# Patient Record
Sex: FEMALE | Race: WHITE | Employment: OTHER | URBAN - METROPOLITAN AREA
[De-identification: names, ages, dates, MRNs, and addresses within clinical notes are randomized per-mention and may not be internally consistent; named-entity substitution may affect disease eponyms.]

---

## 2020-09-05 ENCOUNTER — APPOINTMENT (OUTPATIENT)
Dept: GENERAL RADIOLOGY | Facility: CLINIC | Age: 75
DRG: 003 | End: 2020-09-05
Attending: EMERGENCY MEDICINE
Payer: MEDICARE

## 2020-09-05 ENCOUNTER — APPOINTMENT (OUTPATIENT)
Dept: MRI IMAGING | Facility: CLINIC | Age: 75
DRG: 003 | End: 2020-09-05
Attending: EMERGENCY MEDICINE
Payer: MEDICARE

## 2020-09-05 ENCOUNTER — APPOINTMENT (OUTPATIENT)
Dept: CT IMAGING | Facility: CLINIC | Age: 75
DRG: 003 | End: 2020-09-05
Attending: EMERGENCY MEDICINE
Payer: MEDICARE

## 2020-09-05 ENCOUNTER — HOSPITAL ENCOUNTER (INPATIENT)
Facility: CLINIC | Age: 75
LOS: 30 days | Discharge: NURSING, REHAB OR LONG-TERM CARE | DRG: 003 | End: 2020-10-05
Attending: EMERGENCY MEDICINE | Admitting: HOSPITALIST
Payer: MEDICARE

## 2020-09-05 DIAGNOSIS — R79.89 TROPONIN LEVEL ELEVATED: ICD-10-CM

## 2020-09-05 DIAGNOSIS — G93.40 ACUTE ENCEPHALOPATHY: ICD-10-CM

## 2020-09-05 DIAGNOSIS — N39.0 URINARY TRACT INFECTION WITHOUT HEMATURIA, SITE UNSPECIFIED: ICD-10-CM

## 2020-09-05 DIAGNOSIS — R41.82 ALTERED MENTAL STATUS, UNSPECIFIED ALTERED MENTAL STATUS TYPE: ICD-10-CM

## 2020-09-05 DIAGNOSIS — I63.522: Primary | ICD-10-CM

## 2020-09-05 DIAGNOSIS — I16.0 HYPERTENSIVE URGENCY: ICD-10-CM

## 2020-09-05 LAB
ALBUMIN SERPL-MCNC: 4.1 G/DL (ref 3.4–5)
ALBUMIN UR-MCNC: 10 MG/DL
ALP SERPL-CCNC: 99 U/L (ref 40–150)
ALT SERPL W P-5'-P-CCNC: 23 U/L (ref 0–50)
ANION GAP SERPL CALCULATED.3IONS-SCNC: 7 MMOL/L (ref 3–14)
APPEARANCE UR: ABNORMAL
AST SERPL W P-5'-P-CCNC: 20 U/L (ref 0–45)
BACTERIA #/AREA URNS HPF: ABNORMAL /HPF
BASOPHILS # BLD AUTO: 0 10E9/L (ref 0–0.2)
BASOPHILS NFR BLD AUTO: 0.3 %
BILIRUB DIRECT SERPL-MCNC: 0.2 MG/DL (ref 0–0.2)
BILIRUB SERPL-MCNC: 0.6 MG/DL (ref 0.2–1.3)
BILIRUB UR QL STRIP: NEGATIVE
BUN SERPL-MCNC: 20 MG/DL (ref 7–30)
CALCIUM SERPL-MCNC: 9.7 MG/DL (ref 8.5–10.1)
CHLORIDE SERPL-SCNC: 109 MMOL/L (ref 94–109)
CHOLEST SERPL-MCNC: 186 MG/DL
CO2 SERPL-SCNC: 28 MMOL/L (ref 20–32)
COLOR UR AUTO: YELLOW
CREAT BLD-MCNC: 0.8 MG/DL (ref 0.52–1.04)
CREAT SERPL-MCNC: 0.82 MG/DL (ref 0.52–1.04)
DIFFERENTIAL METHOD BLD: ABNORMAL
EOSINOPHIL # BLD AUTO: 0 10E9/L (ref 0–0.7)
EOSINOPHIL NFR BLD AUTO: 0.2 %
ERYTHROCYTE [DISTWIDTH] IN BLOOD BY AUTOMATED COUNT: 13.3 % (ref 10–15)
GFR SERPL CREATININE-BSD FRML MDRD: 69 ML/MIN/{1.73_M2}
GFR SERPL CREATININE-BSD FRML MDRD: 70 ML/MIN/{1.73_M2}
GLUCOSE BLDC GLUCOMTR-MCNC: 101 MG/DL (ref 70–99)
GLUCOSE SERPL-MCNC: 111 MG/DL (ref 70–99)
GLUCOSE UR STRIP-MCNC: NEGATIVE MG/DL
HBA1C MFR BLD: 5.5 % (ref 0–5.6)
HCT VFR BLD AUTO: 47.8 % (ref 35–47)
HDLC SERPL-MCNC: 87 MG/DL
HGB BLD-MCNC: 15.8 G/DL (ref 11.7–15.7)
HGB UR QL STRIP: NEGATIVE
IMM GRANULOCYTES # BLD: 0 10E9/L (ref 0–0.4)
IMM GRANULOCYTES NFR BLD: 0.3 %
INTERPRETATION ECG - MUSE: NORMAL
KETONES UR STRIP-MCNC: 40 MG/DL
LDLC SERPL CALC-MCNC: 81 MG/DL
LEUKOCYTE ESTERASE UR QL STRIP: ABNORMAL
LYMPHOCYTES # BLD AUTO: 1.7 10E9/L (ref 0.8–5.3)
LYMPHOCYTES NFR BLD AUTO: 17.9 %
MCH RBC QN AUTO: 32.1 PG (ref 26.5–33)
MCHC RBC AUTO-ENTMCNC: 33.1 G/DL (ref 31.5–36.5)
MCV RBC AUTO: 97 FL (ref 78–100)
MONOCYTES # BLD AUTO: 0.4 10E9/L (ref 0–1.3)
MONOCYTES NFR BLD AUTO: 4.6 %
NEUTROPHILS # BLD AUTO: 7.3 10E9/L (ref 1.6–8.3)
NEUTROPHILS NFR BLD AUTO: 76.7 %
NITRATE UR QL: POSITIVE
NONHDLC SERPL-MCNC: 99 MG/DL
NRBC # BLD AUTO: 0 10*3/UL
NRBC BLD AUTO-RTO: 0 /100
PH UR STRIP: 6 PH (ref 5–7)
PLATELET # BLD AUTO: 239 10E9/L (ref 150–450)
POTASSIUM SERPL-SCNC: 3.3 MMOL/L (ref 3.4–5.3)
PROT SERPL-MCNC: 7.3 G/DL (ref 6.8–8.8)
RBC # BLD AUTO: 4.92 10E12/L (ref 3.8–5.2)
RBC #/AREA URNS AUTO: 2 /HPF (ref 0–2)
SODIUM SERPL-SCNC: 144 MMOL/L (ref 133–144)
SOURCE: ABNORMAL
SP GR UR STRIP: >1.035 (ref 1–1.03)
SQUAMOUS #/AREA URNS AUTO: 2 /HPF (ref 0–1)
TRIGL SERPL-MCNC: 89 MG/DL
TROPONIN I SERPL-MCNC: 0.05 UG/L (ref 0–0.04)
TROPONIN I SERPL-MCNC: 0.07 UG/L (ref 0–0.04)
TSH SERPL DL<=0.005 MIU/L-ACNC: 2.04 MU/L (ref 0.4–4)
UROBILINOGEN UR STRIP-MCNC: NORMAL MG/DL (ref 0–2)
WBC # BLD AUTO: 9.5 10E9/L (ref 4–11)
WBC #/AREA URNS AUTO: 93 /HPF (ref 0–5)

## 2020-09-05 PROCEDURE — 25000128 H RX IP 250 OP 636: Performed by: HOSPITALIST

## 2020-09-05 PROCEDURE — 25000125 ZZHC RX 250: Performed by: EMERGENCY MEDICINE

## 2020-09-05 PROCEDURE — 84443 ASSAY THYROID STIM HORMONE: CPT | Performed by: HOSPITALIST

## 2020-09-05 PROCEDURE — 25800030 ZZH RX IP 258 OP 636: Performed by: HOSPITALIST

## 2020-09-05 PROCEDURE — 96374 THER/PROPH/DIAG INJ IV PUSH: CPT | Mod: 59

## 2020-09-05 PROCEDURE — 85025 COMPLETE CBC W/AUTO DIFF WBC: CPT | Performed by: EMERGENCY MEDICINE

## 2020-09-05 PROCEDURE — 93005 ELECTROCARDIOGRAM TRACING: CPT

## 2020-09-05 PROCEDURE — 87086 URINE CULTURE/COLONY COUNT: CPT | Performed by: EMERGENCY MEDICINE

## 2020-09-05 PROCEDURE — 36415 COLL VENOUS BLD VENIPUNCTURE: CPT | Performed by: HOSPITALIST

## 2020-09-05 PROCEDURE — 70498 CT ANGIOGRAPHY NECK: CPT

## 2020-09-05 PROCEDURE — 25000128 H RX IP 250 OP 636: Performed by: EMERGENCY MEDICINE

## 2020-09-05 PROCEDURE — 84484 ASSAY OF TROPONIN QUANT: CPT | Performed by: EMERGENCY MEDICINE

## 2020-09-05 PROCEDURE — 84484 ASSAY OF TROPONIN QUANT: CPT | Performed by: HOSPITALIST

## 2020-09-05 PROCEDURE — U0003 INFECTIOUS AGENT DETECTION BY NUCLEIC ACID (DNA OR RNA); SEVERE ACUTE RESPIRATORY SYNDROME CORONAVIRUS 2 (SARS-COV-2) (CORONAVIRUS DISEASE [COVID-19]), AMPLIFIED PROBE TECHNIQUE, MAKING USE OF HIGH THROUGHPUT TECHNOLOGIES AS DESCRIBED BY CMS-2020-01-R: HCPCS | Performed by: EMERGENCY MEDICINE

## 2020-09-05 PROCEDURE — 80061 LIPID PANEL: CPT | Performed by: EMERGENCY MEDICINE

## 2020-09-05 PROCEDURE — 87088 URINE BACTERIA CULTURE: CPT | Performed by: EMERGENCY MEDICINE

## 2020-09-05 PROCEDURE — 84443 ASSAY THYROID STIM HORMONE: CPT | Performed by: EMERGENCY MEDICINE

## 2020-09-05 PROCEDURE — 99285 EMERGENCY DEPT VISIT HI MDM: CPT | Mod: 25

## 2020-09-05 PROCEDURE — 87186 SC STD MICRODIL/AGAR DIL: CPT | Performed by: EMERGENCY MEDICINE

## 2020-09-05 PROCEDURE — 00000146 ZZHCL STATISTIC GLUCOSE BY METER IP

## 2020-09-05 PROCEDURE — 82565 ASSAY OF CREATININE: CPT

## 2020-09-05 PROCEDURE — 70450 CT HEAD/BRAIN W/O DYE: CPT

## 2020-09-05 PROCEDURE — 99223 1ST HOSP IP/OBS HIGH 75: CPT | Mod: AI | Performed by: HOSPITALIST

## 2020-09-05 PROCEDURE — 96376 TX/PRO/DX INJ SAME DRUG ADON: CPT

## 2020-09-05 PROCEDURE — 83036 HEMOGLOBIN GLYCOSYLATED A1C: CPT | Performed by: EMERGENCY MEDICINE

## 2020-09-05 PROCEDURE — 71046 X-RAY EXAM CHEST 2 VIEWS: CPT

## 2020-09-05 PROCEDURE — 81001 URINALYSIS AUTO W/SCOPE: CPT | Performed by: EMERGENCY MEDICINE

## 2020-09-05 PROCEDURE — 25000132 ZZH RX MED GY IP 250 OP 250 PS 637: Performed by: EMERGENCY MEDICINE

## 2020-09-05 PROCEDURE — 70551 MRI BRAIN STEM W/O DYE: CPT

## 2020-09-05 PROCEDURE — 12000000 ZZH R&B MED SURG/OB

## 2020-09-05 PROCEDURE — 80076 HEPATIC FUNCTION PANEL: CPT | Performed by: EMERGENCY MEDICINE

## 2020-09-05 PROCEDURE — C9803 HOPD COVID-19 SPEC COLLECT: HCPCS

## 2020-09-05 PROCEDURE — 80048 BASIC METABOLIC PNL TOTAL CA: CPT | Performed by: EMERGENCY MEDICINE

## 2020-09-05 RX ORDER — PROCHLORPERAZINE MALEATE 5 MG
5 TABLET ORAL EVERY 6 HOURS PRN
Status: DISCONTINUED | OUTPATIENT
Start: 2020-09-05 | End: 2020-10-05 | Stop reason: HOSPADM

## 2020-09-05 RX ORDER — POTASSIUM CHLORIDE 29.8 MG/ML
20 INJECTION INTRAVENOUS
Status: DISCONTINUED | OUTPATIENT
Start: 2020-09-05 | End: 2020-09-06 | Stop reason: CLARIF

## 2020-09-05 RX ORDER — ASPIRIN 325 MG
325 TABLET ORAL ONCE
Status: COMPLETED | OUTPATIENT
Start: 2020-09-05 | End: 2020-09-05

## 2020-09-05 RX ORDER — POTASSIUM CL/LIDO/0.9 % NACL 10MEQ/0.1L
10 INTRAVENOUS SOLUTION, PIGGYBACK (ML) INTRAVENOUS
Status: DISCONTINUED | OUTPATIENT
Start: 2020-09-05 | End: 2020-10-05 | Stop reason: HOSPADM

## 2020-09-05 RX ORDER — PROCHLORPERAZINE 25 MG
12.5 SUPPOSITORY, RECTAL RECTAL EVERY 12 HOURS PRN
Status: DISCONTINUED | OUTPATIENT
Start: 2020-09-05 | End: 2020-10-05 | Stop reason: HOSPADM

## 2020-09-05 RX ORDER — ONDANSETRON 2 MG/ML
4 INJECTION INTRAMUSCULAR; INTRAVENOUS EVERY 6 HOURS PRN
Status: DISCONTINUED | OUTPATIENT
Start: 2020-09-05 | End: 2020-10-05 | Stop reason: HOSPADM

## 2020-09-05 RX ORDER — LABETALOL HYDROCHLORIDE 5 MG/ML
20 INJECTION, SOLUTION INTRAVENOUS ONCE
Status: COMPLETED | OUTPATIENT
Start: 2020-09-05 | End: 2020-09-05

## 2020-09-05 RX ORDER — IOPAMIDOL 755 MG/ML
90 INJECTION, SOLUTION INTRAVASCULAR ONCE
Status: COMPLETED | OUTPATIENT
Start: 2020-09-05 | End: 2020-09-05

## 2020-09-05 RX ORDER — ASPIRIN 300 MG/1
300 SUPPOSITORY RECTAL DAILY
Status: DISCONTINUED | OUTPATIENT
Start: 2020-09-06 | End: 2020-09-11 | Stop reason: ALTCHOICE

## 2020-09-05 RX ORDER — POLYETHYLENE GLYCOL 3350 17 G/17G
17 POWDER, FOR SOLUTION ORAL DAILY PRN
Status: DISCONTINUED | OUTPATIENT
Start: 2020-09-05 | End: 2020-10-05 | Stop reason: HOSPADM

## 2020-09-05 RX ORDER — AMOXICILLIN 250 MG
1 CAPSULE ORAL 2 TIMES DAILY PRN
Status: DISCONTINUED | OUTPATIENT
Start: 2020-09-05 | End: 2020-10-05 | Stop reason: HOSPADM

## 2020-09-05 RX ORDER — SODIUM CHLORIDE 9 MG/ML
INJECTION, SOLUTION INTRAVENOUS CONTINUOUS
Status: DISCONTINUED | OUTPATIENT
Start: 2020-09-05 | End: 2020-09-07

## 2020-09-05 RX ORDER — MULTIVITAMIN,THERAPEUTIC
1 TABLET ORAL DAILY
Status: ON HOLD | COMMUNITY
End: 2020-10-05

## 2020-09-05 RX ORDER — HYDRALAZINE HYDROCHLORIDE 20 MG/ML
10-20 INJECTION INTRAMUSCULAR; INTRAVENOUS
Status: DISCONTINUED | OUTPATIENT
Start: 2020-09-05 | End: 2020-09-09

## 2020-09-05 RX ORDER — POTASSIUM CHLORIDE 1.5 G/1.58G
20-40 POWDER, FOR SOLUTION ORAL
Status: DISCONTINUED | OUTPATIENT
Start: 2020-09-05 | End: 2020-10-05 | Stop reason: HOSPADM

## 2020-09-05 RX ORDER — LIDOCAINE 40 MG/G
CREAM TOPICAL
Status: DISCONTINUED | OUTPATIENT
Start: 2020-09-05 | End: 2020-10-05 | Stop reason: HOSPADM

## 2020-09-05 RX ORDER — ACETAMINOPHEN 325 MG/1
650 TABLET ORAL EVERY 4 HOURS PRN
Status: DISCONTINUED | OUTPATIENT
Start: 2020-09-05 | End: 2020-09-18

## 2020-09-05 RX ORDER — ATORVASTATIN CALCIUM 20 MG/1
40 TABLET, FILM COATED ORAL
Status: DISCONTINUED | OUTPATIENT
Start: 2020-09-05 | End: 2020-10-05 | Stop reason: HOSPADM

## 2020-09-05 RX ORDER — NALOXONE HYDROCHLORIDE 0.4 MG/ML
.1-.4 INJECTION, SOLUTION INTRAMUSCULAR; INTRAVENOUS; SUBCUTANEOUS
Status: DISCONTINUED | OUTPATIENT
Start: 2020-09-05 | End: 2020-10-05 | Stop reason: HOSPADM

## 2020-09-05 RX ORDER — POTASSIUM CHLORIDE 7.45 MG/ML
10 INJECTION INTRAVENOUS
Status: DISCONTINUED | OUTPATIENT
Start: 2020-09-05 | End: 2020-10-05 | Stop reason: HOSPADM

## 2020-09-05 RX ORDER — VITC/E/ZINC/COPPER/LUTEIN/ZEAX 250 MG-200
1 TABLET,CHEWABLE ORAL DAILY
Status: ON HOLD | COMMUNITY
End: 2020-10-05

## 2020-09-05 RX ORDER — ONDANSETRON 4 MG/1
4 TABLET, ORALLY DISINTEGRATING ORAL EVERY 6 HOURS PRN
Status: DISCONTINUED | OUTPATIENT
Start: 2020-09-05 | End: 2020-10-05 | Stop reason: HOSPADM

## 2020-09-05 RX ORDER — AMOXICILLIN 250 MG
2 CAPSULE ORAL 2 TIMES DAILY PRN
Status: DISCONTINUED | OUTPATIENT
Start: 2020-09-05 | End: 2020-10-05 | Stop reason: HOSPADM

## 2020-09-05 RX ORDER — LABETALOL HYDROCHLORIDE 5 MG/ML
10-40 INJECTION, SOLUTION INTRAVENOUS EVERY 10 MIN PRN
Status: DISCONTINUED | OUTPATIENT
Start: 2020-09-05 | End: 2020-09-09

## 2020-09-05 RX ORDER — CEFTRIAXONE 1 G/1
1 INJECTION, POWDER, FOR SOLUTION INTRAMUSCULAR; INTRAVENOUS EVERY 24 HOURS
Status: DISCONTINUED | OUTPATIENT
Start: 2020-09-05 | End: 2020-09-11

## 2020-09-05 RX ORDER — BISACODYL 10 MG
10 SUPPOSITORY, RECTAL RECTAL DAILY PRN
Status: DISCONTINUED | OUTPATIENT
Start: 2020-09-05 | End: 2020-10-05 | Stop reason: HOSPADM

## 2020-09-05 RX ORDER — POTASSIUM CHLORIDE 1500 MG/1
20-40 TABLET, EXTENDED RELEASE ORAL
Status: DISCONTINUED | OUTPATIENT
Start: 2020-09-05 | End: 2020-10-05 | Stop reason: HOSPADM

## 2020-09-05 RX ORDER — ACETAMINOPHEN 650 MG/1
650 SUPPOSITORY RECTAL EVERY 4 HOURS PRN
Status: DISCONTINUED | OUTPATIENT
Start: 2020-09-05 | End: 2020-09-18

## 2020-09-05 RX ADMIN — CEFTRIAXONE SODIUM 1 G: 1 INJECTION, POWDER, FOR SOLUTION INTRAMUSCULAR; INTRAVENOUS at 20:59

## 2020-09-05 RX ADMIN — LABETALOL HYDROCHLORIDE 20 MG: 5 INJECTION, SOLUTION INTRAVENOUS at 13:33

## 2020-09-05 RX ADMIN — IOPAMIDOL 90 ML: 755 INJECTION, SOLUTION INTRAVENOUS at 14:31

## 2020-09-05 RX ADMIN — SODIUM CHLORIDE 70 ML: 9 INJECTION, SOLUTION INTRAVENOUS at 14:31

## 2020-09-05 RX ADMIN — SODIUM CHLORIDE: 9 INJECTION, SOLUTION INTRAVENOUS at 21:00

## 2020-09-05 RX ADMIN — Medication 10 MEQ: at 23:01

## 2020-09-05 RX ADMIN — ASPIRIN 325 MG ORAL TABLET 325 MG: 325 PILL ORAL at 15:11

## 2020-09-05 RX ADMIN — LABETALOL HYDROCHLORIDE 20 MG: 5 INJECTION, SOLUTION INTRAVENOUS at 15:32

## 2020-09-05 ASSESSMENT — ENCOUNTER SYMPTOMS
ABDOMINAL PAIN: 0
DIARRHEA: 0
COUGH: 0
FEVER: 0
HEADACHES: 0
CONFUSION: 1

## 2020-09-05 ASSESSMENT — ACTIVITIES OF DAILY LIVING (ADL): ADLS_ACUITY_SCORE: 14

## 2020-09-05 NOTE — PROGRESS NOTES
RECEIVING UNIT ED HANDOFF REVIEW    ED Nurse Handoff Report was reviewed by: Kate Gorman, RN on September 5, 2020 at 5:30 PM

## 2020-09-05 NOTE — ED PROVIDER NOTES
History     Chief Complaint:  Altered Mental Status    The history is provided by the patient and the spouse.      Sherrell Leonard is a 74 year old female, accompanied by her , who presents with mild confusion and unsteady gait which has been unchanged the past few days (3 days per ). She denies any headache, chest pain, abdominal pain, fever, cough, diarrhea, or cold like symptoms.  No vision changes or numbness or weakness in the extremities.  She is not anticoagulated. She does not have a history of stroke.    Allergies:  NKDA     Medications:    The patient is not currently taking any prescribed medications.    Past Medical History:    History reviewed.  No pertinent past medical history.    Past Surgical History:    The patient does not have any pertinent past surgical history.     Family History:    History reviewed. No pertinent family history.     Social History:  Patient was accompanied by her .      Review of Systems   Constitutional: Negative for fever.   Respiratory: Negative for cough.    Cardiovascular: Negative for chest pain.   Gastrointestinal: Negative for abdominal pain and diarrhea.   Neurological: Negative for headaches.   Psychiatric/Behavioral: Positive for confusion.   All other systems reviewed and are negative.    Physical Exam     Patient Vitals for the past 24 hrs:   BP Temp Temp src Pulse Resp SpO2 Weight   09/05/20 1500 (!) 212/108 -- -- 92 17 95 % --   09/05/20 1400 (!) 183/89 -- -- 83 15 92 % --   09/05/20 1345 (!) 185/96 -- -- 84 24 92 % --   09/05/20 1330 (!) 221/119 -- -- 99 -- -- --   09/05/20 1315 (!) 203/112 -- -- 78 9 94 % --   09/05/20 1300 (!) 207/100 -- -- 81 20 95 % --   09/05/20 1245 (!) 226/109 -- -- 96 12 94 % --   09/05/20 1240 (!) 213/94 -- -- -- -- -- --   09/05/20 1239 (!) 200/92 98  F (36.7  C) Oral -- 16 95 % 63.5 kg (140 lb)       Physical Exam  VS: Reviewed per above  HENT: Mucous membranes moist, no nuchal rigidity  EYES: sclera anicteric  CV:  Rate as noted, regular rhythm.   RESP: Effort normal. Breath sounds are normal bilaterally.  GI: no tenderness/rebound/guarding, not distended.  NEURO: GCS 15, cranial nerves II through XII are intact, 5 out of 5 strength in all 4 extremities, sensation is intact light touch in all 4 extremities  MSK: No deformity of the extremities  SKIN: Warm and dry    Emergency Department Course   ECG (13:42:17):  Rate 85 bpm. MI interval 212. QRS duration 138. QT/QTc 432/514. P-R-T axes 59 -60 102. Sinus rhythm with 1st degree AV block. Biatrial enlargement. Left axis deviation. Left bundle branch block. Abnormal ECG. Interpreted at 1346 by Martin Bell MD.    Imaging:  Radiology findings were communicated with the patient and  who voiced understanding of the findings.    Head CT w/o Contrast  Patchy and confluent white matter hypoattenuation likely   representing advanced chronic small vessel ischemic change. This is   most focal involving the left basal ganglia/centrum semiovale.   Ischemia cannot be completely excluded. MRI could be performed if   indicated.  As read by Radiology.    CTA Head Neck w Contrast  CTA Head: Multiple intracranial stenoses including high-grade stenosis   of the left middle cerebral artery M1 segment.   CTA Neck: No evidence of flow limiting stenosis at the carotid  As read by Radiology.    Chest XR 2 Views  Two views of the chest. Mild interstitial lung changes are   noted bilaterally. No focal infiltrate or lung consolidation is   appreciated. Lungs are hypoaerated. Heart size appears within normal   limits. No pneumothorax or pleural effusions  As read by Radiology.    Laboratory:  Laboratory findings were communicated with the patient and  who voiced understanding of the findings.    Troponin I 1256: 0.051(H)    BMP: Potassium 3.3(L), Glucose 111(H), o/w WNL (Creatinine 0.82)    CBC: HGB 15.9(H), o/w WNL (WBC 9.5, )    UA with micro: Urineketon: 40, Gravity: >1.035 (H),  Protein albumin: 10, Nitrite: Positive, WBC: 93 (H), bacteria: few, o/w negative     Creatinine POCT: Creatinine 0.8, GFR estimate 70    Asymptomatic Covid PCR: Pending    Interventions:  Medications   labetalol (NORMODYNE/TRANDATE) injection 20 mg (20 mg Intravenous Given 9/5/20 1333)   Saline (70 mLs As instructed Given 9/5/20 1431)   iopamidol (ISOVUE-370) solution 90 mL (90 mLs Intravenous Given 9/5/20 1431)   aspirin (ASA) tablet 325 mg (325 mg Oral Given 9/5/20 1511)   labetalol (NORMODYNE/TRANDATE) injection 20 mg (20 mg Intravenous Given 9/5/20 1532)       Emergency Department Course:  Past medical records, nursing notes, and vitals reviewed.  1244: I performed an exam of the patient and obtained history, as documented above.     EKG was obtained and reviewed in the emergency department.    IV inserted and blood drawn.    Urinalysis and culture obtained and sent for evaluation.    The patient was sent for a head CT and CTA and chest XR while in the emergency department, results above.     1336: I rechecked the patient.    I rechecked the patient. I reviewed the results with the Patient and  and answered all related questions prior to admission.     (1530)  I consulted with Dr. Noriega of the hospitalist services. They are in agreement to accept the patient for admission.    (1538) I consulted with Dr. Mancia, Stroke Neurology Fellow, regarding the patient's history and presentation here in the emergency department.    Findings and plan explained to the Patient and  who consents to admission. Discussed the patient with Dr. Noriega, who will [place] the patient [place] [facility] [place] further monitoring, evaluation, and treatment.    Impression & Plan     Medical Decision Making:  Patient presents to the ER for evaluation of confusion, unsteady gait over the past few days.  On arrival vital signs are normal for blood pressure of 200s over 100s.  She is afebrile.  On exam she has no appreciable  neurological deficits on the gurney but is apparently unsteady while ambulating in the ER without falling to one side of the other.  She is confused about the date small details but otherwise seems relatively coherent.  She denies any other symptoms.  CT of the head without intracranial hemorrhage but there is nonspecific white matter hypoattenuation specifically in the basal ganglia and there are scattered intracranial stenoses worse in the left MCA.  Discussed with stroke neurology with plans for MRI to evaluate for processes such as pres or occult CVA.  EKG is rhythm with left bundle branch block that prior for comparison but no high risk features by Sgarbossa criteria.  Troponin level is slightly elevated at 0.05 but she has no chest pain or shortness of breath. She was given aspirin.  Chest x-ray without widened mediastinum suggest aortic dissection.  Suspect troponin elevation is due to demand ischemia in the setting of elevated blood pressure.  Patient was given serial boluses of labetalol 20 mg for blood pressure control.  At signout to my colleague, patient was awaiting inpatient bed with plans for MRI follow-up as inpatient.      Covid-19  Sherrell Leonard was evaluated during a global COVID-19 pandemic, which necessitated consideration that the patient might be at risk for infection with the SARS-CoV-2 virus that causes COVID-19.   Applicable protocols for evaluation were followed during the patient's care.   COVID-19 was considered as part of the patient's evaluation. The plan for testing is:  a test was obtained during this visit.    Diagnosis:    ICD-10-CM    1. Altered mental status, unspecified altered mental status type  R41.82 UA with Microscopic   2. Urinary tract infection without hematuria, site unspecified  N39.0    3. Hypertensive urgency  I16.0    4. Troponin level elevated  R79.89        Disposition:  Admitted to Dr. Noriega.    Roxy Schneider  9/5/2020    EMERGENCY DEPARTMENT    Scribe  Disclosure:  I, Roxy Schneider, am serving as a scribe at 12:42 PM on 9/5/2020 to document services personally performed by Martin Bell MD based on my observations and the provider's statements to me.        Martin Bell MD  09/05/20 1551       Martin Bell MD  09/05/20 1947

## 2020-09-05 NOTE — ED NOTES
[facility]  ED Nurse Handoff Report    ED Chief complaint: Altered Mental Status      ED Diagnosis:   Final diagnoses:   Altered mental status, unspecified altered mental status type   Urinary tract infection without hematuria, site unspecified   Hypertensive urgency   Troponin level elevated       Code Status: See prior    Allergies: No Known Allergies    Patient Story: Pt here for confusion and dizziness for past three days.   Focused Assessment:  Pt Alert, follows commands. Hypertensive 212 systolic. Given IV meds now 169/105. Needs MRI. UA appears dirty. Ct neg for bleed. Labs WNL.     Treatments and/or interventions provided: See mar for hypertensive medications given.  Patient's response to treatments and/or interventions:blood pressure now 169 systolic.    To be done/followed up on inpatient unit:  na    Does this patient have any cognitive concerns?: Disoriented to time    Activity level - Baseline/Home:  Independent  Activity Level - Current:   Unknown    Patient's Preferred language: English   Needed?: No    Isolation: None  Infection: Not Applicable  Bariatric?: No    Vital Signs:   Vitals:    09/05/20 1345 09/05/20 1400 09/05/20 1500 09/05/20 1600   BP: (!) 185/96 (!) 183/89 (!) 212/108 (!) 169/105   Pulse: 84 83 92 82   Resp: 24 15 17    Temp:       TempSrc:       SpO2: 92% 92% 95% 93%   Weight:           Cardiac Rhythm:     Was the PSS-3 completed:   Yes  What interventions are required if any?               Family Comments: na  OBS brochure/video discussed/provided to patient/family: N/A              Name of person given brochure if not patient: na              Relationship to patient: a    For the majority of the shift this patient's behavior was Green.   Behavioral interventions performed were na.    ED NURSE PHONE NUMBER:[id number]

## 2020-09-05 NOTE — PHARMACY-ADMISSION MEDICATION HISTORY
Pharmacy Medication History  Admission medication history interview status for the 9/5/2020  admission is complete. See EPIC admission navigator for prior to admission medications     Medication history sources: Patient's family/friend ()  Medication history source reliability: Good  Adherence assessment: Good    Medication reconciliation completed by provider prior to medication history? No    Time spent in this activity: 5 min      Prior to Admission medications    Medication Sig Last Dose Taking? Auth Provider   Multiple Vitamins-Minerals (SYSTANE ICAPS AREDS2) CAPS Take 1 capsule by mouth daily 9/5/2020 at Unknown time Yes Unknown, Entered By History   multivitamin, therapeutic (THERA-VIT) TABS tablet Take 1 tablet by mouth daily 9/5/2020 at Unknown time Yes Unknown, Entered By History

## 2020-09-06 ENCOUNTER — APPOINTMENT (OUTPATIENT)
Dept: CARDIOLOGY | Facility: CLINIC | Age: 75
DRG: 003 | End: 2020-09-06
Attending: HOSPITALIST
Payer: MEDICARE

## 2020-09-06 ENCOUNTER — APPOINTMENT (OUTPATIENT)
Dept: MRI IMAGING | Facility: CLINIC | Age: 75
DRG: 003 | End: 2020-09-06
Attending: PSYCHIATRY & NEUROLOGY
Payer: MEDICARE

## 2020-09-06 ENCOUNTER — APPOINTMENT (OUTPATIENT)
Dept: PHYSICAL THERAPY | Facility: CLINIC | Age: 75
DRG: 003 | End: 2020-09-06
Attending: HOSPITALIST
Payer: MEDICARE

## 2020-09-06 ENCOUNTER — APPOINTMENT (OUTPATIENT)
Dept: OCCUPATIONAL THERAPY | Facility: CLINIC | Age: 75
DRG: 003 | End: 2020-09-06
Attending: HOSPITALIST
Payer: MEDICARE

## 2020-09-06 ENCOUNTER — APPOINTMENT (OUTPATIENT)
Dept: SPEECH THERAPY | Facility: CLINIC | Age: 75
DRG: 003 | End: 2020-09-06
Attending: HOSPITALIST
Payer: MEDICARE

## 2020-09-06 LAB
ANION GAP SERPL CALCULATED.3IONS-SCNC: 6 MMOL/L (ref 3–14)
BUN SERPL-MCNC: 13 MG/DL (ref 7–30)
CALCIUM SERPL-MCNC: 8.7 MG/DL (ref 8.5–10.1)
CHLORIDE SERPL-SCNC: 112 MMOL/L (ref 94–109)
CO2 SERPL-SCNC: 26 MMOL/L (ref 20–32)
CREAT SERPL-MCNC: 0.66 MG/DL (ref 0.52–1.04)
ERYTHROCYTE [DISTWIDTH] IN BLOOD BY AUTOMATED COUNT: 13.5 % (ref 10–15)
GFR SERPL CREATININE-BSD FRML MDRD: 86 ML/MIN/{1.73_M2}
GLUCOSE BLDC GLUCOMTR-MCNC: 102 MG/DL (ref 70–99)
GLUCOSE SERPL-MCNC: 89 MG/DL (ref 70–99)
HCT VFR BLD AUTO: 44.8 % (ref 35–47)
HGB BLD-MCNC: 14.4 G/DL (ref 11.7–15.7)
MCH RBC QN AUTO: 31.3 PG (ref 26.5–33)
MCHC RBC AUTO-ENTMCNC: 32.1 G/DL (ref 31.5–36.5)
MCV RBC AUTO: 97 FL (ref 78–100)
PLATELET # BLD AUTO: 215 10E9/L (ref 150–450)
POTASSIUM SERPL-SCNC: 3.4 MMOL/L (ref 3.4–5.3)
RBC # BLD AUTO: 4.6 10E12/L (ref 3.8–5.2)
SARS-COV-2 RNA SPEC QL NAA+PROBE: NOT DETECTED
SODIUM SERPL-SCNC: 144 MMOL/L (ref 133–144)
SPECIMEN SOURCE: NORMAL
TROPONIN I SERPL-MCNC: 0.07 UG/L (ref 0–0.04)
TROPONIN I SERPL-MCNC: 0.08 UG/L (ref 0–0.04)
WBC # BLD AUTO: 7.8 10E9/L (ref 4–11)

## 2020-09-06 PROCEDURE — 92526 ORAL FUNCTION THERAPY: CPT | Mod: GN | Performed by: SPEECH-LANGUAGE PATHOLOGIST

## 2020-09-06 PROCEDURE — 99223 1ST HOSP IP/OBS HIGH 75: CPT | Performed by: PSYCHIATRY & NEUROLOGY

## 2020-09-06 PROCEDURE — 25500064 ZZH RX 255 OP 636: Performed by: HOSPITALIST

## 2020-09-06 PROCEDURE — 97161 PT EVAL LOW COMPLEX 20 MIN: CPT | Mod: GP | Performed by: PHYSICAL THERAPIST

## 2020-09-06 PROCEDURE — 00000146 ZZHCL STATISTIC GLUCOSE BY METER IP

## 2020-09-06 PROCEDURE — 12000000 ZZH R&B MED SURG/OB

## 2020-09-06 PROCEDURE — 85027 COMPLETE CBC AUTOMATED: CPT | Performed by: HOSPITALIST

## 2020-09-06 PROCEDURE — 97116 GAIT TRAINING THERAPY: CPT | Mod: GP | Performed by: PHYSICAL THERAPIST

## 2020-09-06 PROCEDURE — 93306 TTE W/DOPPLER COMPLETE: CPT | Mod: 26 | Performed by: INTERNAL MEDICINE

## 2020-09-06 PROCEDURE — 99233 SBSQ HOSP IP/OBS HIGH 50: CPT | Performed by: INTERNAL MEDICINE

## 2020-09-06 PROCEDURE — 36415 COLL VENOUS BLD VENIPUNCTURE: CPT | Performed by: HOSPITALIST

## 2020-09-06 PROCEDURE — 80048 BASIC METABOLIC PNL TOTAL CA: CPT | Performed by: HOSPITALIST

## 2020-09-06 PROCEDURE — 70553 MRI BRAIN STEM W/O & W/DYE: CPT

## 2020-09-06 PROCEDURE — 25800030 ZZH RX IP 258 OP 636: Performed by: HOSPITALIST

## 2020-09-06 PROCEDURE — 93306 TTE W/DOPPLER COMPLETE: CPT

## 2020-09-06 PROCEDURE — 97166 OT EVAL MOD COMPLEX 45 MIN: CPT | Mod: GO | Performed by: OCCUPATIONAL THERAPIST

## 2020-09-06 PROCEDURE — 99207 ZZC CONSULT E&M CHANGED TO INITIAL LEVEL: CPT | Performed by: PSYCHIATRY & NEUROLOGY

## 2020-09-06 PROCEDURE — 92610 EVALUATE SWALLOWING FUNCTION: CPT | Mod: GN | Performed by: SPEECH-LANGUAGE PATHOLOGIST

## 2020-09-06 PROCEDURE — 97530 THERAPEUTIC ACTIVITIES: CPT | Mod: GP | Performed by: PHYSICAL THERAPIST

## 2020-09-06 PROCEDURE — A9585 GADOBUTROL INJECTION: HCPCS | Performed by: HOSPITALIST

## 2020-09-06 PROCEDURE — 25000132 ZZH RX MED GY IP 250 OP 250 PS 637: Performed by: HOSPITALIST

## 2020-09-06 PROCEDURE — 25000128 H RX IP 250 OP 636: Performed by: HOSPITALIST

## 2020-09-06 PROCEDURE — 97535 SELF CARE MNGMENT TRAINING: CPT | Mod: GO | Performed by: OCCUPATIONAL THERAPIST

## 2020-09-06 PROCEDURE — 84484 ASSAY OF TROPONIN QUANT: CPT | Performed by: HOSPITALIST

## 2020-09-06 RX ORDER — GADOBUTROL 604.72 MG/ML
10 INJECTION INTRAVENOUS ONCE
Status: COMPLETED | OUTPATIENT
Start: 2020-09-06 | End: 2020-09-06

## 2020-09-06 RX ORDER — GADOBUTROL 604.72 MG/ML
7 INJECTION INTRAVENOUS ONCE
Status: DISCONTINUED | OUTPATIENT
Start: 2020-09-06 | End: 2020-09-06

## 2020-09-06 RX ADMIN — GADOBUTROL 10 ML: 604.72 INJECTION INTRAVENOUS at 19:32

## 2020-09-06 RX ADMIN — SODIUM CHLORIDE: 9 INJECTION, SOLUTION INTRAVENOUS at 20:03

## 2020-09-06 RX ADMIN — Medication 10 MEQ: at 01:47

## 2020-09-06 RX ADMIN — ATORVASTATIN CALCIUM 40 MG: 40 TABLET, FILM COATED ORAL at 20:08

## 2020-09-06 RX ADMIN — ASPIRIN 300 MG: 300 SUPPOSITORY RECTAL at 09:20

## 2020-09-06 RX ADMIN — HUMAN ALBUMIN MICROSPHERES AND PERFLUTREN 9 ML: 10; .22 INJECTION, SOLUTION INTRAVENOUS at 12:00

## 2020-09-06 RX ADMIN — Medication 10 MEQ: at 02:58

## 2020-09-06 RX ADMIN — Medication 10 MEQ: at 00:33

## 2020-09-06 ASSESSMENT — ACTIVITIES OF DAILY LIVING (ADL)
ADLS_ACUITY_SCORE: 16
ADLS_ACUITY_SCORE: 16
ADLS_ACUITY_SCORE: 14

## 2020-09-06 NOTE — PROGRESS NOTES
09/06/20 1523   Quick Adds   Type of Visit Initial Occupational Therapy Evaluation   Living Environment   Lives With spouse   Living Arrangements house  (2 story)   Home Accessibility stairs to enter home;stairs within home   Number of Stairs, Main Entrance 2   Stair Railings, Main Entrance railings on both sides of stairs   Number of Stairs, Within Home, Primary   (13)   Stair Railings, Within Home, Primary railings on both sides of stairs   Transportation Anticipated family or friend will provide   Self-Care   Usual Activity Tolerance good   Current Activity Tolerance moderate   Regular Exercise No   Equipment Currently Used at Home none   Activity/Exercise/Self-Care Comment pt is active socially   Functional Level   Ambulation 0-->independent   Transferring 0-->independent   Toileting 0-->independent   Bathing 1-->assistive equipment  (pt takes baths)   Dressing 1-->assistive equipment   Eating 0-->independent   Communication 0-->understands/communicates without difficulty   Cognition 1 - attention or memory deficits   Fall history within last six months no   Which of the above functional risks had a recent onset or change? ambulation;transferring;toileting;bathing;dressing;swallowing   General Information   Onset of Illness/Injury or Date of Surgery - Date 09/05/20   Referring Physician Braden Noriega   Patient/Family Goals Statement pt not speaking,  spouse wants her to recover as much as possible   Additional Occupational Profile Info/Pertinent History of Current Problem Pt is a 74 year olad female admitted for evaluation of aphasia and confusion. Found to have acute infarcts involving the left basal ganglia, centrum semiovale, corona radiata/frontal lobe, and left occiptial lobe.  Pt also has a UTI.  PMH includes macular degneration    Precautions/Limitations fall precautions   General Observations Spouse present during evaluation providing background   Cognitive Status Examination   Level of Consciousness  alert   Follows Commands (Cognition) follows one step commands;25-49% accuracy   Attention Sustained attention impaired   Visual Perception   Visual Perception Wears glasses   Visual Perception Comments pt also uses maginfier with lights for macular degeneration   Range of Motion (ROM)   ROM Quick Adds No deficits were identified   ROM Comment B UEs   Strength   Manual Muscle Testing Quick Adds Other   Strength Comments Pt has fair UE strength, some difficulty following commands for MMT   Hand Strength   Hand Strength Comments gross grasp fair, right slightly stronger   Coordination   Upper Extremity Coordination No deficits were identified   Coordination Comments pt having trouble with commands at times   Mobility   Bed Mobility Bed mobility skill: Sit to supine;Bed mobility skill: Supine to sit   Bed Mobility Skill: Sit to Supine   Level of Bacon: Sit/Supine stand-by assist   Physical Assist/Nonphysical Assist: Sit/Supine verbal cues;1 person assist   Assistive Device: Sit/Supine bedrail   Bed Mobility Skill: Supine to Sit   Level of [place]: Supine/Sit stand-by assist   Physical Assist/Nonphysical Assist: Supine/Sit verbal cues;1 person assist   Assistive Device: Supine/Sit bedrail   Transfer Skill: Sit to Stand   Level of [place]: Sit/Stand stand-by assist   Physical Assist/Nonphysical Assist: Sit/Stand verbal cues;1 person assist   Lower Body Dressing   Level of [place]: Dress Lower Body moderate assist (50% patients effort)   Physical Assist/Nonphysical Assist: Dress Lower Body verbal cues;1 person assist   Activities of Daily Living Analysis   Impairments Contributing to Impaired Activities of Daily Living balance impaired;cognition impaired;coordination impaired;motor control impaired;strength decreased   General Therapy Interventions   Planned Therapy Interventions ADL retraining;cognition;strengthening;transfer training;progressive activity/exercise   Clinical Impression   Criteria  "for Skilled Therapeutic Interventions Met yes, treatment indicated   OT Diagnosis decreased independence and safety for ADLS   Influenced by the following impairments weakness with some confusion and aphasia.  Macular degneration makes movement and following commands more difficult   Assessment of Occupational Performance 5 or more Performance Deficits   Identified Performance Deficits decresed safety and independence with dressing, grooming, bathing, functional mobility, household chores   Clinical Decision Making (Complexity) High complexity   Therapy Frequency Daily   Predicted Duration of Therapy Intervention (days/wks) 5 days   Anticipated Discharge Disposition Acute Rehabilitation Facility   Risks and Benefits of Treatment have been explained. Yes   Patient, Family & other staff in agreement with plan of care Yes   [facility] TM \"6 Clicks\"   2016, Trustees of [facility], under license to [employer].  All rights reserved.   6 Clicks Short Forms Daily Activity Inpatient Short Form   [facility]  \"6 Clicks\" Daily Activity Inpatient Short Form   1. Putting on and taking off regular lower body clothing? 2 - A Lot   2. Bathing (including washing, rinsing, drying)? 2 - A Lot   3. Toileting, which includes using toilet, bedpan or urinal? 2 - A Lot   4. Putting on and taking off regular upper body clothing? 3 - A Little   5. Taking care of personal grooming such as brushing teeth? 3 - A Little   6. Eating meals? 3 - A Little   Daily Activity Raw Score (Score out of 24.Lower scores equate to lower levels of function) 15   Total Evaluation Time   Total Evaluation Time (Minutes) 15     "

## 2020-09-06 NOTE — PLAN OF CARE
Pt here with L occipital CVA w/ multiple infarcts and UTI. Alert to self, ROGER orientation d/t WFD. Right facial droop evident and 4/5 strength noted to R-side. Troponins slightly increased overnight, MD notified with trops to be continued to be monitored q4h. Elevated BP, within parameters. Other VSS. Tele SD w/ 1st degree AVB/BBB. Denies pain. Does not use call light; impulsive at times when needing bathroom. Up with assist x1 with safety cues provided with one step directions. Continues NPO diet with oral cares. Plan for speech eval this AM. Discharge plan pending.

## 2020-09-06 NOTE — PROVIDER NOTIFICATION
MD Notification    Notified Person: MD    Notified Person Name: Dr Noriega    Notification Date/Time: 9/5/2020 7:57 PM     Notification Interaction: paged amcom    Purpose of Notification: patient is NPO overnight, would you like IV hydration over night? Thanks    Orders Received: please see new order for NS infusion placed by Dr Noriega    Comments:

## 2020-09-06 NOTE — PLAN OF CARE
Pt here with possible L CVA. A&O to self. Neuros difficult to assess d/t unable to follow commands, Confusion,Aphasia, R facial droop,word finding, possible R leg strength 4/5 . VSS except hypertension . Tele SR w/1degree AVB/BBB. NPO diet. Continent B/B. On ABX for UTI. Up with A1 GB. Denies pain. Pt scoring green on the Aggression Stop Light Toll.      Pt's belongings:   (Belongings from admission day present in pt's room)      One ring and one pair of glasses    Home medications: No

## 2020-09-06 NOTE — PLAN OF CARE
Pt here with L occiptal stroke with multiple infarcts and UTI . Alert, only oriented to self. Neuros: R droop, R-sided weakness, unable to follow many commands. VSS ex elevated BP, parameters  SBP<220. Tele SR with BBB. Reg diet, thin liquids. Takes pills whole. Up with A1 GB and walker. Denies pain. Echo bubble today. IVF infusing. Pt scoring green on the Aggression Stop Light Tool. R-sided weakness worse than yesterday, more confused today, plan MRI this dudley, LP pending MRI results. Discharge pending.

## 2020-09-06 NOTE — PROGRESS NOTES
[facility]  Hospitalist Progress Note    Admit Date:  9/5/2020  Date of Service (when I saw the patient): 09/06/2020   Provider:  Lucy Julian, DO    Assessment & Plan   Sherrell Leonard is a 74 year old female who was admitted on 9/5/2020 secondary to confusion over the last 2 days.  She has no significant PMH.    Problem List:    1. Acute encephalopathy suspect secondary to mulitple CVA  Presents with confusion as above, seems to have dense expressive aphasia and unable to follow commands.  Slight right lower facial droop on exam but no appreciable focal weakness.    Admission head CT with possible acute ischemia involving the left basal ganglia/centrum semiovale.  Admission CTA shows multiple intracranial stenoses including high-grade stenosis of the left middle cerebral artery M1 segment.  Neurology was contacted by ED provider who recommended MRI to assess for stroke v    MRI this am with acute infarct involving left basal ganglia, centrum semiovale, corona radiata/frontal lobe, and left occipital lobe   Intracranial Vascular Imaging: Multiple high-grade intracranial stenoses as detailed including high-grade stenosis of the left middle cerebral artery M1 segment. Severe stenosis is present involving the right middle cerebral artery M2 branch. Multifocal segmental moderate to severe stenoses are present involving the anterior cerebral arteries, left worse than right. Fetal origin of the right posterior cerebral artery. Multiple moderate to severe stenoses are present involving the bilateral posterior cerebral arteries.   Cervical Carotid and Vertebral Artery Vascular Imaging: 3 vessel aortic arch is present with heavy atherosclerotic plaquing. Mild plaque is present at the left vertebral artery origin.     Awaiting neuro consult for further recs - MRI and LP ordered  MOODY ab ordered and pending  Echo with bubble ordered  Tele monitoring to continue  Has been given daily asa, statin    COVID  testing - still pending    Permissive BP for now, as per Neuro.  PRN labetolol and hydralazine ordered for SBP>220 and DBP>120  LDL - 81    PT/OT/SLP consults requested    2. EKG with LBBB and mild trop elevation  - echo without WMA  Continue tele  Troponin bump but trending down this am     3. E.coli UTI  - ceftriaxone pending urine culture       Diet: Regular Diet Adult  With thin liquids  DVT Prophylaxis: Anti-embolisim stockings (TEDs)  Sinclair Catheter: not present  Code Status: Full Code      Disposition Plan   Expected discharge: several days, recommended to acute rehab once CVA/encephalopathy work-up.  Entered: Lucy Julian,  09/06/2020, 12:12 PM       The patient's care was discussed with the Patient and Patient's Family and bedside RN    Interval History   Seen with  at the bedside.  Also d/w RN.  Pt is able to minimally verbalize.  Has not clearly c/o WEEMS, CP.  Working with PT and speech - walked in [place] some.  Had some lunch.  Neuro rounded earlier, per , awaiting recs.    -Data reviewed today: I reviewed all new labs and imaging results over the last 24 hours. I personally reviewed the brain MRI image(s) showing multiple CVAs, as above. Repeat MRI pending for today..    Physical Exam   Temp: 98.5  F (36.9  C) Temp src: Axillary BP: (!) 192/105 Pulse: 79   Resp: 18 SpO2: 90 % O2 Device: None (Room air)    Vitals:    09/05/20 1239 09/06/20 0623   Weight: 63.5 kg (140 lb) 68.6 kg (151 lb 4.8 oz)     Vital Signs with Ranges  Temp:  [98  F (36.7  C)-98.7  F (37.1  C)] 98.5  F (36.9  C)  Pulse:  [78-99] 79  Resp:  [9-24] 18  BP: (169-226)/() 192/105  SpO2:  [90 %-95 %] 90 %  I/O last 3 completed shifts:  In: 705 [I.V.:705]  Out: -     GEN:  Alert, awake, appears frustrated, min verbal but overall comfortable, no overt respiratory distress.  HEENT:  Normocephalic/atraumatic, no scleral icterus, no nasal discharge, mouth moist,no clear thrush when tongue out, PERRL  NECK:  No  clear thyromegaly of clear JVD  CV:  Somewhat distant but regular rate and rhythm, no loud murmur to ausc.  S1 + S2 noted, no S3 or S4.  LUNGS:  Clear to auscultation ant/lat bilaterally.  No clear rales/rhonchi/wheezing auscultated bilaterally.  No costal retractions bilaterally.  Symmetric chest rise on inhalation noted.  ABD:  Active bowel sounds, soft, no clear grimacing to palpation, min distended.  No rebound/guarding/rigidity.  No masses palpated.  No obvious HSM to exam.  EXT:  No pretibial edema or cyanosis bilaterally. No joint synovitis noted.  No calf-tenderness or asymmetry noted.  SKIN:  Dry to touch, no rashes or jaundice noted.  PSYCH:  Mood frustrated, Not tearful or depressed.  Maintains fairly direct eye contact.  NEURO:  No tremors at rest, min verbalizing, right sided facial droop noted; tongue protruded to the right when sticks out her tongue.  Left hand > right hand .  Strength in the Lt arm/Left leg > right arm/right leg on testing.      Data   Labs:  Recent Labs   Lab 09/06/20  0530 09/05/20  1304 09/05/20  1256     --  144   POTASSIUM 3.4  --  3.3*   CHLORIDE 112*  --  109   CO2 26  --  28   ANIONGAP 6  --  7   GLC 89  --  111*   BUN 13  --  20   CR 0.66  --  0.82   GFRESTIMATED 86 70 69   GFRESTBLACK >90 85 80   MARII 8.7  --  9.7     Recent Labs   Lab 09/06/20  0530 09/05/20  1256   WBC 7.8 9.5   HGB 14.4 15.8*   HCT 44.8 47.8*   MCV 97 97    239     No results for input(s): INR in the last 168 hours.  Recent Labs   Lab 09/06/20  0530 09/06/20  0212 09/05/20  2144   TROPI 0.074* 0.084* 0.069*     Recent Labs   Lab 09/05/20  1256   TSH 2.04     Recent Labs   Lab 09/05/20  1459   COLOR Yellow   APPEARANCE Slightly Cloudy   URINEGLC Negative   URINEBILI Negative   URINEKETONE 40*   SG >1.035*   UBLD Negative   URINEPH 6.0   PROTEIN 10*   NITRITE Positive*   LEUKEST Large*   RBCU 2   WBCU 93*      Recent Imaging:   Recent Results (from the past 24 hour(s))   Head CT w/o  contrast    Narrative    CT SCAN OF THE HEAD WITHOUT CONTRAST September 5, 2020 2:48 PM     HISTORY: Confusion, unsteady.    TECHNIQUE: Axial images of the head and coronal reformations without  IV contrast material. Radiation dose for this scan was reduced using  automated exposure control, adjustment of the mA and/or kV according  to patient size, or iterative reconstruction technique.    COMPARISON: None.    FINDINGS: Moderate volume loss is present. Patchy and confluent white  matter hypoattenuation likely represents advanced chronic small vessel  ischemic change. Hypoattenuation is most focal involving the left  basal ganglia/centrum semiovale. No evidence of hemorrhage, mass, mass  effect or hydrocephalus. The visualized calvarium, tympanic cavities,  mastoid cavities, and paranasal sinuses are unremarkable.      Impression    IMPRESSION: Patchy and confluent white matter hypoattenuation likely  representing advanced chronic small vessel ischemic change. This is  most focal involving the left basal ganglia/centrum semiovale. Acute  ischemia cannot be completely excluded. MRI could be performed if  indicated.    CHANELL HERNANDEZ MD   CTA Head Neck with Contrast    Narrative    CT ANGIOGRAM OF THE HEAD AND NECK WITH CONTRAST September 5, 2020 2:49  PM     HISTORY: Ataxia, stroke suspected.     TECHNIQUE: CT angiography with an injection of 90 mL [id number]-370 IV  with scans through the head and neck. Images were transferred to a  separate 3-D workstation where multiplanar reformations and 3-D images  were created. Estimates of carotid stenoses are made relative to the  distal internal carotid artery diameters except as noted. Radiation  dose for this scan was reduced using automated exposure control,  adjustment of the mA and/or kV according to patient size, or iterative  reconstruction technique.    COMPARISON: None.     CT ANGIOGRAM HEAD FINDINGS: The bilateral vertebral arteries, basilar  artery, and posterior  cerebral arteries are patent. Right vertebral  artery predominantly terminates in posterior inferior cerebellar  artery with diminutive V4 segment. The internal carotid arteries,  anterior cerebral arteries, and middle cerebral arteries are patent.    Moderate to severe atherosclerotic plaquing is present. Severe  stenosis is present involving the left middle cerebral artery, the  distal M1 segment (series 9 image 406). Severe stenosis is present  involving the right middle cerebral artery M2 branch. Multifocal  segmental moderate to severe stenoses are present involving the  anterior cerebral arteries, left worse than right. Fetal origin of the  right posterior cerebral artery. Multiple moderate to severe stenoses  are present involving the bilateral posterior cerebral arteries.     CT ANGIOGRAM NECK FINDINGS: A 3 vessel aortic arch is present with  heavy atherosclerotic plaquing. The bilateral common carotid, anterior  carotid, external carotid, and vertebral arteries are patent. No  evidence of flow limiting stenosis at the carotid bifurcations. Mild  plaque is present at the left vertebral artery origin.     Extensive pulmonary emphysema is present. Nodular thickening at the  lung apices presumably represents chronic scarring/atelectasis.  Moderate to severe cervical spine degenerative change is present.  Scattered dental disease is present with multiple erosions and  periapical abscess noted.       Impression    IMPRESSION:   CTA Head:   1. Multiple high-grade intracranial stenoses as detailed including  high-grade stenosis of the left middle cerebral artery M1 segment.   CTA Neck: No evidence of flow limiting stenosis at the carotid  bifurcations.     CHANELL HERNANDEZ MD   XR Chest 2 Views    Narrative    CHEST TWO VIEW   9/5/2020 3:23 PM     HISTORY: Hypertension, elevated troponin.    COMPARISON: None.      Impression    IMPRESSION: Two views of the chest. Mild interstitial lung changes are  noted  bilaterally. No focal infiltrate or lung consolidation is  appreciated. Lungs are hypoaerated. Heart size appears within normal  limits. No pneumothorax or pleural effusions.    NASIM GARNETT MD   MR Brain w/o Contrast    Narrative    MRI BRAIN WITHOUT CONTRAST  9/5/2020 5:57 PM    HISTORY: Confusion, elevated blood pressure, abnormal CT head.    TECHNIQUE:  Multiplanar, multisequence MRI of the brain without  gadolinium IV contrast material.      COMPARISON:  Head CT 9/5/2020    FINDINGS:   Mild motion artifact. Patchy area of diffusion restriction is present  corresponding to the left basal ganglia/centrum semiovale. A few  scattered areas of diffusion restriction are present involving the  left corona radiata/frontal lobe. Small area of diffusion restriction  is present involving the left occipital lobe. No evidence of  hemorrhage or significant mass effect. Major intracranial flow voids  are maintained.    Moderate parenchymal volume loss is present. Confluent white matter T2  hyperintensities likely represent advanced chronic small vessel  ischemic change. Old basal ganglia/thalamic lacunar infarcts are  present.    The visualized calvarium, tympanic cavities, mastoid cavities, and  paranasal sinuses are unremarkable.      Impression    IMPRESSION:  1. Acute infarcts involving the left basal ganglia, centrum semiovale,  and corona radiata/frontal lobe.  2. Acute infarcts involving the left occipital lobe.  3. No evidence of hemorrhage or significant mass effect.  4. Volume loss, chronic small vessel ischemic change, and multiple old  lacunar infarcts.    CHANELL HERNANDEZ MD       Medications     - MEDICATION INSTRUCTIONS -       sodium chloride 75 mL/hr at 09/05/20 2100       aspirin  325 mg Oral Daily    Or     aspirin  300 mg Rectal Daily     atorvastatin  40 mg Oral or NG Tube Daily at 8 pm     cefTRIAXone  1 g Intravenous Q24H     sodium chloride (PF)  3 mL Intracatheter Q8H

## 2020-09-06 NOTE — CONSULTS
"  [facility]    Stroke Consult Note    Reason for Consult: \"Stroke (potential or actual)\"    Chief Complaint: Altered Mental Status       HPI  Sherrell Leonard is a 74 year old female with no known past medical history on file, presents with mild confusion and unsteady gait which has been on going for the past few days. Admission UA returned mildly abnormal. Patient was sitting up in bed with  at the bedside today. Patient was having word finding difficulties,  was the primary historian.  states the over the past 4 days the patient has become what he described as confused and not herself. He states he noticed that she was having difficulties expressing her thoughts.   States friends commented on patients mental status. Today patients  states the language difficulty has worsened since he initially noticed it a few days ago. Patient was unable to appropriately articulate her thoughts or symptoms.      Stroke Evaluation summarized:  MRI/Head CT: MRI: acute infarct involving left basal ganglia, centrum semiovale, corona radiata/frontal lobe, and left occipital lobe   Intracranial Vascular Imaging: Multiple high-grade intracranial stenoses as detailed including high-grade stenosis of the left middle cerebral artery M1 segment. Severe stenosis is present involving the right middle cerebral artery M2 branch. Multifocal segmental moderate to severe stenoses are present involving the anterior cerebral arteries, left worse than right. Fetal origin of the right posterior cerebral artery. Multiple moderate to severe stenoses are present involving the bilateral posterior cerebral arteries.   Cervical Carotid and Vertebral Artery Vascular Imaging: 3 vessel aortic arch is present with heavy atherosclerotic plaquing. Mild plaque is present at the left vertebral artery origin.   Echocardiogram: EF 60-65%, normal left and right atrium, negative bubble    EKG/Telemetry: SR with 1st degree " "AV block   LDL: 81  A1c: 5.5  Troponin: 0.069  Other testing: Not Applicable      Impression  Ischemic Stroke due to undetermined etiology ; Vasculitis vs RCVS vs unlikely atherosclerotic disease      Recommendations  - Continue 325 ASA   - STAT MRI with perfusion study to evaluate at for at risk tissue   - Discussing potential vessel wall study with radiology   - Consider LP and angio for further evaluation of vasculitis   - Please allow patient to autoregulated blood pressure, do not treat BP under 220, further BP parameters pending MRI image findings     Patient Follow-up    - final recommendation pending work-up    Thank you for this consult. We will continue to follow.     Octavia Cowan PA-C   Neurology  To page me or covering stroke neurology team member, click here: [facility]   Choose \"On Call\" tab at top, then search dropdown box for \"Neurology Adult\", select location, press Enter, then look for stroke/neuro ICU/telestroke.  _____________________________________________________    Past Medical History   No past medical history on file.  Past Surgical History   No past surgical history on file.  Medications   Home Meds  Prior to Admission medications    Medication Sig Start Date End Date Taking? Authorizing Provider   Multiple Vitamins-Minerals (SYSTANE ICAPS AREDS2) CAPS Take 1 capsule by mouth daily   Yes Unknown, Entered By History   multivitamin, therapeutic (THERA-VIT) TABS tablet Take 1 tablet by mouth daily   Yes Unknown, Entered By History       Scheduled Meds    aspirin  325 mg Oral Daily    Or     aspirin  300 mg Rectal Daily     atorvastatin  40 mg Oral or NG Tube Daily at 8 pm     cefTRIAXone  1 g Intravenous Q24H     sodium chloride (PF)  3 mL Intracatheter Q8H       Infusion Meds    - MEDICATION INSTRUCTIONS -       sodium chloride 75 mL/hr at 09/05/20 2100       PRN Meds  acetaminophen, acetaminophen, bisacodyl, hydrALAZINE, labetalol, lidocaine 4%, lidocaine (buffered or not buffered), - MEDICATION " INSTRUCTIONS -, melatonin, naloxone, ondansetron **OR** ondansetron, polyethylene glycol, potassium chloride, potassium chloride with lidocaine, potassium chloride, potassium chloride, potassium chloride, prochlorperazine **OR** prochlorperazine **OR** prochlorperazine, senna-docusate **OR** senna-docusate, sodium chloride (PF)    Allergies   No Known Allergies  Family History   No family history on file.  Social History   Social History     Tobacco Use     Smoking status: Not on file   Substance Use Topics     Alcohol use: Not on file     Drug use: Not on file       Review of Systems   Review of systems not obtained due to patient factors - confusion and patient currently globally aphasic        PHYSICAL EXAMINATION   Temp:  [98  F (36.7  C)-98.7  F (37.1  C)] 98.3  F (36.8  C)  Pulse:  [78-99] 83  Resp:  [9-24] 18  BP: (169-226)/() 188/103  SpO2:  [92 %-95 %] 93 %    Neurologic  Mental Status:  alert, oriented x 3, able to follow some simple commands but could not follow complex commands, globally aphasic   Cranial Nerves:  visual fields intact, PERRL, EOMI with normal smooth pursuit, hearing not formally tested but intact to conversation, tongue protrusion midline, right facial droop   Motor:  normal muscle tone and bulk, no abnormal movements, able to move all limbs spontaneously, decreased strength in right upper and lower extremity, + drift of right upper extremity   Reflexes:  deferred   Sensory:  could not assess due to patients asphasic test   Coordination:  deferred  Station/Gait:  deferred    Dysphagia Screen  Per Nursing    Stroke Scales    NIHSS  Interval baseline (09/05/20 6446)   Interval Comments     1a. Level of Consciousness 0-->Alert, keenly responsive   1b. LOC Questions 2-->Answers neither question correctly   1c. LOC Commands 2-->Performs neither task correctly   2.   Best Gaze 0-->Normal   3.   Visual 0-->No visual loss   4.   Facial Palsy 1-->Minor paralysis (flattened nasolabial fold,  asymmetry on smiling)   5a. Motor Arm, Left 0-->No drift, limb holds 90 (or 45) degrees for full 10 secs   5b. Motor Arm, Right 1-->Drift, limb holds 90 (or 45) degrees, but drifts down before full 10 secs, does not hit bed or other support   6a. Motor Leg, Left 0-->No drift, leg holds 30 degree position for full 5 secs   6b. Motor Leg, right 1-->Drift, leg falls by the end of the 5-sec period but does not hit bed   7.   Limb Ataxia 0-->Absent   8.   Sensory 1-->Mild-to-moderate sensory loss, patient feels pinprick is less sharp or is dull on the affected side, or there is a loss of superficial pain with pinprick, but patient is aware of being touched   9.   Best Language 2-->Severe aphasia, all communication is through fragmentary expression, great need for inference, questioning, and guessing by the listener. Range of information that can be exchanged is limited, listener carries burden of. . . (see row details)   10. Dysarthria 0-->Normal   11. Extinction and Inattention  0-->No abnormality   Total 10 (09/06/20 1240)       Imaging  I personally reviewed all imaging; relevant findings per HPI.    Labs Data   CBC  Recent Labs   Lab 09/06/20  0530 09/05/20  1256   WBC 7.8 9.5   RBC 4.60 4.92   HGB 14.4 15.8*   HCT 44.8 47.8*    239     Basic Metabolic Panel   Recent Labs   Lab 09/06/20  0530 09/05/20  1256    144   POTASSIUM 3.4 3.3*   CHLORIDE 112* 109   CO2 26 28   BUN 13 20   CR 0.66 0.82   GLC 89 111*   MARII 8.7 9.7     Liver Panel  Recent Labs   Lab 09/05/20  1256   PROTTOTAL 7.3   ALBUMIN 4.1   BILITOTAL 0.6   ALKPHOS 99   AST 20   ALT 23     INR  No lab results found.   Lipid Profile    Recent Labs   Lab Test 09/05/20  1256   CHOL 186   HDL 87   LDL 81   TRIG 89     A1C    Recent Labs   Lab Test 09/05/20  1256   A1C 5.5     Troponin I    Recent Labs   Lab 09/06/20  0530 09/06/20  0212 09/05/20  2144   TROPI 0.074* 0.084* 0.069*          Stroke Code / Stroke Consult Data Data This was a  non-emergent, non-tele stroke consult.

## 2020-09-06 NOTE — PROGRESS NOTES
SPIRITUAL HEALTH SERVICES  SPIRITUAL ASSESSMENT Progress Note  [facility]     REFERRAL SOURCE:  Request at Admission    I attempted to see patient a number of times today but she was receiving cares/in appointments during all of my attempts.     PLAN: Spiritual health will attempt to visit patient again tomorrow.     Liz Beavers  Associate    Phone: [phone]  Pager: [phone]

## 2020-09-06 NOTE — PROGRESS NOTES
09/06/20 1230   Quick Adds   Type of Visit Initial PT Evaluation   Living Environment   Lives With spouse   Living Arrangements house   Home Accessibility stairs to enter home;stairs within home   Number of Stairs, Main Entrance 2   Stair Railings, Main Entrance railings on both sides of stairs   Number of Stairs, Within Home, Primary   (13)   Stair Railings, Within Home, Primary railings on both sides of stairs   Transportation Anticipated family or friend will provide  (spouse drives d/t pt's macular)   Self-Care   Usual Activity Tolerance good   Current Activity Tolerance moderate   Regular Exercise No   Equipment Currently Used at Home none   Activity/Exercise/Self-Care Comment Spouse describes pt has antalgic gait at baseline d/t chronic hip pain, IND without AD for mobility at baseline   Functional Level Prior   Ambulation 0-->independent   Transferring 0-->independent   Toileting 0-->independent   Bathing   (sits to bathe)   Communication 0-->understands/communicates without difficulty   Fall history within last six months no   Which of the above functional risks had a recent onset or change? ambulation;transferring   General Information   Onset of Illness/Injury or Date of Surgery - Date 09/05/20   Referring Physician Braden Noriega MD   Patient/Family Goals Statement pt did not state, spouse open to recommendations   Pertinent History of Current Problem (include personal factors and/or comorbidities that impact the POC) Pt is a 73yo female admitted for evaluation of aphasia and confusion, found to have acute infarcts involving the L basal ganglia, centrum semiovale, corona radiata/frontal lobe, and L occipital lobe. Also with UTI.   Precautions/Limitations fall precautions   General Observations Pt long sitting in bed, spouse at bedside; pt straightening sheets and blanket, not participating in subjective interview except occasional yes or no response which seemed accurate when stated   Cognitive Status  Examination   Orientation person   Level of Consciousness alert   Follows Commands and Answers Questions 75% of the time;able to follow single-step instructions   Personal Safety and Judgment at risk behaviors demonstrated;impulsive   Pain Assessment   Patient Currently in Pain No   Posture    Posture Forward head position   Range of Motion (ROM)   ROM Comment BLE WNL with AAROM   Strength   Strength Comments RLE functional weakness noted with buckling during gait, RLE: dorsiflexion 4/5, plantarflexion 3+/5, knee extension 3+/5, knee flexion 4-/5, hip flexion 4-/5; LLE 5/5   Bed Mobility   Bed Mobility Comments Boby supine>sit, pt impulsive, not using RUE to assist   Transfer Skills   Transfer Comments modA sit>stand no AD, R lean initial stance, RLE buckling mildly   Gait   Gait Comments 3' without AD, modA for balance, dcreased step length and clearance on R, pt reaching out for BUE support (refused walker initially)   Balance   Balance Comments needs BUE support for static and dynamic standing activiies   Sensory Examination   Sensory Perception Comments 5/5 accuracy light touch sensation screen sandra Petty's inattention to R with mobility   Coordination   Coordination Comments appears to have decreased coordination RLE and RUE during mbility/functional tasks; pt not following commands for heel to shin or rapid alternating movements sitting EOB   General Therapy Interventions   Planned Therapy Interventions balance training;bed mobility training;gait training;neuromuscular re-education;strengthening;transfer training;progressive activity/exercise   Clinical Impression   Criteria for Skilled Therapeutic Intervention yes, treatment indicated   PT Diagnosis Impaired gait   Influenced by the following impairments Weakness, impaired coordination, impaired balance, expressive aphasia, confusion/cognition, impaired safety   Functional limitations due to impairments decreased safety and IND with functional transfers,  "gait, stairs   Clinical Presentation Stable/Uncomplicated   Clinical Decision Making (Complexity) Low complexity   Therapy Frequency 2x/day   Predicted Duration of Therapy Intervention (days/wks) 4 days   Anticipated Discharge Disposition Acute Rehabilitation Facility   Risk & Benefits of therapy have been explained Yes   Patient, Family & other staff in agreement with plan of care Yes   [facility] TM \"6 Clicks\"   2016, Trustees of [facility], under license to [employer].  All rights reserved.   6 Clicks Short Forms Basic Mobility Inpatient Short Form   [facility]  \"6 Clicks\" V.2 Basic Mobility Inpatient Short Form   1. Turning from your back to your side while in a flat bed without using bedrails? 3 - A Little   2. Moving from lying on your back to sitting on the side of a flat bed without using bedrails? 3 - A Little   3. Moving to and from a bed to a chair (including a wheelchair)? 2 - A Lot   4. Standing up from a chair using your arms (e.g., wheelchair, or bedside chair)? 2 - A Lot   5. To walk in hospital room? 2 - A Lot   6. Climbing 3-5 steps with a railing? 2 - A Lot   Basic Mobility Raw Score (Score out of 24.Lower scores equate to lower levels of function) 14   Total Evaluation Time   Total Evaluation Time (Minutes) 12     "

## 2020-09-06 NOTE — PROGRESS NOTES
X-cover    Troponin up slightly to 0.084. asymptomatic. Likely 2/2 CVA. No heparin given CVA. Continue to monitor    Emil Martínez MD

## 2020-09-06 NOTE — PLAN OF CARE
Discharge Planner SLP   Patient plan for discharge: Pt/ did not state  Current status: SLP: Pt presents with mild dysphagia on clinical swallow evaluation. Significant receptive and expressive aphasia with pt unable to follow or imitate commands. Right droop noted. Thin liquids by cup and straw, puree solids and regular solids trialed. Mild oral residue after the cracker and one cough with thin liquids by straw after the cracker.     Recommended regular diet and thin liquids. Trained  in swallow strategies including: no straws, fully upright, small bites and sips, slow rate of intake, avoid talking/distractions while eating and choose softer/more moist foods.    Barriers to return to prior living situation: Dysphagia; aphasia  Recommendations for discharge: ARU  Rationale for recommendations: Continue ST daily for swallowing. Add speech/language evaluation and tx.        Entered by: Elaine Santos 09/06/2020 10:00 AM

## 2020-09-06 NOTE — PROVIDER NOTIFICATION
MD Notification    Notified Person: MD    Notified Person Name: Gavino Neuro fellow    Notification Date/Time: 9/5/20 1946    Notification Interaction: page    Purpose of Notification: FYI MRI has resulted thanks    Orders Received:    Comments:

## 2020-09-06 NOTE — PLAN OF CARE
Discharge Planner OT     Pt is a 74 year old female admitted for evaluation of aphasia and confusion. Found to have acute infarcts involving the left basal ganglia, centrum semiovale, corona radiata/frontal lobe, and left occiptial lobe.  Pt also has a UTI.  PMH includes macular degeneration.  Lives with spouse in a 2 story home, has been independent with ADLS at baseline.  Uses a lighted magnifier for reading at baseline.      Patient plan for discharge: Not stated, spouse supportive of rehab post hospitalization.  Current status: Initial evaluation complete.  Pt's spouse providing background.  Pt appearred to be following most of the time but only said on3 e 3 word phrase.  Was inconsistent in trying to mirror movements for UE ROM and LE dressing.  Not sure if it was related to aphasia or limited vision with macular degeneration.  Seemed to happen mostly on right side and with right arm.  Pt had some success finishing ADL tasks that I had physically started for her.  Spouse present and very supportive.    Barriers to return to prior living situation: current medical situation including aphasia, weakness, decreased vision with macular degeneration.  Recommendations for discharge: ARU  Rationale for recommendations: Pt is significantly below her baseline with a complicated picture of symptoms.inhibiting functional performance in ADLS.  Would benefit from intense OT here and at ARU to increase UE function, ability to follow commands and sequence ADL and IADL task, and increase UE strength.  Pt and spouse appear very motivated, would be able to tolerate 3 hours of therapy a day.  Pt is a great aRU candidate.       Entered by: Donell Perez 09/06/2020 3:39 PM

## 2020-09-06 NOTE — PLAN OF CARE
Discharge Planner PT   Patient plan for discharge: Pt did not state, spouse open to recommendations    Current status: PT orders received, eval completed, treatment initiated. Pt is a 73yo female admitted for evaluation of aphasia and confusion, found to have acute infarcts involving the L basal ganglia, centrum semiovale, corona radiata/frontal lobe, and L occipital lobe. Also with UTI.    Pt with significant expressive aphasia, able to answer  yes/no. Follows ~90% of cues for mobility, responds best to demo compared to verbal commands. Pt presents with RUE/RLE weakness, inattention to R, impulsive at times. Pt requires Boby for bed mobility, modA for sit<>stand transfers, modA to ambulate 25' without AD, R knee buckling with fatigue, pt reaching for BUE support. Pt requires min-modA to ambulate 25' with FWW, needs assist to manage walker. Unsteady on feet.    Barriers to return to prior living situation: Weakness, impaired balance, inattention to R, decreased safety awareness, fall risk, level of assist    Recommendations for discharge: ARC    Rationale for recommendations:  Pt is significantly below baseline limited by above listed barriers. Pt with good family support, highly motivated to participate in therapy. Pt will benefit from intense interdisciplinary therapies to address impairments and increase functional independence so pt can return home safely. Anticipate pt will be able to tolerate 3 hours of therapy per day at disch.       Entered by: Ava Davila 09/06/2020 1:09 PM

## 2020-09-06 NOTE — PROGRESS NOTES
"   09/06/20 1002   General Information   Onset Date 09/05/20   Start of Care Date 09/06/20   Referring Physician Dr. Noriega   Patient Profile Review/OT: Additional Occupational Profile Info See Profile for full history and prior level of function   Patient/Family Goals Statement eat   Swallowing Evaluation Bedside swallow evaluation   Behaviorial Observations Alert  (aphasia)   Mode of current nutrition NPO   Respiratory Status Room air   Comments \"Sherrell Leonard is a 74 year old female admitted on 9/5/2020.  No known past medical history secondary to no routine medical care.  Her  brings her in secondary to confusion over the past 2 days.\" MRI found Acute infarcts involving the left basal ganglia, centrum semiovale, and corona radiata/frontal lobe. Acute infarcts involving the left occipital lobe. Old basal ganglia/thalamic lacunar infarcts are present.\"   Clinical Swallow Evaluation   Oral Musculature unable to assess due to poor participation/comprehension;anomalies present   Oral Musculature Comments right droop at rest; unable to follow any commands or imitation   General Therapy Interventions   Planned Therapy Interventions Dysphagia Treatment   Dysphagia treatment Modified diet education;Instruction of safe swallow strategies   Swallow Eval: Clinical Impressions   Skilled Criteria for Therapy Intervention Skilled criteria met.  Treatment indicated.   Functional Assessment Scale (FAS) 5   Treatment Diagnosis dysphagia   Diet texture recommendations Regular diet;Thin liquids  (soft/moist foods)   Recommended Feeding/Eating Techniques alternate between small bites and sips of food/liquid;check mouth frequently for oral residue/pocketing;hard swallow w/ each bite or sip;no straws;small sips/bites   Therapy Frequency Daily   Predicted Duration of Therapy Intervention (days/wks) 1 week   Anticipated Discharge Disposition inpatient rehabilitation facility   Risks and Benefits of Treatment have been explained. " Yes   Patient, family and/or staff in agreement with Plan of Care Yes   Clinical Impression Comments SLP: Pt presents with mild dysphagia on clinical swallow evaluation. Significant receptive and expressive aphasia with pt unable to follow or imitate commands. Right droop noted. Thin liquids by cup and straw, puree solids and regular solids trialed. Mild oral residue after the cracker and one cough with thin liquids by straw after the cracker. Recommended regular diet and thin liquids. Trained  in swallow strategies including: no straws, fully upright, small bites and sips, slow rate of intake, avoid talking/distractions while eating and choose softer/more moist foods.   Total Evaluation Time   Total Evaluation Time (Minutes) 30

## 2020-09-06 NOTE — PROVIDER NOTIFICATION
"MD Notification    Notified Person: MD    Notified Person Name: Dr. Martínez    Notification Date/Time: 9/6/2020 at 0311    Notification Interaction: web-based page    Purpose of Notification: \"740-1 - SL - FYI - pt on q4h Troponins. Most recent trop 0.084; asymptomatic. Next check at 0600. Thanks, Leydi MONTES *[id number]\"    Orders Received:    Comments:    "

## 2020-09-07 ENCOUNTER — APPOINTMENT (OUTPATIENT)
Dept: OCCUPATIONAL THERAPY | Facility: CLINIC | Age: 75
DRG: 003 | End: 2020-09-07
Attending: PHYSICIAN ASSISTANT
Payer: MEDICARE

## 2020-09-07 LAB
ANION GAP SERPL CALCULATED.3IONS-SCNC: 6 MMOL/L (ref 3–14)
APTT PPP: 27 SEC (ref 22–37)
BACTERIA SPEC CULT: ABNORMAL
BUN SERPL-MCNC: 11 MG/DL (ref 7–30)
CALCIUM SERPL-MCNC: 8.5 MG/DL (ref 8.5–10.1)
CHLORIDE SERPL-SCNC: 110 MMOL/L (ref 94–109)
CO2 SERPL-SCNC: 26 MMOL/L (ref 20–32)
CREAT SERPL-MCNC: 0.7 MG/DL (ref 0.52–1.04)
CRP SERPL-MCNC: <2.9 MG/L (ref 0–8)
ERYTHROCYTE [SEDIMENTATION RATE] IN BLOOD BY WESTERGREN METHOD: 6 MM/H (ref 0–30)
GFR SERPL CREATININE-BSD FRML MDRD: 84 ML/MIN/{1.73_M2}
GLUCOSE SERPL-MCNC: 95 MG/DL (ref 70–99)
INR PPP: 1.05 (ref 0.86–1.14)
Lab: ABNORMAL
POTASSIUM SERPL-SCNC: 3.1 MMOL/L (ref 3.4–5.3)
POTASSIUM SERPL-SCNC: 3.5 MMOL/L (ref 3.4–5.3)
SODIUM SERPL-SCNC: 142 MMOL/L (ref 133–144)
SPECIMEN SOURCE: ABNORMAL

## 2020-09-07 PROCEDURE — 97112 NEUROMUSCULAR REEDUCATION: CPT | Mod: GO

## 2020-09-07 PROCEDURE — 12000000 ZZH R&B MED SURG/OB

## 2020-09-07 PROCEDURE — 80048 BASIC METABOLIC PNL TOTAL CA: CPT | Performed by: INTERNAL MEDICINE

## 2020-09-07 PROCEDURE — 25800030 ZZH RX IP 258 OP 636: Performed by: HOSPITALIST

## 2020-09-07 PROCEDURE — 36415 COLL VENOUS BLD VENIPUNCTURE: CPT | Performed by: INTERNAL MEDICINE

## 2020-09-07 PROCEDURE — 99233 SBSQ HOSP IP/OBS HIGH 50: CPT | Performed by: INTERNAL MEDICINE

## 2020-09-07 PROCEDURE — 86038 ANTINUCLEAR ANTIBODIES: CPT | Performed by: INTERNAL MEDICINE

## 2020-09-07 PROCEDURE — 85730 THROMBOPLASTIN TIME PARTIAL: CPT | Performed by: INTERNAL MEDICINE

## 2020-09-07 PROCEDURE — 36415 COLL VENOUS BLD VENIPUNCTURE: CPT | Performed by: HOSPITALIST

## 2020-09-07 PROCEDURE — 99207 ZZC APP CREDIT; MD BILLING SHARED VISIT: CPT | Performed by: PHYSICIAN ASSISTANT

## 2020-09-07 PROCEDURE — 97530 THERAPEUTIC ACTIVITIES: CPT | Mod: GO

## 2020-09-07 PROCEDURE — 99232 SBSQ HOSP IP/OBS MODERATE 35: CPT | Performed by: PSYCHIATRY & NEUROLOGY

## 2020-09-07 PROCEDURE — 86140 C-REACTIVE PROTEIN: CPT | Performed by: INTERNAL MEDICINE

## 2020-09-07 PROCEDURE — 25000128 H RX IP 250 OP 636: Performed by: HOSPITALIST

## 2020-09-07 PROCEDURE — 25000132 ZZH RX MED GY IP 250 OP 250 PS 637: Performed by: HOSPITALIST

## 2020-09-07 PROCEDURE — 85610 PROTHROMBIN TIME: CPT | Performed by: INTERNAL MEDICINE

## 2020-09-07 PROCEDURE — 85652 RBC SED RATE AUTOMATED: CPT | Performed by: INTERNAL MEDICINE

## 2020-09-07 PROCEDURE — 84132 ASSAY OF SERUM POTASSIUM: CPT | Performed by: HOSPITALIST

## 2020-09-07 RX ORDER — LORAZEPAM 0.5 MG/1
0.25 TABLET ORAL
Status: DISCONTINUED | OUTPATIENT
Start: 2020-09-07 | End: 2020-09-10

## 2020-09-07 RX ADMIN — ATORVASTATIN CALCIUM 40 MG: 40 TABLET, FILM COATED ORAL at 19:10

## 2020-09-07 RX ADMIN — ACETAMINOPHEN 650 MG: 325 TABLET, FILM COATED ORAL at 20:38

## 2020-09-07 RX ADMIN — POTASSIUM CHLORIDE 40 MEQ: 1500 TABLET, EXTENDED RELEASE ORAL at 15:56

## 2020-09-07 RX ADMIN — SODIUM CHLORIDE: 9 INJECTION, SOLUTION INTRAVENOUS at 08:29

## 2020-09-07 RX ADMIN — ASPIRIN 325 MG: 325 TABLET, COATED ORAL at 08:28

## 2020-09-07 RX ADMIN — CEFTRIAXONE SODIUM 1 G: 1 INJECTION, POWDER, FOR SOLUTION INTRAMUSCULAR; INTRAVENOUS at 01:24

## 2020-09-07 RX ADMIN — POTASSIUM CHLORIDE 20 MEQ: 1500 TABLET, EXTENDED RELEASE ORAL at 19:28

## 2020-09-07 ASSESSMENT — ACTIVITIES OF DAILY LIVING (ADL)
ADLS_ACUITY_SCORE: 20
ADLS_ACUITY_SCORE: 16
ADLS_ACUITY_SCORE: 20
ADLS_ACUITY_SCORE: 16

## 2020-09-07 NOTE — PROGRESS NOTES
"    [facility]    Medicine Progress Note - Hospitalist Service       Date of Admission:  9/5/2020  Assessment & Plan The email address for your [employer] account has been updated     Sherrell Leonard is a 74 year old female who was admitted on 9/5/2020 secondary to confusion over the last 2 days.  She has no significant PMH. She presented with aphasia, slight right lower facial droop, RUE weakness.     CTA of the head in ER revealed multiple intracranial vessel stenoses (L M1, L>R PCA, R M2) and left basal ganglia hypodensity suggestive of ischemia; MRI confirmed left MCA and PCA distribution strokes of small to moderate volume. ER eval also revealed a UTI.      Dense aphasia and R sided weakness  Mulitple CVAs of unclear etiology - inflammatory vs ASCVD  * Neurology consult appreciated.  \"Differential diagnoses include vasculitis vs RCVS, less likely multifocal atherosclerosis (hard to explain why multiple concurrent acute ischemic strokes and no athero risk factors), embolic (would not cause focal stenoses).\"  * CRP 2.9, ESR 6   * Echo EF 60-65% nl LV and RV size, function and structure. Bubble negative.    * LDL 81    - Permissive HTN, treated SBP > 220 with minimal doses of short-acting IV medications   - RNP ab, C4, C3, Ro and La ab, Scleroderma abs, RF, ANCA, DS DNA ordered on 09/07/20 differentiate bt vasculitis vs athero vs RCVS if etiology remains unclear after above studies.   - MOODY pending on 09/07/20   - MRA pending.   - Tele reviewed on 09/07/20, shows NSR, LBBB  - LP  On 09/07/20   - Considering DSA for further evaluation of vasculature    - Daily ASA, statin started.   - PT/OT/SLP consults - Recommending ARU, advanced to regular diet + thin liquids.    - Stop IVF on 09/07/20.      Asymptomatic COVID screen - negative on 9/5      EKG with LBBB and mild trop elevation secondary to type II NSTEMI related to stroke - Troponin flat. Echo nl.      Hypokalemia - 3.1 on 09/07/20  - Stop IVF  - " Regular diet.   - Replacement protocol ordered.    - Recheck in AM.      E.coli UTI - Sensitive to cephalosporins, bactrim   - continue IV ceftriaxone      Diet: Regular Diet Adult    DVT Prophylaxis: Pneumatic Compression Devices  Sinclair Catheter: not present  Code Status: Full Code           Disposition Plan   Expected discharge: 2 - 3 days, recommended to ARU once evaluation complete and treatment determined. .  Entered: Marlena Valdez MD 09/07/2020, 11:09 AM       The patient's care was discussed with the Bedside Nurse, Patient and Patient's Family, spoke with  on 09/07/20. .    Marlena Valdez MD  Hospitalist Service  [facility]    ______________________________________________________________________    Interval History   Events over last 24 hours as outlined above. Patient denies any SOB, CP, abdominal pain, N/V/D.     Continues with significant aphasia and R facial droop, RUE strong today.     Data reviewed today: I reviewed all medications, new labs and imaging results over the last 24 hours. I personally reviewed the EKG tracing showing as above.    Physical Exam   Vital Signs: Temp: 97.7  F (36.5  C) Temp src: Oral BP: (!) 202/110 Pulse: 71   Resp: 20 SpO2: 90 % O2 Device: None (Room air)    Weight: 151 lbs 4.8 oz  Constitutional:  NAD,   Neuropsyche:  alert and oriented, answers questions appropriately. Dense expressive aphasia but is following commands and say a few words. Can not name objects or tell me her name. Right lower facial droop. Good strength in all extremities.   Respiratory:  Breathing comfortably, good air exchange, no wheezes, no crackles.   Cardiovascular:  Regular rate and rhythm, no edema.  GI:  soft, NT/ND, BS normal  Skin/Integumen:  No acute rash, bruising or sign of bleeding.         Data   Recent Labs   Lab 09/07/20  0758 09/06/20  0530 09/06/20  0212 09/05/20  2144 09/05/20  1256   WBC  --  7.8  --   --  9.5   HGB  --  14.4  --   --  15.8*   MCV  --  97   --   --  97   PLT  --  215  --   --  239   INR 1.05  --   --   --   --     144  --   --  144   POTASSIUM 3.1* 3.4  --   --  3.3*   CHLORIDE 110* 112*  --   --  109   CO2 26 26  --   --  28   BUN 11 13  --   --  20   CR 0.70 0.66  --   --  0.82   ANIONGAP 6 6  --   --  7   MARII 8.5 8.7  --   --  9.7   GLC 95 89  --   --  111*   ALBUMIN  --   --   --   --  4.1   PROTTOTAL  --   --   --   --  7.3   BILITOTAL  --   --   --   --  0.6   ALKPHOS  --   --   --   --  99   ALT  --   --   --   --  23   AST  --   --   --   --  20   TROPI  --  0.074* 0.084* 0.069* 0.051*     Recent Results (from the past 24 hour(s))   MR Brain w/o & w Contrast    Narrative    MRI BRAIN WITHOUT AND WITH CONTRAST  9/6/2020 7:33 PM    HISTORY:  Focal neuro deficit, > 6 hrs, stroke suspected.     TECHNIQUE:  Multiplanar, multisequence MRI of the brain without and  with 10 mL Gadavist.    COMPARISON: Brain MRI 9/5/2020    FINDINGS:  Multiple areas of diffusion restriction are present throughout the  left basal ganglia and cerebral hemisphere, increased in size and  number compared to 9/5/2020. Infarcts involving the left basal  ganglia/centrum semiovale, corona radiata, frontal lobe, parietal  lobe, and occipital lobe. No right hemispheric, brainstem, or  cerebellum infarcts are identified. Multiple old basal  ganglia/thalamic lacunar infarcts are present.    The distal cervical left internal carotid artery demonstrates  hyperintensity on postcontrast imaging which is suggestive of slow  flow, however the flow void is maintained on T2-weighted imaging as  before. Flow voids are otherwise maintained.    Volume loss is present with confluent white matter T2 hyperdensities  likely representing advanced chronic small vessel ischemic change. No  evidence of hemorrhage or significant mass effect. No abnormal  parenchymal enhancement is identified.    MR perfusion imaging was performed and is limited by motion artifact.  MR perfusion images demonstrate  a defect corresponding to the left  basal ganglia/centrum semiovale. Areas of apparent transit time  prolongation are present corresponding to the left cerebral  hemisphere.    The visualized calvarium, tympanic cavities, mastoid cavities, and  paranasal sinuses are unremarkable.      Impression    IMPRESSION:  1. Increased size and number of infarcts involving the left basal  ganglia and cerebral hemisphere.  2. Volume loss, chronic small vessel ischemic change, and multiple old  lacunar infarcts.  3. The distal cervical left internal carotid artery demonstrates  hyperintensity on postcontrast imaging which is suggestive of slow  flow, however the flow void is maintained on T2-weighted imaging as  before. Repeat vascular imaging could be performed if indicated.    CHANELL HERNANDEZ MD     Medications     - MEDICATION INSTRUCTIONS -         aspirin  325 mg Oral Daily    Or     aspirin  300 mg Rectal Daily     atorvastatin  40 mg Oral or NG Tube Daily at 8 pm     cefTRIAXone  1 g Intravenous Q24H     sodium chloride (PF)  3 mL Intracatheter Q8H

## 2020-09-07 NOTE — PROGRESS NOTES
[facility]    Stroke Progress Note    Interval Events  Sherrell Leonard is a 74 year old female with no PMH on file, who presented to the ED on 09/05/2020 for evaluation of confusion that began a few days ago. During admission patient was found to have a UTI in which she is currently being treated for. No acute events overnight, Today patient continues to be globally aphasic.     Stroke Evaluation summarized:  MRI/Head CT: MRI: acute infarct involving left basal ganglia, centrum semiovale, corona radiata/frontal lobe, and left occipital lobe   Intracranial Vascular Imaging: Multiple high-grade intracranial stenoses as detailed including high-grade stenosis of the left middle cerebral artery M1 segment. Severe stenosis is present involving the right middle cerebral artery M2 branch. Multifocal segmental moderate to severe stenoses are present involving the anterior cerebral arteries, left worse than right. Fetal origin of the right posterior cerebral artery. Multiple moderate to severe stenoses are present involving the bilateral posterior cerebral arteries.   Cervical Carotid and Vertebral Artery Vascular Imaging: 3 vessel aortic arch is present with heavy atherosclerotic plaquing. Mild plaque is present at the left vertebral artery origin.   Echocardiogram: EF 60-65%, normal left and right atrium, negative bubble    EKG/Telemetry: SR with 1st degree AV block   LDL: 81  A1c: 5.5  Troponin: 0.069  Other testing: Not Applicable     Impression  Multifocal intracranial stenoses, most notably left M1 focal stenosis, causing multifocal ischemic strokes and a left MCA syndrome. Differential diagnoses include vasculitis vs RCVS, less likely multifocal atherosclerosis (hard to explain why multiple concurrent acute ischemic strokes and no athero risk factors), embolic (would not cause focal stenoses).    Plan  - Continue  mg   - Continue Atorvastatin 40 mg daily   - MRI w/wo with vessel wall imaging -  "ordered for tomorrow Ativan PRN available to be given prior to exam   - Allow patient to autoregulate blood pressure, do not treat BP under 220, further BP parameters pending MRI image findings   - Serum studies pending, will follow results   - Will hold on LP, pending other test results     Thank you for this consult, we will continue to follow.     Octavia Cowan PA-C   Neurology  To page me or covering stroke neurology team member, click here: AMCOM   Choose \"On Call\" tab at top, then search dropdown box for \"Neurology Adult\", select location, press Enter, then look for stroke/neuro ICU/telestroke.  ______________________________________________________    Medications   Home Meds  Prior to Admission medications    Medication Sig Start Date End Date Taking? Authorizing Provider   Multiple Vitamins-Minerals (SYSTANE ICAPS AREDS2) CAPS Take 1 capsule by mouth daily   Yes Unknown, Entered By History   multivitamin, therapeutic (THERA-VIT) TABS tablet Take 1 tablet by mouth daily   Yes Unknown, Entered By History       Scheduled Meds    aspirin  325 mg Oral Daily    Or     aspirin  300 mg Rectal Daily     atorvastatin  40 mg Oral or NG Tube Daily at 8 pm     cefTRIAXone  1 g Intravenous Q24H     sodium chloride (PF)  3 mL Intracatheter Q8H       Infusion Meds    - MEDICATION INSTRUCTIONS -         PRN Meds  acetaminophen, acetaminophen, bisacodyl, hydrALAZINE, labetalol, lidocaine 4%, lidocaine (buffered or not buffered), - MEDICATION INSTRUCTIONS -, melatonin, naloxone, ondansetron **OR** ondansetron, polyethylene glycol, potassium chloride, potassium chloride with lidocaine, potassium chloride, potassium chloride, prochlorperazine **OR** prochlorperazine **OR** prochlorperazine, senna-docusate **OR** senna-docusate, sodium chloride (PF)       PHYSICAL EXAMINATION  Temp:  [97.6  F (36.4  C)-98.5  F (36.9  C)] 98  F (36.7  C)  Pulse:  [71-86] 75  Resp:  [18-20] 20  BP: (200-213)/(105-116) 200/110  SpO2:  [90 %-95 %] 93 " %     Neurologic  Mental Status:  alert, oriented x 3, able to follow some simple commands but could not follow complex commands, globally aphasic   Cranial Nerves:  visual fields intact, PERRL, EOMI with normal smooth pursuit, hearing not formally tested but intact to conversation, tongue protrusion midline, right facial droop   Motor:  normal muscle tone and bulk, no abnormal movements, able to move all limbs spontaneously, decreased strength in right upper and lower extremity, + drift of right upper extremity   Reflexes:  deferred   Sensory:  could not assess due to patients asphasic test   Coordination:  deferred  Station/Gait:  deferred       Stroke Scales    NIHSS  Interval baseline (09/05/20 9166)   Interval Comments     1a. Level of Consciousness 0-->Alert, keenly responsive   1b. LOC Questions 2-->Answers neither question correctly   1c. LOC Commands 2-->Performs neither task correctly   2.   Best Gaze 0-->Normal   3.   Visual 0-->No visual loss   4.   Facial Palsy 1-->Minor paralysis (flattened nasolabial fold, asymmetry on smiling)   5a. Motor Arm, Left 0-->No drift, limb holds 90 (or 45) degrees for full 10 secs   5b. Motor Arm, Right 1-->Drift, limb holds 90 (or 45) degrees, but drifts down before full 10 secs, does not hit bed or other support   6a. Motor Leg, Left 0-->No drift, leg holds 30 degree position for full 5 secs   6b. Motor Leg, right 1-->Drift, leg falls by the end of the 5-sec period but does not hit bed   7.   Limb Ataxia 0-->Absent   8.   Sensory 0-->Normal, no sensory loss   9.   Best Language 2-->Severe aphasia, all communication is through fragmentary expression, great need for inference, questioning, and guessing by the listener. Range of information that can be exchanged is limited, listener carries burden of. . . (see row details)   10. Dysarthria 0-->Normal   11. Extinction and Inattention  0-->No abnormality   Total 9 (09/07/20 0830)       Imaging  I personally reviewed all  imaging; relevant findings per HPI.     Lab Results Data   CBC  Recent Labs   Lab 09/06/20  0530 09/05/20  1256   WBC 7.8 9.5   RBC 4.60 4.92   HGB 14.4 15.8*   HCT 44.8 47.8*    239     Basic Metabolic Panel    Recent Labs   Lab 09/07/20  0758 09/06/20  0530 09/05/20  1256    144 144   POTASSIUM 3.1* 3.4 3.3*   CHLORIDE 110* 112* 109   CO2 26 26 28   BUN 11 13 20   CR 0.70 0.66 0.82   GLC 95 89 111*   MARII 8.5 8.7 9.7     Liver Panel  Recent Labs   Lab 09/05/20  1256   PROTTOTAL 7.3   ALBUMIN 4.1   BILITOTAL 0.6   ALKPHOS 99   AST 20   ALT 23     INR    Recent Labs   Lab Test 09/07/20  0758   INR 1.05      Lipid Profile    Recent Labs   Lab Test 09/05/20  1256   CHOL 186   HDL 87   LDL 81   TRIG 89     A1C    Recent Labs   Lab Test 09/05/20  1256   A1C 5.5     Troponin I    Recent Labs   Lab 09/06/20  0530 09/06/20  0212 09/05/20  2144   TROPI 0.074* 0.084* 0.069*

## 2020-09-07 NOTE — PLAN OF CARE
Discharge Planner OT   Patient plan for discharge: none stated  Current status: CGA for sup>sit and sit>stand. CGA for standing balance w/ walker. Worked w/ pt following simple motor commands, after [place] and repetition w/ physical and verbal cueing, pt followed ~60%. Pt unable to follow command to scoot laterally at EOB, min A for sup>sit then A x 2 for scooting and boosting up in bed.  Barriers to return to prior living situation: level of A  Recommendations for discharge: ARU  Rationale for recommendations:  is significantly below her baseline with a complicated picture of symptoms.inhibiting functional performance in ADLS.  Would benefit from intense OT here and at ARU to increase UE function, ability to follow commands and sequence ADL and IADL task, and increase UE strength.  Pt and spouse appear very motivated, would be able to tolerate 3 hours of therapy a day.         Entered by: Monisha Singh 09/07/2020 3:38 PM

## 2020-09-07 NOTE — PLAN OF CARE
Pt A&O to self also difficulty finding words with communications. Right side facial droop w/mod strength BUE and BLE. VSS on RA ex elevated BP- MD aware. Tolerating oral intake and voiding adequately. IV infusing.  at bedside. Had CTA done. Will continue to monitor.

## 2020-09-07 NOTE — PROVIDER NOTIFICATION
MD Notification    Notified Person: MD    Notified Person Name: Dr Sierra    Notification Date/Time: 9/7/2020 4:43 AM     Notification Interaction: paged amcom    Purpose of Notification: FYI evening repeat MRI showing Increased size and number of infarcts involving the left basal ganglia and cerebral hemisphere. Planned MRI for 0800 to eval for vasculitis.    Orders Received:    Comments:

## 2020-09-07 NOTE — PROGRESS NOTES
Interventional Radiology    Patient brought to radiology and I had a lengthy discussion with patient and .  Patient had full dose aspirin this AM (325 mg).  Typically, ASA is a 5 day hold.  However, would be willing to proceed with urgent diagnostic/medical need.  I am not aware of that need at the moment.  Call out to Neurology and have not heard back.  Please plan to hold ASA and NeuroIR team will reevaluate tomorrow, perhaps after discussing with Neurology, will be able to develop definitive plan and timing.    Explained in detail to patient .    Tobias Blackman

## 2020-09-07 NOTE — PROGRESS NOTES
SPIRITUAL HEALTH SERVICES Progress Note  [facility]    Short visit w/ Ptnt &  of 57 yrs. He did all the talking, as Ptnt unable since stroke.     He said they were adjusting to the big shift in their life after Ptnt's stroke, awaiting doctor visit this afternoon to learn how long Ptnt is expected to remain in hospital.     said plans already in place to move Ptnt to Acute Care Facility. I offered emotional support.      brought up they are [employer] and wanted to see a ; he thought they'd already asked yesterday.  said they knew Fr Clark and looks forward to seeing him. I left a msj for Fr Clark to visit.      Kellie Hua  Chaplain Resident

## 2020-09-07 NOTE — PLAN OF CARE
Pt here with L occiptal stroke with multiple infarcts and UTI . Alert, oriented to self only. VSS on RA ex elevated BP, parameters  SBP <220. Tele SR with BBB. Regular diet, thin liquids. Takes pills whole. Up with A1 GB and walker. Needs cues in using walker.  Denies pain. Neuros intact ex R droop, R-sided weakness, unable to follow certain commands. Continent, voiding bathroom, Incontinent urine x1. IVF infusing @75mL/hr. Pt scoring green on the Aggression Stop Light Tool. Plan to repeat MRI in AM,  LP pending MRI results. Discharge pending. Continue to monitor.

## 2020-09-08 ENCOUNTER — APPOINTMENT (OUTPATIENT)
Dept: SPEECH THERAPY | Facility: CLINIC | Age: 75
DRG: 003 | End: 2020-09-08
Attending: PHYSICIAN ASSISTANT
Payer: MEDICARE

## 2020-09-08 ENCOUNTER — APPOINTMENT (OUTPATIENT)
Dept: PHYSICAL THERAPY | Facility: CLINIC | Age: 75
DRG: 003 | End: 2020-09-08
Attending: PHYSICIAN ASSISTANT
Payer: MEDICARE

## 2020-09-08 ENCOUNTER — APPOINTMENT (OUTPATIENT)
Dept: MRI IMAGING | Facility: CLINIC | Age: 75
DRG: 003 | End: 2020-09-08
Attending: PSYCHIATRY & NEUROLOGY
Payer: MEDICARE

## 2020-09-08 ENCOUNTER — APPOINTMENT (OUTPATIENT)
Dept: OCCUPATIONAL THERAPY | Facility: CLINIC | Age: 75
DRG: 003 | End: 2020-09-08
Attending: PHYSICIAN ASSISTANT
Payer: MEDICARE

## 2020-09-08 ENCOUNTER — APPOINTMENT (OUTPATIENT)
Dept: MRI IMAGING | Facility: CLINIC | Age: 75
DRG: 003 | End: 2020-09-08
Attending: RADIOLOGY
Payer: MEDICARE

## 2020-09-08 LAB
ANA SER QL IF: NEGATIVE
CREAT SERPL-MCNC: 0.69 MG/DL (ref 0.52–1.04)
GFR SERPL CREATININE-BSD FRML MDRD: 85 ML/MIN/{1.73_M2}
POTASSIUM SERPL-SCNC: 3.7 MMOL/L (ref 3.4–5.3)

## 2020-09-08 PROCEDURE — 92526 ORAL FUNCTION THERAPY: CPT | Mod: GN | Performed by: SPEECH-LANGUAGE PATHOLOGIST

## 2020-09-08 PROCEDURE — 70553 MRI BRAIN STEM W/O & W/DYE: CPT

## 2020-09-08 PROCEDURE — 86160 COMPLEMENT ANTIGEN: CPT | Performed by: PHYSICIAN ASSISTANT

## 2020-09-08 PROCEDURE — 97530 THERAPEUTIC ACTIVITIES: CPT | Mod: GO

## 2020-09-08 PROCEDURE — 99207 ZZC APP CREDIT; MD BILLING SHARED VISIT: CPT | Performed by: PHYSICIAN ASSISTANT

## 2020-09-08 PROCEDURE — 86235 NUCLEAR ANTIGEN ANTIBODY: CPT | Performed by: PHYSICIAN ASSISTANT

## 2020-09-08 PROCEDURE — 97110 THERAPEUTIC EXERCISES: CPT | Mod: GO

## 2020-09-08 PROCEDURE — 97530 THERAPEUTIC ACTIVITIES: CPT | Mod: GP

## 2020-09-08 PROCEDURE — 86255 FLUORESCENT ANTIBODY SCREEN: CPT | Performed by: PHYSICIAN ASSISTANT

## 2020-09-08 PROCEDURE — 82565 ASSAY OF CREATININE: CPT | Performed by: PHYSICIAN ASSISTANT

## 2020-09-08 PROCEDURE — 36415 COLL VENOUS BLD VENIPUNCTURE: CPT | Performed by: PHYSICIAN ASSISTANT

## 2020-09-08 PROCEDURE — A9585 GADOBUTROL INJECTION: HCPCS | Performed by: HOSPITALIST

## 2020-09-08 PROCEDURE — 25000128 H RX IP 250 OP 636: Performed by: HOSPITALIST

## 2020-09-08 PROCEDURE — 25000132 ZZH RX MED GY IP 250 OP 250 PS 637: Performed by: PHYSICIAN ASSISTANT

## 2020-09-08 PROCEDURE — 70544 MR ANGIOGRAPHY HEAD W/O DYE: CPT

## 2020-09-08 PROCEDURE — 25500064 ZZH RX 255 OP 636: Performed by: HOSPITALIST

## 2020-09-08 PROCEDURE — 86431 RHEUMATOID FACTOR QUANT: CPT | Performed by: PHYSICIAN ASSISTANT

## 2020-09-08 PROCEDURE — 99232 SBSQ HOSP IP/OBS MODERATE 35: CPT | Performed by: PSYCHIATRY & NEUROLOGY

## 2020-09-08 PROCEDURE — 97116 GAIT TRAINING THERAPY: CPT | Mod: GP

## 2020-09-08 PROCEDURE — 99232 SBSQ HOSP IP/OBS MODERATE 35: CPT | Performed by: INTERNAL MEDICINE

## 2020-09-08 PROCEDURE — 12000000 ZZH R&B MED SURG/OB

## 2020-09-08 PROCEDURE — 84132 ASSAY OF SERUM POTASSIUM: CPT | Performed by: PHYSICIAN ASSISTANT

## 2020-09-08 PROCEDURE — 97535 SELF CARE MNGMENT TRAINING: CPT | Mod: GO

## 2020-09-08 PROCEDURE — 25000132 ZZH RX MED GY IP 250 OP 250 PS 637: Performed by: HOSPITALIST

## 2020-09-08 PROCEDURE — 86225 DNA ANTIBODY NATIVE: CPT | Performed by: PHYSICIAN ASSISTANT

## 2020-09-08 PROCEDURE — 92523 SPEECH SOUND LANG COMPREHEN: CPT | Mod: GN | Performed by: SPEECH-LANGUAGE PATHOLOGIST

## 2020-09-08 RX ORDER — LORAZEPAM 2 MG/ML
0.5 INJECTION INTRAMUSCULAR
Status: DISCONTINUED | OUTPATIENT
Start: 2020-09-08 | End: 2020-09-10

## 2020-09-08 RX ORDER — GADOBUTROL 604.72 MG/ML
7 INJECTION INTRAVENOUS ONCE
Status: COMPLETED | OUTPATIENT
Start: 2020-09-08 | End: 2020-09-08

## 2020-09-08 RX ADMIN — CEFTRIAXONE SODIUM 1 G: 1 INJECTION, POWDER, FOR SOLUTION INTRAMUSCULAR; INTRAVENOUS at 00:17

## 2020-09-08 RX ADMIN — ASPIRIN 325 MG: 325 TABLET, COATED ORAL at 08:38

## 2020-09-08 RX ADMIN — GADOBUTROL 7 ML: 604.72 INJECTION INTRAVENOUS at 14:11

## 2020-09-08 RX ADMIN — LORAZEPAM 0.25 MG: 0.5 TABLET ORAL at 11:53

## 2020-09-08 RX ADMIN — ATORVASTATIN CALCIUM 40 MG: 40 TABLET, FILM COATED ORAL at 20:04

## 2020-09-08 ASSESSMENT — ACTIVITIES OF DAILY LIVING (ADL)
ADLS_ACUITY_SCORE: 20
SWALLOWING: 0-->SWALLOWS FOODS/LIQUIDS WITHOUT DIFFICULTY

## 2020-09-08 NOTE — PLAN OF CARE
Here with CVI.  Neuros stable.  Continues to have word-finding difficulty and mild aphasia.  Inconsistent with commands due to aphasia.  RUE slightly weaker, right neglect when up with walker noted.  Tele SR BBB.  Hot pack for right shoulder pain.  Up with assist 1, gait belt and walker.  Ativan given for repeat MRI.  Discharge to ARC when ready.

## 2020-09-08 NOTE — PROGRESS NOTES
CM    I: Consult for discharge planning remains pending; Per [facility] liaison, pt could be appropriate if pt has 24/7 assistance post ARU; Pt has no insurance listed in Epic as well. SW sent email to Financial counselor for assistance.    P: Continue to assist as needed.    TASH Guadarrama   [facility]  [phone]

## 2020-09-08 NOTE — PLAN OF CARE
Discharge Planner PT   Pt is a 73yo female admitted for evaluation of aphasia and confusion, found to have acute infarcts involving the L basal ganglia, centrum semiovale, corona radiata/frontal lobe, and L occipital lobe. Also with UTI.    Patient plan for discharge: Pt did not state, spouse open to recommendations    Current status:   AM: Supine-sit standby assist, sitting balance supervision, sit-stands Min assist with technique cues and right hand facilitation, pivots with min assist with walker management and heavy safety cues to square up and reach back to ensure pt reaches surface safely, min assist 8' + 40' + 40' rolling walker with cues for right foot stride length, foot inside walker, walker management, 3 stairs with moderate assist with pt powering from affected leg with facilitation and right hand movement assist given inattention. Not impulsive during visit but impaired safety awareness.     PM: Supine-sit standby assist, sitting balance supervision, sit-stands Min assist with right hand facilitation, pivots with min assist with walker management and safety cues to square up and reach back to ensure pt reaches surface safely, min assist 100' + 100' walk with rolling walker with cues for right foot stride length, foot inside walker, walker management facilitation, right hand movement assist given inattention. Not impulsive during visit but impaired safety awareness with left gaze preference. Sit-Supine min assist right leg, squaring up, maximum assist scoot, bed alarm on.   Barriers to return to prior living situation: Weakness, impaired balance, inattention to R, decreased safety awareness, fall risk, level of assist    Recommendations for discharge: Acute rehab    Rationale for recommendations:  Great rehab candidate. Pt is significantly below baseline but making rapid progress, works hard. Pt with good family support, highly motivated to participate in therapy. Pt will benefit from intense  interdisciplinary therapies to address impairments and increase functional independence so pt can return home safely. 3hrs rehab daily is reasonable for this pt.        Entered by: Brittany Dressler 09/08/2020 10:50 AM

## 2020-09-08 NOTE — PLAN OF CARE
Discharge Planner SLP   Patient plan for discharge: Not addressed.   Current status: Speech and language evaluation completed. Patient scoring a 10 on the WAB-R indicating very severe recepetive and expressive aphasia. Patient was unable to follow 1-step directions. Yes/no question were unreliable as she answers no to all questions. She was unable to imititate vowels, or automatic utterances. Unable to name common objects. She did have a few spontaneous words such as bye, thank you. Patient will benefit from daily speech therapy to improve her functional communication skills.     Barriers to return to prior living situation: Aphasia  Recommendations for discharge: ARC  Rationale for recommendations: Patient will need intense ST needs for aphasia to return of her functional communication skills needed for daily living. Her  is supportive and will be able to tolerate 3 hours of therapy daily. She has good participation and motivation. She is well below her baseline.        Entered by: Kady Zelaya 09/08/2020 12:03 PM

## 2020-09-08 NOTE — PROGRESS NOTES
[facility]    Medicine Progress Note - Hospitalist Service       Date of Admission:  9/5/2020  Assessment & Plan   Sherrell Leonard is a 74 year-old female with no known history who presents with aphasia, slight right lower facial droop, RUE weakness.       Dense aphasia and R sided weakness  Multiple CVAs of unclear etiology - inflammatory vs ASCVD  Presents with aphasia, slight right facial droop, right upper extremity weakness. CTA of the head in ER revealed multiple intracranial vessel stenoses (L M1, L>R PCA, R M2) and left basal ganglia hypodensity suggestive of ischemia; MRI confirmed left MCA and PCA distribution strokes of small to moderate volume. Concern for possible vasculitis vs RCVS given multifocal reva of stenosis. Inflammatory markers low.   * Echo EF 60-65% nl LV and RV size, function and structure. Bubble negative.    * LDL 81  - Neurology following  - RNP ab, C4, C3, Ro and La ab, Scleroderma abs, RF, ANCA, DS DNA, MOODY pending  - Permissive HTN, treated SBP > 220 with minimal doses of short-acting IV medications   - MRA pending .   - Telemetry  - Holding on LP per neurology  - Continue aspirin, statin  - PT/OT/SLP consults - Recommending ARU, advanced to regular diet + thin liquids.       Asymptomatic COVID screen - negative on 9/5      EKG with LBBB and mild trop elevation secondary to type II NSTEMI related to stroke - Troponin flat. Echocardiogram within normal limits.       Hypokalemia  Potassium 3.1 on 09/07/20  - Monitor and replace per protocol      E.coli UTI  Sensitive to cephalosporins, TMP-SMX.   - Continue IV ceftriaxone while hospitalized    Diet: Regular Diet Adult    DVT Prophylaxis: Pneumatic Compression Devices  Sinclair Catheter: not present  Code Status: Full Code      Disposition Plan   Expected discharge: Pending MRA results, neurology recommendations, need for further work-up   Entered: Jose R Ruggiero MD 09/08/2020, 2:54 PM       The patient's care was discussed  with the patient and patient's     Jose R Ruggiero MD  Hospitalist Service  [facility]    ______________________________________________________________________    Interval History   History from patient limited by aphasia.  Per her  able to speak a few more words today, but is mostly unchanged compared to yesterday.     Data reviewed today: I reviewed all medications, new labs and imaging results over the last 24 hours. I personally reviewed no images or EKG's today.    Physical Exam   Vital Signs: Temp: 97.5  F (36.4  C) Temp src: Oral BP: (!) 209/113 Pulse: 79   Resp: 18 SpO2: 94 % O2 Device: None (Room air)    Weight: 151 lbs 4.8 oz    Constitutional:  NAD  Respiratory: Clear to auscultation bilaterally, good air movement bilaterally  Cardiovascular: RRR, no m/r/g.   GI: Soft, non-tender, non-distended.    Skin/Integumen: Warm, dry  Neuro: Alert. Expressive aphasia. RUE weakness noted.     Data   Recent Labs   Lab 09/08/20  0829 09/07/20  2330 09/07/20  0758 09/06/20  0530 09/06/20  0212 09/05/20  2144 09/05/20  1256   WBC  --   --   --  7.8  --   --  9.5   HGB  --   --   --  14.4  --   --  15.8*   MCV  --   --   --  97  --   --  97   PLT  --   --   --  215  --   --  239   INR  --   --  1.05  --   --   --   --    NA  --   --  142 144  --   --  144   POTASSIUM 3.7 3.5 3.1* 3.4  --   --  3.3*   CHLORIDE  --   --  110* 112*  --   --  109   CO2  --   --  26 26  --   --  28   BUN  --   --  11 13  --   --  20   CR 0.69  --  0.70 0.66  --   --  0.82   ANIONGAP  --   --  6 6  --   --  7   MARII  --   --  8.5 8.7  --   --  9.7   GLC  --   --  95 89  --   --  111*   ALBUMIN  --   --   --   --   --   --  4.1   PROTTOTAL  --   --   --   --   --   --  7.3   BILITOTAL  --   --   --   --   --   --  0.6   ALKPHOS  --   --   --   --   --   --  99   ALT  --   --   --   --   --   --  23   AST  --   --   --   --   --   --  20   TROPI  --   --   --  0.074* 0.084* 0.069* 0.051*       No results found for  this or any previous visit (from the past 24 hour(s)).  Medications     - MEDICATION INSTRUCTIONS -         aspirin  325 mg Oral Daily    Or     aspirin  300 mg Rectal Daily     atorvastatin  40 mg Oral or NG Tube Daily at 8 pm     cefTRIAXone  1 g Intravenous Q24H     sodium chloride (PF)  3 mL Intracatheter Q8H

## 2020-09-08 NOTE — PLAN OF CARE
Pt here with possible L occipital CVA and multiple infarcts. A&O to self. Neuros difficult to assess d/t unable to follow commands, Confusion,Aphasia, R facial droop,word finding, possible R side strength 4/5 . VSS except hypertension, /115, Tylenol given for generalized pain, recheck /100. Regular diet. Incontinent with bladder at times, continent with bowels. On ABX for UTI. Up with A1 GB. Denies pain. Pt scoring green on the Aggression Stop Light Toll.  Does not use the call light, impulsive at times when needing bathroom.  K 3.1, replaced. Recheck at 2330 and morning.

## 2020-09-08 NOTE — PLAN OF CARE
Pt here with possible L occipital CVA and multiple infarcts. A&O to self. Neuros difficult to assess d/t unable to follow commands, Confusion,Aphasia, R facial droop,word finding, possible R side neglect, R side strength 4/5. VSS. Du5ymio, thin liquids. Incontinent with bladder at times, continent with bowels. On ABX for UTI. Up with A1 GB. Denies pain. Pt scoring green on the Aggression Stop Light Toll.  Does not use the call light, impulsive at times when needing bathroom.  Pending MRI results

## 2020-09-08 NOTE — PROGRESS NOTES
09/08/20 1146   General Information, SLP   Type of Evaluation Speech and Language   Type of Visit Initial   Start of Care Date 09/08/20   Onset of Illness/Injury or Date of Surgery - Date 09/05/20   Referring Physician Dr. Noriega   Patient/Family Goals Statement To be able to communicate her wants and needs.    Pertinent History of Current Problem CTA of the head in ER revealed multiple intracranial vessel stenoses (L M1, L>R PCA, R M2) and left basal ganglia hypodensity suggestive of ischemia; MRI confirmed left MCA and PCA distribution strokes of small to moderate volume. ER eval also revealed a UTI.    Precautions/Limitations fall precautions;swallowing precautions   General Observations Alert and pleasant.   Oral Motor Sensory Function   Completed on Swallow Evaluation Completed Clinical Bedside Swallow Evaluation   Western Aphasia Battery- Revised Bedside Record From   Spontaneous Speech Content Score (out of 10) 1   Spontaneous Speech Fluency Score (out of 10) 1   Auditory Verbal Comprehension Score (out of 10) 4   Sequential Commands Score (out of 10) 0   Repetition Score (out of 10) 0   Object Naming Score (out of 10) 0   Bedside Aphasia Sum 6   WAB-R Bedside Aphasia Score 10   Aphasia Severity Level Very Severe Aphasia   General Therapy Interventions   Planned Therapy Interventions Language   Language Auditory comprehension;Verbal expression   Clinical Impression, SLP Eval   Criteria for Skilled Therapeutic Interventions Met Yes   SLP Diagnosis Severe receptive and expressive aphasia   Functional limitations due to impairments Unable to communicate her needs.    Rehab Potential Good, to achieve stated therapy goals   Therapy Frequency Daily   Predicted Duration of Therapy Intervention (days/wks) 1 week.    Anticipated Discharge Disposition Acute Rehabilitation Facility   Risks and Benefits of Treatment have been explained. Yes   Patient, Family & other staff in agreement with plan of care Yes   Clinical  Impression Comments Patient scoring a 10 on the WAB-R indicating very severe recepetive and expressive aphasia. Patient was unable to follow 1-step directions. Yes/no question were unreliable as she answers no to all questions. She was unable to imititate vowels, or automatic utterances. Unable to name common objects. She did have a few spontaneous words such as bye, thank you. Patient will benefit from daily speech therapy to improve her functional communication skills.     Total Evaluation Time      Total Evaluation Time (Minutes) 15

## 2020-09-08 NOTE — PROGRESS NOTES
[facility]    Stroke Progress Note    Interval Events  Sherrell Leonard is a 74 year old female with no PMH on file, who presented to the ED for evaluation of confusion that began a few days ago. During admission patient was found to have a UTI in which she is currently being treated for. No acute events overnight, Today patient continues to be globally aphasic.  Patient intermittently following simple commands today.     Stroke Evaluation summarized:  MRI/Head CT: MRI: acute infarct involving left basal ganglia, centrum semiovale, corona radiata/frontal lobe, and left occipital lobe   Intracranial Vascular Imaging: Multiple high-grade intracranial stenoses as detailed including high-grade stenosis of the left middle cerebral artery M1 segment. Severe stenosis is present involving the right middle cerebral artery M2 branch. Multifocal segmental moderate to severe stenoses are present involving the anterior cerebral arteries, left worse than right. Fetal origin of the right posterior cerebral artery. Multiple moderate to severe stenoses are present involving the bilateral posterior cerebral arteries.   Cervical Carotid and Vertebral Artery Vascular Imaging: 3 vessel aortic arch is present with heavy atherosclerotic plaquing. Mild plaque is present at the left vertebral artery origin.   Echocardiogram: EF 60-65%, normal left and right atrium, negative bubble    EKG/Telemetry: SR with 1st degree AV block   LDL: 81  A1c: 5.5  Troponin: 0.069  Other testing: Not Applicable    Impression  Multifocal intracranial stenoses, most notably left M1 focal stenosis, causing multifocal ischemic strokes and a left MCA syndrome. Differential diagnoses include vasculitis vs RCVS, less likely multifocal atherosclerosis (hard to explain why multiple concurrent acute ischemic strokes and no athero risk factors), embolic (would not cause focal stenoses).    Plan  - Continue  mg   - Continue Atorvastatin 40 mg daily  "  - MRI w/wo and MRA brain - ordered for today. Ativan PRN available to be given prior to exam, awaiting results   - Allow patient to autoregulate blood pressure, do not treat BP under 220, further BP parameters pending MRI image findings   - Urinary tract infection, continue antibiotic treatment per hospitalist team  - Serum studies pending, will follow results   - Will hold on LP, pending other test results       Thank you for this consult, we will continue to follow,     Octavia Cowan PA-C   Neurology  To page me or covering stroke neurology team member, click here: [facility]COM   Choose \"On Call\" tab at top, then search dropdown box for \"Neurology Adult\", select location, press Enter, then look for stroke/neuro ICU/telestroke.  ______________________________________________________    Medications   Home Meds  Prior to Admission medications    Medication Sig Start Date End Date Taking? Authorizing Provider   Multiple Vitamins-Minerals (SYSTANE ICAPS AREDS2) CAPS Take 1 capsule by mouth daily   Yes Unknown, Entered By History   multivitamin, therapeutic (THERA-VIT) TABS tablet Take 1 tablet by mouth daily   Yes Unknown, Entered By History       Scheduled Meds    aspirin  325 mg Oral Daily    Or     aspirin  300 mg Rectal Daily     atorvastatin  40 mg Oral or NG Tube Daily at 8 pm     cefTRIAXone  1 g Intravenous Q24H     sodium chloride (PF)  3 mL Intracatheter Q8H       Infusion Meds    - MEDICATION INSTRUCTIONS -         PRN Meds  acetaminophen, acetaminophen, bisacodyl, hydrALAZINE, labetalol, lidocaine 4%, lidocaine (buffered or not buffered), LORazepam, LORazepam, - MEDICATION INSTRUCTIONS -, melatonin, naloxone, ondansetron **OR** ondansetron, polyethylene glycol, potassium chloride, potassium chloride with lidocaine, potassium chloride, potassium chloride, prochlorperazine **OR** prochlorperazine **OR** prochlorperazine, senna-docusate **OR** senna-docusate, sodium chloride (PF)       PHYSICAL EXAMINATION  Temp:  " [97.5  F (36.4  C)-98.3  F (36.8  C)] 97.5  F (36.4  C)  Pulse:  [77-87] 79  Resp:  [18-20] 18  BP: (183-216)/() 209/113  SpO2:  [91 %-94 %] 94 %     Neurologic  Mental Status:  alert to self, intermittenly following simple commands, globally aphasic   Cranial Nerves:  PERRL, EOMI with normal smooth pursuit, hearing not formally tested but intact to conversation, tongue protrusion midline, right facial droop   Motor:   normal muscle tone and bulk, no abnormal movements, able to move all limbs spontaneously, decreased strength in right upper and lower extremity, + drift of right upper extremity    Reflexes:  deferred   Sensory: Unable to assess due to patient's aphasic state  Coordination: Deferred  Station/Gait: Deferred    Stroke Scales    NIHSS  Interval baseline (09/05/20 1856)   Interval Comments     1a. Level of Consciousness 0-->Alert, keenly responsive   1b. LOC Questions 2-->Answers neither question correctly   1c. LOC Commands 2-->Performs neither task correctly   2.   Best Gaze 0-->Normal   3.   Visual 0-->No visual loss   4.   Facial Palsy 1-->Minor paralysis (flattened nasolabial fold, asymmetry on smiling)   5a. Motor Arm, Left 0-->No drift, limb holds 90 (or 45) degrees for full 10 secs   5b. Motor Arm, Right 1-->Drift, limb holds 90 (or 45) degrees, but drifts down before full 10 secs, does not hit bed or other support   6a. Motor Leg, Left 0-->No drift, leg holds 30 degree position for full 5 secs   6b. Motor Leg, right 1-->Drift, leg falls by the end of the 5-sec period but does not hit bed   7.   Limb Ataxia 0-->Absent   8.   Sensory 0-->Normal, no sensory loss   9.   Best Language 2-->Severe aphasia, all communication is through fragmentary expression, great need for inference, questioning, and guessing by the listener. Range of information that can be exchanged is limited, listener carries burden of. . . (see row details)   10. Dysarthria 0-->Normal   11. Extinction and Inattention  0-->No  abnormality   Total 9 (09/08/20 1200)       Imaging  I personally reviewed all imaging; relevant findings per HPI.     Lab Results Data   CBC  Recent Labs   Lab 09/06/20  0530 09/05/20  1256   WBC 7.8 9.5   RBC 4.60 4.92   HGB 14.4 15.8*   HCT 44.8 47.8*    239     Basic Metabolic Panel    Recent Labs   Lab 09/08/20  0829 09/07/20  2330 09/07/20  0758 09/06/20  0530 09/05/20  1256   NA  --   --  142 144 144   POTASSIUM 3.7 3.5 3.1* 3.4 3.3*   CHLORIDE  --   --  110* 112* 109   CO2  --   --  26 26 28   BUN  --   --  11 13 20   CR 0.69  --  0.70 0.66 0.82   GLC  --   --  95 89 111*   MARII  --   --  8.5 8.7 9.7     Liver Panel  Recent Labs   Lab 09/05/20  1256   PROTTOTAL 7.3   ALBUMIN 4.1   BILITOTAL 0.6   ALKPHOS 99   AST 20   ALT 23     INR    Recent Labs   Lab Test 09/07/20  0758   INR 1.05      Lipid Profile    Recent Labs   Lab Test 09/05/20  1256   CHOL 186   HDL 87   LDL 81   TRIG 89     A1C    Recent Labs   Lab Test 09/05/20  1256   A1C 5.5     Troponin I    Recent Labs   Lab 09/06/20  0530 09/06/20  0212 09/05/20  2144   TROPI 0.074* 0.084* 0.069*

## 2020-09-08 NOTE — PLAN OF CARE
Pt here with L sided strokes. Lethargic, oriented to self. Not following all neuro commands. Neuros with WFD, R droop, R visual neglect, R sided weakness. BP elevated but within parameters, other VSS. Tele SB w/ BBB. On regular diet, takes pills whole with water. Up with 1 GB/walker. Incontinent of urine at times. Denies pain. Plan for repeat MRI in AM, discharge pending.    Behavior & Aggression Tool color: Yellow, for confusion    Pt's belongings: Belongings from admission day present in pt's room               Home medications: None

## 2020-09-08 NOTE — PLAN OF CARE
Discharge Planner SLP   Patient plan for discharge: ARC  Current status: SLP: Patient seen for swallow treatment for a meal follow up session of regular textures and thin liquids. Patient demonstrated impulsive behaviors and decreased awareness of pocketing food on the right side. Moderate amount that was reduced with multiple swallows and alternated with pureed texture. Education and assistance with using a swab to clear the residue and her tongue/right lateral sulci. She had a coughing episode on peaches with a drink of milk. Increased risk for aspiration with thin liquids if not clearing orally due to premature entry.   Recommend: 1. Downgrade diet to DDL 3 with thin liquids. 2. Upright, supervision with meals/assistance, check for oral residue, no straws, small bites/sips. If coughing with thin change to nectar.   Barriers to return to prior living situation: aphasia and dysphagia  Recommendations for discharge: ARC  Rationale for recommendations: Patient will need intense ST needs for both aphasia and dysphagia. Will be able to tolerate 3 hours of therapy daily and is motivated and has good family support. Patient is significantly below her baseline.        Entered by: Kady Zelaya 09/08/2020 3:22 PM

## 2020-09-08 NOTE — PLAN OF CARE
Discharge Planner OT   Patient plan for discharge: none stated  Current status: SBA for sup>sit. Sit>stand w/ CGA. Mobility to/from restroom w/ min A for negotiating walker, rebecca w/ turning. Pt needed [place] to initiate, but then washed her face w/ mod A. Guided through RUE ex x 10 reps each, fatigued quickly. Mod A for sit>supine, not following motor commands towards end of session.   Barriers to return to prior living situation: level of A, R inattention and weakness, fall risk, dec safety awareness  Recommendations for discharge: ARU  Rationale for recommendations: Pt is significantly below her baseline with a complicated picture of symptoms.inhibiting functional performance in ADLS.  Would benefit from intense OT here and at ARU to increase UE function, ability to follow commands and sequence ADL and IADL task, and increase UE strength.  Pt and spouse appear very motivated, would be able to tolerate 3 hours of therapy a day       Entered by: Monisha Singh 09/08/2020 11:33 AM

## 2020-09-09 ENCOUNTER — APPOINTMENT (OUTPATIENT)
Dept: PHYSICAL THERAPY | Facility: CLINIC | Age: 75
DRG: 003 | End: 2020-09-09
Attending: PHYSICIAN ASSISTANT
Payer: MEDICARE

## 2020-09-09 ENCOUNTER — APPOINTMENT (OUTPATIENT)
Dept: GENERAL RADIOLOGY | Facility: CLINIC | Age: 75
DRG: 003 | End: 2020-09-09
Attending: PHYSICIAN ASSISTANT
Payer: MEDICARE

## 2020-09-09 ENCOUNTER — HOSPITAL ENCOUNTER (OUTPATIENT)
Dept: NEUROLOGY | Facility: CLINIC | Age: 75
DRG: 003 | End: 2020-09-09
Attending: PHYSICIAN ASSISTANT
Payer: MEDICARE

## 2020-09-09 ENCOUNTER — APPOINTMENT (OUTPATIENT)
Dept: SPEECH THERAPY | Facility: CLINIC | Age: 75
DRG: 003 | End: 2020-09-09
Attending: PHYSICIAN ASSISTANT
Payer: MEDICARE

## 2020-09-09 LAB
ANCA AB PATTERN SER IF-IMP: NORMAL
APPEARANCE CSF: ABNORMAL
APPEARANCE CSF: CLEAR
C-ANCA TITR SER IF: NORMAL {TITER}
C3 SERPL-MCNC: 119 MG/DL (ref 81–157)
C4 SERPL-MCNC: 39 MG/DL (ref 13–39)
COLOR CSF: ABNORMAL
COLOR CSF: COLORLESS
DSDNA AB SER-ACNC: 1 IU/ML
ENA RNP IGG SER IA-ACNC: <0.2 AI (ref 0–0.9)
ENA SCL70 IGG SER IA-ACNC: <0.2 AI (ref 0–0.9)
ENA SS-A IGG SER IA-ACNC: <0.2 AI (ref 0–0.9)
ENA SS-B IGG SER IA-ACNC: <0.2 AI (ref 0–0.9)
EOSINOPHIL NFR CSF MANUAL: 3 %
GLUCOSE CSF-MCNC: 50 MG/DL (ref 40–70)
GRAM STN SPEC: NORMAL
LYMPH ABN NFR CSF MANUAL: 36 %
MISCELLANEOUS TEST: NORMAL
MONOS+MACROS NFR CSF MANUAL: 14 %
NEUTROPHILS NFR CSF MANUAL: 47 %
PROT CSF-MCNC: 155 MG/DL (ref 15–60)
RBC # CSF MANUAL: 1580 /UL (ref 0–2)
RBC # CSF MANUAL: 7955 /UL (ref 0–2)
RHEUMATOID FACT SER NEPH-ACNC: <7 IU/ML (ref 0–20)
SPECIMEN SOURCE: NORMAL
TUBE # CSF: 1 #
TUBE # CSF: 4 #
WBC # CSF MANUAL: 2 /UL (ref 0–5)
WBC # CSF MANUAL: 6 /UL (ref 0–5)

## 2020-09-09 PROCEDURE — 25000128 H RX IP 250 OP 636: Performed by: INTERNAL MEDICINE

## 2020-09-09 PROCEDURE — 95816 EEG AWAKE AND DROWSY: CPT

## 2020-09-09 PROCEDURE — 92507 TX SP LANG VOICE COMM INDIV: CPT | Mod: GN | Performed by: SPEECH-LANGUAGE PATHOLOGIST

## 2020-09-09 PROCEDURE — 87102 FUNGUS ISOLATION CULTURE: CPT | Performed by: STUDENT IN AN ORGANIZED HEALTH CARE EDUCATION/TRAINING PROGRAM

## 2020-09-09 PROCEDURE — 87205 SMEAR GRAM STAIN: CPT | Performed by: STUDENT IN AN ORGANIZED HEALTH CARE EDUCATION/TRAINING PROGRAM

## 2020-09-09 PROCEDURE — 009U3ZX DRAINAGE OF SPINAL CANAL, PERCUTANEOUS APPROACH, DIAGNOSTIC: ICD-10-PCS | Performed by: PHYSICIAN ASSISTANT

## 2020-09-09 PROCEDURE — 00000102 ZZHCL STATISTIC CYTO WRIGHT STAIN TC: Performed by: PHYSICIAN ASSISTANT

## 2020-09-09 PROCEDURE — 82945 GLUCOSE OTHER FLUID: CPT | Performed by: STUDENT IN AN ORGANIZED HEALTH CARE EDUCATION/TRAINING PROGRAM

## 2020-09-09 PROCEDURE — 99232 SBSQ HOSP IP/OBS MODERATE 35: CPT | Performed by: PSYCHIATRY & NEUROLOGY

## 2020-09-09 PROCEDURE — 99207 ZZC APP CREDIT; MD BILLING SHARED VISIT: CPT | Performed by: PHYSICIAN ASSISTANT

## 2020-09-09 PROCEDURE — 84999 UNLISTED CHEMISTRY PROCEDURE: CPT | Performed by: PHYSICIAN ASSISTANT

## 2020-09-09 PROCEDURE — 97530 THERAPEUTIC ACTIVITIES: CPT | Mod: GP

## 2020-09-09 PROCEDURE — 25000128 H RX IP 250 OP 636: Performed by: HOSPITALIST

## 2020-09-09 PROCEDURE — 25000132 ZZH RX MED GY IP 250 OP 250 PS 637: Mod: GY | Performed by: HOSPITALIST

## 2020-09-09 PROCEDURE — 84157 ASSAY OF PROTEIN OTHER: CPT | Performed by: STUDENT IN AN ORGANIZED HEALTH CARE EDUCATION/TRAINING PROGRAM

## 2020-09-09 PROCEDURE — 87476 LYME DIS DNA AMP PROBE: CPT | Performed by: HOSPITALIST

## 2020-09-09 PROCEDURE — 12000000 ZZH R&B MED SURG/OB

## 2020-09-09 PROCEDURE — 62270 DX LMBR SPI PNXR: CPT

## 2020-09-09 PROCEDURE — 86592 SYPHILIS TEST NON-TREP QUAL: CPT | Performed by: HOSPITALIST

## 2020-09-09 PROCEDURE — 87798 DETECT AGENT NOS DNA AMP: CPT | Performed by: PHYSICIAN ASSISTANT

## 2020-09-09 PROCEDURE — 86787 VARICELLA-ZOSTER ANTIBODY: CPT | Performed by: HOSPITALIST

## 2020-09-09 PROCEDURE — 89050 BODY FLUID CELL COUNT: CPT | Performed by: PHYSICIAN ASSISTANT

## 2020-09-09 PROCEDURE — 87529 HSV DNA AMP PROBE: CPT | Performed by: STUDENT IN AN ORGANIZED HEALTH CARE EDUCATION/TRAINING PROGRAM

## 2020-09-09 PROCEDURE — 84999 UNLISTED CHEMISTRY PROCEDURE: CPT | Performed by: HOSPITALIST

## 2020-09-09 PROCEDURE — 25000125 ZZHC RX 250: Performed by: HOSPITALIST

## 2020-09-09 PROCEDURE — 89051 BODY FLUID CELL COUNT: CPT | Performed by: STUDENT IN AN ORGANIZED HEALTH CARE EDUCATION/TRAINING PROGRAM

## 2020-09-09 PROCEDURE — 25000132 ZZH RX MED GY IP 250 OP 250 PS 637: Mod: GY | Performed by: INTERNAL MEDICINE

## 2020-09-09 PROCEDURE — 99232 SBSQ HOSP IP/OBS MODERATE 35: CPT | Performed by: INTERNAL MEDICINE

## 2020-09-09 PROCEDURE — 88108 CYTOPATH CONCENTRATE TECH: CPT | Performed by: PHYSICIAN ASSISTANT

## 2020-09-09 PROCEDURE — 86787 VARICELLA-ZOSTER ANTIBODY: CPT | Performed by: PHYSICIAN ASSISTANT

## 2020-09-09 PROCEDURE — 86618 LYME DISEASE ANTIBODY: CPT | Performed by: PHYSICIAN ASSISTANT

## 2020-09-09 PROCEDURE — 87798 DETECT AGENT NOS DNA AMP: CPT | Performed by: HOSPITALIST

## 2020-09-09 PROCEDURE — 87070 CULTURE OTHR SPECIMN AEROBIC: CPT | Performed by: STUDENT IN AN ORGANIZED HEALTH CARE EDUCATION/TRAINING PROGRAM

## 2020-09-09 PROCEDURE — 88108 CYTOPATH CONCENTRATE TECH: CPT | Mod: 26 | Performed by: PHYSICIAN ASSISTANT

## 2020-09-09 PROCEDURE — 87581 M.PNEUMON DNA AMP PROBE: CPT | Performed by: PHYSICIAN ASSISTANT

## 2020-09-09 RX ORDER — DEXTROSE MONOHYDRATE 25 G/50ML
25-50 INJECTION, SOLUTION INTRAVENOUS
Status: DISCONTINUED | OUTPATIENT
Start: 2020-09-09 | End: 2020-09-11

## 2020-09-09 RX ORDER — AMLODIPINE BESYLATE 5 MG/1
5 TABLET ORAL DAILY
Status: DISCONTINUED | OUTPATIENT
Start: 2020-09-09 | End: 2020-09-22

## 2020-09-09 RX ORDER — DEXTROSE MONOHYDRATE 25 G/50ML
25-50 INJECTION, SOLUTION INTRAVENOUS
Status: DISCONTINUED | OUTPATIENT
Start: 2020-09-09 | End: 2020-09-09

## 2020-09-09 RX ORDER — LABETALOL HYDROCHLORIDE 5 MG/ML
10-40 INJECTION, SOLUTION INTRAVENOUS EVERY 10 MIN PRN
Status: DISCONTINUED | OUTPATIENT
Start: 2020-09-09 | End: 2020-09-10

## 2020-09-09 RX ORDER — HYDRALAZINE HYDROCHLORIDE 20 MG/ML
10-20 INJECTION INTRAMUSCULAR; INTRAVENOUS
Status: DISCONTINUED | OUTPATIENT
Start: 2020-09-09 | End: 2020-09-10

## 2020-09-09 RX ORDER — NICOTINE POLACRILEX 4 MG
15-30 LOZENGE BUCCAL
Status: DISCONTINUED | OUTPATIENT
Start: 2020-09-09 | End: 2020-09-09

## 2020-09-09 RX ORDER — LIDOCAINE HYDROCHLORIDE 10 MG/ML
3 INJECTION, SOLUTION EPIDURAL; INFILTRATION; INTRACAUDAL; PERINEURAL ONCE
Status: COMPLETED | OUTPATIENT
Start: 2020-09-09 | End: 2020-09-09

## 2020-09-09 RX ORDER — NICOTINE POLACRILEX 4 MG
15-30 LOZENGE BUCCAL
Status: DISCONTINUED | OUTPATIENT
Start: 2020-09-09 | End: 2020-09-11

## 2020-09-09 RX ADMIN — ATORVASTATIN CALCIUM 40 MG: 40 TABLET, FILM COATED ORAL at 19:21

## 2020-09-09 RX ADMIN — LABETALOL HYDROCHLORIDE 10 MG: 5 INJECTION, SOLUTION INTRAVENOUS at 20:03

## 2020-09-09 RX ADMIN — LIDOCAINE HYDROCHLORIDE 2 ML: 10 INJECTION, SOLUTION EPIDURAL; INFILTRATION; INTRACAUDAL; PERINEURAL at 15:05

## 2020-09-09 RX ADMIN — LABETALOL HYDROCHLORIDE 20 MG: 5 INJECTION, SOLUTION INTRAVENOUS at 20:25

## 2020-09-09 RX ADMIN — HYDRALAZINE HYDROCHLORIDE 10 MG: 20 INJECTION INTRAMUSCULAR; INTRAVENOUS at 18:46

## 2020-09-09 RX ADMIN — LIDOCAINE HYDROCHLORIDE 2 ML: 10 INJECTION, SOLUTION EPIDURAL; INFILTRATION; INTRACAUDAL; PERINEURAL at 14:35

## 2020-09-09 RX ADMIN — CEFTRIAXONE SODIUM 1 G: 1 INJECTION, POWDER, FOR SOLUTION INTRAMUSCULAR; INTRAVENOUS at 00:00

## 2020-09-09 RX ADMIN — HYDRALAZINE HYDROCHLORIDE 10 MG: 20 INJECTION INTRAMUSCULAR; INTRAVENOUS at 17:59

## 2020-09-09 RX ADMIN — AMLODIPINE BESYLATE 5 MG: 5 TABLET ORAL at 16:39

## 2020-09-09 ASSESSMENT — ACTIVITIES OF DAILY LIVING (ADL)
ADLS_ACUITY_SCORE: 20

## 2020-09-09 NOTE — PLAN OF CARE
Discharge Planner SLP   Patient plan for discharge: None addressed.   Current status: Patient continues to have severe receptive and expressive aphasia and is frustrated with the inability to communicate.   Barriers to return to prior living situation: Aphasia  Recommendations for discharge: ARC  Rationale for recommendations: Patient needs intense ST needs to improve her functional communication. She will be able to tolerate 3 hours of therapy daily and has a supportive . She is significantly below her baseline.        Entered by: Kady Zelaya 09/09/2020 1:44 PM

## 2020-09-09 NOTE — PLAN OF CARE
Pt here with L sided strokes. Alert, ROGER orientation d/t aphasia. Neuros with WFD, R droop, R visual neglect, R sided weakness. BP elevated but within parameters, other VSS. Tele SB w/ BBB. On regular diet, takes pills whole with water. Up with 1-2 GB/walker. Needs prompting when walking to bathroom. Incontinent of urine at times. Denies pain. Discharge to ARU pending.    Behavior & Aggression Tool color: Yellow, for confusion    Pt's belongings: Belongings from admission day present in pt's room               Home medications: None

## 2020-09-09 NOTE — PROVIDER NOTIFICATION
"Text page sent to hospitalist, \"Just fyi call from lab, pre vicente gram stain results- no organisms found. \"    "

## 2020-09-09 NOTE — PROGRESS NOTES
[facility]    Medicine Progress Note - Hospitalist Service       Date of Admission:  9/5/2020  Assessment & Plan   Sherrell Leonard is a 74 year-old female with no known history who presents with aphasia, slight right lower facial droop, RUE weakness.  Admitted 9/5/2020.      Dense aphasia and R sided weakness  Multiple CVAs of unclear etiology, likely due to multifocal atherosclerosis   Hypertension   Presents with aphasia, slight right facial droop, right upper extremity weakness. CTA of the head in ER revealed multiple intracranial vessel stenoses (L M1, L>R PCA, R M2) and left basal ganglia hypodensity suggestive of ischemia; MRI confirmed left MCA and PCA distribution strokes of small to moderate volume. Concern for possible vasculitis vs RCVS given multifocal reva of stenosis. Inflammatory markers low.   * Echo EF 60-65% nl LV and RV size, function and structure. Bubble negative.    * LDL 81  - Neurology following  - Negative/normal serologic testing: RNP ab, C4, C3, Ro and [place] ab, Scleroderma abs, RF, ANCA, DS DNA, MOODY  - Start amlodipine 5 mg daily, changed parameters for PRN IV medications   - MRA more suggestive of multifocal atherosclerosis per neurology  - Telemetry  - EEG, LP ordered per neurology; results pending  - Continue aspirin (held for LP), statin  - PT/OT/SLP consults - Recommending ARU, advanced to regular diet + thin liquids.       Asymptomatic COVID screen - negative on 9/5      EKG with LBBB and mild trop elevation secondary to type II NSTEMI related to stroke - Troponin flat. Echocardiogram within normal limits.       Hypokalemia  Potassium 3.1 on 09/07/20  - Monitor and replace per protocol      E.coli UTI  Sensitive to cephalosporins, TMP-SMX.   - Continue IV ceftriaxone while hospitalized    Diet: Dysphagia Diet Level 3 Advanced Thin Liquids (water, ice chips, juice, milk gelatin, ice cream, etc) (No straws. )    DVT Prophylaxis: Pneumatic Compression Devices  Sinclair Catheter:  not present  Code Status: Full Code      Disposition Plan   Expected discharge: Pending LP/EEG results, neurology recommendations   Entered: Jose R Ruggiero MD 09/09/2020, 3:19 PM       The patient's care was discussed with the patient and patient's  and bedside RN    Jose R Ruggiero MD  Hospitalist Service  [facility]    ______________________________________________________________________    Interval History   History from patient limited by aphasia. Speech noted to be worse overnight per RN. This afternoon, about the same. Discussed with  and RN at bedside.     Data reviewed today: I reviewed all medications, new labs and imaging results over the last 24 hours. I personally reviewed no images or EKG's today.    Physical Exam   Vital Signs: Temp: 97.6  F (36.4  C) Temp src: Oral BP: (!) 190/92 Pulse: 66   Resp: 16 SpO2: 91 % O2 Device: None (Room air)    Weight: 151 lbs 4.8 oz    Constitutional:  NAD  Respiratory: Clear to auscultation bilaterally, good air movement bilaterally  Cardiovascular: RRR, no m/r/g.   GI: Soft, non-tender, non-distended.    Skin/Integumen: Warm, dry  Neuro: Alert. Expressive aphasia. RUE weakness noted.     Data   Recent Labs   Lab 09/08/20  0829 09/07/20  2330 09/07/20  0758 09/06/20  0530 09/06/20  0212 09/05/20  2144 09/05/20  1256   WBC  --   --   --  7.8  --   --  9.5   HGB  --   --   --  14.4  --   --  15.8*   MCV  --   --   --  97  --   --  97   PLT  --   --   --  215  --   --  239   INR  --   --  1.05  --   --   --   --    NA  --   --  142 144  --   --  144   POTASSIUM 3.7 3.5 3.1* 3.4  --   --  3.3*   CHLORIDE  --   --  110* 112*  --   --  109   CO2  --   --  26 26  --   --  28   BUN  --   --  11 13  --   --  20   CR 0.69  --  0.70 0.66  --   --  0.82   ANIONGAP  --   --  6 6  --   --  7   MARII  --   --  8.5 8.7  --   --  9.7   GLC  --   --  95 89  --   --  111*   ALBUMIN  --   --   --   --   --   --  4.1   PROTTOTAL  --   --   --   --   --   --   7.3   BILITOTAL  --   --   --   --   --   --  0.6   ALKPHOS  --   --   --   --   --   --  99   ALT  --   --   --   --   --   --  23   AST  --   --   --   --   --   --  20   TROPI  --   --   --  0.074* 0.084* 0.069* 0.051*       No results found for this or any previous visit (from the past 24 hour(s)).  Medications     - MEDICATION INSTRUCTIONS -       - MEDICATION INSTRUCTIONS -       sodium chloride 0.9%         aspirin  325 mg Oral Daily    Or     aspirin  300 mg Rectal Daily     atorvastatin  40 mg Oral or NG Tube Daily at 8 pm     cefTRIAXone  1 g Intravenous Q24H     sodium chloride (PF)  3 mL Intracatheter Q8H

## 2020-09-09 NOTE — PLAN OF CARE
Discharge Planner PT   Pt is a 73yo female admitted for evaluation of aphasia and confusion, found to have acute infarcts involving the L basal ganglia, centrum semiovale, corona radiata/frontal lobe, and L occipital lobe. Also with UTI.    Patient plan for discharge: Pt did not state, spouse open to recommendations - wondering about visitors at acute rehab    Current status:   AM: Held given medical status decline, testing.     PM: RN reports pt medically stable for PT before LP procedure. Supine-sit min assist twice, sitting balance standby assist, 6 stands moderate assist initially with right knee buckling improving to min assist with simply posterior lean - left rail used. Left armhold with PT in front, then PT on her left with handhold assist with forward/retro steps 1', 1' to right - Mod-maximum assist, left pivot to transport cart Mod-maximum assist with right lower extremity mostly passive management, truncal stabilization. Sit-supine min assist right leg, maximum assist repositioning. Quick to fatigue. Yes/no responses not always accurate. Weaker right hemiparesis with worse communication today.   Barriers to return to prior living situation: Weakness, impaired balance, inattention to R, decreased safety awareness, fall risk, level of assist    Recommendations for discharge: Acute rehab    Rationale for recommendations:  Great rehab candidate pending medical stability. Pt is significantly below baseline but making rapid progress, works hard. Pt with good family support, highly motivated to participate in therapy. Pt will benefit from intense interdisciplinary therapies to address impairments and increase functional independence so pt can return home safely. 3hrs rehab daily is reasonable for this pt at discharge.        Entered by: Brittany Dressler 09/09/2020 2:05 PM

## 2020-09-09 NOTE — PLAN OF CARE
Here with CVI.  Neuros stable.  Word-finding difficulties- only able to say some words, and difficulties with understanding directions or answering questions.  RUE/RLE weakness and neglect noted more with transfers and ambulation.  Tele SR/BBB. BPs elevated, within current parameters. Incontinent this am.  NPO for LP at 1400.  Up with assist 1, gait belt and walker, unsteady at times.  ARU at discharge.

## 2020-09-09 NOTE — PLAN OF CARE
OT: Pt being further assessed for neuro changes; EEG still being done at this time.    Addendum: Unable to see in PM due to Lumbar Puncture, then bedrest following procedure.

## 2020-09-09 NOTE — PROGRESS NOTES
0900 PT rx held given RN report of increased dysarthria awaiting neurology assessment.     At 0930, RN reports neurology stopped by and thinks 1000 PT should be okay but to check back. 1000 rx asked to be held given EEG about the same time.

## 2020-09-09 NOTE — PROGRESS NOTES
[facility]    Stroke Progress Note    Interval Events  Sherrell Leonard is a 74 year old female with no PMH on file, who presented to the ED for evaluation of confusion that began a few days ago. During admission patient was found to have a UTI in which she is currently being treated for. No acute events overnight, Today patient continues to be globally aphasic. Patient intermittently following simple commands with observed right sided neglect.  at bedside, discussed further diagnostic testing which included an EEG and LP.  was agreeable with the plan. Lab work thus far unrevealing. MRI findings more suggestive of atherosclerosis but an  exclude vasculitis at this time.     Stroke Evaluation summarized:  MRI/Head CT: MRI: acute infarct involving left basal ganglia, centrum semiovale, corona radiata/frontal lobe, and left occipital lobe   Intracranial Vascular Imaging: Multiple high-grade intracranial stenoses as detailed including high-grade stenosis of the left middle cerebral artery M1 segment. Severe stenosis is present involving the right middle cerebral artery M2 branch. Multifocal segmental moderate to severe stenoses are present involving the anterior cerebral arteries, left worse than right. Fetal origin of the right posterior cerebral artery. Multiple moderate to severe stenoses are present involving the bilateral posterior cerebral arteries.   Cervical Carotid and Vertebral Artery Vascular Imaging: 3 vessel aortic arch is present with heavy atherosclerotic plaquing. Mild plaque is present at the left vertebral artery origin.   Echocardiogram: EF 60-65%, normal left and right atrium, negative bubble    EKG/Telemetry: SR with 1st degree AV block   LDL: 81  A1c: 5.5  Troponin: 0.069  Other testing: Not Applicable    Impression  Multifocal intracranial stenoses, most notably left M1 focal stenosis, causing multifocal ischemic strokes and a left MCA syndrome    Plan  - Routine EEG  "completed, awaiting final read  - LP scheduled for this afternoon ;  held for LP procedure, can resume tomorrow   - Continue Lipitor 40 mg daily   - Urinary tract infection - continue antibiotic treatment per primary team   - Given MRI findings, ok to begin treating BP with SBP goal < 160     Thank you for this consult. We will continue to follow.     Octavia Cowan PA-C   Neurology  To page me or covering stroke neurology team member, click here: AMCOM   Choose \"On Call\" tab at top, then search dropdown box for \"Neurology Adult\", select location, press Enter, then look for stroke/neuro ICU/telestroke.  ______________________________________________________    Medications   Home Meds  Prior to Admission medications    Medication Sig Start Date End Date Taking? Authorizing Provider   Multiple Vitamins-Minerals (SYSTANE ICAPS AREDS2) CAPS Take 1 capsule by mouth daily   Yes Unknown, Entered By History   multivitamin, therapeutic (THERA-VIT) TABS tablet Take 1 tablet by mouth daily   Yes Unknown, Entered By History       Scheduled Meds    aspirin  325 mg Oral Daily    Or     aspirin  300 mg Rectal Daily     atorvastatin  40 mg Oral or NG Tube Daily at 8 pm     cefTRIAXone  1 g Intravenous Q24H     sodium chloride (PF)  3 mL Intracatheter Q8H       Infusion Meds    - MEDICATION INSTRUCTIONS -       - MEDICATION INSTRUCTIONS -       sodium chloride 0.9%         PRN Meds  acetaminophen, acetaminophen, bisacodyl, glucose **OR** dextrose **OR** glucagon, hydrALAZINE, labetalol, lidocaine 4%, lidocaine (buffered or not buffered), LORazepam, LORazepam, - MEDICATION INSTRUCTIONS -, - MEDICATION INSTRUCTIONS -, melatonin, naloxone, ondansetron **OR** ondansetron, polyethylene glycol, potassium chloride, potassium chloride with lidocaine, potassium chloride, potassium chloride, prochlorperazine **OR** prochlorperazine **OR** prochlorperazine, senna-docusate **OR** senna-docusate, sodium chloride (PF), sodium chloride 0.9%   "     PHYSICAL EXAMINATION  Temp:  [97.6  F (36.4  C)-99.2  F (37.3  C)] 97.6  F (36.4  C)  Pulse:  [65-92] 66  Resp:  [16-18] 16  BP: (139-190)/() 190/92  SpO2:  [91 %-94 %] 91 %     Neurologic  Mental Status:  alert to self, intermittenly following simple commands, globally aphasic   Cranial Nerves:  PERRL, EOMI with normal smooth pursuit, hearing not formally tested but intact to conversation, tongue protrusion midline, right facial droop   Motor:   normal muscle tone and bulk, no abnormal movements, able to move all limbs spontaneously, decreased strength in right upper and lower extremity, + drift of right upper extremity. There is an element of right sided neglect     Reflexes:  deferred   Sensory: Unable to assess due to patient's aphasic state   Coordination: Deferred  Station/Gait: Deferred    Stroke Scales    NIHSS  Interval baseline (09/05/20 1856)   Interval Comments     1a. Level of Consciousness 0-->Alert, keenly responsive   1b. LOC Questions 2-->Answers neither question correctly   1c. LOC Commands 2-->Performs neither task correctly   2.   Best Gaze 0-->Normal   3.   Visual 0-->No visual loss   4.   Facial Palsy 1-->Minor paralysis (flattened nasolabial fold, asymmetry on smiling)   5a. Motor Arm, Left 0-->No drift, limb holds 90 (or 45) degrees for full 10 secs   5b. Motor Arm, Right 1-->Drift, limb holds 90 (or 45) degrees, but drifts down before full 10 secs, does not hit bed or other support   6a. Motor Leg, Left 0-->No drift, leg holds 30 degree position for full 5 secs   6b. Motor Leg, right 0-->No drift, leg holds 30 degree position for full 5 secs   7.   Limb Ataxia 0-->Absent   8.   Sensory 0-->Normal, no sensory loss   9.   Best Language 2-->Severe aphasia, all communication is through fragmentary expression, great need for inference, questioning, and guessing by the listener. Range of information that can be exchanged is limited, listener carries burden of. . . (see row details)   10.  Dysarthria 1-->Mild-to-moderate dysarthria, patient slurs at least some words and, at worst, can be understood with some difficulty   11. Extinction and Inattention  1-->Visual, tactile, auditory, spatial, or personal inattention or extinction to bilateral simultaneous stimulation in one of the sensory modalities   Total 10 (09/09/20 1405)       Imaging  I personally reviewed all imaging; relevant findings per HPI.     Lab Results Data   CBC  Recent Labs   Lab 09/06/20  0530 09/05/20  1256   WBC 7.8 9.5   RBC 4.60 4.92   HGB 14.4 15.8*   HCT 44.8 47.8*    239     Basic Metabolic Panel    Recent Labs   Lab 09/08/20  0829 09/07/20  2330 09/07/20  0758 09/06/20  0530 09/05/20  1256   NA  --   --  142 144 144   POTASSIUM 3.7 3.5 3.1* 3.4 3.3*   CHLORIDE  --   --  110* 112* 109   CO2  --   --  26 26 28   BUN  --   --  11 13 20   CR 0.69  --  0.70 0.66 0.82   GLC  --   --  95 89 111*   MARII  --   --  8.5 8.7 9.7     Liver Panel  Recent Labs   Lab 09/05/20  1256   PROTTOTAL 7.3   ALBUMIN 4.1   BILITOTAL 0.6   ALKPHOS 99   AST 20   ALT 23     INR    Recent Labs   Lab Test 09/07/20  0758   INR 1.05      Lipid Profile    Recent Labs   Lab Test 09/05/20  1256   CHOL 186   HDL 87   LDL 81   TRIG 89     A1C    Recent Labs   Lab Test 09/05/20  1256   A1C 5.5     Troponin I    Recent Labs   Lab 09/06/20  0530 09/06/20  0212 09/05/20  2144   TROPI 0.074* 0.084* 0.069*

## 2020-09-09 NOTE — PROVIDER NOTIFICATION
"Text to MD:    \" Speech is worse this morning.  Only able to say a few words.  Follows some commands\"  "

## 2020-09-09 NOTE — PROGRESS NOTES
RADIOLOGY PROCEDURE NOTE  Patient name: Sherrell Leonard  MRN: [id number]  : 1945    Pre-procedure diagnosis: Encephalopathy, recent multifocal stroke  Post-procedure diagnosis: Same    Procedure Date/Time: 2020  3:29 PM  Procedure: Lumbar puncture  Estimated blood loss: None  Specimen(s) collected with description: 10 ml CSF, blood-tinge in initial sample cleared with subsequent vials  The patient tolerated the procedure well with no immediate complications.  Significant findings:none    See imaging dictation for procedural details.    Provider name: Eliseo Avalos PA-C  Assistant(s):None

## 2020-09-10 ENCOUNTER — APPOINTMENT (OUTPATIENT)
Dept: CT IMAGING | Facility: CLINIC | Age: 75
DRG: 003 | End: 2020-09-10
Attending: PHYSICIAN ASSISTANT
Payer: MEDICARE

## 2020-09-10 ENCOUNTER — APPOINTMENT (OUTPATIENT)
Dept: GENERAL RADIOLOGY | Facility: CLINIC | Age: 75
DRG: 003 | End: 2020-09-10
Attending: STUDENT IN AN ORGANIZED HEALTH CARE EDUCATION/TRAINING PROGRAM
Payer: MEDICARE

## 2020-09-10 ENCOUNTER — APPOINTMENT (OUTPATIENT)
Dept: CT IMAGING | Facility: CLINIC | Age: 75
DRG: 003 | End: 2020-09-10
Attending: STUDENT IN AN ORGANIZED HEALTH CARE EDUCATION/TRAINING PROGRAM
Payer: MEDICARE

## 2020-09-10 LAB
ANION GAP SERPL CALCULATED.3IONS-SCNC: 6 MMOL/L (ref 3–14)
BASOPHILS # BLD AUTO: 0 10E9/L (ref 0–0.2)
BASOPHILS NFR BLD AUTO: 0.2 %
BUN SERPL-MCNC: 15 MG/DL (ref 7–30)
CALCIUM SERPL-MCNC: 8.7 MG/DL (ref 8.5–10.1)
CHLORIDE SERPL-SCNC: 109 MMOL/L (ref 94–109)
CO2 SERPL-SCNC: 26 MMOL/L (ref 20–32)
COPATH REPORT: NORMAL
CREAT SERPL-MCNC: 0.63 MG/DL (ref 0.52–1.04)
DIFFERENTIAL METHOD BLD: ABNORMAL
EOSINOPHIL # BLD AUTO: 0 10E9/L (ref 0–0.7)
EOSINOPHIL NFR BLD AUTO: 0.2 %
ERYTHROCYTE [DISTWIDTH] IN BLOOD BY AUTOMATED COUNT: 13.3 % (ref 10–15)
GFR SERPL CREATININE-BSD FRML MDRD: 87 ML/MIN/{1.73_M2}
GLUCOSE BLDC GLUCOMTR-MCNC: 110 MG/DL (ref 70–99)
GLUCOSE BLDC GLUCOMTR-MCNC: 150 MG/DL (ref 70–99)
GLUCOSE SERPL-MCNC: 100 MG/DL (ref 70–99)
HCT VFR BLD AUTO: 45.2 % (ref 35–47)
HGB BLD-MCNC: 14.6 G/DL (ref 11.7–15.7)
HSV1 DNA CSF QL NAA+PROBE: NOT DETECTED
HSV2 DNA CSF QL NAA+PROBE: NOT DETECTED
IMM GRANULOCYTES # BLD: 0 10E9/L (ref 0–0.4)
IMM GRANULOCYTES NFR BLD: 0.2 %
LYMPHOCYTES # BLD AUTO: 1.8 10E9/L (ref 0.8–5.3)
LYMPHOCYTES NFR BLD AUTO: 15.2 %
MCH RBC QN AUTO: 31.4 PG (ref 26.5–33)
MCHC RBC AUTO-ENTMCNC: 32.3 G/DL (ref 31.5–36.5)
MCV RBC AUTO: 97 FL (ref 78–100)
MICROBIOLOGIST REVIEW: NORMAL
MISCELLANEOUS TEST: NORMAL
MONOCYTES # BLD AUTO: 1.3 10E9/L (ref 0–1.3)
MONOCYTES NFR BLD AUTO: 11.1 %
NEUTROPHILS # BLD AUTO: 8.8 10E9/L (ref 1.6–8.3)
NEUTROPHILS NFR BLD AUTO: 73.1 %
NRBC # BLD AUTO: 0 10*3/UL
NRBC BLD AUTO-RTO: 0 /100
PLATELET # BLD AUTO: 206 10E9/L (ref 150–450)
POTASSIUM SERPL-SCNC: 3 MMOL/L (ref 3.4–5.3)
RBC # BLD AUTO: 4.65 10E12/L (ref 3.8–5.2)
SARS-COV-2 RNA SPEC QL NAA+PROBE: NORMAL
SODIUM SERPL-SCNC: 141 MMOL/L (ref 133–144)
SPECIMEN SOURCE: NORMAL
WBC # BLD AUTO: 12 10E9/L (ref 4–11)

## 2020-09-10 PROCEDURE — 99233 SBSQ HOSP IP/OBS HIGH 50: CPT | Performed by: PSYCHIATRY & NEUROLOGY

## 2020-09-10 PROCEDURE — 71045 X-RAY EXAM CHEST 1 VIEW: CPT

## 2020-09-10 PROCEDURE — 25000128 H RX IP 250 OP 636: Performed by: HOSPITALIST

## 2020-09-10 PROCEDURE — 36415 COLL VENOUS BLD VENIPUNCTURE: CPT | Performed by: STUDENT IN AN ORGANIZED HEALTH CARE EDUCATION/TRAINING PROGRAM

## 2020-09-10 PROCEDURE — 00000146 ZZHCL STATISTIC GLUCOSE BY METER IP

## 2020-09-10 PROCEDURE — 20000003 ZZH R&B ICU

## 2020-09-10 PROCEDURE — 99207 ZZC APP CREDIT; MD BILLING SHARED VISIT: CPT | Performed by: PHYSICIAN ASSISTANT

## 2020-09-10 PROCEDURE — 25000132 ZZH RX MED GY IP 250 OP 250 PS 637: Mod: GY | Performed by: STUDENT IN AN ORGANIZED HEALTH CARE EDUCATION/TRAINING PROGRAM

## 2020-09-10 PROCEDURE — 25000132 ZZH RX MED GY IP 250 OP 250 PS 637: Mod: GY | Performed by: INTERNAL MEDICINE

## 2020-09-10 PROCEDURE — 25000128 H RX IP 250 OP 636: Performed by: PHYSICIAN ASSISTANT

## 2020-09-10 PROCEDURE — 99233 SBSQ HOSP IP/OBS HIGH 50: CPT | Performed by: INTERNAL MEDICINE

## 2020-09-10 PROCEDURE — 25800030 ZZH RX IP 258 OP 636: Performed by: STUDENT IN AN ORGANIZED HEALTH CARE EDUCATION/TRAINING PROGRAM

## 2020-09-10 PROCEDURE — 25000125 ZZHC RX 250: Performed by: HOSPITALIST

## 2020-09-10 PROCEDURE — 25000132 ZZH RX MED GY IP 250 OP 250 PS 637: Mod: GY | Performed by: HOSPITALIST

## 2020-09-10 PROCEDURE — 70450 CT HEAD/BRAIN W/O DYE: CPT

## 2020-09-10 PROCEDURE — 0042T CT HEAD PERFUSION WITH CONTRAST: CPT

## 2020-09-10 PROCEDURE — 80048 BASIC METABOLIC PNL TOTAL CA: CPT | Performed by: STUDENT IN AN ORGANIZED HEALTH CARE EDUCATION/TRAINING PROGRAM

## 2020-09-10 PROCEDURE — U0003 INFECTIOUS AGENT DETECTION BY NUCLEIC ACID (DNA OR RNA); SEVERE ACUTE RESPIRATORY SYNDROME CORONAVIRUS 2 (SARS-COV-2) (CORONAVIRUS DISEASE [COVID-19]), AMPLIFIED PROBE TECHNIQUE, MAKING USE OF HIGH THROUGHPUT TECHNOLOGIES AS DESCRIBED BY CMS-2020-01-R: HCPCS | Performed by: INTERNAL MEDICINE

## 2020-09-10 PROCEDURE — 85025 COMPLETE CBC W/AUTO DIFF WBC: CPT | Performed by: STUDENT IN AN ORGANIZED HEALTH CARE EDUCATION/TRAINING PROGRAM

## 2020-09-10 PROCEDURE — 70498 CT ANGIOGRAPHY NECK: CPT

## 2020-09-10 PROCEDURE — 25000128 H RX IP 250 OP 636: Performed by: INTERNAL MEDICINE

## 2020-09-10 RX ORDER — LORAZEPAM 0.5 MG/1
0.25 TABLET ORAL
Status: DISCONTINUED | OUTPATIENT
Start: 2020-09-10 | End: 2020-09-26

## 2020-09-10 RX ORDER — HYDRALAZINE HYDROCHLORIDE 20 MG/ML
10-20 INJECTION INTRAMUSCULAR; INTRAVENOUS
Status: DISCONTINUED | OUTPATIENT
Start: 2020-09-10 | End: 2020-09-13

## 2020-09-10 RX ORDER — CLOPIDOGREL BISULFATE 75 MG/1
75 TABLET ORAL DAILY
Status: DISCONTINUED | OUTPATIENT
Start: 2020-09-11 | End: 2020-09-16

## 2020-09-10 RX ORDER — CLOPIDOGREL BISULFATE 75 MG/1
75 TABLET ORAL DAILY
Status: DISCONTINUED | OUTPATIENT
Start: 2020-09-11 | End: 2020-09-10

## 2020-09-10 RX ORDER — CLOPIDOGREL BISULFATE 75 MG/1
300 TABLET ORAL ONCE
Status: COMPLETED | OUTPATIENT
Start: 2020-09-10 | End: 2020-09-10

## 2020-09-10 RX ORDER — CLOPIDOGREL BISULFATE 75 MG/1
300 TABLET ORAL ONCE
Status: DISCONTINUED | OUTPATIENT
Start: 2020-09-10 | End: 2020-09-10

## 2020-09-10 RX ORDER — SODIUM CHLORIDE 9 MG/ML
INJECTION, SOLUTION INTRAVENOUS CONTINUOUS
Status: DISCONTINUED | OUTPATIENT
Start: 2020-09-10 | End: 2020-09-10

## 2020-09-10 RX ORDER — IOPAMIDOL 755 MG/ML
120 INJECTION, SOLUTION INTRAVASCULAR ONCE
Status: COMPLETED | OUTPATIENT
Start: 2020-09-10 | End: 2020-09-10

## 2020-09-10 RX ORDER — HEPARIN SODIUM 5000 [USP'U]/.5ML
5000 INJECTION, SOLUTION INTRAVENOUS; SUBCUTANEOUS EVERY 12 HOURS
Status: DISCONTINUED | OUTPATIENT
Start: 2020-09-10 | End: 2020-10-05 | Stop reason: HOSPADM

## 2020-09-10 RX ORDER — LABETALOL HYDROCHLORIDE 5 MG/ML
20 INJECTION, SOLUTION INTRAVENOUS
Status: DISCONTINUED | OUTPATIENT
Start: 2020-09-10 | End: 2020-09-13

## 2020-09-10 RX ADMIN — SODIUM CHLORIDE 100 ML: 9 INJECTION, SOLUTION INTRAVENOUS at 12:28

## 2020-09-10 RX ADMIN — Medication 10 MEQ: at 20:14

## 2020-09-10 RX ADMIN — LABETALOL HYDROCHLORIDE 10 MG: 5 INJECTION, SOLUTION INTRAVENOUS at 00:41

## 2020-09-10 RX ADMIN — Medication 10 MEQ: at 22:47

## 2020-09-10 RX ADMIN — IOPAMIDOL 120 ML: 755 INJECTION, SOLUTION INTRAVENOUS at 12:28

## 2020-09-10 RX ADMIN — CEFTRIAXONE SODIUM 1 G: 1 INJECTION, POWDER, FOR SOLUTION INTRAMUSCULAR; INTRAVENOUS at 00:46

## 2020-09-10 RX ADMIN — ASPIRIN 325 MG: 325 TABLET, COATED ORAL at 08:30

## 2020-09-10 RX ADMIN — LABETALOL HYDROCHLORIDE 20 MG: 5 INJECTION, SOLUTION INTRAVENOUS at 02:00

## 2020-09-10 RX ADMIN — Medication 10 MEQ: at 21:44

## 2020-09-10 RX ADMIN — SODIUM CHLORIDE: 9 INJECTION, SOLUTION INTRAVENOUS at 12:57

## 2020-09-10 RX ADMIN — AMLODIPINE BESYLATE 5 MG: 5 TABLET ORAL at 08:30

## 2020-09-10 RX ADMIN — HEPARIN SODIUM 5000 UNITS: 5000 INJECTION, SOLUTION INTRAVENOUS; SUBCUTANEOUS at 20:19

## 2020-09-10 RX ADMIN — CLOPIDOGREL BISULFATE 300 MG: 75 TABLET, FILM COATED ORAL at 13:22

## 2020-09-10 RX ADMIN — ATORVASTATIN CALCIUM 40 MG: 40 TABLET, FILM COATED ORAL at 20:20

## 2020-09-10 RX ADMIN — SODIUM CHLORIDE 1000 ML: 9 INJECTION, SOLUTION INTRAVENOUS at 12:30

## 2020-09-10 ASSESSMENT — ACTIVITIES OF DAILY LIVING (ADL)
ADLS_ACUITY_SCORE: 20
ADLS_ACUITY_SCORE: 22

## 2020-09-10 NOTE — PROGRESS NOTES
[facility]    Medicine Progress Note - Hospitalist Service       Date of Admission:  9/5/2020  Assessment & Plan   Sherrell Leonard is a 74 year-old female with no known history who presents with aphasia, slight right lower facial droop, RUE weakness.  Admitted 9/5/2020.      Dense aphasia and R sided weakness  Multiple CVAs of unclear etiology, likely due to multifocal atherosclerosis   Hypertension   Presents with aphasia, slight right facial droop, right upper extremity weakness. CTA of the head in ER revealed multiple intracranial vessel stenoses (L M1, L>R PCA, R M2) and left basal ganglia hypodensity suggestive of ischemia; MRI confirmed left MCA and PCA distribution strokes of small to moderate volume. Concern for possible vasculitis vs RCVS given multifocal reva of stenosis. Inflammatory markers low.   * Echo EF 60-65% nl LV and RV size, function and structure. Bubble negative.    * LDL 81  * MRA more suggestive of multifocal atherosclerosis per neurology  - Neurology following  - Negative/normal serologic testing: RNP ab, C4, C3, Ro and La ab, Scleroderma abs, RF, ANCA, DS DNA, MOODY  - Started amlodipine 5 mg daily 9/9/20, increase to 10 mg 9/11/20 if blood pressure remains elevated.  - Telemetry  - EEG pending  - LP with traumatic tap, gram stain negative, elevated protein, normal glucose. Follow-up per neurology.  - Continue aspirin, statin  - PT/OT/SLP consults - Recommending ARU, diet per SLP      Mild hypoxia  - SpO2 previously borderline at 91-92%, hypoxic to 88% overnight and now on 2L NC  - Unable to assess for symptoms due to aphasia, though does not appear short of breath and  has not noticed any cough  - Borderline temperature elevation, but no fever  - Primary DDx includes aspiration versus atelectasis   - RN to attempt to wean, if unable to wean or fever plan for CXR  - Encourage IS as able     ADDENDUM:   Discussed with neurology.  Patient had code stroke called in the  afternoon, additional testing included white blood cell count that was elevated, chest x-ray with increased interstitial markings compared to admission.  Given hypoxia, borderline temperature elevation, will send symptomatic COVID19 testing. Overall, lower suspicion, so if COVID19 negative can discontinue precautions UNLESS having ongoing fevers or worsening respiratory status.  Will hold on additional fluids, started by neurology as patient is pressure dependent following stroke, but now improving as blood pressure has increased.     Asymptomatic COVID screen   Negative on 9/5      LBBB   Mild trop elevation secondary to type 2 NSTEMI related to stroke - Troponin flat. Echocardiogram within normal limits.       Hypokalemia  Potassium 3.1 on 09/07/20  - Monitor and replace per protocol      E.coli UTI  Sensitive to cephalosporins, TMP-SMX.   - Continue IV ceftriaxone while hospitalized    Diet: NPO per Anesthesia Guidelines for Procedure/Surgery Except for: Meds    DVT Prophylaxis: Pneumatic Compression Devices  Sinclair Catheter: not present  Code Status: Full Code      Disposition Plan   Expected discharge: Pending completion of work-up, neurology recommendations. Currently recommending ARU, SW following.   Entered: Jose R Ruggiero MD 09/10/2020, 11:06 AM       The patient's care was discussed with the patient and patient's  and bedside RN    Jose R Ruggiero MD  Hospitalist Service  [facility]    ______________________________________________________________________    Interval History   History from patient limited by aphasia. Overnight noted to be hypoxic to 88%, previously 91-92%. No cough noted by .      Data reviewed today: I reviewed all medications, new labs and imaging results over the last 24 hours. I personally reviewed no images or EKG's today.    Physical Exam   Vital Signs: Temp: 100.2  F (37.9  C) Temp src: Oral BP: (!) 161/77 Pulse: 78   Resp: 16 SpO2: 92 % O2 Device:  Nasal cannula Oxygen Delivery: 2 LPM  Weight: 145 lbs 14.4 oz    Constitutional:  NAD  Respiratory: Clear to auscultation bilaterally, good air movement bilaterally. Normal effort at rest.   Cardiovascular: RRR, no m/r/g.   GI: Soft, non-tender, non-distended.    Skin/Integumen: Warm, dry  Neuro: Alert. Expressive and some receptive aphasia. RUE weakness noted.     Data   Recent Labs   Lab 09/08/20  0829 09/07/20  2330 09/07/20  0758 09/06/20  0530 09/06/20  0212 09/05/20  2144 09/05/20  1256   WBC  --   --   --  7.8  --   --  9.5   HGB  --   --   --  14.4  --   --  15.8*   MCV  --   --   --  97  --   --  97   PLT  --   --   --  215  --   --  239   INR  --   --  1.05  --   --   --   --    NA  --   --  142 144  --   --  144   POTASSIUM 3.7 3.5 3.1* 3.4  --   --  3.3*   CHLORIDE  --   --  110* 112*  --   --  109   CO2  --   --  26 26  --   --  28   BUN  --   --  11 13  --   --  20   CR 0.69  --  0.70 0.66  --   --  0.82   ANIONGAP  --   --  6 6  --   --  7   MARII  --   --  8.5 8.7  --   --  9.7   GLC  --   --  95 89  --   --  111*   ALBUMIN  --   --   --   --   --   --  4.1   PROTTOTAL  --   --   --   --   --   --  7.3   BILITOTAL  --   --   --   --   --   --  0.6   ALKPHOS  --   --   --   --   --   --  99   ALT  --   --   --   --   --   --  23   AST  --   --   --   --   --   --  20   TROPI  --   --   --  0.074* 0.084* 0.069* 0.051*       Recent Results (from the past 24 hour(s))   XR Lumbar Puncture Spinal Tap Diag    Narrative    EXAM: XR LUMBAR PUNCTURE SPINAL TAP DIAGNOSTIC  9/9/2020 3:21 PM       History:  Multiple strokes on MRI.     PROCEDURE: The patient consented for a lumbar puncture. The benefits  and risks of the procedure were explained to the patient, including  but not limited to worsening headache, hemorrhage, infection, lower  extremity pain, or nerve root injury. The patient was sterilely  prepped and draped with the patient in the supine position, over the  lower back. Under fluoroscopic guidance,  the interlaminar spaces were  noted. 1% lidocaine was administered for local anesthetic over the  L2-3 interlaminar space, and a 22 gauge needle was advanced into the  thecal sac under fluoroscopic guidance.  There was initial aspiration  of blood-tinged CSF. Return of CSF from this level was at a very slow  rate, another puncture was made at the L5 level with return of CSF.  About 12 cc's total were aspirated.  The needle was removed. There  were no immediate complications associated with the procedure.    Estimated blood loss during the procedure was less than 5 mL.    Fluoro time: 1.7   Images Obtained: 2      Impression    IMPRESSION: Successful lumbar puncture, bloody tap initially with  sequential clearing of CSF in multiple vials.    SEVERIANO RIZO PA-C     Medications     - MEDICATION INSTRUCTIONS -       - MEDICATION INSTRUCTIONS -       sodium chloride 0.9%         amLODIPine  5 mg Oral Daily     aspirin  325 mg Oral Daily    Or     aspirin  300 mg Rectal Daily     atorvastatin  40 mg Oral or NG Tube Daily at 8 pm     cefTRIAXone  1 g Intravenous Q24H     sodium chloride (PF)  3 mL Intracatheter Q8H

## 2020-09-10 NOTE — PROGRESS NOTES
"  [facility]    Stroke Progress Note    Interval Events  No acute events overnight. Today stroke neurology team was paged regarding an acute neurologic change on exam. Patient reported to not be moving her RUE. Went to see patient this morning the patient was able to hold right arm up for a few seconds before dropping the hand to the bed. Later this morning, upon reexamination she was unable to hold her right arm or lift her right leg against gravity. A stroke code was called, patient was brought down for STAT CT/CTA/CTP, which were stable. Patients exam improved after blood pressure began rising, patient appears more alert, and although there is still decreased strength on the right, patient is able to slightly hold her right arm. Patient is able to lift right leg if prompted. SBP ranging in 190's.       Impression  Multifocal intracranial stenoses, most notably left M1 focal stenosis, causing multifocal ischemic strokes and a left MCA syndrome     Plan  -Loaded with Plavix 300 mg today, then DAPT with ASA 81 mg + Plavix 75 mg   - Hold blood pressure medications, as patient is currently pressure dependent. Further BP parameters to come   - patient should remain flat in bed unless up to eat or use the restroom   - Q2H neuro checks   - Possible 24 hour EEG tomorrow vs. Diagnostic angiogram. Currently discussing course of treatment with endovascular team  - Continue antibiotics treatment per primary team for UTI       Thank you for this consult, we will continue to follow.     Octavia Cowan PA-C   Neurology  To page me or covering stroke neurology team member, click here: AMCOM   Choose \"On Call\" tab at top, then search dropdown box for \"Neurology Adult\", select location, press Enter, then look for stroke/neuro ICU/telestroke.  ______________________________________________________    Medications   Home Meds  Prior to Admission medications    Medication Sig Start Date End Date Taking? Authorizing " Provider   Multiple Vitamins-Minerals (SYSTANE ICAPS AREDS2) CAPS Take 1 capsule by mouth daily   Yes Unknown, Entered By History   multivitamin, therapeutic (THERA-VIT) TABS tablet Take 1 tablet by mouth daily   Yes Unknown, Entered By History       Scheduled Meds    [Held by provider] amLODIPine  5 mg Oral Daily     aspirin  325 mg Oral Daily    Or     aspirin  300 mg Rectal Daily     atorvastatin  40 mg Oral or NG Tube Daily at 8 pm     cefTRIAXone  1 g Intravenous Q24H     [START ON 9/11/2020] clopidogrel  75 mg Oral Daily     heparin ANTICOAGULANT  5,000 Units Subcutaneous Q12H     sodium chloride (PF)  3 mL Intracatheter Q8H       Infusion Meds    - MEDICATION INSTRUCTIONS -       - MEDICATION INSTRUCTIONS -       sodium chloride 0.9%         PRN Meds  acetaminophen, acetaminophen, bisacodyl, glucose **OR** dextrose **OR** glucagon, [Held by provider] hydrALAZINE, [Held by provider] labetalol, lidocaine 4%, lidocaine (buffered or not buffered), LORazepam, LORazepam, - MEDICATION INSTRUCTIONS -, - MEDICATION INSTRUCTIONS -, melatonin, naloxone, ondansetron **OR** ondansetron, polyethylene glycol, potassium chloride, potassium chloride with lidocaine, potassium chloride, potassium chloride, prochlorperazine **OR** prochlorperazine **OR** prochlorperazine, senna-docusate **OR** senna-docusate, sodium chloride (PF), sodium chloride 0.9%       PHYSICAL EXAMINATION  Temp:  [97.6  F (36.4  C)-100.2  F (37.9  C)] 98.2  F (36.8  C)  Pulse:  [72-91] 81  Resp:  [16-20] 18  BP: (147-206)/() 193/100  SpO2:  [88 %-95 %] 95 %     Neurologic  Mental Status:  alert to self, intermittenly following simple commands, globally aphasic   Cranial Nerves:  PERRL, EOMI with normal smooth pursuit, hearing not formally tested but intact to conversation, tongue protrusion midline, right facial droop   Motor:  normal muscle tone and bulk, decreased strength in right upper and lower extremity ; able to lift right leg off bed with  assistance and hold up, able to hold right arm off bed for a few seconds with assistance before dropping arm to bed. There is an element of right sided neglect     Reflexes:  deferred   Sensory: Unable to assess due to patient's aphasic state   Coordination: Deferred  Station/Gait: Deferred       Stroke Scales    NIHSS (at time of stroke code)   ** symptoms improved after BP increased ; patient was able to lift her right leg and hold her right arm up for a few seconds.   Interval (repeat stroke code) (09/10/20 1215)   Interval Comments     1a. Level of Consciousness 0-->Alert, keenly responsive   1b. LOC Questions 2-->Answers neither question correctly   1c. LOC Commands 1-->Performs one task correctly   2.   Best Gaze 0-->Normal   3.   Visual 0-->No visual loss   4.   Facial Palsy 1-->Minor paralysis (flattened nasolabial fold, asymmetry on smiling)   5a. Motor Arm, Left 0-->No drift, limb holds 90 (or 45) degrees for full 10 secs   5b. Motor Arm, Right 4-->No movement   6a. Motor Leg, Left 0-->No drift, leg holds 30 degree position for full 5 secs   6b. Motor Leg, right 2-->Some effort against gravity, leg falls to bed by 5 secs, but has some effort against gravity   7.   Limb Ataxia 2-->Present in two limbs   8.   Sensory 0-->Normal, no sensory loss   9.   Best Language 2-->Severe aphasia, all communication is through fragmentary expression, great need for inference, questioning, and guessing by the listener. Range of information that can be exchanged is limited, listener carries burden of. . . (see row details)   10. Dysarthria 2-->Severe dysarthria, patients speech is so slurred as to be unintelligible in the absence of or out of proportion to any dysphasia, or is mute/anarthric   11. Extinction and Inattention  1-->Visual, tactile, auditory, spatial, or personal inattention or extinction to bilateral simultaneous stimulation in one of the sensory modalities   Total 17 (09/10/20 6370)       Imaging  I  personally reviewed all imaging; relevant findings per HPI.     Lab Results Data   CBC  Recent Labs   Lab 09/10/20  1315 09/06/20  0530 09/05/20  1256   WBC 12.0* 7.8 9.5   RBC 4.65 4.60 4.92   HGB 14.6 14.4 15.8*   HCT 45.2 44.8 47.8*    215 239     Basic Metabolic Panel    Recent Labs   Lab 09/10/20  1315 09/08/20  0829 09/07/20  2330 09/07/20  0758 09/06/20  0530     --   --  142 144   POTASSIUM 3.0* 3.7 3.5 3.1* 3.4   CHLORIDE 109  --   --  110* 112*   CO2 26  --   --  26 26   BUN 15  --   --  11 13   CR 0.63 0.69  --  0.70 0.66   *  --   --  95 89   MARII 8.7  --   --  8.5 8.7     Liver Panel  Recent Labs   Lab 09/05/20  1256   PROTTOTAL 7.3   ALBUMIN 4.1   BILITOTAL 0.6   ALKPHOS 99   AST 20   ALT 23     INR    Recent Labs   Lab Test 09/07/20  0758   INR 1.05      Lipid Profile    Recent Labs   Lab Test 09/05/20  1256   CHOL 186   HDL 87   LDL 81   TRIG 89     A1C    Recent Labs   Lab Test 09/05/20  1256   A1C 5.5     Troponin I    Recent Labs   Lab 09/06/20  0530 09/06/20  0212 09/05/20  2144   TROPI 0.074* 0.084* 0.069*

## 2020-09-10 NOTE — PROVIDER NOTIFICATION
Pt here with L sided strokes. Alert, ROGER orientation r/t aphasia.  WFD, pt very frustrated with this. RUE/RLE weakness, R droop. HOB Flat bedrest from LP. Site CDI. SBP elevated this shift, MD aware, PRN meds given, other VSS. Takes pills with water. DD3 with thin liquid diet. Incontinent of urine. Plan for ARU at discharge.

## 2020-09-10 NOTE — PLAN OF CARE
Pt here with L-sided infarcts/UTI. Arousable to voice. ROGER orientation d/t aphasia. Severe WFD, slight frustration noted during assessment. R-sided weakness 4/5, possible right-sided neglect noted. Slight right facial droop. Inconsistent with following commands; moving all extremities purposefully. Elevated BP at 0000, given PRN Labetalol x2 to obtain SBP goal of < 160, 0400 BP within parameters. O2 decreased to 87% RA while sleeping, placed on 2L O2 to sustain > 92%. Other VSS. Tele SR w/ BBB. Denies pain.  Band-aids to backside all C/D/I. HOB elevated between 20-30 degrees, tolerating well. No HA. Incontinent overnight, turned/repo'd q2h. Plan for SLP eval in AM. Discharge to ARU pending

## 2020-09-10 NOTE — PROGRESS NOTES
Paged Octavia TOMLINSON at this time due to not enough CSF fluid to complete all the add ons ordered this am per lab.

## 2020-09-10 NOTE — PLAN OF CARE
SLP: Attempted to see for speech/language treatment, but having nursing cares and per OT note now having neuro changes.   SLP: Per her nurse she is having an angiogram sometime today. Will hold treatment and reschedule for 9/11/20. Would continue with NPO status after angio until re-evaluated by speech.

## 2020-09-10 NOTE — PROGRESS NOTES
Patient arrived to the ICU at approximately 1810 to be ruled out for COVID-19. Patient was swabbed with symptomatic test sent at approximately 1830.    Neurological deficits present, most notably global aphasia with a right facial droop and decreased strength in the right upper and right lower extremities.    Laboratory values drawn at 1315 today with a potassium result of 3.0. Replacement protocol ordered; however, no replacement has been administered. Request sent to pharmacy for doses to be dispensed.

## 2020-09-11 ENCOUNTER — APPOINTMENT (OUTPATIENT)
Dept: INTERVENTIONAL RADIOLOGY/VASCULAR | Facility: CLINIC | Age: 75
DRG: 003 | End: 2020-09-11
Attending: STUDENT IN AN ORGANIZED HEALTH CARE EDUCATION/TRAINING PROGRAM
Payer: MEDICARE

## 2020-09-11 ENCOUNTER — HOSPITAL ENCOUNTER (INPATIENT)
Facility: CLINIC | Age: 75
End: 2020-09-11
Payer: MEDICARE

## 2020-09-11 ENCOUNTER — APPOINTMENT (OUTPATIENT)
Dept: ULTRASOUND IMAGING | Facility: CLINIC | Age: 75
DRG: 003 | End: 2020-09-11
Attending: PSYCHIATRY & NEUROLOGY
Payer: MEDICARE

## 2020-09-11 ENCOUNTER — ANESTHESIA (OUTPATIENT)
Dept: SURGERY | Facility: CLINIC | Age: 75
DRG: 003 | End: 2020-09-11
Payer: MEDICARE

## 2020-09-11 ENCOUNTER — APPOINTMENT (OUTPATIENT)
Dept: CT IMAGING | Facility: CLINIC | Age: 75
DRG: 003 | End: 2020-09-11
Attending: PHYSICIAN ASSISTANT
Payer: MEDICARE

## 2020-09-11 ENCOUNTER — ANESTHESIA EVENT (OUTPATIENT)
Dept: SURGERY | Facility: CLINIC | Age: 75
DRG: 003 | End: 2020-09-11
Payer: MEDICARE

## 2020-09-11 ENCOUNTER — APPOINTMENT (OUTPATIENT)
Dept: PHYSICAL THERAPY | Facility: CLINIC | Age: 75
DRG: 003 | End: 2020-09-11
Attending: STUDENT IN AN ORGANIZED HEALTH CARE EDUCATION/TRAINING PROGRAM
Payer: MEDICARE

## 2020-09-11 LAB
ANION GAP SERPL CALCULATED.3IONS-SCNC: 4 MMOL/L (ref 3–14)
B BURGDOR AB CSF IA-ACNC: 0.15 LIV
BUN SERPL-MCNC: 11 MG/DL (ref 7–30)
CALCIUM SERPL-MCNC: 8 MG/DL (ref 8.5–10.1)
CHLORIDE SERPL-SCNC: 114 MMOL/L (ref 94–109)
CO2 SERPL-SCNC: 25 MMOL/L (ref 20–32)
CREAT SERPL-MCNC: 0.56 MG/DL (ref 0.52–1.04)
ERYTHROCYTE [DISTWIDTH] IN BLOOD BY AUTOMATED COUNT: 13.1 % (ref 10–15)
GFR SERPL CREATININE-BSD FRML MDRD: >90 ML/MIN/{1.73_M2}
GLUCOSE BLDC GLUCOMTR-MCNC: 73 MG/DL (ref 70–99)
GLUCOSE BLDC GLUCOMTR-MCNC: 76 MG/DL (ref 70–99)
GLUCOSE BLDC GLUCOMTR-MCNC: 79 MG/DL (ref 70–99)
GLUCOSE SERPL-MCNC: 95 MG/DL (ref 70–99)
HCT VFR BLD AUTO: 42 % (ref 35–47)
HGB BLD-MCNC: 13.7 G/DL (ref 11.7–15.7)
LABORATORY COMMENT REPORT: NORMAL
MCH RBC QN AUTO: 31.7 PG (ref 26.5–33)
MCHC RBC AUTO-ENTMCNC: 32.6 G/DL (ref 31.5–36.5)
MCV RBC AUTO: 97 FL (ref 78–100)
PLATELET # BLD AUTO: 191 10E9/L (ref 150–450)
POTASSIUM SERPL-SCNC: 3.4 MMOL/L (ref 3.4–5.3)
RBC # BLD AUTO: 4.32 10E12/L (ref 3.8–5.2)
SARS-COV-2 RNA SPEC QL NAA+PROBE: NEGATIVE
SODIUM SERPL-SCNC: 143 MMOL/L (ref 133–144)
SPECIMEN SOURCE: NORMAL
SPECIMEN TYPE: NORMAL
VARICELLA ZOSTER DNA PCR COMMENT: NORMAL
VZV DNA SPEC QL NAA+PROBE: NORMAL
WBC # BLD AUTO: 9.5 10E9/L (ref 4–11)

## 2020-09-11 PROCEDURE — 25800030 ZZH RX IP 258 OP 636: Performed by: NURSE ANESTHETIST, CERTIFIED REGISTERED

## 2020-09-11 PROCEDURE — C9113 INJ PANTOPRAZOLE SODIUM, VIA: HCPCS | Performed by: INTERNAL MEDICINE

## 2020-09-11 PROCEDURE — 27210805 ZZH SHEATH CR4

## 2020-09-11 PROCEDURE — 36415 COLL VENOUS BLD VENIPUNCTURE: CPT | Performed by: INTERNAL MEDICINE

## 2020-09-11 PROCEDURE — 25000128 H RX IP 250 OP 636: Performed by: HOSPITALIST

## 2020-09-11 PROCEDURE — C1725 CATH, TRANSLUMIN NON-LASER: HCPCS

## 2020-09-11 PROCEDURE — 99207 ZZC APP CREDIT; MD BILLING SHARED VISIT: CPT | Performed by: PHYSICIAN ASSISTANT

## 2020-09-11 PROCEDURE — C1751 CATH, INF, PER/CENT/MIDLINE: HCPCS

## 2020-09-11 PROCEDURE — 27210732 ZZH ACCESSORY CR1

## 2020-09-11 PROCEDURE — 27210736 ZZH ACCESSORY CR13

## 2020-09-11 PROCEDURE — C1769 GUIDE WIRE: HCPCS

## 2020-09-11 PROCEDURE — 25500064 ZZH RX 255 OP 636: Performed by: NEUROLOGICAL SURGERY

## 2020-09-11 PROCEDURE — 94002 VENT MGMT INPAT INIT DAY: CPT

## 2020-09-11 PROCEDURE — 25000132 ZZH RX MED GY IP 250 OP 250 PS 637: Mod: GY | Performed by: HOSPITALIST

## 2020-09-11 PROCEDURE — 20000003 ZZH R&B ICU

## 2020-09-11 PROCEDURE — 80048 BASIC METABOLIC PNL TOTAL CA: CPT | Performed by: INTERNAL MEDICINE

## 2020-09-11 PROCEDURE — 25000566 ZZH SEVOFLURANE, EA 15 MIN

## 2020-09-11 PROCEDURE — 27210845 ZZH DEVICE INFLATION CR5

## 2020-09-11 PROCEDURE — 25000128 H RX IP 250 OP 636: Performed by: INTERNAL MEDICINE

## 2020-09-11 PROCEDURE — 27210742 ZZH CATH CR1

## 2020-09-11 PROCEDURE — 40000275 ZZH STATISTIC RCP TIME EA 10 MIN

## 2020-09-11 PROCEDURE — 25000128 H RX IP 250 OP 636: Performed by: STUDENT IN AN ORGANIZED HEALTH CARE EDUCATION/TRAINING PROGRAM

## 2020-09-11 PROCEDURE — 99291 CRITICAL CARE FIRST HOUR: CPT | Mod: 25 | Performed by: INTERNAL MEDICINE

## 2020-09-11 PROCEDURE — 27210886 ZZH ACCESSORY CR5

## 2020-09-11 PROCEDURE — 25000125 ZZHC RX 250: Performed by: NURSE ANESTHETIST, CERTIFIED REGISTERED

## 2020-09-11 PROCEDURE — 25800030 ZZH RX IP 258 OP 636: Performed by: STUDENT IN AN ORGANIZED HEALTH CARE EDUCATION/TRAINING PROGRAM

## 2020-09-11 PROCEDURE — 25000128 H RX IP 250 OP 636: Performed by: NURSE ANESTHETIST, CERTIFIED REGISTERED

## 2020-09-11 PROCEDURE — 70450 CT HEAD/BRAIN W/O DYE: CPT

## 2020-09-11 PROCEDURE — 94003 VENT MGMT INPAT SUBQ DAY: CPT

## 2020-09-11 PROCEDURE — 00000146 ZZHCL STATISTIC GLUCOSE BY METER IP

## 2020-09-11 PROCEDURE — 97530 THERAPEUTIC ACTIVITIES: CPT | Mod: GP

## 2020-09-11 PROCEDURE — 37000009 ZZH ANESTHESIA TECHNICAL FEE, EACH ADDTL 15 MIN

## 2020-09-11 PROCEDURE — 40000008 ZZH STATISTIC AIRWAY CARE

## 2020-09-11 PROCEDURE — 27210914 ZZH SHEATH CR8

## 2020-09-11 PROCEDURE — 25000132 ZZH RX MED GY IP 250 OP 250 PS 637: Mod: GY | Performed by: STUDENT IN AN ORGANIZED HEALTH CARE EDUCATION/TRAINING PROGRAM

## 2020-09-11 PROCEDURE — 99233 SBSQ HOSP IP/OBS HIGH 50: CPT | Performed by: INTERNAL MEDICINE

## 2020-09-11 PROCEDURE — 037G3ZZ DILATION OF INTRACRANIAL ARTERY, PERCUTANEOUS APPROACH: ICD-10-PCS | Performed by: NEUROLOGICAL SURGERY

## 2020-09-11 PROCEDURE — 5A1955Z RESPIRATORY VENTILATION, GREATER THAN 96 CONSECUTIVE HOURS: ICD-10-PCS | Performed by: INTERNAL MEDICINE

## 2020-09-11 PROCEDURE — 99233 SBSQ HOSP IP/OBS HIGH 50: CPT | Performed by: PSYCHIATRY & NEUROLOGY

## 2020-09-11 PROCEDURE — 27210894 ZZH CATH CR6

## 2020-09-11 PROCEDURE — 85027 COMPLETE CBC AUTOMATED: CPT | Performed by: INTERNAL MEDICINE

## 2020-09-11 PROCEDURE — 27210887 ZZH ACCESSORY CR6

## 2020-09-11 PROCEDURE — 93970 EXTREMITY STUDY: CPT

## 2020-09-11 PROCEDURE — C1887 CATHETER, GUIDING: HCPCS

## 2020-09-11 PROCEDURE — 40000170 ZZH STATISTIC PRE-PROCEDURE ASSESSMENT II

## 2020-09-11 PROCEDURE — 25000128 H RX IP 250 OP 636: Performed by: PHYSICIAN ASSISTANT

## 2020-09-11 PROCEDURE — 25800030 ZZH RX IP 258 OP 636: Performed by: NEUROLOGICAL SURGERY

## 2020-09-11 PROCEDURE — 37000008 ZZH ANESTHESIA TECHNICAL FEE, 1ST 30 MIN

## 2020-09-11 PROCEDURE — 27210738 ZZH ACCESSORY CR2

## 2020-09-11 RX ORDER — PROPOFOL 10 MG/ML
INJECTION, EMULSION INTRAVENOUS PRN
Status: DISCONTINUED | OUTPATIENT
Start: 2020-09-11 | End: 2020-09-11

## 2020-09-11 RX ORDER — PROPOFOL 10 MG/ML
5-75 INJECTION, EMULSION INTRAVENOUS CONTINUOUS
Status: DISCONTINUED | OUTPATIENT
Start: 2020-09-11 | End: 2020-09-13

## 2020-09-11 RX ORDER — SODIUM CHLORIDE, SODIUM LACTATE, POTASSIUM CHLORIDE, CALCIUM CHLORIDE 600; 310; 30; 20 MG/100ML; MG/100ML; MG/100ML; MG/100ML
INJECTION, SOLUTION INTRAVENOUS CONTINUOUS
Status: CANCELLED | OUTPATIENT
Start: 2020-09-11

## 2020-09-11 RX ORDER — DEXTROSE MONOHYDRATE 25 G/50ML
25-50 INJECTION, SOLUTION INTRAVENOUS
Status: DISCONTINUED | OUTPATIENT
Start: 2020-09-11 | End: 2020-10-05 | Stop reason: HOSPADM

## 2020-09-11 RX ORDER — ASPIRIN 81 MG/1
81 TABLET ORAL DAILY
Status: DISCONTINUED | OUTPATIENT
Start: 2020-09-12 | End: 2020-09-11 | Stop reason: DRUGHIGH

## 2020-09-11 RX ORDER — SODIUM CHLORIDE, SODIUM LACTATE, POTASSIUM CHLORIDE, CALCIUM CHLORIDE 600; 310; 30; 20 MG/100ML; MG/100ML; MG/100ML; MG/100ML
INJECTION, SOLUTION INTRAVENOUS CONTINUOUS PRN
Status: DISCONTINUED | OUTPATIENT
Start: 2020-09-11 | End: 2020-09-11

## 2020-09-11 RX ORDER — NALOXONE HYDROCHLORIDE 0.4 MG/ML
.1-.4 INJECTION, SOLUTION INTRAMUSCULAR; INTRAVENOUS; SUBCUTANEOUS
Status: DISCONTINUED | OUTPATIENT
Start: 2020-09-11 | End: 2020-09-11

## 2020-09-11 RX ORDER — PROPOFOL 10 MG/ML
INJECTION, EMULSION INTRAVENOUS CONTINUOUS PRN
Status: DISCONTINUED | OUTPATIENT
Start: 2020-09-11 | End: 2020-09-11

## 2020-09-11 RX ORDER — IOPAMIDOL 612 MG/ML
150 INJECTION, SOLUTION INTRAVASCULAR ONCE
Status: COMPLETED | OUTPATIENT
Start: 2020-09-11 | End: 2020-09-11

## 2020-09-11 RX ORDER — SODIUM CHLORIDE 9 MG/ML
INJECTION, SOLUTION INTRAVENOUS CONTINUOUS
Status: DISCONTINUED | OUTPATIENT
Start: 2020-09-11 | End: 2020-09-12

## 2020-09-11 RX ORDER — CLOPIDOGREL BISULFATE 75 MG/1
75 TABLET ORAL DAILY
Status: DISCONTINUED | OUTPATIENT
Start: 2020-09-12 | End: 2020-09-11

## 2020-09-11 RX ORDER — HEPARIN SODIUM 1000 [USP'U]/ML
INJECTION, SOLUTION INTRAVENOUS; SUBCUTANEOUS PRN
Status: DISCONTINUED | OUTPATIENT
Start: 2020-09-11 | End: 2020-09-11

## 2020-09-11 RX ORDER — EPHEDRINE SULFATE 50 MG/ML
INJECTION, SOLUTION INTRAMUSCULAR; INTRAVENOUS; SUBCUTANEOUS PRN
Status: DISCONTINUED | OUTPATIENT
Start: 2020-09-11 | End: 2020-09-11

## 2020-09-11 RX ORDER — NICOTINE POLACRILEX 4 MG
15-30 LOZENGE BUCCAL
Status: DISCONTINUED | OUTPATIENT
Start: 2020-09-11 | End: 2020-10-05 | Stop reason: HOSPADM

## 2020-09-11 RX ORDER — LIDOCAINE HYDROCHLORIDE 20 MG/ML
INJECTION, SOLUTION INFILTRATION; PERINEURAL PRN
Status: DISCONTINUED | OUTPATIENT
Start: 2020-09-11 | End: 2020-09-11

## 2020-09-11 RX ORDER — ACETAMINOPHEN 500 MG
500 TABLET ORAL EVERY 6 HOURS PRN
Status: DISCONTINUED | OUTPATIENT
Start: 2020-09-11 | End: 2020-09-11

## 2020-09-11 RX ORDER — FENTANYL CITRATE 50 UG/ML
INJECTION, SOLUTION INTRAMUSCULAR; INTRAVENOUS PRN
Status: DISCONTINUED | OUTPATIENT
Start: 2020-09-11 | End: 2020-09-11

## 2020-09-11 RX ADMIN — PROPOFOL 30 MG: 10 INJECTION, EMULSION INTRAVENOUS at 18:33

## 2020-09-11 RX ADMIN — SODIUM CHLORIDE, POTASSIUM CHLORIDE, SODIUM LACTATE AND CALCIUM CHLORIDE: 600; 310; 30; 20 INJECTION, SOLUTION INTRAVENOUS at 18:17

## 2020-09-11 RX ADMIN — PANTOPRAZOLE SODIUM 40 MG: 40 INJECTION, POWDER, FOR SOLUTION INTRAVENOUS at 23:23

## 2020-09-11 RX ADMIN — Medication 5 MG: at 19:08

## 2020-09-11 RX ADMIN — Medication 10 MEQ: at 00:26

## 2020-09-11 RX ADMIN — FENTANYL CITRATE 25 MCG: 50 INJECTION, SOLUTION INTRAMUSCULAR; INTRAVENOUS at 20:34

## 2020-09-11 RX ADMIN — FENTANYL CITRATE 25 MCG: 50 INJECTION, SOLUTION INTRAMUSCULAR; INTRAVENOUS at 19:56

## 2020-09-11 RX ADMIN — FENTANYL CITRATE 25 MCG: 50 INJECTION, SOLUTION INTRAMUSCULAR; INTRAVENOUS at 20:05

## 2020-09-11 RX ADMIN — SODIUM CHLORIDE 1000 ML: 9 INJECTION, SOLUTION INTRAVENOUS at 01:30

## 2020-09-11 RX ADMIN — Medication 5 MG: at 19:25

## 2020-09-11 RX ADMIN — CLOPIDOGREL BISULFATE 75 MG: 75 TABLET, FILM COATED ORAL at 08:25

## 2020-09-11 RX ADMIN — ROCURONIUM BROMIDE 10 MG: 10 INJECTION INTRAVENOUS at 20:34

## 2020-09-11 RX ADMIN — IOPAMIDOL 42 ML: 612 INJECTION, SOLUTION INTRAVENOUS at 20:28

## 2020-09-11 RX ADMIN — ROCURONIUM BROMIDE 40 MG: 10 INJECTION INTRAVENOUS at 18:26

## 2020-09-11 RX ADMIN — CEFTRIAXONE SODIUM 1 G: 1 INJECTION, POWDER, FOR SOLUTION INTRAMUSCULAR; INTRAVENOUS at 01:37

## 2020-09-11 RX ADMIN — LIDOCAINE HYDROCHLORIDE 40 MG: 20 INJECTION, SOLUTION INFILTRATION; PERINEURAL at 18:26

## 2020-09-11 RX ADMIN — PROPOFOL 30 MG: 10 INJECTION, EMULSION INTRAVENOUS at 18:35

## 2020-09-11 RX ADMIN — SODIUM CHLORIDE, POTASSIUM CHLORIDE, SODIUM LACTATE AND CALCIUM CHLORIDE: 600; 310; 30; 20 INJECTION, SOLUTION INTRAVENOUS at 20:47

## 2020-09-11 RX ADMIN — FENTANYL CITRATE 25 MCG: 50 INJECTION, SOLUTION INTRAMUSCULAR; INTRAVENOUS at 18:51

## 2020-09-11 RX ADMIN — PHENYLEPHRINE HYDROCHLORIDE 0.5 MCG/KG/MIN: 10 INJECTION INTRAVENOUS at 18:34

## 2020-09-11 RX ADMIN — ROCURONIUM BROMIDE 20 MG: 10 INJECTION INTRAVENOUS at 20:37

## 2020-09-11 RX ADMIN — HEPARIN SODIUM 5000 UNITS: 5000 INJECTION, SOLUTION INTRAVENOUS; SUBCUTANEOUS at 08:25

## 2020-09-11 RX ADMIN — SODIUM CHLORIDE: 9 INJECTION, SOLUTION INTRAVENOUS at 21:30

## 2020-09-11 RX ADMIN — PROPOFOL 80 MG: 10 INJECTION, EMULSION INTRAVENOUS at 18:26

## 2020-09-11 RX ADMIN — HEPARIN SODIUM 5000 UNITS: 1000 INJECTION INTRAVENOUS; SUBCUTANEOUS at 19:24

## 2020-09-11 RX ADMIN — ASPIRIN 325 MG: 325 TABLET, COATED ORAL at 08:25

## 2020-09-11 RX ADMIN — HYDRALAZINE HYDROCHLORIDE 20 MG: 20 INJECTION INTRAMUSCULAR; INTRAVENOUS at 21:25

## 2020-09-11 RX ADMIN — PROPOFOL 50 MCG/KG/MIN: 10 INJECTION, EMULSION INTRAVENOUS at 20:47

## 2020-09-11 RX ADMIN — SODIUM CHLORIDE 1000 ML: 9 INJECTION, SOLUTION INTRAVENOUS at 02:43

## 2020-09-11 ASSESSMENT — ACTIVITIES OF DAILY LIVING (ADL)
ADLS_ACUITY_SCORE: 22

## 2020-09-11 ASSESSMENT — LIFESTYLE VARIABLES: TOBACCO_USE: 1

## 2020-09-11 NOTE — PROVIDER NOTIFICATION
Notified neurology fellow on-call, Dr. Ellison, of change in neuro status. At 0000 assessment, pt not following commands. Will withdraw from pain on left side and RLE; also grimaces to pain. Will not open eyes. Pupils PERRL.  at time of assessment. MD to order IVF bolus.

## 2020-09-11 NOTE — PLAN OF CARE
"OT: OT on hold today due to pt on \"flat bedrest with plan for diagnostic angio with intention to treat with angioplasty or stenting\" per most recent MD note. Will check status tomorrow.  "

## 2020-09-11 NOTE — PROGRESS NOTES
This 74 year old woman has symptomatic left MCA stenosis. She seems to have flow limiting stenosis in the left M1 and has suffered left MCA distribution strokes as well as posterior watershed strokes. Noninvasive vascular imaging shows diffuse atherosclerotic disease in the intracranial arteries, most notably as described above. Based on her progressive neurological deterioration despite medical therapy, we have determined that cerebral angiography and angioplasty/stent placement is indicated. Discussed the indications and controversies surrounding this condition with her . We believe that revascularization will reduce the risk of recurrent stroke. Her  understands the risks of death, stroke, arterial injury, hemorrhage, groin hematoma, need for re-treatment, and he agrees to proceed. Will perform the procedure under general anesthesia because of her aphasia. We will attempt to maintain hypertension during the induction and intra-procedurally.  GERALDINE Burgess MD

## 2020-09-11 NOTE — PROGRESS NOTES
[facility]    Medicine Progress Note - Hospitalist Service       Date of Admission:  9/5/2020  Assessment & Plan   Sherrell Leonard is a 74 year-old female with no known history who presents with aphasia, slight right lower facial droop, RUE weakness.  Admitted 9/5/2020.      Dense aphasia and R sided weakness  Multiple CVAs of unclear etiology, likely due to multifocal atherosclerosis   Hypertension   Presents with aphasia, slight right facial droop, right upper extremity weakness. CTA of the head in ER revealed multiple intracranial vessel stenoses (L M1, L>R PCA, R M2) and left basal ganglia hypodensity suggestive of ischemia; MRI confirmed left MCA and PCA distribution strokes of small to moderate volume. Concern for possible vasculitis vs RCVS given multifocal reva of stenosis. Inflammatory markers low.   * Echo EF 60-65% nl LV and RV size, function and structure. Bubble negative.    * LDL 81  * MRA more suggestive of multifocal atherosclerosis per neurology  - Neurology following, discussed with neuro-stroke team 9/11  - Negative/normal serologic testing: RNP ab, C4, C3, Ro and La ab, Scleroderma abs, RF, ANCA, DS DNA, MOODY  - Patient has been pressure dependent, with worsening symptoms when blood pressure decreases. Amlodipine previously started, now on hold.   - Plan for cerebral angiogram with intention of stenting 9/11  - EEG pending  - Telemetry  - LP with traumatic tap, gram stain negative, elevated protein, normal glucose, cytology negative. Follow-up pending results per neurology   - Continue aspirin, clopidogrel statin  - PT/OT/SLP consults - Recommending ARU, diet per SLP      Acute hypoxic respiratory failure  SpO2 previously borderline at 91-92%, hypoxic to 88% overnight and subsequently on 2L NC 9/10/20. Repeat CXR with increased interstitial markings, borderline temperature elevation (but not fever), mild leukocytosis.   - COVID19 PCR negative (second negative this admission).  Discontinue precautions.   - Question component of pulmonary edema given IVF given during admission and increased O2 needs following 3-4L given overnight to raise BP, echocardiogram with normal EF but indeterminate diastolic dysfunction. Note admission XR with mild interstitial lung changes, so may have component of underlying fibrosis at baseline.   - Discussed with neurology service, will avoid diuresis until angioplasty completed. Dr. De Jesus is on call overnight and will assess for furosemide 20 mg IV to be given post-angioplasty     Asymptomatic COVID screen   Negative on 9/5.      LBBB   Mild trop elevation secondary to type 2 NSTEMI related to stroke - Troponin flat. Echocardiogram within normal limits.       Hypokalemia  Potassium 3.1 on 09/07/20  - Monitor and replace per protocol      E.coli UTI  Sensitive to cephalosporins, TMP-SMX.   - Has completed 6 days of ceftriaxone IV, will discontinue     Diet: NPO per Anesthesia Guidelines for Procedure/Surgery Except for: Meds    DVT Prophylaxis: Pneumatic Compression Devices  Sinclair Catheter: not present  Code Status: Full Code      Disposition Plan   Expected discharge: Pending angiogram / angioplasty and post procedure course.  Entered: Jose R Ruggiero MD 09/11/2020, 3:07 PM       The patient's care was discussed with the patient, neurology service and bedside RN. Message left with daughter for update.     Jose R Ruggiero MD  Hospitalist Service  [facility]    ______________________________________________________________________    Interval History   History from patient limited by aphasia. Overnight had worsening symptoms again and received additional IVF. Per RN, has been stable on 4L, dropped to 89% with sleep.     Data reviewed today: I reviewed all medications, new labs and imaging results over the last 24 hours. I personally reviewed no images or EKG's today.    Physical Exam   Vital Signs: Temp: 98  F (36.7  C) Temp src: Oral BP: (!)  231/111 Pulse: 80   Resp: 20 SpO2: 97 % O2 Device: Nasal cannula Oxygen Delivery: 4 LPM  Weight: 153 lbs 3.52 oz    Constitutional:  NAD  Respiratory: Fine crackles bilateral bases. Normal effort at rest.   Cardiovascular: RRR, no m/r/g.   GI: Soft, non-tender, non-distended.    Skin/Integumen: Warm, dry  Neuro: Alert. Expressive and some receptive aphasia. RUE weakness noted.     Data   Recent Labs   Lab 09/11/20  0522 09/10/20  1315 09/08/20  0829  09/07/20  0758 09/06/20  0530 09/06/20  0212 09/05/20  2144 09/05/20  1256   WBC 9.5 12.0*  --   --   --  7.8  --   --  9.5   HGB 13.7 14.6  --   --   --  14.4  --   --  15.8*   MCV 97 97  --   --   --  97  --   --  97    206  --   --   --  215  --   --  239   INR  --   --   --   --  1.05  --   --   --   --     141  --   --  142 144  --   --  144   POTASSIUM 3.4 3.0* 3.7   < > 3.1* 3.4  --   --  3.3*   CHLORIDE 114* 109  --   --  110* 112*  --   --  109   CO2 25 26  --   --  26 26  --   --  28   BUN 11 15  --   --  11 13  --   --  20   CR 0.56 0.63 0.69  --  0.70 0.66  --   --  0.82   ANIONGAP 4 6  --   --  6 6  --   --  7   MARII 8.0* 8.7  --   --  8.5 8.7  --   --  9.7   GLC 95 100*  --   --  95 89  --   --  111*   ALBUMIN  --   --   --   --   --   --   --   --  4.1   PROTTOTAL  --   --   --   --   --   --   --   --  7.3   BILITOTAL  --   --   --   --   --   --   --   --  0.6   ALKPHOS  --   --   --   --   --   --   --   --  99   ALT  --   --   --   --   --   --   --   --  23   AST  --   --   --   --   --   --   --   --  20   TROPI  --   --   --   --   --  0.074* 0.084* 0.069* 0.051*    < > = values in this interval not displayed.       Recent Results (from the past 24 hour(s))   US Lower Extremity Venous Duplex Bilateral    Narrative    VENOUS ULTRASOUND BILATERAL LOWER EXTREMITIES  9/11/2020 9:25 AM     HISTORY: Fever in patient with stroke. DVT.    COMPARISON: None.    TECHNIQUE: Color Doppler and spectral waveform analysis performed  throughout the deep  veins of both lower extremities.    FINDINGS: Both common femoral, proximal greater saphenous, femoral,  and popliteal veins demonstrate normal blood flow, compression, and  augmentation. Posterior tibial and peroneal veins are compressible.      Impression    IMPRESSION: Negative for deep venous thrombosis throughout both lower  extremities.    JUSTIN PACHECO MD     Medications     - MEDICATION INSTRUCTIONS -       - MEDICATION INSTRUCTIONS -       sodium chloride 0.9%         [Held by provider] amLODIPine  5 mg Oral Daily     [START ON 9/12/2020] aspirin  81 mg Oral Daily     atorvastatin  40 mg Oral or NG Tube Daily at 8 pm     cefTRIAXone  1 g Intravenous Q24H     clopidogrel  75 mg Oral Daily     heparin ANTICOAGULANT  5,000 Units Subcutaneous Q12H     sodium chloride (PF)  3 mL Intracatheter Q8H

## 2020-09-11 NOTE — PROGRESS NOTES
SPIRITUAL HEALTH SERVICES  SPIRITUAL ASSESSMENT Progress Note  [facility] ICU     REFERRAL SOURCE: Family    [facility] received a request from [place] [employer] stating patient's family asked for patient to receive SOA before her procedure.Given patient was under covid rule out and current visitor restrictions we were not able to offer this. [facility]   offered prayers outside patient's door.    I called patient's partner and explained this to him. He expressed understanding and gratitude for the  offering prayers outside her door. Her  was tearful on the phone, reflecting on he and Amisha's life together for 57 years. He asked for prayer which I shared with him, as well as offering emotional and grief support.     PLAN: I will continue to follow for patient and family support.     Liz Beavers  Associate    Phone: [phone]  Pager: [phone]

## 2020-09-11 NOTE — PLAN OF CARE
Shift: [id number]    Major Events: COVID test came back negative. Routine CT completed. Down to PACU at 1705 for IR procedure.     Neuro: PERRLA. Unable to assess orientation- expressive aphasia. Follows simple commands. Able to move L side. R side remains weaker.   Cardiac: SR. Ordered to keep -220.   Resp: 4L NC. Lungs clear.   GI: Remained NPO for procedure. BG wnl.   : Purewick in place- came out of place. Soaked pad x2- unable to get accurate I&Os.   Skin: Random bruising over body. Intact.   Family: , Donell, updated throughout day- down to PACU with patient.

## 2020-09-11 NOTE — PLAN OF CARE
"Discharge Planner PT:      Patient plan for discharge: Pt is aphasic and was not able to verbalize, other than \"yes\" \"no\"     Current status:   RN agreed to PT interventions. SBP to be between 180-220. Pt was received supine in bed and required mod assist x 1 with supine>sit. Pt sat at EOB x 12 min with min assist progressing to SBA. Pt was able to perform B LAQ x 15 on each side with minimal verbal cues for anterior lean and no LOB. Pt scooted to the EOB with min assist x 2 and stood with min assist x 2 and verbal cues for 2 min with B HHA. Pt was able to take 2 small steps to the HOB with max cues and increased time. Pt was assisted back to bed with mod assist x 2 and verbal cues. Pt is positioned on her left side and is left with all needs within reach,alarm engaged and BP noted to be at 231/111. RN informed of BP outside the parameters.     Barriers to return to prior living situation: Weakness, impaired balance, inattention to R, Impaired strength, decreased safety awareness, fall risk, level of assist     Recommendations for discharge: Acute rehab     Rationale for recommendations:  Great rehab candidate. Pt is significantly below baseline but making rapid progress, works hard. Pt with good family support, highly motivated to participate in therapy. Pt will benefit from intense interdisciplinary therapies to address impairments and increase functional independence so pt can return home safely. 3hrs rehab daily is reasonable for this pt.          Entered by: Julieta Lloyd 09/11/2020 3:42 PM       "

## 2020-09-11 NOTE — PLAN OF CARE
Problem: Adult Inpatient Plan of Care  Goal: Plan of Care Review  Outcome: Declining     Problem: Cerebral Tissue Perfusion Risk (Stroke, Ischemic/Transient Ischemic Attack)  Goal: Optimal Cerebral Tissue Perfusion  Outcome: Declining   Severe neuro changes today, BP very elevated. Head of bed flat this am per MD. Pt tolerating honey thick liquids, nectar thick pt was coughing consistently.  at bedside. Pt received fluid bolus, and maintenance fluids after that. Pt had stat head ct and cta. Holding on cerebral angiogram at this  time. Changed to q2 hour neuro checks and vitals.

## 2020-09-11 NOTE — PLAN OF CARE
Swallow: Per Neuro rounds, pt NPO for possible stent today pending COVID results. Will continue to follow. SLP pager: [phone]    Speech/language: Will hold aphasia treatment while in special precautions. Will continue to follow daily.

## 2020-09-11 NOTE — PROGRESS NOTES
Neurology team paged regarding blood pressure; clarification of parameters and PRN medication regimen.

## 2020-09-11 NOTE — PROGRESS NOTES
"  [facility]    Stroke Progress Note    Interval Events  Overnight stroke team was paged for a change in neuro status, patient was reportedly not following any commands and would not open her eyes. At the time of the assessment, patients SBP was 166. Patient was given IVF bolus, upon reexamination at 0400 patients examination improved, patient reportedly began following some commands. This morning, patient was awake laying in bed. She was able to hold her right arm and leg up when prompted. Patient still moving left side of body spontaneously. She continues to be aphasic, using only single words such as \"yes\" \"No\" or \"OK\".  Patient appear to be perfusion dependent, requiring SBP >180 to avoid worsening.     Impression  Multifocal intracranial stenoses, most notably left M1 focal stenosis, causing multifocal ischemic strokes and a left MCA syndrome ; Likely secondary to ICAD     Plan  - Continue DAPT with Plavix 75 mg + ASA 81 mg daily   - BP goal treat with antihypertensive for SBP >220.  - Q2H neuro checks   - Patient should remain flat in bed unless up to eat or use the restroom   - COVID test pending ; Plan for today is a diagnostic angio with intention to treat with angioplasty and stenting. CTH ordered to be completed prior     Thank you for this consult, we will continue to follow.      Octavia Cowan PA-C   Neurology  To page me or covering stroke neurology team member, click here: AMCOM   Choose \"On Call\" tab at top, then search dropdown box for \"Neurology Adult\", select location, press Enter, then look for stroke/neuro ICU/telestroke.  ______________________________________________________    Medications   Home Meds  Prior to Admission medications    Medication Sig Start Date End Date Taking? Authorizing Provider   Multiple Vitamins-Minerals (SYSTANE ICAPS AREDS2) CAPS Take 1 capsule by mouth daily   Yes Unknown, Entered By History   multivitamin, therapeutic (THERA-VIT) TABS tablet Take 1 " "tablet by mouth daily   Yes Unknown, Entered By History       Scheduled Meds    [Held by provider] amLODIPine  5 mg Oral Daily     aspirin  325 mg Oral Daily    Or     aspirin  300 mg Rectal Daily     atorvastatin  40 mg Oral or NG Tube Daily at 8 pm     cefTRIAXone  1 g Intravenous Q24H     clopidogrel  75 mg Oral Daily     heparin ANTICOAGULANT  5,000 Units Subcutaneous Q12H     sodium chloride (PF)  3 mL Intracatheter Q8H       Infusion Meds    - MEDICATION INSTRUCTIONS -       - MEDICATION INSTRUCTIONS -       sodium chloride 0.9%         PRN Meds  acetaminophen, acetaminophen, bisacodyl, glucose **OR** dextrose **OR** glucagon, hydrALAZINE, labetalol, lidocaine 4%, lidocaine (buffered or not buffered), LORazepam, - MEDICATION INSTRUCTIONS -, - MEDICATION INSTRUCTIONS -, melatonin, naloxone, ondansetron **OR** ondansetron, polyethylene glycol, potassium chloride, potassium chloride with lidocaine, potassium chloride, potassium chloride, prochlorperazine **OR** prochlorperazine **OR** prochlorperazine, senna-docusate **OR** senna-docusate, sodium chloride (PF), sodium chloride 0.9%       PHYSICAL EXAMINATION  Temp:  [97.7  F (36.5  C)-99.4  F (37.4  C)] 97.7  F (36.5  C)  Pulse:  [62-92] 92  Resp:  [10-24] 15  BP: (153-217)/() 183/109  SpO2:  [89 %-96 %] 95 %     Neurologic  Mental Status:  alert to self, intermittenly following simple commands, globally aphasic answers with \"Yes\" \"No\" \"OK\"   Cranial Nerves:  PERRL, EOMI with normal smooth pursuit, hearing not formally tested but intact to conversation, tongue protrusion midline, right facial droop   Motor:  normal muscle tone and bulk, decreased strength in right upper and lower extremity ; able to hold RUE/RLE up off bed when prompted with assistance with lifting extremity up with patient initially. There is an element of right sided neglect     Reflexes:  deferred   Sensory: Unable to assess due to patient's aphasic state "   Coordination: Deferred  Station/Gait: Deferred    Stroke Scales    NIHSS  Interval (repeat stroke code) (09/10/20 1215)   Interval Comments     1a. Level of Consciousness 0-->Alert, keenly responsive   1b. LOC Questions 2-->Answers neither question correctly   1c. LOC Commands 2-->Performs neither task correctly   2.   Best Gaze 0-->Normal   3.   Visual 0-->No visual loss   4.   Facial Palsy 1-->Minor paralysis (flattened nasolabial fold, asymmetry on smiling)   5a. Motor Arm, Left 0-->No drift, limb holds 90 (or 45) degrees for full 10 secs   5b. Motor Arm, Right 2-->Some effort against gravity, limb cannot get to or maintain (if cued) 90 (or 45) degrees, drifts down to bed, but has some effort against gravity   6a. Motor Leg, Left 0-->No drift, leg holds 30 degree position for full 5 secs   6b. Motor Leg, right 1-->Drift, leg falls by the end of the 5-sec period but does not hit bed   7.   Limb Ataxia 0-->Absent   8.   Sensory 1-->Mild-to-moderate sensory loss, patient feels pinprick is less sharp or is dull on the affected side, or there is a loss of superficial pain with pinprick, but patient is aware of being touched   9.   Best Language 2-->Severe aphasia, all communication is through fragmentary expression, great need for inference, questioning, and guessing by the listener. Range of information that can be exchanged is limited, listener carries burden of. . . (see row details)   10. Dysarthria 1-->Mild-to-moderate dysarthria, patient slurs at least some words and, at worst, can be understood with some difficulty   11. Extinction and Inattention  1-->Visual, tactile, auditory, spatial, or personal inattention or extinction to bilateral simultaneous stimulation in one of the sensory modalities   Total 13 (09/11/20 1121)       Imaging  I personally reviewed all imaging; relevant findings per HPI.     Lab Results Data   CBC  Recent Labs   Lab 09/11/20  0522 09/10/20  1315 09/06/20  0530   WBC 9.5 12.0* 7.8    RBC 4.32 4.65 4.60   HGB 13.7 14.6 14.4   HCT 42.0 45.2 44.8    206 215     Basic Metabolic Panel    Recent Labs   Lab 09/11/20  0522 09/10/20  1315 09/08/20  0829  09/07/20  0758    141  --   --  142   POTASSIUM 3.4 3.0* 3.7   < > 3.1*   CHLORIDE 114* 109  --   --  110*   CO2 25 26  --   --  26   BUN 11 15  --   --  11   CR 0.56 0.63 0.69  --  0.70   GLC 95 100*  --   --  95   MARII 8.0* 8.7  --   --  8.5    < > = values in this interval not displayed.     Liver Panel  Recent Labs   Lab 09/05/20  1256   PROTTOTAL 7.3   ALBUMIN 4.1   BILITOTAL 0.6   ALKPHOS 99   AST 20   ALT 23     INR    Recent Labs   Lab Test 09/07/20  0758   INR 1.05      Lipid Profile    Recent Labs   Lab Test 09/05/20  1256   CHOL 186   HDL 87   LDL 81   TRIG 89     A1C    Recent Labs   Lab Test 09/05/20  1256   A1C 5.5     Troponin I    Recent Labs   Lab 09/06/20  0530 09/06/20  0212 09/05/20  2144   TROPI 0.074* 0.084* 0.069*

## 2020-09-11 NOTE — PROGRESS NOTES
CLINICAL NUTRITION SERVICES  -  ASSESSMENT NOTE      Future/Additional Recommendations:  - will follow-up for nutritional supplements pending diet advancement   Malnutrition:   % Weight Loss: Unable (no previous wts recorded)  % Intake:  <75% for >/= 7 days (non-severe malnutrition)  Subcutaneous Fat Loss:  Unable to observe  Muscle Loss:  Unable to observe   Fluid Retention:  None noted    Malnutrition Diagnosis: Unable to determine due to lack of wt trending and nutrition focused physical exam         REASON FOR ASSESSMENT  Sherrell Leonard is a 74 year old female seen by Registered Dietitian for [facility]      NUTRITION HISTORY  - Information obtained from chart review:  - No pertinent medical history found   - Admitted with confusion suspect secondary to CVA     - Unable to obtain diet history from patient at this time as COVID r/o and inability to follow most commands       CURRENT NUTRITION ORDERS  Diet Order:     NPO     Current Intake/Tolerance:  Patient was previously tolerating a DD3, thin liquids diet and ordering adequate meals, although flowsheets only recorded 0-50% meal consumption. Unclear how well patient has been eating this past week, but suspect </=75%     Rechecking COVID results today (negative on 9/05)     Code stroke called yesterday afternoon and pt transferred to ICU      NUTRITION FOCUSED PHYSICAL ASSESSMENT FOR DIAGNOSING MALNUTRITION)  No:  COVID test pending              Observed:    Deferred     Obtained from Chart/Interdisciplinary Team:  No edema  No skin issues     ANTHROPOMETRICS  Height: ?62 inches (re-measure)   Weight: 145 lb (66.2 kg) on 9/10  BMI: 26.68 kg/m^2  Weight Status:  Overweight BMI 25-29.9  IBW: 50 kg   % IBW: 132%  Weight History: No previous wt trending to evaluate   Wt Readings from Last 10 Encounters:   09/11/20 69.5 kg (153 lb 3.5 oz)       LABS  Labs reviewed  Recent Labs   Lab 09/11/20  0522 09/10/20  1815 09/10/20  1315 09/10/20  1217 09/07/20  0758 09/06/20  0530  09/06/20  0346 09/05/20  2353 09/05/20  1256   GLC 95  --  100*  --  95 89  --   --  111*   BGM  --  150*  --  110*  --   --  102* 101*  --        MEDICATIONS  Medications reviewed      ASSESSED NUTRITION NEEDS PER APPROVED PRACTICE GUIDELINES:    Dosing Weight 54 kg (adjusted)   Estimated Energy Needs: 0475-7080 kcals (25-30 Kcal/Kg)  Justification: maintenance and overweight  Estimated Protein Needs: 65-81 grams protein (1.2-1.5 g pro/Kg)  Justification: preservation of lean body mass    MALNUTRITION:  % Weight Loss: Unable (no previous wts recorded)  % Intake:  <75% for >/= 7 days (non-severe malnutrition)  Subcutaneous Fat Loss:  Unable to observe  Muscle Loss:  Unable to observe   Fluid Retention:  None noted    Malnutrition Diagnosis: Unable to determine due to lack of wt trending and nutrition focused physical exam     NUTRITION DIAGNOSIS:  Inadequate oral intake related to NPO as evidenced by no intake recorded today      NUTRITION INTERVENTIONS  Recommendations / Nutrition Prescription  ADAT   Will follow-up for nutritional supplements pending diet advancements       Implementation  Nutrition education: Per Provider order if indicated   Collaboration and Referral of Nutrition care: patient was discussed during ICU rounds       Nutrition Goals  Diet will adv >FL within 48 hrs       MONITORING AND EVALUATION:  Progress towards goals will be monitored and evaluated per protocol and Practice Guidelines        Charlotte Baker RD, LD  Clinical Dietitian

## 2020-09-11 NOTE — PLAN OF CARE
PT Note 9/11/2020 8:31 AM    PT attempted. Pt is busy with RN in room performing neuro checks. Noted COVID results pending in chart. PT will return for the PM session.

## 2020-09-11 NOTE — PLAN OF CARE
"Pt began following some commands at 0400. Still moves left side and RLE. PERRL. Only able to speak single words such as \"yes\" and \"no\". Afebrile this shift. VSS. COVID pending. Will continue to monitor.  "

## 2020-09-12 ENCOUNTER — APPOINTMENT (OUTPATIENT)
Dept: GENERAL RADIOLOGY | Facility: CLINIC | Age: 75
DRG: 003 | End: 2020-09-12
Attending: NURSE PRACTITIONER
Payer: MEDICARE

## 2020-09-12 ENCOUNTER — APPOINTMENT (OUTPATIENT)
Dept: CT IMAGING | Facility: CLINIC | Age: 75
DRG: 003 | End: 2020-09-12
Attending: NEUROLOGICAL SURGERY
Payer: MEDICARE

## 2020-09-12 ENCOUNTER — APPOINTMENT (OUTPATIENT)
Dept: CT IMAGING | Facility: CLINIC | Age: 75
DRG: 003 | End: 2020-09-12
Attending: INTERNAL MEDICINE
Payer: MEDICARE

## 2020-09-12 ENCOUNTER — HOSPITAL ENCOUNTER (OUTPATIENT)
Dept: NEUROLOGY | Facility: CLINIC | Age: 75
DRG: 003 | End: 2020-09-12
Attending: NURSE PRACTITIONER
Payer: MEDICARE

## 2020-09-12 LAB
ANION GAP SERPL CALCULATED.3IONS-SCNC: 7 MMOL/L (ref 3–14)
BUN SERPL-MCNC: 12 MG/DL (ref 7–30)
CALCIUM SERPL-MCNC: 8.6 MG/DL (ref 8.5–10.1)
CHLORIDE SERPL-SCNC: 111 MMOL/L (ref 94–109)
CO2 SERPL-SCNC: 23 MMOL/L (ref 20–32)
CREAT SERPL-MCNC: 0.63 MG/DL (ref 0.52–1.04)
GFR SERPL CREATININE-BSD FRML MDRD: 87 ML/MIN/{1.73_M2}
GLUCOSE BLDC GLUCOMTR-MCNC: 77 MG/DL (ref 70–99)
GLUCOSE BLDC GLUCOMTR-MCNC: 86 MG/DL (ref 70–99)
GLUCOSE BLDC GLUCOMTR-MCNC: 91 MG/DL (ref 70–99)
GLUCOSE BLDC GLUCOMTR-MCNC: 94 MG/DL (ref 70–99)
GLUCOSE SERPL-MCNC: 94 MG/DL (ref 70–99)
M PNEUMO DNA SPEC QL NAA+PROBE: NOT DETECTED
POTASSIUM SERPL-SCNC: 2.9 MMOL/L (ref 3.4–5.3)
POTASSIUM SERPL-SCNC: 3.7 MMOL/L (ref 3.4–5.3)
RESULT: NORMAL
RESULT: NORMAL
SEND OUTS MISC TEST CODE: NORMAL
SEND OUTS MISC TEST CODE: NORMAL
SEND OUTS MISC TEST SPECIMEN: NORMAL
SEND OUTS MISC TEST SPECIMEN: NORMAL
SODIUM SERPL-SCNC: 141 MMOL/L (ref 133–144)
SPECIMEN SOURCE: NORMAL
SPECIMEN SOURCE: NORMAL
T GONDII DNA SPEC QL NAA+PROBE: NOT DETECTED
TEST NAME: NORMAL
TEST NAME: NORMAL
VDRL CSF QL: NON REACTIVE

## 2020-09-12 PROCEDURE — 25000132 ZZH RX MED GY IP 250 OP 250 PS 637: Mod: GY | Performed by: STUDENT IN AN ORGANIZED HEALTH CARE EDUCATION/TRAINING PROGRAM

## 2020-09-12 PROCEDURE — 94003 VENT MGMT INPAT SUBQ DAY: CPT

## 2020-09-12 PROCEDURE — 70450 CT HEAD/BRAIN W/O DYE: CPT

## 2020-09-12 PROCEDURE — 71250 CT THORAX DX C-: CPT

## 2020-09-12 PROCEDURE — 40000008 ZZH STATISTIC AIRWAY CARE

## 2020-09-12 PROCEDURE — 25000128 H RX IP 250 OP 636: Performed by: INTERNAL MEDICINE

## 2020-09-12 PROCEDURE — C9113 INJ PANTOPRAZOLE SODIUM, VIA: HCPCS | Performed by: INTERNAL MEDICINE

## 2020-09-12 PROCEDURE — 80048 BASIC METABOLIC PNL TOTAL CA: CPT | Performed by: INTERNAL MEDICINE

## 2020-09-12 PROCEDURE — 25000128 H RX IP 250 OP 636: Performed by: PHYSICIAN ASSISTANT

## 2020-09-12 PROCEDURE — 25000128 H RX IP 250 OP 636: Performed by: HOSPITALIST

## 2020-09-12 PROCEDURE — 99291 CRITICAL CARE FIRST HOUR: CPT | Performed by: INTERNAL MEDICINE

## 2020-09-12 PROCEDURE — 40000275 ZZH STATISTIC RCP TIME EA 10 MIN

## 2020-09-12 PROCEDURE — 00000146 ZZHCL STATISTIC GLUCOSE BY METER IP

## 2020-09-12 PROCEDURE — 25000132 ZZH RX MED GY IP 250 OP 250 PS 637: Mod: GY | Performed by: HOSPITALIST

## 2020-09-12 PROCEDURE — 25000128 H RX IP 250 OP 636: Performed by: STUDENT IN AN ORGANIZED HEALTH CARE EDUCATION/TRAINING PROGRAM

## 2020-09-12 PROCEDURE — 99207 ZZC APP CREDIT; MD BILLING SHARED VISIT: CPT | Performed by: NURSE PRACTITIONER

## 2020-09-12 PROCEDURE — 25800030 ZZH RX IP 258 OP 636: Performed by: INTERNAL MEDICINE

## 2020-09-12 PROCEDURE — 20000003 ZZH R&B ICU

## 2020-09-12 PROCEDURE — 84132 ASSAY OF SERUM POTASSIUM: CPT | Performed by: INTERNAL MEDICINE

## 2020-09-12 PROCEDURE — 99207 ZZC NO BILLABLE SERVICE THIS VISIT: CPT | Performed by: INTERNAL MEDICINE

## 2020-09-12 PROCEDURE — 25000132 ZZH RX MED GY IP 250 OP 250 PS 637: Mod: GY | Performed by: NEUROLOGICAL SURGERY

## 2020-09-12 PROCEDURE — 99233 SBSQ HOSP IP/OBS HIGH 50: CPT | Performed by: PSYCHIATRY & NEUROLOGY

## 2020-09-12 PROCEDURE — 95714 VEEG EA 12-26 HR UNMNTR: CPT

## 2020-09-12 PROCEDURE — 40000986 XR ABDOMEN PORT 1 VW

## 2020-09-12 RX ORDER — LEVETIRACETAM 10 MG/ML
1000 INJECTION INTRAVASCULAR EVERY 12 HOURS
Status: DISCONTINUED | OUTPATIENT
Start: 2020-09-13 | End: 2020-09-13

## 2020-09-12 RX ORDER — ASPIRIN 81 MG/1
81 TABLET ORAL DAILY
Status: DISCONTINUED | OUTPATIENT
Start: 2020-09-13 | End: 2020-09-14

## 2020-09-12 RX ORDER — POTASSIUM CHLORIDE 29.8 MG/ML
20 INJECTION INTRAVENOUS
Status: DISCONTINUED | OUTPATIENT
Start: 2020-09-12 | End: 2020-10-05 | Stop reason: HOSPADM

## 2020-09-12 RX ADMIN — PROPOFOL 60 MCG/KG/MIN: 10 INJECTION, EMULSION INTRAVENOUS at 00:29

## 2020-09-12 RX ADMIN — ACETAMINOPHEN 650 MG: 325 TABLET, FILM COATED ORAL at 23:06

## 2020-09-12 RX ADMIN — POTASSIUM CHLORIDE 20 MEQ: 400 INJECTION, SOLUTION INTRAVENOUS at 07:15

## 2020-09-12 RX ADMIN — HEPARIN SODIUM 5000 UNITS: 5000 INJECTION, SOLUTION INTRAVENOUS; SUBCUTANEOUS at 19:58

## 2020-09-12 RX ADMIN — HYDRALAZINE HYDROCHLORIDE 20 MG: 20 INJECTION INTRAMUSCULAR; INTRAVENOUS at 00:14

## 2020-09-12 RX ADMIN — HYDRALAZINE HYDROCHLORIDE 20 MG: 20 INJECTION INTRAMUSCULAR; INTRAVENOUS at 08:42

## 2020-09-12 RX ADMIN — HYDRALAZINE HYDROCHLORIDE 20 MG: 20 INJECTION INTRAMUSCULAR; INTRAVENOUS at 03:03

## 2020-09-12 RX ADMIN — HEPARIN SODIUM 5000 UNITS: 5000 INJECTION, SOLUTION INTRAVENOUS; SUBCUTANEOUS at 08:42

## 2020-09-12 RX ADMIN — POTASSIUM CHLORIDE 20 MEQ: 400 INJECTION, SOLUTION INTRAVENOUS at 05:21

## 2020-09-12 RX ADMIN — LABETALOL HYDROCHLORIDE 20 MG: 5 INJECTION, SOLUTION INTRAVENOUS at 17:42

## 2020-09-12 RX ADMIN — ATORVASTATIN CALCIUM 40 MG: 40 TABLET, FILM COATED ORAL at 19:58

## 2020-09-12 RX ADMIN — HYDRALAZINE HYDROCHLORIDE 20 MG: 20 INJECTION INTRAMUSCULAR; INTRAVENOUS at 17:29

## 2020-09-12 RX ADMIN — LEVETIRACETAM 2000 MG: 500 INJECTION, SOLUTION INTRAVENOUS at 19:56

## 2020-09-12 RX ADMIN — PROPOFOL 50 MCG/KG/MIN: 10 INJECTION, EMULSION INTRAVENOUS at 09:46

## 2020-09-12 RX ADMIN — PANTOPRAZOLE SODIUM 40 MG: 40 INJECTION, POWDER, FOR SOLUTION INTRAVENOUS at 22:30

## 2020-09-12 RX ADMIN — DOCUSATE SODIUM 50 MG AND SENNOSIDES 8.6 MG 1 TABLET: 8.6; 5 TABLET, FILM COATED ORAL at 23:07

## 2020-09-12 RX ADMIN — PROPOFOL 60 MCG/KG/MIN: 10 INJECTION, EMULSION INTRAVENOUS at 04:37

## 2020-09-12 RX ADMIN — POTASSIUM CHLORIDE 20 MEQ: 400 INJECTION, SOLUTION INTRAVENOUS at 06:11

## 2020-09-12 RX ADMIN — PROPOFOL 40 MCG/KG/MIN: 10 INJECTION, EMULSION INTRAVENOUS at 15:12

## 2020-09-12 RX ADMIN — ACETAMINOPHEN 650 MG: 325 TABLET, FILM COATED ORAL at 09:45

## 2020-09-12 RX ADMIN — ASPIRIN 325 MG: 325 TABLET, COATED ORAL at 09:45

## 2020-09-12 RX ADMIN — CLOPIDOGREL BISULFATE 75 MG: 75 TABLET, FILM COATED ORAL at 09:45

## 2020-09-12 ASSESSMENT — ACTIVITIES OF DAILY LIVING (ADL)
ADLS_ACUITY_SCORE: 24
ADLS_ACUITY_SCORE: 20
ADLS_ACUITY_SCORE: 20

## 2020-09-12 ASSESSMENT — VISUAL ACUITY
OU: OTHER (SEE COMMENT)

## 2020-09-12 NOTE — PROGRESS NOTES
"  [facility]    Stroke Progress Note    Interval Events  Underwent cerebral angiogram yesterday with angioplasty of L M1 stenosis. . Repeat CTH this morning without evidence of bleeding. Propofol was held during exam this morning. Shortly after stopping, Ms. Leonard developed rhythmic movements of the left leg. Given concern for seizure, vEEG was ordered.    Stroke Evaluation summarized:  MRI/Head CT:  MRI 9/5 with acute infarcts of the L basal ganglia, centrum semiovale and corona radiata.   Intracranial Vascular Imaging: CTA head with severe L M1 stenosis and scattered moderate to severe atherosclerotic plaque throughout.   Cervical Carotid and Vertebral Artery Vascular Imaging: CTA neck without significant stenosis  Echocardiogram: EF 60-65%, normal LA, negative bubble  EKG/Telemetry: Sinus with 1st degree AVB  LDL: 81  A1c: 5.5  Troponin: 0.051   Other testing: Not Applicable    Impression & Plan  Scattered intracranial atherosclerosis with severe focal left M1 stenosis, now s/p balloon angioplasty 9/11  Left MCA syndrome  - Continue Q2 hour neurochecks  - Goal -160  - DAPT with ASA 81 mg daily + Plavix 75 mg daily   - Continue Lipitor 40 mg daily    Possible seizure  - Continuous vEEG ordered  - Will hold off on AED initiation until EEG   - Continue propofol, would not pursue extubation at this time    We will follow.     LILLY Louis, CNP  Neurology  To page me or covering stroke neurology team member, click here: AMCOM   Choose \"On Call\" tab at top, then search dropdown box for \"Neurology Adult\", select location, press Enter, then look for stroke/neuro ICU/telestroke.    ______________________________________________________    Medications   Home Meds  Prior to Admission medications    Medication Sig Start Date End Date Taking? Authorizing Provider   Multiple Vitamins-Minerals (SYSTANE ICAPS AREDS2) CAPS Take 1 capsule by mouth daily   Yes Unknown, Entered By History   multivitamin, " therapeutic (THERA-VIT) TABS tablet Take 1 tablet by mouth daily   Yes Unknown, Entered By History       Scheduled Meds    [Held by provider] amLODIPine  5 mg Oral Daily     aspirin  325 mg Oral Daily     atorvastatin  40 mg Oral or NG Tube Daily at 8 pm     clopidogrel  75 mg Oral Daily     heparin ANTICOAGULANT  5,000 Units Subcutaneous Q12H     pantoprazole (PROTONIX) IV  40 mg Intravenous Q24H     sodium chloride (PF)  3 mL Intracatheter Q8H       Infusion Meds    - MEDICATION INSTRUCTIONS -       propofol (DIPRIVAN) infusion 50 mcg/kg/min (09/12/20 0516)     sodium chloride 100 mL/hr at 09/11/20 2130       PRN Meds  acetaminophen, acetaminophen, bisacodyl, glucose **OR** dextrose **OR** glucagon, hydrALAZINE, labetalol, lidocaine 4%, lidocaine (buffered or not buffered), LORazepam, - MEDICATION INSTRUCTIONS -, melatonin, naloxone, ondansetron **OR** ondansetron, polyethylene glycol, potassium chloride, potassium chloride with lidocaine, potassium chloride, potassium chloride, potassium chloride, prochlorperazine **OR** prochlorperazine **OR** prochlorperazine, senna-docusate **OR** senna-docusate, sodium chloride (PF)       PHYSICAL EXAMINATION  Temp:  [98  F (36.7  C)-98.4  F (36.9  C)] 98.4  F (36.9  C)  Pulse:  [] 79  Resp:  [11-27] 12  BP: (115-231)/() 115/63  MAP:  [74 mmHg-120 mmHg] 85 mmHg  Arterial Line BP: (122-184)/(47-74) 146/60  FiO2 (%):  [30 %-40 %] 30 %  SpO2:  [89 %-100 %] 97 %     Neurologic  Mental Status:  Intubated, propofol briefly held prior to exam: does not open eyes to voice or noxious stimuli, does not follow commands, does not speak  Cranial Nerves:  PERRL, difficulty to assess facial symmetry with ETT fastener, does not protrude tongue  Motor:  no spontaneous movement noted, grimace and shrug to RUE noxious stimuli, grimace to LUE noxious stimuli, LLE with rhythmic contraction, RLE with minimal movement to stimulation   Reflexes:  toes down-going  Sensory:  see motor  exam  Coordination:  unable to assess  Station/Gait:  deferred     Imaging  I personally reviewed all imaging; relevant findings per HPI.     Lab Results Data   CBC  Recent Labs   Lab 09/11/20  0522 09/10/20  1315 09/06/20  0530   WBC 9.5 12.0* 7.8   RBC 4.32 4.65 4.60   HGB 13.7 14.6 14.4   HCT 42.0 45.2 44.8    206 215     Basic Metabolic Panel    Recent Labs   Lab 09/12/20  0400 09/11/20  0522 09/10/20  1315    143 141   POTASSIUM 2.9* 3.4 3.0*   CHLORIDE 111* 114* 109   CO2 23 25 26   BUN 12 11 15   CR 0.63 0.56 0.63   GLC 94 95 100*   MARII 8.6 8.0* 8.7     Liver Panel  Recent Labs   Lab 09/05/20  1256   PROTTOTAL 7.3   ALBUMIN 4.1   BILITOTAL 0.6   ALKPHOS 99   AST 20   ALT 23     INR    Recent Labs   Lab Test 09/07/20  0758   INR 1.05      Lipid Profile    Recent Labs   Lab Test 09/05/20  1256   CHOL 186   HDL 87   LDL 81   TRIG 89     A1C    Recent Labs   Lab Test 09/05/20  1256   A1C 5.5     Troponin I    Recent Labs   Lab 09/06/20  0530 09/06/20  0212 09/05/20  2144   TROPI 0.074* 0.084* 0.069*

## 2020-09-12 NOTE — ANESTHESIA PROCEDURE NOTES
ARTERIAL LINE PROCEDURE NOTE:  Staff -   Anesthesiologist:  Darvin Toscano MD      Performed By: anesthesiologist         Pre-Procedure  Performed by Darvin Toscano MD  Location: pre-op      Pre-Anesthestic Checklist: patient identified, IV checked, risks and benefits discussed, informed consent, monitors and equipment checked and pre-op evaluation    Timeout  Correct Patient: Yes   Correct Procedure: Yes   Correct Site: Yes     Correct Position: Yes     .   Procedure Documentation  Procedure: arterial line    Supine  Insertion Site:radial.Skin infiltrated with 1 mL of 1% lidocaine. Injection technique: Seldinger Technique and Rene's test completed  .  .  Patient Prep/Sterile Barriers; all elements of maximal sterile barrier technique followed, mask, hat, sterile gown, sterile gloves, draped, hand hygiene, chlorhexidine gluconate and isopropyl alcohol    Assessment/Narrative    Catheter: 20 gauge, 12 cm     Secured by other  Tegaderm dressing used.    Arterial waveform: Yes IBP within 10% of NIBP: Yes

## 2020-09-12 NOTE — ANESTHESIA PREPROCEDURE EVALUATION
Anesthesia Pre-Procedure Evaluation    Patient: Sherrell Leonard   MRN: [id number] : 1945          Preoperative Diagnosis: Stroke (H) [I63.9]    Procedure(s):  CERVICAL ANGOGRAM.    History reviewed. No pertinent past medical history.  History reviewed. No pertinent surgical history.    Anesthesia Evaluation     . Pt has had prior anesthetic.            ROS/MED HX    ENT/Pulmonary:     (+)tobacco use, Past use , . Other pulmonary disease hypoxia withSaO2 88%.   (-) sleep apnea   Neurologic:     (+)CVA date: 2020 with deficits    Cardiovascular:  - neg cardiovascular ROS      (-) hypertension   METS/Exercise Tolerance:     Hematologic:         Musculoskeletal:         GI/Hepatic:  - neg GI/hepatic ROS      (-) GERD   Renal/Genitourinary:  - ROS Renal section negative       Endo:  - neg endo ROS       Psychiatric:         Infectious Disease:         Malignancy:         Other:                          Physical Exam  Normal systems: pulmonary    Airway   Mallampati: III  TM distance: >3 FB  Neck ROM: full    Dental   (+) chipped    Cardiovascular   Rhythm and rate: regular      Pulmonary             Lab Results   Component Value Date    WBC 9.5 2020    HGB 13.7 2020    HCT 42.0 2020     2020    CRP <2.9 2020    SED 6 2020     2020    POTASSIUM 3.4 2020    CHLORIDE 114 (H) 2020    CO2 25 2020    BUN 11 2020    CR 0.56 2020    GLC 95 2020    MARII 8.0 (L) 2020    ALBUMIN 4.1 2020    PROTTOTAL 7.3 2020    ALT 23 2020    AST 20 2020    ALKPHOS 99 2020    BILITOTAL 0.6 2020    PTT 27 2020    INR 1.05 2020    TSH 2.04 2020       Preop Vitals  BP Readings from Last 3 Encounters:   20 (!) 225/115    Pulse Readings from Last 3 Encounters:   20 81      Resp Readings from Last 3 Encounters:   20 13    SpO2 Readings from Last 3 Encounters:   20 97%       Temp Readings from Last 1 Encounters:   09/11/20 36.7  C (98  F) (Oral)    Ht Readings from Last 1 Encounters:   No data found for Ht      Wt Readings from Last 1 Encounters:   09/11/20 69.5 kg (153 lb 3.5 oz)    There is no height or weight on file to calculate BMI.       Anesthesia Plan      History & Physical Review  History and physical reviewed and following examination; no interval change.SBP to be maintained between 180 - 200 mmHg    ASA Status:  3 .    NPO Status:  > 8 hours    Plan for General with Intravenous and Propofol induction. Maintenance will be Balanced.    PONV prophylaxis:  Ondansetron (or other 5HT-3)  Additional equipment: Videolaryngoscope and Arterial Line        Postoperative Care  Postoperative pain management:  Multi-modal analgesia.      Consents  Anesthetic plan, risks, benefits and alternatives discussed with:  Patient and Spouse..                 Darvin Toscano MD

## 2020-09-12 NOTE — ANESTHESIA CARE TRANSFER NOTE
Patient: Sherrell Leonard    Procedure(s):  CERVICAL ANGOGRAM.    Diagnosis: Stroke (H) [I63.9]  Diagnosis Additional Information: No value filed.    Anesthesia Type:   General     Note:  Airway :ETT  Patient transferred to:ICU  Comments: Patient connected to SpO2, EKG, and arterial blood pressure transport monitors and accompanied by CRNA and ICU RN to ICU room. Patient ventilated with ambu via ETT with O2 at 10L during transport.     On arrival to ICU, pt is placed on Vent by RT. Anesthesia handoff of care at 2103.ICU Handoff: Call for PAUSE to initiate/utilize ICU HANDOFF, Identified Patient, Identified Responsible Provider, Reviewed the Pertinent Medical History, Discussed Surgical Course, Reviewed Intra-OP Anesthesia Management and Issues during Anesthesia, Set Expectations for Post Procedure Period and Allowed Opportunity for Questions and Acknowledgement of Understanding      Vitals: (Last set prior to Anesthesia Care Transfer)    CRNA VITALS  9/11/2020 2017 - 9/11/2020 2117      9/11/2020             Resp Rate (observed):  14                Electronically Signed By: LILLY Lenz CRNA  September 11, 2020  9:23 PM

## 2020-09-12 NOTE — PROGRESS NOTES
Previous neuro assessments patients right eye reaction to light has been sluggish; now it is brisk.

## 2020-09-12 NOTE — PROGRESS NOTES
Patient arrived to ICU room 346 from IR s/p cerebral angiogram with angioplasty only. No stents placed at this time. Patient remains intubated and sedated. HR 67. Left radial contreras /70 (103) will use hydralazine as ordered. RT removed ambubag then placed patient on ventilator CMV 40% 500 12/5. O2 sat %. Temp 98.4 Axillary. Blood glucose 76. Right femoral artery had star device deployed at 20:25. Hemostasis achieved at 20:30 in IR. Left femoral vein had been accessed in IR. Currently Right groin site is sfd. Left femoral CVC is infusing. Skin is intact with one dime size lesion on patients left foot which resembles shingles.

## 2020-09-12 NOTE — PLAN OF CARE
SLP: Pt intubated with no plans to extubate today per RN. Given change in status, pt will need full re-evaluation of swallow and language function. Will complete SLP orders. Please re-consult when pt is appropriate.

## 2020-09-12 NOTE — PROGRESS NOTES
[facility]   CRITICAL CARE ADMISSION/CONSULTATION    Sherrell Leonard MRN: [id number]  1945  Date of Admission:9/5/2020          HPI   Sherrell Leonard IS a 74 year old female admitted on 9/5/2020 for acute aphasia found to have multiple intracranial vessel stenosis and left basal ganglia hypodensity suggestive of ischemic CVA. MRI confirmed L MCA and PCA distribution small-mod CVA's. She underwent cerebral angiogram and L MCA angioplasty tonight. Intubation was required pre-procedure and she was left intubated due to mental status and tight BP control. Critical care is consulted for management of these acute issues.            Past Medical History:    History reviewed. No pertinent past medical history.          Past Surgical History:    History reviewed. No pertinent surgical history.         Social History:     Social History     Tobacco Use     Smoking status: Former Smoker     Types: Cigarettes     Last attempt to quit: 2005     Years since quitting: 15.7     Smokeless tobacco: Former User   Substance Use Topics     Alcohol use: Yes     Comment: 1/day             Family History:   History reviewed. No pertinent family history.          Allergies:   Please see allergy list which was reviewed this admission.         Medications:       [Held by provider] amLODIPine  5 mg Oral Daily     [START ON 9/12/2020] aspirin  325 mg Oral Daily     atorvastatin  40 mg Oral or NG Tube Daily at 8 pm     clopidogrel  75 mg Oral Daily     heparin ANTICOAGULANT  5,000 Units Subcutaneous Q12H     sodium chloride (PF)  3 mL Intracatheter Q8H     acetaminophen, acetaminophen, bisacodyl, glucose **OR** dextrose **OR** glucagon, hydrALAZINE, labetalol, lidocaine 4%, lidocaine (buffered or not buffered), LORazepam, - MEDICATION INSTRUCTIONS -, melatonin, naloxone, ondansetron **OR** ondansetron, polyethylene glycol, potassium chloride, potassium chloride with lidocaine, potassium chloride, potassium chloride, prochlorperazine  **OR** prochlorperazine **OR** prochlorperazine, senna-docusate **OR** senna-docusate, sodium chloride (PF)         Review of Systems:   Critically ill. Unable to assess.         Physical Examination:   Temp:  [97.7  F (36.5  C)-98.4  F (36.9  C)] 98  F (36.7  C)  Pulse:  [62-94] 81  Resp:  [10-27] 13  BP: (153-231)/() 225/115  SpO2:  [89 %-98 %] 97 %    Intake/Output Summary (Last 24 hours) at 9/11/2020 2156  Last data filed at 9/11/2020 2102  Gross per 24 hour   Intake 4650 ml   Output 950 ml   Net 3700 ml     Wt Readings from Last 4 Encounters:   09/11/20 69.5 kg (153 lb 3.5 oz)     BP - Mean:  [] 158  Ventilation Mode: CMV/AC  (Continuous Mandatory Ventilation/ Assist Control)  Rate Set (breaths/minute): 12 breaths/min  Tidal Volume Set (mL): 500 mL  PEEP (cm H2O): 5 cmH2O  Oxygen Concentration (%): 40 %  Resp: 13    No lab results found in last 7 days.    GEN: no acute distress. Intubated, sedated  HEENT: head ncat, sclera anicteric, OP patent, trachea midline   PULM: unlabored synchronous with vent, clear anteriorly    CV/COR: RRR S1S2 no gallop,  No rub, no murmur  ABD: soft nontender, hypoactive bowel sounds, no mass  EXT:  Edema   Warm. Left fem sheath. Left radial art line  NEURO: grossly intact  SKIN: no obvious rash      Assessment and plan :     Neurology/Psychiatry/Pain/Sedation/Hemodynamics/Pulmonary/ID  1. Sedation for vent synchrony. Cont propofol and prn fentanyl. RASS -4 tonight.   2. Acute respiratory failure in setting of multiple territory CVA's from L MCA and PCA distribution s/p L MCA angioplasty. Easy to ventilate/oxygenate. Pulm mechanics adequate given current Vt and PEEP. Plan to keep -160 which is currently well controlled with prn anti-HTN. She will remain on plavix, statin and ASA. Extubation pending CT head tomorrow morning.   2. E. Coli UTI. Completed 6-days of abx.   3. Hypoxia presumed 2/2 pulmonary edema. Initial CXR with bilateral interstitial disease.  Recommend CT chest non-contrast in am along with head to evaluate for an underlying chronic vs acute parenchymal lung process.     Other  1. GI/Nutrition. NPO for now given potential extubation in am, cont PPI  2. Renal. UOP non-oliguric. Watch SCr.   3. Endo. ICU glucose protocol  4. Access. Use L fem venous central line for IV gtts. Has left radial art line for continuous BP measurements.     Disposition/Code Status/Other  1. Critically ill following L mCA angioplasty  2. Code: Full    ICU Prophylaxis:   1. DVT:mechanical  2. VAP: HOB 30 degrees, chlorhexidine rinse  3. Stress Ulcer: PPI  4. Restraints: Nonviolent soft two point restraints required and necessary for patient safety and continued cares and good effect as patient continues to pull at necessary lines, tubes despite education and distraction. Will readdress daily.   5. IV Access - central access required and necessary for continued patient cares  6. Feeding - npo    I have personally reviewed the daily labs, imaging studies, cultures and discussed the case with referring physician and consulting physicians.     This patient is critically ill and I have provided 30 minutes of critical care time (excluding procedures) on September 11, 2020.     Anthony Galan MD   of Medicine  Interventional Pulmonary  Department of Pulmonary, Allergy, Critical Care and Sleep Medicine   [facility], [place]  Pager: [phone]   Office: [phone]  Email: [email or web address]      ROUTINE ICU LABS (Last four results)  CMP  Recent Labs   Lab 09/11/20  0522 09/10/20  1315 09/08/20  0829 09/07/20  2330 09/07/20  0758 09/06/20  0530  09/05/20  1256    141  --   --  142 144  --  144   POTASSIUM 3.4 3.0* 3.7 3.5 3.1* 3.4  --  3.3*   CHLORIDE 114* 109  --   --  110* 112*  --  109   CO2 25 26  --   --  26 26  --  28   ANIONGAP 4 6  --   --  6 6  --  7   GLC 95 100*  --   --  95 89  --  111*   BUN 11 15  --   --  11 13  --  20   CR 0.56 0.63  0.69  --  0.70 0.66  --  0.82   GFRESTIMATED >90 87 85  --  84 86   < > 69   GFRESTBLACK >90 >90 >90  --  >90 >90   < > 80   MARII 8.0* 8.7  --   --  8.5 8.7  --  9.7   PROTTOTAL  --   --   --   --   --   --   --  7.3   ALBUMIN  --   --   --   --   --   --   --  4.1   BILITOTAL  --   --   --   --   --   --   --  0.6   ALKPHOS  --   --   --   --   --   --   --  99   AST  --   --   --   --   --   --   --  20   ALT  --   --   --   --   --   --   --  23    < > = values in this interval not displayed.     CBC  Recent Labs   Lab 09/11/20  0522 09/10/20  1315 09/06/20  0530 09/05/20  1256   WBC 9.5 12.0* 7.8 9.5   RBC 4.32 4.65 4.60 4.92   HGB 13.7 14.6 14.4 15.8*   HCT 42.0 45.2 44.8 47.8*   MCV 97 97 97 97   MCH 31.7 31.4 31.3 32.1   MCHC 32.6 32.3 32.1 33.1   RDW 13.1 13.3 13.5 13.3    206 215 239     INR  Recent Labs   Lab 09/07/20  0758   INR 1.05     Arterial Blood GasNo lab results found in last 7 days.    All cultures:  Recent Labs   Lab 09/09/20  1509 09/05/20  1459   CULT Culture negative after 2 days  Culture negative monitoring continues >100,000 colonies/mL  Escherichia coli  *     Recent Results (from the past 24 hour(s))   US Lower Extremity Venous Duplex Bilateral    Narrative    VENOUS ULTRASOUND BILATERAL LOWER EXTREMITIES  9/11/2020 9:25 AM     HISTORY: Fever in patient with stroke. DVT.    COMPARISON: None.    TECHNIQUE: Color Doppler and spectral waveform analysis performed  throughout the deep veins of both lower extremities.    FINDINGS: Both common femoral, proximal greater saphenous, femoral,  and popliteal veins demonstrate normal blood flow, compression, and  augmentation. Posterior tibial and peroneal veins are compressible.      Impression    IMPRESSION: Negative for deep venous thrombosis throughout both lower  extremities.    JUSTIN PACHECO MD   CT Head w/o Contrast    Narrative    CT SCAN OF THE HEAD WITHOUT CONTRAST   9/11/2020 1:25 PM     HISTORY: Stroke, follow-up.    TECHNIQUE:   Axial images of the head and coronal reformations without  IV contrast material. Radiation dose for this scan was reduced using  automated exposure control, adjustment of the mA and/or kV according  to patient size, or iterative reconstruction technique.    COMPARISON: CT head 9/10/2020, MRI head 9/7/2020.    FINDINGS: There has been expected evolution of the evolving subacute  ischemic infarct centered in the left corona radiata. Note is also  made of previously demonstrated now subacute-appearing ischemic  infarcts in the left occipital lobe, posterolateral left temporal  lobe, and left subinsular region. Multiple additional smaller  scattered cortical/subcortical ischemic infarcts in the left cerebral  hemisphere involving the left frontal lobe, parietal lobe, and  occipitotemporal regions are much better characterized on previous  MRI. No definite new extended infarct signs are seen.    Mild to moderate generalized brain parenchymal volume loss. Moderately  extensive hypoattenuation within the periventricular and deep more  than subcortical cerebral white matter, nonspecific, but most likely  related to chronic small vessel ischemic disease. The ventricles are  normal in size and configuration. No acute intracranial hemorrhage.  Subtle small partially calcified extra-axial enhancing mass along the  anterior aspect of the right middle cranial fossa (series 3 image 7)  again noted, most likely representing a small meningioma. No  associated mass effect.    Visualized orbits appear normal. The paranasal sinuses, mastoids, and  middle ear cavities appear within normal limits. Prominent  pneumatization of the bilateral petrous temporal bones is incidentally  noted.      Impression    IMPRESSION:  1. Expected evolution of subacute-appearing ischemic infarcts in the  left cerebral hemisphere, with the largest confluent area of  infarction centered in the left corona radiata. No intracranial  hemorrhage or significant  associated mass effect.  2. Generalized brain parenchymal volume loss and moderately extensive  chronic small vessel ischemic disease.  3. Redemonstrated small extra-axial soft tissue mass along the  anterior aspect of the right middle cranial fossa most likely  represents a small meningioma. No significant associated mass effect.    BRADLY VALENTE MD              Lines, Drains, Airway     .  Peripheral IV 09/09/20 Distal;Right Wrist (Active)   Site Assessment [facility] 09/11/20 1600   Line Status Infusing 09/11/20 1600   Phlebitis Scale 0-->no symptoms 09/11/20 1600   Infiltration Scale 0 09/11/20 1600   Extravasation? No 09/11/20 1600   Number of days: 2       Peripheral IV 09/10/20 Right Lower forearm (Active)   Site Assessment [facility] except;Ecchymotic 09/11/20 1600   Line Status Saline locked 09/11/20 1600   Phlebitis Scale 0-->no symptoms 09/11/20 1600   Infiltration Scale 0 09/11/20 1600   Extravasation? No 09/11/20 1600   Number of days: 1       Arterial Line 09/11/20 (Active)   Number of days: 0       CVC Single Lumen 09/11/20 Left Femoral (Active)   Site Assessment [facility] 09/11/20 2045   Line Status Blood return noted;Saline locked 09/11/20 2045   Dressing Intervention Gauze;Transparent;New dressing 09/11/20 2045   Extravasation? No 09/11/20 2045   Line Necessity Yes, meets criteria 09/11/20 2045   Number of days: 0       Right Groin Interventional Procedure Access (Active)   Site Assessment [facility] 09/11/20 2030   Hemostasis management Other (Comment) 09/11/20 2030   CMS Right Extremity [facility] 09/11/20 2030   Dorsalis Pulse - Right Leg Normal 09/11/20 2030   Posterior Tibial Pulse - Right Leg Normal 09/11/20 2030   Number of days: 0       ETT 7 (Active)   Secured By Commercial tube gongora 09/11/20 2127   Site Appearance Clean and dry 09/11/20 2127   Secured at (cm) to lip 21 cm 09/11/20 2127   Site of ET Tube Securement At lip 09/11/20 2127   Number of days: 0       External Urinary Catheter (Active)   Skin no redness;no  breakdown;other (see comments) 09/11/20 1200   Urine Color Yellow 09/11/20 1200   Output (mL) 250 mL 09/11/20 1100   Collection Container Standard 09/11/20 1200   External Catheter changed Done 09/11/20 1200   Number of days: 1       Incision/Surgical Site 09/09/20 Lower Thoracic spine (Active)   Incision Assessment WDL 09/11/20 1200   Eva-Incision Assessment Warm 09/11/20 0800   Closure Approximated 09/10/20 1815   Incision Drainage Amount None 09/10/20 2000   Dressing Intervention Clean, dry, intact 09/11/20 1200   Number of days: 2

## 2020-09-12 NOTE — PROGRESS NOTES
[facility] ICU RESPIRATORY NOTE        Date of Admission: 9/5/2020    Date of Intubation (most recent):9/11/20    Reason for Mechanical Ventilation:Airway protection    Number of Days on Mechanical Ventilation:2    Met Criteria for Spontaneous Breathing Trial: No    Reason for No Spontaneous Breathing Trial:Per MD    Significant Events Today:None     ABG Results: No lab results found in last 7 days.    Current Vent Settings: Ventilation Mode: CMV/AC  (Continuous Mandatory Ventilation/ Assist Control)  FiO2 (%): 30 %  Rate Set (breaths/minute): 112 breaths/min  Tidal Volume Set (mL): 500 mL  PEEP (cm H2O): 5 cmH2O  Oxygen Concentration (%): 30 %  Resp: 12      Skin Assessment:Dried blood on lips. Bedside RN aware.    Plan:Will cont full vent support for now and will assess for weaning readiness daily.    Kady Bender, RT

## 2020-09-12 NOTE — PROGRESS NOTES
VEEG monitoring preliminary results:    VEEG has been running for over one hour.  EEG showed severe generalized slowing under sedation, with focal slowing over the left hemisphere.  No epileptiform activities, no clinical or electrographic seizures were observed.     Chery Hou MD  Neurology

## 2020-09-12 NOTE — IR NOTE
Interventional Radiology Intra-procedural Nursing Note    Patient Name: Sherrell Leonard  Medical Record Number: [id number]  Today's Date: September 11, 2020    Start Time: 1854  End of procedure time: 2030  Procedure: cerebral angiogram, left mca angioplasty  Report given to: JYOTI Foley, ICU  Time pt departs:  2050  : n/a    Other Notes:   pt under care of anesthesia. monitoring, meds, and equipment per anes. pt intubated and placed onto procedure table in supine position. bilateral groin prepped and pt draped under sterile technique.    6f right femoral arterial sheath was used during procedure- this was removed at 2030; starclose closure device was deployed at 2030 with hemostasis achieved at that time. Right groin c/d/i without hematoma covered with sterile guaze dressing    4f left femoral venous access left in place       Gricel Bravo RN

## 2020-09-12 NOTE — PROGRESS NOTES
ADDENDUM:  Initial monitoring on low-dose of propofol was negative for seizure.  However when propofol is currently turned off, the patient again began to experience left lower extremity twitching.  I was notified by the neurology team that the patient appears to be having seizures arising from the right temporal lobe.  -Restart propofol  -Start Keppra with a 2 g load followed by 1 g every 12 hours    MICU Progress Note    Sherrell Leonard MRN# [id number]   Age: 74 year old YOB: 1945     Date of Admission: 9/5/2020  Date of Service: 09/12/2020   ==================================================  24 HOUR EVENTS:   Balloon angioplasty 9/11.  Sedation holiday this morning in anticipation of extubation, however she developed rhythmic jerking motions in the left lower extremity concerning for seizure so continuous EEG was initiated.    Changes for Today:  We will keep her intubated until results of EEG have been seen. Possible extubation if awake and seizure free later today.    ==================================================          PHYSICAL EXAM  Temp:  [98.3  F (36.8  C)-99  F (37.2  C)] 98.3  F (36.8  C)  Pulse:  [] 77  Resp:  [11-27] 15  BP: ()/() 103/53  MAP:  [74 mmHg-120 mmHg] 77 mmHg  Arterial Line BP: (122-184)/(47-74) 129/56  FiO2 (%):  [30 %-40 %] 30 %  SpO2:  [96 %-100 %] 97 %    Ventilation Mode: CMV/AC  (Continuous Mandatory Ventilation/ Assist Control)  FiO2 (%): 30 %  Rate Set (breaths/minute): 12 breaths/min  Tidal Volume Set (mL): 500 mL  PEEP (cm H2O): 5 cmH2O  Oxygen Concentration (%): 30 %  Resp: 15      General: Sedated, intubated  HEENT: AT/NC, sclera anicteric, PERRL, OP clear with MMM  Neck: Supple, no JVD or cervical LAD  Resp: CTAB, no crackles or wheezes  Cardiac: RRR, NS1,S2, No m/r/g  Abdomen:  +BS.  No HSM or masses,   Extremities: No LE edema or obvious joint abnormalities  Skin: Warm and dry, no jaundice or rash  Neuro: Sedated, intubated    Date 09/12/20  0700 - 09/13/20 0659   Shift 2374-6364 4976-3127 7135-7171 24 Hour Total   INTAKE   I.V. 703.3   703.3   NG/   120   Shift Total(mL/kg) 823.3(12.07)   823.3(12.07)   OUTPUT   Shift Total(mL/kg)       Weight (kg) 68.2 68.2 68.2 68.2       =====================================================  LABORATORY DATA    Labs reviewed by me personally, significant abnormalities detailed in assessment and plan.      ROUTINE ICU LABS (Last four results)  CMP  Recent Labs   Lab 09/12/20 0930 09/12/20  0400 09/11/20  0522 09/10/20  1315 09/08/20  0829  09/07/20  0758   NA  --  141 143 141  --   --  142   POTASSIUM 3.7 2.9* 3.4 3.0* 3.7   < > 3.1*   CHLORIDE  --  111* 114* 109  --   --  110*   CO2  --  23 25 26  --   --  26   ANIONGAP  --  7 4 6  --   --  6   GLC  --  94 95 100*  --   --  95   BUN  --  12 11 15  --   --  11   CR  --  0.63 0.56 0.63 0.69  --  0.70   GFRESTIMATED  --  87 >90 87 85  --  84   GFRESTBLACK  --  >90 >90 >90 >90  --  >90   MARII  --  8.6 8.0* 8.7  --   --  8.5    < > = values in this interval not displayed.     CBC  Recent Labs   Lab 09/11/20  0522 09/10/20  1315 09/06/20  0530   WBC 9.5 12.0* 7.8   RBC 4.32 4.65 4.60   HGB 13.7 14.6 14.4   HCT 42.0 45.2 44.8   MCV 97 97 97   MCH 31.7 31.4 31.3   MCHC 32.6 32.3 32.1   RDW 13.1 13.3 13.5    206 215     INR  Recent Labs   Lab 09/07/20  0758   INR 1.05     Arterial Blood GasNo lab results found in last 7 days.  Venous Blood Gas No lab results found in last 7 days.      Recent Labs   Lab 09/09/20  1509 09/05/20  1459   CULT Culture negative after 2 days  Culture negative monitoring continues >100,000 colonies/mL  Escherichia coli  *         Recent Results (from the past 48 hour(s))   US Lower Extremity Venous Duplex Bilateral    Narrative    VENOUS ULTRASOUND BILATERAL LOWER EXTREMITIES  9/11/2020 9:25 AM     HISTORY: Fever in patient with stroke. DVT.    COMPARISON: None.    TECHNIQUE: Color Doppler and spectral waveform analysis  performed  throughout the deep veins of both lower extremities.    FINDINGS: Both common femoral, proximal greater saphenous, femoral,  and popliteal veins demonstrate normal blood flow, compression, and  augmentation. Posterior tibial and peroneal veins are compressible.      Impression    IMPRESSION: Negative for deep venous thrombosis throughout both lower  extremities.    JUSTIN PACHECO MD   CT Head w/o Contrast    Narrative    CT SCAN OF THE HEAD WITHOUT CONTRAST   9/11/2020 1:25 PM     HISTORY: Stroke, follow-up.    TECHNIQUE:  Axial images of the head and coronal reformations without  IV contrast material. Radiation dose for this scan was reduced using  automated exposure control, adjustment of the mA and/or kV according  to patient size, or iterative reconstruction technique.    COMPARISON: CT head 9/10/2020, MRI head 9/7/2020.    FINDINGS: There has been expected evolution of the evolving subacute  ischemic infarct centered in the left corona radiata. Note is also  made of previously demonstrated now subacute-appearing ischemic  infarcts in the left occipital lobe, posterolateral left temporal  lobe, and left subinsular region. Multiple additional smaller  scattered cortical/subcortical ischemic infarcts in the left cerebral  hemisphere involving the left frontal lobe, parietal lobe, and  occipitotemporal regions are much better characterized on previous  MRI. No definite new extended infarct signs are seen.    Mild to moderate generalized brain parenchymal volume loss. Moderately  extensive hypoattenuation within the periventricular and deep more  than subcortical cerebral white matter, nonspecific, but most likely  related to chronic small vessel ischemic disease. The ventricles are  normal in size and configuration. No acute intracranial hemorrhage.  Subtle small partially calcified extra-axial enhancing mass along the  anterior aspect of the right middle cranial fossa (series 3 image 7)  again noted,  most likely representing a small meningioma. No  associated mass effect.    Visualized orbits appear normal. The paranasal sinuses, mastoids, and  middle ear cavities appear within normal limits. Prominent  pneumatization of the bilateral petrous temporal bones is incidentally  noted.      Impression    IMPRESSION:  1. Expected evolution of subacute-appearing ischemic infarcts in the  left cerebral hemisphere, with the largest confluent area of  infarction centered in the left corona radiata. No intracranial  hemorrhage or significant associated mass effect.  2. Generalized brain parenchymal volume loss and moderately extensive  chronic small vessel ischemic disease.  3. Redemonstrated small extra-axial soft tissue mass along the  anterior aspect of the right middle cranial fossa most likely  represents a small meningioma. No significant associated mass effect.    BRADLY VALENTE MD   CT Head w/o Contrast    Narrative    CT SCAN OF THE HEAD WITHOUT CONTRAST   9/12/2020 8:19 AM     HISTORY: Stroke, follow-up. Angioplasty of left MCA performed. Check  for reperfusion hemorrhage.    TECHNIQUE:  Axial images of the head and coronal reformations without  IV contrast material. Radiation dose for this scan was reduced using  automated exposure control, adjustment of the mA and/or kV according  to patient size, or iterative reconstruction technique.    COMPARISON: CT head dated 9/11/2020.    FINDINGS: Overall, no significant change in the appearance of the  subacute ischemic infarcts in the left cervical hemisphere, with the  most confluent area of infarction in the left centrum semiovale, and  to a lesser degree involving the cortical/subcortical regions, as has  been described on previous reports. No acute intracranial hemorrhage,  extra-axial fluid collection, or mass effect/herniation. Mild to  moderate generalized brain parenchymal volume loss. Moderately  extensive presumed chronic small vessel ischemic changes in  the  cerebral white matter, as before. Subtle extra-axial soft tissue mass  along the anterior aspect of the right middle cranial fossa (series 4  image 22), unchanged and likely representing a small meningioma.  Scattered intracranial atherosclerotic calcifications are again seen,  primarily involving the carotid siphons.    Mild scattered mucosal thickening in the paranasal sinuses,  particularly within the ethmoid air cells and right sphenoid sinus  with small bilateral sphenoid sinus air-fluid levels. Mastoid and  middle ear cavities are clear. The bony calvarium and bones of the  skull base appear intact. The patient is intubated, and the  endotracheal tube is incompletely evaluated/imaged on this exam.  Incidental note made of scattered carious erosions involving the  visualized maxillary teeth with a periapical abscess of the second  right maxillary premolar which also is fractured (series 5 image 5).      Impression    IMPRESSION:  1. No acute intracranial hemorrhage/hemorrhagic conversion.  2. Unchanged appearance of the left cerebral hemisphere ischemic  infarcts, with the most confluent area of infarction in the left  centrum semiovale.  3. Global brain parenchymal volume loss and presumed chronic small  vessel ischemic disease.  4. Unchanged small extra-axial mass along the anterior aspect of the  right middle cranial fossa, probably a small meningioma.  5. Multiple carious erosions of the visualized maxillary teeth with a  periapical abscess of the second right maxillary premolar, which is  also fractured.   CT Chest w/o Contrast    Narrative    CT CHEST W/O CONTRAST 9/12/2020 8:19 AM    CLINICAL HISTORY: Interstitial lung disease    TECHNIQUE: CT chest without IV contrast. Multiplanar reformats were  obtained. Dose reduction techniques were used.  CONTRAST: None.    COMPARISON: None available    FINDINGS:   LUNGS AND PLEURA: Endotracheal tube tip in the mid essence. The central  tracheobronchial tree is  clear. Moderate centrilobular emphysema.  Bibasilar dependent atelectasis. No pleural effusion or pneumothorax.  No suspicious pulmonary masses.    MEDIASTINUM/AXILLAE: No lymphadenopathy in the axillary or chest. No  thoracic aortic aneurysm. Atherosclerotic aortic and coronary artery  calcifications. No pericardial effusion. Scattered small  calcifications in the right breast. 1.1 cm calcified nodule in the  left breast, likely a benign fibroadenoma.    UPPER ABDOMEN: No significant finding.    MUSCULOSKELETAL: Sclerotic focus in the T8 vertebral body is likely a  bone island (series 7, image 49). Degenerative changes in the spine.      Impression    IMPRESSION:   1.  Moderate centrilobular emphysema. Mild bibasilar atelectasis.  2.  Small right breast calcifications are indeterminate. 1.1 cm  calcified nodule in the left breast is possibly a benign fibroadenoma.  Correlate with mammography on a nonurgent basis.    EDBBI ACUÑA MD   XR Abdomen Port 1 View    Narrative    XR ABDOMEN PORT 1 VW   9/12/2020 9:30 AM     HISTORY: OG placement verification    COMPARISON: None.      Impression    IMPRESSION: Enteric tube tip and sidehole over the stomach.  Nonobstructive bowel gas pattern. Moderate amount of formed colon  stool. Left basilar retrocardiac atelectasis/consolidation.    DEBBI ACUÑA MD           ==================================================    ASSESSMENT AND PLAN:         Neuro/psych:    -Sedation: Propofol    ##CVA, multiple territories from left MCA and PCA distribution  ## MCA angioplasty  Initially did well with plans to extubate the morning of 9/12, however when sedating medications were reduced she was found to have rhythmic movements in her left lower extremity concerning for seizure activity so extubation was postponed and continuous EEG was initiated.  -Maintain -160  -Continue Plavix, statin, and aspirin  -Neurology following, appreciate her assistance  -Await video EEG  results      Pulmonary:   ## Acute respiratory failure secondary to neurologic insult as above  Continue ventilator treatment as above      Cardiac: No active concerns      Renal: No active concerns      Infectious Disease:   ## E. coli UTI, status post 6 days antibiotics      GI/:   -Nutrition: N.p.o. for the short-term as she will likely be extubated this weekend.  If she remains intubated into next week, we will likely initiate tube feeds    Endocrine: No active issues, monitoring for hyper or hypoglycemia    Heme: No active concerns    Skin: No active concerns    Prophylaxis:     1. DVT:mechanical  2. VAP: HOB 30 degrees, chlorhexidine rinse  3. Stress Ulcer: PPI  4. Restraints: Nonviolent soft two point restraints required and necessary for patient safety and continued cares and good effect as patient continues to pull at necessary lines, tubes despite education and distraction. Will readdress daily.   5. IV Access - central access required and necessary for continued patient cares        CODE: Full  Dispo: Remains critically ill, on the ventilator and requiring ICU cares      Attestation:  I have reviewed today's vital signs, notes, medications, labs, and imaging.    CCT 45 min      Marty Sarabia M.D.  Pulmonary & Critical Care  Pager: Click Here to page

## 2020-09-12 NOTE — ANESTHESIA POSTPROCEDURE EVALUATION
Patient: Sherrell Leonard    Procedure(s):  CERVICAL ANGOGRAM.    Diagnosis:Stroke (H) [I63.9]  Diagnosis Additional Information: No value filed.    Anesthesia Type:  General    Note:  Anesthesia Post Evaluation    Patient location during evaluation: ICU  Patient participation: Unable to evaluate secondary to administered sedation  Level of consciousness: obtunded/minimal responses  Pain management: unable to assess  Airway patency: patent  Cardiovascular status: hemodynamically stable  Respiratory status: ETT, ventilator and intubated  Hydration status: acceptable  PONV: unable to assess             Last vitals:  Vitals:    09/11/20 1500 09/11/20 1600 09/11/20 1700   BP: (!) 225/110 (!) 218/109 (!) 225/115   Pulse: 79 80 81   Resp: 27 25 13   Temp:      SpO2: 97% 98% 97%         Electronically Signed By: Darvin Toscano MD  September 11, 2020  9:48 PM

## 2020-09-12 NOTE — PROGRESS NOTES
Events noted.  Discussed with Dr. Petty, intensivist.  Patient still intubated on the vent.  Intensivist managing the patient at this time.  I did not make a formal visit today, if patient is extubated later today will assume care tomorrow morning.

## 2020-09-12 NOTE — PROCEDURES
[facility]     Endovascular Surgical Neuroradiology Post-Procedure Note    Pre-Procedure Diagnosis:  Symptomatic left MCA stenosis  Post-Procedure Diagnosis:  Flow limiting (> 90%) stenosis of left M1 segment    Procedure(s):   Intracranial balloon angioplasty (left M1)    Findings:  See above. Angioplasty with 2.25 x 15 mm balloon. Residual stenosis of about 20-30%. We opted not to place a stent. Diffuse atherosclerotic disease in left MCA and JENNIFER territories.    Plan:  Keep intubated overnight. -160    Primary Surgeon:  Dr. Dotty Burgess  Additional Assistants:  Dr. Renetta Pedersen    Prior to the start of the procedure and with procedural staff participation, I verbally confirmed: the patient s identity using two indicators, relevant allergies, that the procedure was appropriate and matched the consent or emergent situation, and that the correct equipment/implants were available. Immediately prior to starting the procedure I conducted the Time Out with the procedural staff and re-confirmed the patient s name, procedure, and site/side. (The Joint Commission universal protocol was followed.)  Yes    Anesthesia:  Performed by Anesthesia  Medications:  Heparin 5000 units IV  Puncture site:  Right femoral arterial, left femoral venous    Fluoroscopy time (minutes):  37.5  Radiation dose (mGy):  1370.66    Contrast amount (mL):  42     Estimated blood loss (mL):  50    Closure:  Device Starclose    Disposition:  Will be followed in hospital by the Neuro Critical Care/Stroke team.        Sedation Post-Procedure Summary    Sedatives: GETA    Vital signs and pulse oximetry were monitored and remained stable throughout the procedure, and sedation was maintained until the procedure was complete.  The patient was monitored by staff until sedation discharge criteria were met.    Patient tolerance:  Patient tolerated the procedure well with no immediate complications.      Dotty Burgess MD  Pager:   [phone]

## 2020-09-12 NOTE — PROGRESS NOTES
Patient does not have a urinary catheter therefore she has been bladder scanned at 23:45. Only 39 ml at this time.

## 2020-09-12 NOTE — PROGRESS NOTES
Repeat bladder scan showed 511 ml in patients bladder. Since it is no catheter September this writer applied a purewick which collected 50 ml; the rest was on the bed. Linens changed and bed bath given.

## 2020-09-13 ENCOUNTER — APPOINTMENT (OUTPATIENT)
Dept: MRI IMAGING | Facility: CLINIC | Age: 75
DRG: 003 | End: 2020-09-13
Attending: STUDENT IN AN ORGANIZED HEALTH CARE EDUCATION/TRAINING PROGRAM
Payer: MEDICARE

## 2020-09-13 ENCOUNTER — HOSPITAL ENCOUNTER (OUTPATIENT)
Dept: NEUROLOGY | Facility: CLINIC | Age: 75
DRG: 003 | End: 2020-09-13
Attending: NURSE PRACTITIONER
Payer: MEDICARE

## 2020-09-13 LAB
ANION GAP SERPL CALCULATED.3IONS-SCNC: 8 MMOL/L (ref 3–14)
BUN SERPL-MCNC: 17 MG/DL (ref 7–30)
CALCIUM SERPL-MCNC: 8.6 MG/DL (ref 8.5–10.1)
CHLORIDE SERPL-SCNC: 114 MMOL/L (ref 94–109)
CO2 SERPL-SCNC: 21 MMOL/L (ref 20–32)
CREAT SERPL-MCNC: 0.64 MG/DL (ref 0.52–1.04)
GFR SERPL CREATININE-BSD FRML MDRD: 87 ML/MIN/{1.73_M2}
GLUCOSE BLDC GLUCOMTR-MCNC: 79 MG/DL (ref 70–99)
GLUCOSE SERPL-MCNC: 85 MG/DL (ref 70–99)
MAGNESIUM SERPL-MCNC: 2.1 MG/DL (ref 1.6–2.3)
PHOSPHATE SERPL-MCNC: 3.5 MG/DL (ref 2.5–4.5)
PLATELET # BLD AUTO: 233 10E9/L (ref 150–450)
POTASSIUM SERPL-SCNC: 3.1 MMOL/L (ref 3.4–5.3)
POTASSIUM SERPL-SCNC: 3.8 MMOL/L (ref 3.4–5.3)
SODIUM SERPL-SCNC: 143 MMOL/L (ref 133–144)

## 2020-09-13 PROCEDURE — 40000008 ZZH STATISTIC AIRWAY CARE

## 2020-09-13 PROCEDURE — 25500064 ZZH RX 255 OP 636: Performed by: HOSPITALIST

## 2020-09-13 PROCEDURE — 40000281 ZZH STATISTIC TRANSPORT TIME EA 15 MIN

## 2020-09-13 PROCEDURE — 00000146 ZZHCL STATISTIC GLUCOSE BY METER IP

## 2020-09-13 PROCEDURE — 25000132 ZZH RX MED GY IP 250 OP 250 PS 637: Mod: GY | Performed by: STUDENT IN AN ORGANIZED HEALTH CARE EDUCATION/TRAINING PROGRAM

## 2020-09-13 PROCEDURE — 70544 MR ANGIOGRAPHY HEAD W/O DYE: CPT

## 2020-09-13 PROCEDURE — 95714 VEEG EA 12-26 HR UNMNTR: CPT

## 2020-09-13 PROCEDURE — 25000132 ZZH RX MED GY IP 250 OP 250 PS 637: Mod: GY | Performed by: NURSE PRACTITIONER

## 2020-09-13 PROCEDURE — 70549 MR ANGIOGRAPH NECK W/O&W/DYE: CPT

## 2020-09-13 PROCEDURE — 85049 AUTOMATED PLATELET COUNT: CPT | Performed by: HOSPITALIST

## 2020-09-13 PROCEDURE — 84100 ASSAY OF PHOSPHORUS: CPT | Performed by: HOSPITALIST

## 2020-09-13 PROCEDURE — 94003 VENT MGMT INPAT SUBQ DAY: CPT

## 2020-09-13 PROCEDURE — 84132 ASSAY OF SERUM POTASSIUM: CPT | Performed by: INTERNAL MEDICINE

## 2020-09-13 PROCEDURE — 80048 BASIC METABOLIC PNL TOTAL CA: CPT | Performed by: HOSPITALIST

## 2020-09-13 PROCEDURE — 40000275 ZZH STATISTIC RCP TIME EA 10 MIN

## 2020-09-13 PROCEDURE — 25800030 ZZH RX IP 258 OP 636: Performed by: NURSE PRACTITIONER

## 2020-09-13 PROCEDURE — 25000128 H RX IP 250 OP 636: Performed by: INTERNAL MEDICINE

## 2020-09-13 PROCEDURE — 25000128 H RX IP 250 OP 636: Performed by: NURSE PRACTITIONER

## 2020-09-13 PROCEDURE — 20000003 ZZH R&B ICU

## 2020-09-13 PROCEDURE — 99207 ZZC NO BILLABLE SERVICE THIS VISIT: CPT | Performed by: INTERNAL MEDICINE

## 2020-09-13 PROCEDURE — 25000128 H RX IP 250 OP 636: Performed by: PHYSICIAN ASSISTANT

## 2020-09-13 PROCEDURE — 25000132 ZZH RX MED GY IP 250 OP 250 PS 637: Mod: GY | Performed by: INTERNAL MEDICINE

## 2020-09-13 PROCEDURE — C9113 INJ PANTOPRAZOLE SODIUM, VIA: HCPCS | Performed by: INTERNAL MEDICINE

## 2020-09-13 PROCEDURE — 25000132 ZZH RX MED GY IP 250 OP 250 PS 637: Mod: GY | Performed by: HOSPITALIST

## 2020-09-13 PROCEDURE — 83735 ASSAY OF MAGNESIUM: CPT | Performed by: HOSPITALIST

## 2020-09-13 PROCEDURE — 99291 CRITICAL CARE FIRST HOUR: CPT | Performed by: INTERNAL MEDICINE

## 2020-09-13 PROCEDURE — 99233 SBSQ HOSP IP/OBS HIGH 50: CPT | Performed by: PSYCHIATRY & NEUROLOGY

## 2020-09-13 PROCEDURE — 25000128 H RX IP 250 OP 636: Performed by: STUDENT IN AN ORGANIZED HEALTH CARE EDUCATION/TRAINING PROGRAM

## 2020-09-13 PROCEDURE — A9585 GADOBUTROL INJECTION: HCPCS | Performed by: HOSPITALIST

## 2020-09-13 PROCEDURE — 99207 ZZC APP CREDIT; MD BILLING SHARED VISIT: CPT | Performed by: NURSE PRACTITIONER

## 2020-09-13 PROCEDURE — 70553 MRI BRAIN STEM W/O & W/DYE: CPT

## 2020-09-13 PROCEDURE — 25000128 H RX IP 250 OP 636

## 2020-09-13 RX ORDER — CHLORHEXIDINE GLUCONATE ORAL RINSE 1.2 MG/ML
15 SOLUTION DENTAL EVERY 12 HOURS
Status: DISCONTINUED | OUTPATIENT
Start: 2020-09-13 | End: 2020-10-05 | Stop reason: HOSPADM

## 2020-09-13 RX ORDER — LABETALOL HYDROCHLORIDE 5 MG/ML
20 INJECTION, SOLUTION INTRAVENOUS
Status: DISCONTINUED | OUTPATIENT
Start: 2020-09-13 | End: 2020-10-05 | Stop reason: HOSPADM

## 2020-09-13 RX ORDER — GADOBUTROL 604.72 MG/ML
12 INJECTION INTRAVENOUS ONCE
Status: COMPLETED | OUTPATIENT
Start: 2020-09-13 | End: 2020-09-13

## 2020-09-13 RX ORDER — LORAZEPAM 2 MG/ML
INJECTION INTRAMUSCULAR
Status: COMPLETED
Start: 2020-09-13 | End: 2020-09-13

## 2020-09-13 RX ORDER — LORAZEPAM 2 MG/ML
2 INJECTION INTRAMUSCULAR ONCE
Status: COMPLETED | OUTPATIENT
Start: 2020-09-13 | End: 2020-09-13

## 2020-09-13 RX ORDER — HYDRALAZINE HYDROCHLORIDE 20 MG/ML
10-20 INJECTION INTRAMUSCULAR; INTRAVENOUS
Status: DISCONTINUED | OUTPATIENT
Start: 2020-09-13 | End: 2020-09-14

## 2020-09-13 RX ORDER — PROPOFOL 10 MG/ML
30 INJECTION, EMULSION INTRAVENOUS CONTINUOUS
Status: DISCONTINUED | OUTPATIENT
Start: 2020-09-13 | End: 2020-09-13

## 2020-09-13 RX ORDER — MIDAZOLAM (PF) 1 MG/ML IN 0.9 % SODIUM CHLORIDE INTRAVENOUS SOLUTION
10 CONTINUOUS
Status: DISCONTINUED | OUTPATIENT
Start: 2020-09-13 | End: 2020-09-18

## 2020-09-13 RX ADMIN — CHLORHEXIDINE GLUCONATE 15 ML: 1.2 RINSE ORAL at 08:27

## 2020-09-13 RX ADMIN — GADOBUTROL 12 ML: 604.72 INJECTION INTRAVENOUS at 18:00

## 2020-09-13 RX ADMIN — CHLORHEXIDINE GLUCONATE 15 ML: 1.2 RINSE ORAL at 20:13

## 2020-09-13 RX ADMIN — LEVETIRACETAM 1500 MG: 500 INJECTION, SOLUTION INTRAVENOUS at 09:51

## 2020-09-13 RX ADMIN — MIDAZOLAM (PF) 1 MG/ML IN 0.9 % SODIUM CHLORIDE INTRAVENOUS SOLUTION 10 MG/HR: at 12:33

## 2020-09-13 RX ADMIN — LEVETIRACETAM 1500 MG: 500 INJECTION, SOLUTION INTRAVENOUS at 21:49

## 2020-09-13 RX ADMIN — HYDRALAZINE HYDROCHLORIDE 20 MG: 20 INJECTION INTRAMUSCULAR; INTRAVENOUS at 09:24

## 2020-09-13 RX ADMIN — ATORVASTATIN CALCIUM 40 MG: 40 TABLET, FILM COATED ORAL at 20:13

## 2020-09-13 RX ADMIN — LORAZEPAM 2 MG: 2 INJECTION INTRAMUSCULAR at 10:18

## 2020-09-13 RX ADMIN — ASPIRIN 81 MG: 81 TABLET, COATED ORAL at 08:23

## 2020-09-13 RX ADMIN — MIDAZOLAM (PF) 1 MG/ML IN 0.9 % SODIUM CHLORIDE INTRAVENOUS SOLUTION 10 MG/HR: at 18:30

## 2020-09-13 RX ADMIN — POTASSIUM CHLORIDE 40 MEQ: 1.5 POWDER, FOR SOLUTION ORAL at 06:52

## 2020-09-13 RX ADMIN — PROPOFOL 20 MCG/KG/MIN: 10 INJECTION, EMULSION INTRAVENOUS at 03:09

## 2020-09-13 RX ADMIN — HEPARIN SODIUM 5000 UNITS: 5000 INJECTION, SOLUTION INTRAVENOUS; SUBCUTANEOUS at 08:23

## 2020-09-13 RX ADMIN — HEPARIN SODIUM 5000 UNITS: 5000 INJECTION, SOLUTION INTRAVENOUS; SUBCUTANEOUS at 20:14

## 2020-09-13 RX ADMIN — LORAZEPAM 2 MG: 2 INJECTION INTRAMUSCULAR; INTRAVENOUS at 10:18

## 2020-09-13 RX ADMIN — HYDRALAZINE HYDROCHLORIDE 20 MG: 20 INJECTION INTRAMUSCULAR; INTRAVENOUS at 13:04

## 2020-09-13 RX ADMIN — PANTOPRAZOLE SODIUM 40 MG: 40 INJECTION, POWDER, FOR SOLUTION INTRAVENOUS at 22:12

## 2020-09-13 RX ADMIN — POTASSIUM CHLORIDE 20 MEQ: 1.5 POWDER, FOR SOLUTION ORAL at 08:23

## 2020-09-13 RX ADMIN — CLOPIDOGREL BISULFATE 75 MG: 75 TABLET, FILM COATED ORAL at 08:23

## 2020-09-13 ASSESSMENT — VISUAL ACUITY
OU: OTHER (SEE COMMENT)

## 2020-09-13 ASSESSMENT — ACTIVITIES OF DAILY LIVING (ADL)
ADLS_ACUITY_SCORE: 24
ADLS_ACUITY_SCORE: 20
ADLS_ACUITY_SCORE: 24
ADLS_ACUITY_SCORE: 24

## 2020-09-13 NOTE — PROGRESS NOTES
Patient still intubated on the vent.  Discussed with intensivist this morning, likely attempt at extubation later today.  If extubated later today will assume care in the morning, however if she continues to be on the vent, will sign off.

## 2020-09-13 NOTE — PROVIDER NOTIFICATION
Neurocrit notified on BP lower than parameter. Will switch propofol to versed. Will continue to monitor.

## 2020-09-13 NOTE — PROGRESS NOTES
MICU Progress Note    Sherrell Leonard MRN# [id number]   Age: 74 year old YOB: 1945     Date of Admission: 9/5/2020  Date of Service: 09/13/2020   ==================================================  24 HOUR EVENTS:   LLE rhythmic jerking when sedation weaned yesterday AM, video EEG was initiated and revealed seizures from the right temporal lobe when propofol was weaned. Propofol was restarted overnight and she was started on keppra.      Changes for Today:  Wean off propofol today and assess for seizure activity. If remains seizure free, will try vent weaning and potentially extubation.    ==================================================    PHYSICAL EXAM  Temp:  [97.8  F (36.6  C)-100.1  F (37.8  C)] 98.7  F (37.1  C)  Pulse:  [] 81  Resp:  [12-26] 14  BP: ()/(52-99) 173/75  FiO2 (%):  [30 %] 30 %  SpO2:  [96 %-100 %] 99 %    Ventilation Mode: CMV/AC  (Continuous Mandatory Ventilation/ Assist Control)  FiO2 (%): 30 %  Rate Set (breaths/minute): 12 breaths/min  Tidal Volume Set (mL): 500 mL  PEEP (cm H2O): 5 cmH2O  Oxygen Concentration (%): 30 %  Resp: 14      Intake/Output Summary (Last 24 hours) at 9/13/2020 0809  Last data filed at 9/13/2020 0700  Gross per 24 hour   Intake 1121.47 ml   Output 250 ml   Net 871.47 ml     Vitals:    09/11/20 2115 09/12/20 0400 09/13/20 0000   Weight: 68.2 kg (150 lb 5.7 oz) 68.2 kg (150 lb 5.7 oz) 70.9 kg (156 lb 4.9 oz)         General: Sedated, intubated  HEENT: AT/NC, sclera anicteric, PERRL, OP clear with MMM. EEG leads in place  Neck: Supple, no JVD or cervical LAD  Resp: CTAB, no crackles or wheezes  Cardiac: RRR, NS1,S2, No m/r/g  Abdomen:  +BS.  No HSM or masses,   Extremities: No LE edema or obvious joint abnormalities  Skin: Warm and dry, no jaundice or rash  Neuro: Sedated, intubated    Date 09/12/20 0700 - 09/13/20 0659   Shift 2250-1282 0877-2322 5491-9272 24 Hour Total   INTAKE   I.V. 703.3   703.3   NG/   120   Shift Total(mL/kg)  823.3(12.07)   823.3(12.07)   OUTPUT   Shift Total(mL/kg)       Weight (kg) 68.2 68.2 68.2 68.2       =====================================================  LABORATORY DATA    Labs reviewed by me personally, significant abnormalities detailed in assessment and plan.      ROUTINE ICU LABS (Last four results)  CMP  Recent Labs   Lab 09/13/20  0604 09/12/20  0930 09/12/20  0400 09/11/20  0522 09/10/20  1315     --  141 143 141   POTASSIUM 3.1* 3.7 2.9* 3.4 3.0*   CHLORIDE 114*  --  111* 114* 109   CO2 21  --  23 25 26   ANIONGAP 8  --  7 4 6   GLC 85  --  94 95 100*   BUN 17  --  12 11 15   CR 0.64  --  0.63 0.56 0.63   GFRESTIMATED 87  --  87 >90 87   GFRESTBLACK >90  --  >90 >90 >90   MARII 8.6  --  8.6 8.0* 8.7     CBC  Recent Labs   Lab 09/13/20  0604 09/11/20  0522 09/10/20  1315   WBC  --  9.5 12.0*   RBC  --  4.32 4.65   HGB  --  13.7 14.6   HCT  --  42.0 45.2   MCV  --  97 97   MCH  --  31.7 31.4   MCHC  --  32.6 32.3   RDW  --  13.1 13.3    191 206     INR  Recent Labs   Lab 09/07/20  0758   INR 1.05     Arterial Blood GasNo lab results found in last 7 days.  Venous Blood Gas No lab results found in last 7 days.      Recent Labs   Lab 09/09/20  1509   CULT Culture negative after 2 days  Culture negative monitoring continues         Recent Results (from the past 48 hour(s))   US Lower Extremity Venous Duplex Bilateral    Narrative    VENOUS ULTRASOUND BILATERAL LOWER EXTREMITIES  9/11/2020 9:25 AM     HISTORY: Fever in patient with stroke. DVT.    COMPARISON: None.    TECHNIQUE: Color Doppler and spectral waveform analysis performed  throughout the deep veins of both lower extremities.    FINDINGS: Both common femoral, proximal greater saphenous, femoral,  and popliteal veins demonstrate normal blood flow, compression, and  augmentation. Posterior tibial and peroneal veins are compressible.      Impression    IMPRESSION: Negative for deep venous thrombosis throughout both  lower  extremities.    JUSTIN PACHECO MD   CT Head w/o Contrast    Narrative    CT SCAN OF THE HEAD WITHOUT CONTRAST   9/11/2020 1:25 PM     HISTORY: Stroke, follow-up.    TECHNIQUE:  Axial images of the head and coronal reformations without  IV contrast material. Radiation dose for this scan was reduced using  automated exposure control, adjustment of the mA and/or kV according  to patient size, or iterative reconstruction technique.    COMPARISON: CT head 9/10/2020, MRI head 9/7/2020.    FINDINGS: There has been expected evolution of the evolving subacute  ischemic infarct centered in the left corona radiata. Note is also  made of previously demonstrated now subacute-appearing ischemic  infarcts in the left occipital lobe, posterolateral left temporal  lobe, and left subinsular region. Multiple additional smaller  scattered cortical/subcortical ischemic infarcts in the left cerebral  hemisphere involving the left frontal lobe, parietal lobe, and  occipitotemporal regions are much better characterized on previous  MRI. No definite new extended infarct signs are seen.    Mild to moderate generalized brain parenchymal volume loss. Moderately  extensive hypoattenuation within the periventricular and deep more  than subcortical cerebral white matter, nonspecific, but most likely  related to chronic small vessel ischemic disease. The ventricles are  normal in size and configuration. No acute intracranial hemorrhage.  Subtle small partially calcified extra-axial enhancing mass along the  anterior aspect of the right middle cranial fossa (series 3 image 7)  again noted, most likely representing a small meningioma. No  associated mass effect.    Visualized orbits appear normal. The paranasal sinuses, mastoids, and  middle ear cavities appear within normal limits. Prominent  pneumatization of the bilateral petrous temporal bones is incidentally  noted.      Impression    IMPRESSION:  1. Expected evolution of  subacute-appearing ischemic infarcts in the  left cerebral hemisphere, with the largest confluent area of  infarction centered in the left corona radiata. No intracranial  hemorrhage or significant associated mass effect.  2. Generalized brain parenchymal volume loss and moderately extensive  chronic small vessel ischemic disease.  3. Redemonstrated small extra-axial soft tissue mass along the  anterior aspect of the right middle cranial fossa most likely  represents a small meningioma. No significant associated mass effect.    BRADLY VALENTE MD   CT Head w/o Contrast    Narrative    CT SCAN OF THE HEAD WITHOUT CONTRAST   9/12/2020 8:19 AM     HISTORY: Stroke, follow-up. Angioplasty of left MCA performed. Check  for reperfusion hemorrhage.    TECHNIQUE:  Axial images of the head and coronal reformations without  IV contrast material. Radiation dose for this scan was reduced using  automated exposure control, adjustment of the mA and/or kV according  to patient size, or iterative reconstruction technique.    COMPARISON: CT head dated 9/11/2020.    FINDINGS: Overall, no significant change in the appearance of the  subacute ischemic infarcts in the left cervical hemisphere, with the  most confluent area of infarction in the left centrum semiovale, and  to a lesser degree involving the cortical/subcortical regions, as has  been described on previous reports. No acute intracranial hemorrhage,  extra-axial fluid collection, or mass effect/herniation. Mild to  moderate generalized brain parenchymal volume loss. Moderately  extensive presumed chronic small vessel ischemic changes in the  cerebral white matter, as before. Subtle extra-axial soft tissue mass  along the anterior aspect of the right middle cranial fossa (series 4  image 22), unchanged and likely representing a small meningioma.  Scattered intracranial atherosclerotic calcifications are again seen,  primarily involving the carotid siphons.    Mild scattered  mucosal thickening in the paranasal sinuses,  particularly within the ethmoid air cells and right sphenoid sinus  with small bilateral sphenoid sinus air-fluid levels. Mastoid and  middle ear cavities are clear. The bony calvarium and bones of the  skull base appear intact. The patient is intubated, and the  endotracheal tube is incompletely evaluated/imaged on this exam.  Incidental note made of scattered carious erosions involving the  visualized maxillary teeth with a periapical abscess of the second  right maxillary premolar which also is fractured (series 5 image 5).      Impression    IMPRESSION:  1. No acute intracranial hemorrhage/hemorrhagic conversion.  2. Unchanged appearance of the left cerebral hemisphere ischemic  infarcts, with the most confluent area of infarction in the left  centrum semiovale.  3. Global brain parenchymal volume loss and presumed chronic small  vessel ischemic disease.  4. Unchanged small extra-axial mass along the anterior aspect of the  right middle cranial fossa, probably a small meningioma.  5. Multiple carious erosions of the visualized maxillary teeth with a  periapical abscess of the second right maxillary premolar, which is  also fractured.    BRADLY VALENTE MD   CT Chest w/o Contrast    Narrative    CT CHEST W/O CONTRAST 9/12/2020 8:19 AM    CLINICAL HISTORY: Interstitial lung disease    TECHNIQUE: CT chest without IV contrast. Multiplanar reformats were  obtained. Dose reduction techniques were used.  CONTRAST: None.    COMPARISON: None available    FINDINGS:   LUNGS AND PLEURA: Endotracheal tube tip in the mid essence. The central  tracheobronchial tree is clear. Moderate centrilobular emphysema.  Bibasilar dependent atelectasis. No pleural effusion or pneumothorax.  No suspicious pulmonary masses.    MEDIASTINUM/AXILLAE: No lymphadenopathy in the axillary or chest. No  thoracic aortic aneurysm. Atherosclerotic aortic and coronary artery  calcifications. No pericardial  effusion. Scattered small  calcifications in the right breast. 1.1 cm calcified nodule in the  left breast, likely a benign fibroadenoma.    UPPER ABDOMEN: No significant finding.    MUSCULOSKELETAL: Sclerotic focus in the T8 vertebral body is likely a  bone island (series 7, image 49). Degenerative changes in the spine.      Impression    IMPRESSION:   1.  Moderate centrilobular emphysema. Mild bibasilar atelectasis.  2.  Small right breast calcifications are indeterminate. 1.1 cm  calcified nodule in the left breast is possibly a benign fibroadenoma.  Correlate with mammography on a nonurgent basis.    DEBBI ACUÑA MD   XR Abdomen Port 1 View    Narrative    XR ABDOMEN PORT 1 VW   9/12/2020 9:30 AM     HISTORY: OG placement verification    COMPARISON: None.      Impression    IMPRESSION: Enteric tube tip and sidehole over the stomach.  Nonobstructive bowel gas pattern. Moderate amount of formed colon  stool. Left basilar retrocardiac atelectasis/consolidation.    DEBBI ACUÑA MD           ==================================================    Sherrell Leonard IS a 74 year old female admitted on 9/5/2020 for acute aphasia found to have multiple intracranial vessel stenosis and left basal ganglia hypodensity suggestive of ischemic CVA. MRI confirmed L MCA and PCA distribution small-mod CVA's. She underwent cerebral angiogram and L MCA angioplasty 9/11. She tolerated the procedure well however when sedation was weaned the morning of 9/12 she developed seizures.    ASSESSMENT AND PLAN:       Neuro/psych:    -Sedation: Propofol    ##CVA, multiple territories from left MCA and PCA distribution  ## MCA angioplasty  Initially did well with plans to extubate the morning of 9/12, however when sedating medications were reduced she was found to have rhythmic movements in her left lower extremity concerning for seizure activity so extubation was postponed and continuous EEG was initiated.  -Maintain -160  -Continue Plavix,  statin, and aspirin  -Neurology following, appreciate her assistance  -Await video EEG results      ## Seizures  No evidence of seizure activity prior to angioplasty, however the morning of 9/12 left lower extremity twitching was was seen when propofol was weaned down.  Video EEG was initiated which revealed right temporal seizure activity.  The propofol was restarted and she was loaded with 2 g of Keppra.  -Neurology following, appreciate their assistance  -Continue Keppra 1 g twice daily  -Slowly wean propofol      Pulmonary:   ## Acute respiratory failure secondary to neurologic insult as above  Minimal vent settings, plan to wean once seizures are controlled.      Cardiac: No active concerns      Renal: No active concerns      Infectious Disease:   ## E. coli UTI, status post 6 days antibiotics      GI/:   -Nutrition: N.p.o. for the short-term as she will likely be extubated this weekend.  If she remains intubated into next week, we will likely initiate tube feeds    Endocrine: No active issues, monitoring for hyper or hypoglycemia    Heme: No active concerns    Skin: No active concerns    Prophylaxis:     1. DVT:mechanical  2. VAP: HOB 30 degrees, chlorhexidine rinse  3. Stress Ulcer: PPI  4. Restraints: Nonviolent soft two point restraints required and necessary for patient safety and continued cares and good effect as patient continues to pull at necessary lines, tubes despite education and distraction. Will readdress daily.   5. IV Access - central access required and necessary for continued patient cares        CODE: Full  Dispo: Remains critically ill, on the ventilator and requiring ICU cares      Attestation:  I have reviewed today's vital signs, notes, medications, labs, and imaging.    CCT 35 min      Marty Sarabia M.D.  Pulmonary & Critical Care  Pager: Click Here to page

## 2020-09-13 NOTE — PROGRESS NOTES
[facility] ICU RESPIRATORY NOTE           Date of Admission: 9/5/2020     Date of Intubation (most recent):9/11/20     Reason for Mechanical Ventilation:Airway protection     Number of Days on Mechanical Ventilation:3     Met Criteria for Spontaneous Breathing Trial: No     Reason for No Spontaneous Breathing Trial:Per MD     Significant Events Today:None     Ventilation Mode: CMV/AC  (Continuous Mandatory Ventilation/ Assist Control)  FiO2 (%): 30 %  Rate Set (breaths/minute): 12 breaths/min  Tidal Volume Set (mL): 500 mL  PEEP (cm H2O): 5 cmH2O  Oxygen Concentration (%): 30 %  Resp: 12    No lab results found in last 7 days.   Plan: continue full vent support tonight

## 2020-09-13 NOTE — PLAN OF CARE
Pt continues to be intubated and lightly sedated on propofol. Continuous EEG monitor on. Keppra given.Pt incont of urine. Depends on.Goal of bp 140-160.

## 2020-09-13 NOTE — PROGRESS NOTES
[facility] ICU RESPIRATORY NOTE        Date of Admission: 9/5/2020    Date of Intubation (most recent):9/11/2020    Reason for Mechanical Ventilation:Airway protection    Number of Days on Mechanical Ventilation:3    Met Criteria for Spontaneous Breathing Trial:No    Reason for No Spontaneous Breathing Trial:Per MD    Significant Events Today:Pt was transported to [facility].    ABG Results: No lab results found in last 7 days.    Current Vent Settings: Ventilation Mode: CMV/AC  (Continuous Mandatory Ventilation/ Assist Control)  FiO2 (%): 30 %  Rate Set (breaths/minute): 12 breaths/min  Tidal Volume Set (mL): 500 mL  PEEP (cm H2O): 5 cmH2O  Oxygen Concentration (%): 30 %  Resp: 12    Plan:Will cont full vent support for now and will assess for weaning readiness daily.    Kady Bender, RT

## 2020-09-13 NOTE — PROGRESS NOTES
[facility]    Stroke Progress Note    Interval Events  EEG initiated after episode of left leg jerking when propofol was held. In the afternoon, propofol was stopped for a 10 minute trial to assess for leg jerking and EEG correlation. Initial EEG read showed no seizure activity. Propofol was then weaned completely. After stopping, the leg jerking recurred with EEG evidence of right temporal seizures. Propofol was restarted and she was loaded with Keppra 2g.    This morning EEG read demonstrated no seizures but intermittent right temporal PLEDs. Propofol was held for two hours this morning with evidence of increased irritability on EEG. Propofol was restarted without obvious improvement. A 2 mg Ativan challenge was administered which resulted in improvement on EEG. Due to hypotension secondary to propofol, we will trial Versed.     Stroke Evaluation summarized:  MRI/Head CT:  MRI 9/5 with acute infarcts of the L basal ganglia, centrum semiovale and corona radiata.   Intracranial Vascular Imaging: CTA head with severe L M1 stenosis and scattered moderate to severe atherosclerotic plaque throughout.   Cervical Carotid and Vertebral Artery Vascular Imaging: CTA neck without significant stenosis  Echocardiogram: EF 60-65%, normal LA, negative bubble  EKG/Telemetry: Sinus with 1st degree AVB  LDL: 81  A1c: 5.5  Troponin: 0.051   Other testing: Not Applicable     Impression & Plan  Scattered intracranial atherosclerosis with severe focal left M1 stenosis, now s/p balloon angioplasty 9/11  Left MCA syndrome  - Continue Q2 hour neurochecks  - Goal -160  - DAPT with ASA 81 mg daily + Plavix 75 mg daily   - Continue Lipitor 40 mg daily     Right temporal seizure  - Continue vEEG   - Loaded with Keppra 2 g yesterday and started on 1g q12. Will increase Keppra maintenance dose to 1500 mg q12 hours  - Will discontinue Propofol and start Versed 10 mg/hr  - MRI/MRA/MRV to evaluate for lesion that would  "explain new right side seizures    We will follow.    LILLY Louis, CNP  Neurology  To page me or covering stroke neurology team member, click here: [email or web address]   Choose \"On Call\" tab at top, then search dropdown box for \"Neurology Adult\", select location, press Enter, then look for stroke/neuro ICU/telestroke.    ______________________________________________________    Medications   Home Meds  Prior to Admission medications    Medication Sig Start Date End Date Taking? Authorizing Provider   Multiple Vitamins-Minerals (SYSTANE ICAPS AREDS2) CAPS Take 1 capsule by mouth daily   Yes Unknown, Entered By History   multivitamin, therapeutic (THERA-VIT) TABS tablet Take 1 tablet by mouth daily   Yes Unknown, Entered By History       Scheduled Meds    [Held by provider] amLODIPine  5 mg Oral Daily     aspirin  81 mg Oral Daily     atorvastatin  40 mg Oral or NG Tube Daily at 8 pm     clopidogrel  75 mg Oral Daily     heparin ANTICOAGULANT  5,000 Units Subcutaneous Q12H     levETIRAcetam  1,000 mg Intravenous Q12H     pantoprazole (PROTONIX) IV  40 mg Intravenous Q24H     sodium chloride (PF)  3 mL Intracatheter Q8H       Infusion Meds    - MEDICATION INSTRUCTIONS -       propofol (DIPRIVAN) infusion 20 mcg/kg/min (09/13/20 0309)       PRN Meds  acetaminophen, acetaminophen, bisacodyl, glucose **OR** dextrose **OR** glucagon, hydrALAZINE, labetalol, lidocaine 4%, lidocaine (buffered or not buffered), LORazepam, - MEDICATION INSTRUCTIONS -, melatonin, naloxone, ondansetron **OR** ondansetron, polyethylene glycol, potassium chloride, potassium chloride with lidocaine, potassium chloride, potassium chloride, potassium chloride, prochlorperazine **OR** prochlorperazine **OR** prochlorperazine, senna-docusate **OR** senna-docusate, sodium chloride (PF)       PHYSICAL EXAMINATION  Temp:  [97.8  F (36.6  C)-100.1  F (37.8  C)] 100.1  F (37.8  C)  Pulse:  [] 85  Resp:  [12-26] 15  BP: ()/(52-99) 173/75  FiO2 (%):  [30 " %] 30 %  SpO2:  [96 %-100 %] 100 %     Neurologic  Mental Status:  Exam off propofol for 2 hours: opens eyes to voice and noxious stimuli, does not follow commands, does not attent to examiner  Cranial Nerves:  PERRL, difficult to assess facial symmetry with ETT fastener, does not protrude tongue  Motor:  normal muscle tone and bulk, no abnormal movements, grimace to pain in RUE, no reaction to pain in LUE, grimace and withdrawal to pain in RLE, grimace and movement towards stimuli in LLE  Sensory:  see motor exam  Coordination:  unable to assess  Station/Gait:  deferred     Imaging  I personally reviewed all imaging; relevant findings per HPI.     Lab Results Data   CBC  Recent Labs   Lab 09/13/20  0604 09/11/20  0522 09/10/20  1315   WBC  --  9.5 12.0*   RBC  --  4.32 4.65   HGB  --  13.7 14.6   HCT  --  42.0 45.2    191 206     Basic Metabolic Panel    Recent Labs   Lab 09/13/20  0604 09/12/20  0930 09/12/20  0400 09/11/20  0522     --  141 143   POTASSIUM 3.1* 3.7 2.9* 3.4   CHLORIDE 114*  --  111* 114*   CO2 21  --  23 25   BUN 17  --  12 11   CR 0.64  --  0.63 0.56   GLC 85  --  94 95   MARII 8.6  --  8.6 8.0*     Liver Panel  No results for input(s): PROTTOTAL, ALBUMIN, BILITOTAL, ALKPHOS, AST, ALT, BILIDIRECT in the last 168 hours.  INR    Recent Labs   Lab Test 09/07/20  0758   INR 1.05      Lipid Profile    Recent Labs   Lab Test 09/05/20  1256   CHOL 186   HDL 87   LDL 81   TRIG 89     A1C    Recent Labs   Lab Test 09/05/20  1256   A1C 5.5     Troponin I  No results for input(s): TROPI in the last 168 hours.

## 2020-09-13 NOTE — PROGRESS NOTES
Neuro: Sedated. Pupils equal and reactive. Not following.   Pulm: Coarse LS. On full vent support  CV: SR w/ BBB. BP labile  : Urinary retention. Straight cath x1. Bladder scan done throughout the shift  GI: NPO. OG clamped. Smear BMx1 this shift.   Extremities: Purposeful movements on LUE. Able to make movements  Withdraws on BLE.   Skin: Grossly intact, except lesion on L foot.   Lines: Central line on L groin. PIVx1 on RUE.   Family:  updated at bedside throughout the shift.   Plan: Back on propofol for the concern of seizure. Keppra started. Will keep her sedated and intubated over night.

## 2020-09-13 NOTE — PROGRESS NOTES
Pt was transported to [facility] with Sondra vent and was stable t/o entire transport.     Ventilation Mode: CMV/AC  (Continuous Mandatory Ventilation/ Assist Control)  FiO2 (%): 30 %  Rate Set (breaths/minute): 12 breaths/min  Tidal Volume Set (mL): 500 mL  PEEP (cm H2O): 5 cmH2O  Oxygen Concentration (%): 30 %  Resp: 22    RT will continue to follow and monitor.    Temp: 97.8  F (36.6  C) Temp src: Axillary BP: (!) 149/73 Pulse: 85   Resp: 22 SpO2: 99 % O2 Device: Mechanical Ventilator        Sherrell Beckham RT

## 2020-09-13 NOTE — PROGRESS NOTES
Neuro: Sedated w/ versed. Pupils equal and reactive.   Pulm: Clear LS. On full vent support  CV: SR w/ BBB. BP labile  : Incontinent   GI: NPO. OG clamped.   Extremities: Purposeful movements on LUE with SV. Withdraws on all extremities.   Skin: Grossly intact, except lesion on L foot.   Lines: Central line on L groin. PIVx1 on RUE.   Family:  updated at bedside throughout the shift.   Plan: Keep pt sedated and intubated. Repeat MRI and continue EEG.

## 2020-09-14 ENCOUNTER — APPOINTMENT (OUTPATIENT)
Dept: GENERAL RADIOLOGY | Facility: CLINIC | Age: 75
DRG: 003 | End: 2020-09-14
Attending: ANESTHESIOLOGY
Payer: MEDICARE

## 2020-09-14 ENCOUNTER — HOSPITAL ENCOUNTER (OUTPATIENT)
Dept: NEUROLOGY | Facility: CLINIC | Age: 75
DRG: 003 | End: 2020-09-14
Attending: NURSE PRACTITIONER
Payer: MEDICARE

## 2020-09-14 LAB
ALBUMIN SERPL-MCNC: 2.1 G/DL (ref 3.4–5)
ALP SERPL-CCNC: 70 U/L (ref 40–150)
ALT SERPL W P-5'-P-CCNC: 20 U/L (ref 0–50)
ANION GAP SERPL CALCULATED.3IONS-SCNC: 5 MMOL/L (ref 3–14)
AST SERPL W P-5'-P-CCNC: 27 U/L (ref 0–45)
B BURGDOR DNA SPEC QL NAA+PROBE: NOT DETECTED
BACTERIA SPEC CULT: NO GROWTH
BILIRUB DIRECT SERPL-MCNC: 0.2 MG/DL (ref 0–0.2)
BILIRUB SERPL-MCNC: 0.4 MG/DL (ref 0.2–1.3)
BUN SERPL-MCNC: 17 MG/DL (ref 7–30)
CALCIUM SERPL-MCNC: 8.7 MG/DL (ref 8.5–10.1)
CHLORIDE SERPL-SCNC: 117 MMOL/L (ref 94–109)
CO2 SERPL-SCNC: 23 MMOL/L (ref 20–32)
CREAT SERPL-MCNC: 0.55 MG/DL (ref 0.52–1.04)
ERYTHROCYTE [DISTWIDTH] IN BLOOD BY AUTOMATED COUNT: 13.4 % (ref 10–15)
GFR SERPL CREATININE-BSD FRML MDRD: >90 ML/MIN/{1.73_M2}
GLUCOSE BLDC GLUCOMTR-MCNC: 72 MG/DL (ref 70–99)
GLUCOSE BLDC GLUCOMTR-MCNC: 80 MG/DL (ref 70–99)
GLUCOSE BLDC GLUCOMTR-MCNC: 82 MG/DL (ref 70–99)
GLUCOSE SERPL-MCNC: 84 MG/DL (ref 70–99)
HCT VFR BLD AUTO: 35.8 % (ref 35–47)
HGB BLD-MCNC: 11.4 G/DL (ref 11.7–15.7)
MCH RBC QN AUTO: 31.1 PG (ref 26.5–33)
MCHC RBC AUTO-ENTMCNC: 31.8 G/DL (ref 31.5–36.5)
MCV RBC AUTO: 98 FL (ref 78–100)
PLATELET # BLD AUTO: 251 10E9/L (ref 150–450)
POTASSIUM SERPL-SCNC: 3.3 MMOL/L (ref 3.4–5.3)
POTASSIUM SERPL-SCNC: 4.2 MMOL/L (ref 3.4–5.3)
PROT SERPL-MCNC: 5.4 G/DL (ref 6.8–8.8)
RBC # BLD AUTO: 3.66 10E12/L (ref 3.8–5.2)
SODIUM SERPL-SCNC: 145 MMOL/L (ref 133–144)
SPECIMEN SOURCE: NORMAL
SPECIMEN SOURCE: NORMAL
VALPROATE SERPL-MCNC: 49 MG/L (ref 50–100)
WBC # BLD AUTO: 7.7 10E9/L (ref 4–11)

## 2020-09-14 PROCEDURE — 25000128 H RX IP 250 OP 636: Performed by: STUDENT IN AN ORGANIZED HEALTH CARE EDUCATION/TRAINING PROGRAM

## 2020-09-14 PROCEDURE — 85027 COMPLETE CBC AUTOMATED: CPT | Performed by: INTERNAL MEDICINE

## 2020-09-14 PROCEDURE — 80076 HEPATIC FUNCTION PANEL: CPT | Performed by: INTERNAL MEDICINE

## 2020-09-14 PROCEDURE — 99207 ZZC NO BILLABLE SERVICE THIS VISIT: CPT | Performed by: INTERNAL MEDICINE

## 2020-09-14 PROCEDURE — 25000128 H RX IP 250 OP 636: Performed by: PHYSICIAN ASSISTANT

## 2020-09-14 PROCEDURE — 00000146 ZZHCL STATISTIC GLUCOSE BY METER IP

## 2020-09-14 PROCEDURE — 36569 INSJ PICC 5 YR+ W/O IMAGING: CPT

## 2020-09-14 PROCEDURE — 99291 CRITICAL CARE FIRST HOUR: CPT | Performed by: ANESTHESIOLOGY

## 2020-09-14 PROCEDURE — 80048 BASIC METABOLIC PNL TOTAL CA: CPT | Performed by: INTERNAL MEDICINE

## 2020-09-14 PROCEDURE — 40000061 EEG VIDEO 12-26 HR UNMONITORED

## 2020-09-14 PROCEDURE — 40000986 XR CHEST PORT 1 VW

## 2020-09-14 PROCEDURE — 94003 VENT MGMT INPAT SUBQ DAY: CPT

## 2020-09-14 PROCEDURE — 25000128 H RX IP 250 OP 636: Performed by: NURSE PRACTITIONER

## 2020-09-14 PROCEDURE — 40000275 ZZH STATISTIC RCP TIME EA 10 MIN

## 2020-09-14 PROCEDURE — 99291 CRITICAL CARE FIRST HOUR: CPT | Performed by: PSYCHIATRY & NEUROLOGY

## 2020-09-14 PROCEDURE — 84132 ASSAY OF SERUM POTASSIUM: CPT | Performed by: INTERNAL MEDICINE

## 2020-09-14 PROCEDURE — 25800030 ZZH RX IP 258 OP 636: Performed by: NURSE PRACTITIONER

## 2020-09-14 PROCEDURE — 80164 ASSAY DIPROPYLACETIC ACD TOT: CPT | Performed by: NURSE PRACTITIONER

## 2020-09-14 PROCEDURE — 25000125 ZZHC RX 250: Performed by: NURSE PRACTITIONER

## 2020-09-14 PROCEDURE — 27210429 ZZH NUTRITION PRODUCT INTERMEDIATE LITER

## 2020-09-14 PROCEDURE — 25000132 ZZH RX MED GY IP 250 OP 250 PS 637: Mod: GY | Performed by: NURSE PRACTITIONER

## 2020-09-14 PROCEDURE — 27211441 ZZ H KIT SHRLOCK 5FR POWER PICC TRIPLE LUMEN

## 2020-09-14 PROCEDURE — 25000132 ZZH RX MED GY IP 250 OP 250 PS 637: Mod: GY | Performed by: HOSPITALIST

## 2020-09-14 PROCEDURE — 40000008 ZZH STATISTIC AIRWAY CARE

## 2020-09-14 PROCEDURE — 80165 DIPROPYLACETIC ACID FREE: CPT | Performed by: NURSE PRACTITIONER

## 2020-09-14 PROCEDURE — 40000986 XR ABDOMEN PORT 1 VW

## 2020-09-14 PROCEDURE — 20000003 ZZH R&B ICU

## 2020-09-14 PROCEDURE — 25000132 ZZH RX MED GY IP 250 OP 250 PS 637: Mod: GY | Performed by: STUDENT IN AN ORGANIZED HEALTH CARE EDUCATION/TRAINING PROGRAM

## 2020-09-14 RX ORDER — ASPIRIN 81 MG/1
81 TABLET, CHEWABLE ORAL DAILY
Status: DISCONTINUED | OUTPATIENT
Start: 2020-09-15 | End: 2020-10-05 | Stop reason: HOSPADM

## 2020-09-14 RX ORDER — HYDRALAZINE HYDROCHLORIDE 20 MG/ML
5-20 INJECTION INTRAMUSCULAR; INTRAVENOUS
Status: DISCONTINUED | OUTPATIENT
Start: 2020-09-14 | End: 2020-09-15

## 2020-09-14 RX ORDER — DEXTROSE MONOHYDRATE 100 MG/ML
INJECTION, SOLUTION INTRAVENOUS CONTINUOUS PRN
Status: DISCONTINUED | OUTPATIENT
Start: 2020-09-14 | End: 2020-10-05 | Stop reason: HOSPADM

## 2020-09-14 RX ADMIN — MIDAZOLAM (PF) 1 MG/ML IN 0.9 % SODIUM CHLORIDE INTRAVENOUS SOLUTION 10 MG/HR: at 03:16

## 2020-09-14 RX ADMIN — POTASSIUM CHLORIDE 20 MEQ: 1.5 POWDER, FOR SOLUTION ORAL at 08:14

## 2020-09-14 RX ADMIN — CHLORHEXIDINE GLUCONATE 15 ML: 1.2 RINSE ORAL at 20:55

## 2020-09-14 RX ADMIN — LABETALOL HYDROCHLORIDE 20 MG: 5 INJECTION INTRAVENOUS at 20:56

## 2020-09-14 RX ADMIN — VALPROATE SODIUM 750 MG: 100 INJECTION, SOLUTION INTRAVENOUS at 22:01

## 2020-09-14 RX ADMIN — CLOPIDOGREL BISULFATE 75 MG: 75 TABLET, FILM COATED ORAL at 08:14

## 2020-09-14 RX ADMIN — LEVETIRACETAM 1500 MG: 500 INJECTION, SOLUTION INTRAVENOUS at 08:14

## 2020-09-14 RX ADMIN — ASPIRIN 81 MG: 81 TABLET, COATED ORAL at 08:14

## 2020-09-14 RX ADMIN — CHLORHEXIDINE GLUCONATE 15 ML: 1.2 RINSE ORAL at 08:45

## 2020-09-14 RX ADMIN — HEPARIN SODIUM 5000 UNITS: 5000 INJECTION, SOLUTION INTRAVENOUS; SUBCUTANEOUS at 08:13

## 2020-09-14 RX ADMIN — VALPROATE SODIUM 1410 MG: 100 INJECTION, SOLUTION INTRAVENOUS at 11:44

## 2020-09-14 RX ADMIN — LEVETIRACETAM 1500 MG: 500 INJECTION, SOLUTION INTRAVENOUS at 21:30

## 2020-09-14 RX ADMIN — POTASSIUM CHLORIDE 40 MEQ: 1.5 POWDER, FOR SOLUTION ORAL at 05:59

## 2020-09-14 RX ADMIN — HEPARIN SODIUM 5000 UNITS: 5000 INJECTION, SOLUTION INTRAVENOUS; SUBCUTANEOUS at 19:56

## 2020-09-14 RX ADMIN — HYDRALAZINE HYDROCHLORIDE 10 MG: 20 INJECTION INTRAMUSCULAR; INTRAVENOUS at 06:07

## 2020-09-14 RX ADMIN — MIDAZOLAM (PF) 1 MG/ML IN 0.9 % SODIUM CHLORIDE INTRAVENOUS SOLUTION 10 MG/HR: at 12:55

## 2020-09-14 RX ADMIN — MIDAZOLAM (PF) 1 MG/ML IN 0.9 % SODIUM CHLORIDE INTRAVENOUS SOLUTION 10 MG/HR: at 22:34

## 2020-09-14 RX ADMIN — VALPROATE SODIUM 1410 MG: 100 INJECTION, SOLUTION INTRAVENOUS at 18:23

## 2020-09-14 RX ADMIN — ATORVASTATIN CALCIUM 40 MG: 40 TABLET, FILM COATED ORAL at 19:56

## 2020-09-14 ASSESSMENT — ACTIVITIES OF DAILY LIVING (ADL)
ADLS_ACUITY_SCORE: 24

## 2020-09-14 NOTE — PROGRESS NOTES
Patient still on the vent.  Discussed with the intensivist, likely to be extubated today.  Hospitalist service will sign off for now.

## 2020-09-14 NOTE — PROGRESS NOTES
EEG CLINICAL NEUROPHYSIOLOGY PRELIMINARY REPORT    Video-EEG through 9 AM today reviewed. Currently treated with midazolam 10 mg/hour. Discontinuous EEG with runs of theta beta up to 30 uV in amplitude alternating with periods of significant suppression. Suppressed periods account for about 30% of ongoing recording. No clear reactivity. No periodic discharges. No electrographic seizures.    Study continues consistent with severe diffuse encephalopathy. This may in part be related to anesthetic drips employed. No ongoing seizures. Patient is being treated with midazolam which is a potent anticonvulsant so seizures could potentially emerge when this treatment is withdrawn.    Full report to follow.    Mike Haywood MD  Pager [phone]

## 2020-09-14 NOTE — PLAN OF CARE
NG placed. Verified by xr and intensivist. Marium to start TF. Marium to remove femoral sheath per md.

## 2020-09-14 NOTE — CONSULTS
CLINICAL NUTRITION SERVICES - REASSESSMENT NOTE      Recommendations Ordered by Registered Dietitian (RD): Goal TF Isosource 1.5 at 40 mL/hr to provide:  1440 kcal (27 kcal/kg), 65 g protein (1.2 g/kg), 169 g CHO, 14 g fiber, 730 mL H2O  Flushes 60 mL every 4 hours    Malnutrition: % Weight Loss:  Unable to obtain without a nutrition history   % Intake:  </= 50% for >/= 5 days (severe malnutrition)  Subcutaneous Fat Loss:  None observed  Muscle Loss:  None observed  Fluid Retention:  None noted    Malnutrition Diagnosis: Unable to determine due to lack of a nutrition history (patient intubated and not able to provide)       EVALUATION OF PROGRESS TOWARD GOALS   Diet:  NPO on vent   Asked to see patient for TF initiation     Nutrition Support:  TF to start today via OG     Intake/Tolerance:  Patient is day #4 NPO   Appears that patient was consuming < 75% of meals while on a DD3 thin liquid diet per RD eval on 9/11/20      ASSESSED NUTRITION NEEDS:  Dosing Weight 54 kg (adjusted)   Estimated Energy Needs: 6573-9878 kcals (25-30 Kcal/Kg)  Justification: maintenance and overweight  Estimated Protein Needs: 65-81 grams protein (1.2-1.5 g pro/Kg)  Justification: preservation of lean body mass      NEW FINDINGS:   9/11:  Intubated   9/11:  IR = cerebral angiogram, left mca angioplasty   9/13:  MRI of neck = 1. Normal MR angiogram of the neck.   2. Mild proximal to mid basilar artery stenosis.   9/13:  MRI of brain = 1. Redemonstration of completed infarct in the deep left frontal   centrum semiovale and corona radiata white matter extending into the   left basal ganglia.   2. Interval development of numerous small bihemispheric acute infarcts   as above. No mass effect or hemorrhagic transformation.   3. Perfusion imaging demonstrating interval development of delayed   contrast arrival in the mid and posterior right hemisphere new since   the previous study from September 10. Multifocal stenoses involving   anterior  middle and posterior cerebral arteries on the right noted   which may account for this. This may be the result of intracranial   atherosclerosis versus inflammatory disease.   9/13:  1.  Catheterization of right common femoral artery.   2.  Catheterization of left common femoral vein using ultrasound guidance.     3.  Catheterization of left common carotid artery.   4.  Catheterization of left internal carotid artery.   5.  Catheterization of left middle cerebral artery.   6.  Balloon angioplasty of left middle cerebral artery, M1  segment.   7.  Multiview diagnostic cerebral angiography.   8.  Interpretation of images.   9.  Percutaneous closure of right common femoral artery puncture site with StarClose system.     Weight = 70.3 kg (up from admit)     Previous Goals (9/11):   Diet will adv >FL within 48 hrs  Evaluation: Not met    Previous Nutrition Diagnosis (9/11):   Inadequate oral intake related to NPO as evidenced by no intake recorded today  Evaluation: No change      MALNUTRITION  % Weight Loss:  Unable to obtain without a nutrition history   % Intake:  </= 50% for >/= 5 days (severe malnutrition)  Subcutaneous Fat Loss:  None observed  Muscle Loss:  None observed  Fluid Retention:  None noted    Malnutrition Diagnosis: Unable to determine due to lack of a nutrition history (patient intubated and not able to provide)    CURRENT NUTRITION DIAGNOSIS  Inadequate protein-energy intake related to NPO on vent with plans to start TF today as evidenced by meeting 0% needs     INTERVENTIONS  Recommendations / Nutrition Prescription  Goal TF Isosource 1.5 at 40 mL/hr to provide:  1440 kcal (27 kcal/kg), 65 g protein (1.2 g/kg), 169 g CHO, 14 g fiber, 730 mL H2O  Flushes 60 mL every 4 hours     Implementation  EN Composition, EN Schedule and Feeding Tube Flush:  Orders written to begin TF at 10 mL/hr and increase every 8 hours by 15 mL to goal.  Flushes as above.     Goals  Isosource 1.5 at 40 mL/hr will meet %  needs     MONITORING AND EVALUATION:  Progress towards goals will be monitored and evaluated per protocol and Practice Guidelines    Callie Johnston RD, LD, CNSC   Clinical Dietitian - [facility]

## 2020-09-14 NOTE — PLAN OF CARE
Remains sedated on Versed 10mg/hr. 2nd dose of Depacon given. Plan to keep -180. Stable on minimal vent settings. Continue EEG.

## 2020-09-14 NOTE — PLAN OF CARE
PT/OT: Per discussion with RN, pt not appropriate for therapies today. Per RN, they may try to extubate today, so therapy should check back tomorrow.

## 2020-09-14 NOTE — PROGRESS NOTES
[facility] ICU RESPIRATORY NOTE           Date of Admission: 9/5/2020     Date of Intubation (most recent):9/11/2020     Reason for Mechanical Ventilation:Airway protection     Number of Days on Mechanical Ventilation: 4     Met Criteria for Spontaneous Breathing Trial:No     Reason for No Spontaneous Breathing Trial:Per MD     Significant Events Today:None overnight     ABG Results: No lab results found in last 7 days.    Ventilation Mode: CMV/AC  (Continuous Mandatory Ventilation/ Assist Control)  FiO2 (%): 30 %  Rate Set (breaths/minute): 12 breaths/min  Tidal Volume Set (mL): 500 mL  PEEP (cm H2O): 5 cmH2O  Oxygen Concentration (%): 30 %  Resp: 18    Gabino Torres, RT

## 2020-09-14 NOTE — PROCEDURES
[facility]    Triple Lumen PICC Placement    Date/Time: 9/14/2020 1:52 PM  Performed by: Alina Mckinney RN  Authorized by: Tien Reynoso MD   Indications: vascular access    UNIVERSAL PROTOCOL   Site Marked: Yes  Prior Images Obtained and Reviewed:  Yes  Required items: Required blood products, implants, devices and special equipment available    Patient identity confirmed:  Arm band and hospital-assigned identification number  NA - No sedation, light sedation, or local anesthesia  Confirmation Checklist:  Patient's identity using two indicators, relevant allergies, procedure was appropriate and matched the consent or emergent situation and correct equipment/implants were available  Time out: Immediately prior to the procedure a time out was called    Universal Protocol: the Joint Commission Universal Protocol was followed    Preparation: Patient was prepped and draped in usual sterile fashion           ANESTHESIA    Anesthesia: Local infiltration  Local Anesthetic:  Lidocaine 1% without epinephrine  Anesthetic Total (mL):  3      SEDATION    Patient Sedated: No        Preparation: skin prepped with 2% chlorhexidine  Skin prep agent: skin prep agent completely dried prior to procedure  Sterile barriers: maximum sterile barriers were used: cap, mask, sterile gown, sterile gloves, and large sterile sheet  Hand hygiene: hand hygiene performed prior to central venous catheter insertion  Type of line used: PICC and Power PICC  Catheter type: triple lumen  Lumen type: valved  Catheter size: 5 Fr  Brand: Bard  Lot number: [id number]  Placement method: MST and ultrasound  Number of attempts: 2  Successful placement: yes  Orientation: right  Location: basilic vein  Arm circumference: adults 10 cm  Extremity circumference: 32  Visible catheter length: 6  Internal length: 37 cm  Total catheter length: 43  Dressing and securement: antibiotic disc placed, blood cleaned with CHG, blood removed, chlorhexidine patch  applied, dressing applied, occlusive dressing applied and securement device  Post procedure assessment: blood return through all ports and placement verified by x-ray  PROCEDURE   Patient Tolerance:  Patient tolerated the procedure well with no immediate complications  Describe Procedure: Right upper arm picc placed for access.  CXR done to confirm placement

## 2020-09-14 NOTE — PLAN OF CARE
Pt continues with cont EEG on versed. Goal of sbp 140-180. Hydralazine 10mg given x1.K 3.3 40 meq given per protocol. Pt incont of urine.

## 2020-09-14 NOTE — OP NOTE
Procedure Date: 09/13/2020      PREOPERATIVE DIAGNOSIS:     1.  Flow-limiting left middle cerebral artery M1 segment stenosis.   2.  Left hemispheric strokes secondary to #1.      POSTOPERATIVE DIAGNOSES:   1.  Flow-limiting left middle cerebral artery M1 segment stenosis.   2.  Left hemispheric strokes secondary to #1.      TITLE OF PROCEDURES:   1.  Catheterization of right common femoral artery.   2.  Catheterization of left common femoral vein using ultrasound guidance.    3.  Catheterization of left common carotid artery.   4.  Catheterization of left internal carotid artery.   5.  Catheterization of left middle cerebral artery.   6.  Balloon angioplasty of left middle cerebral artery, M1 segment.   7.  Multiview diagnostic cerebral angiography.   8.  Interpretation of images.   9.  Percutaneous closure of right common femoral artery puncture site with StarClose system.      :  Dotty Burgess MD      ASSISTANTS:  Dr. Renetta Pedersen      ANESTHESIA:  GETA.      MEDICATIONS:  Per Anesthesiology, 5000 units IV heparin.      FLUOROSCOPY TIME:  37.5 minutes.      FLUOROSCOPY DOSE:  1370.66 mGy.      CONTRAST:  42 mL of Isovue.        ESTIMATED BLOOD LOSS:  Approximately 50 mL.      HISTORY OF PRESENT ILLNESS:  Ms. Leonard is a 74-year-old woman with symptomatic left MCA stenosis.  She has developed left middle cerebral artery distribution strokes resulting in global aphasia and right hemiparesis.  She appears to have diffuse intracranial atherosclerotic disease with the most severe stenosis in the left M1 segment.  She has sustained her systolic blood pressures as high as the 230s without any pharmacological support.  She had declined despite maximum medical therapy and we have determined that intracranial angiogram and balloon angioplasty/stent placement is indicated to reduce her risk of recurrent strokes.  Consent was obtained from her .  We will perform the procedure under general anesthesia because  of her aphasia and because of the need to maintain strict blood pressure control afterwards.      DESCRIPTION OF PROCEDURE:  Upon intubation and induction of general anesthesia, the patient was placed in supine position on the angiography table.  The patient's blood pressure was labile and difficult to maintain in the systolic 160s to 200 range.  The patient had poor peripheral IV access and we decided to place a left femoral venous sheath.  Both groins were prepared and draped in the usual sterile fashion.  Time-out was performed.      We palpated the right common femoral artery.  We punctured the right common femoral artery with a micropuncture needle.  Using modified Seldinger technique, a 5-Iraqi microsheath was placed.  This was exchanged over a J-wire for a standard 5-Iraqi sheath.  We used ultrasound guidance to identify the left common femoral vein.  Ultrasound showed that the left common femoral vein was patent.  An image was stored and sent it for permanent record.  Under ultrasound guidance, we punctured the left common femoral vein and used modified Seldinger technique to place a 4-Iraqi sheath.  We advanced a 5-Iraqi De La Cruz-2 catheter over a 0.035-inch Glidewire  through the right common femoral arterial sheath.  The patient's aortic arch was heavily atherosclerotic with a tortuous origin of the left common carotid artery.  The catheter shape was reconstituted in the descending aorta and we catheterized the left common carotid artery.  Baseline angiographic runs confirmed flow-limiting stenosis in the left M1 segment.  Once this was confirmed, we decided to move forward with angioplasty and possible stent placement.      We attempted to exchange the diagnostic catheter over a stiff exchange Glidewire for a 6-Iraqi x 80 cm Cook Shuttle.  The De La Cruz 2 catheter herniated out of the common carotid artery during 2 such attempts.  We removed the catheter and planned on single step access with the  shuttle.  We exchanged the short arterial sheath for the Cook Shuttle.  We then advanced a [place] tip De La Cruz 2 catheter.  This catheter was reconstituted over a stiff Glidewire in the left subclavian artery.  We then catheterized the left common carotid artery.  We were able to advance the stiff Glidewire into the left external carotid artery.  We then advanced the Cook Shuttle in a coaxial fashion into the distal left common carotid artery. The patient received 5000 units of IV heparin.       We prepared a 5-Greenlandic x 115 cm Hilda intermediate catheter and a [place] 2.25 mm x 15 mm balloon.  We advanced these catheters in a coaxial fashion over a Traxces-14 microwire under microwire and roadmap guidance.  The patient's left ICA was very tortuous and we could not advance the Hilda catheter beyond the ophthalmic segment.  Under roadmap guidance, we were able to cross the severe stenosis in the left mid M1 segment.  With the tip of the wire positioned in the distal M2 branch, we had just enough length of the balloon to position across the stenosis.  We inflated the balloon to its nominal pressure of 6 atmospheres at a rate of 1 atmosphere per minute.  We then deflated the balloon at a rate of 1 atmosphere per 30 seconds.  We withdrew the balloon and angiographic runs showed marked improvement in the stenosis.  While there was residual stenosis of about 20-30%, the vessel wall at the site of angioplasty did not appear irregular.  We repeated run several minutes later and the stenosis did not appear to recoil.  Given the stability of the revascularization and anticipated difficulty navigating the stent delivery system through the tortuous ICA, we decided against stent placement.  Final angiographic runs were performed.  We exchanged the Cook Shuttle for a StarClose sheath and closed the right arterial puncture site with the StarClose system.  We left the 4-Greenlandic left femoral venous sheath in place for IV access.  The  patient was transported to the ICU intubated.  I was present for the entire procedure including the key portions.      INTERPRETATION OF IMAGES:   1.  Left common femoral artery runoff:  This shows insertion of sheath below the common femoral artery bifurcation.  Both SFA and profunda branches are greater than 5 mm in diameter.  There are mild diffuse atherosclerotic changes but no stenosis, dissection, or contrast extravasation.   2.  Left common carotid artery injection, AP and lateral views intracranially, baseline runs:  These views show normal filling of the distal cervical and intracranial segments of the left ICA.  The visible branches of the left external carotid artery appear normal.  The ophthalmic artery is the first intradural branch.  There are extensive atherosclerotic changes throughout the left ICA, but no significant stenosis.  There is less than 10% stenosis at the cervical petrous junction.  There is an infundibulum at the origin of the posterior communicating artery.  The ICA bifurcates into the anterior middle cerebral arteries.  There are diffuse atherosclerotic changes with multiple segmental stenosis disease throughout both territories.  These changes are most notable in the mid M1 segment, just distal to the origin of the anterior temporal artery and at the origins of both M2 segments.  There are also extensive atherosclerotic changes in the left pericallosal artery which has bihemispheric representation.  There is delayed contrast clearance from the distal MCA vessels suggestive of a flow-limiting stenosis in the M1 segment by WASID criteria, stenosis measures approximately 80-90%.  No aneurysms or vascular malformations are seen.  The capillary phase shows a perfusion deficit in the posterior temporal lobe and lingular artery region.  The venous phase appears normal.   3.  Left common carotid artery injection, AP and oblique, roadmap images in the cervical region:  Series 2-5 demonstrate  that the common carotid bifurcation is approximately at the level of the C3-C4 disk space.  There is no stenosis at the origin of the ICA.  At the level of C2 vertebral body, there is a hairpin turn in the ICA.  The posterior genu of the cavernous segment is also tortuous and is oriented superiorly and the horizontal segment slopes downward as it courses towards the anterior genu.   4.  Left internal carotid artery in series 6 through 8 show a single roadmap images with sequential advancement of the intermediate catheter and the balloon microcatheter.  The tip of the microwire is positioned in the M2-M3 junction.   5.  Series 9 shows single fluoroscopic image AP and lateral views with the balloon inflated to nominal pressure.  There is a subtle waist in the center of the balloon, corresponding to the site of maximal stenosis.  The tip of the intermediate catheter is in the ophthalmic segment and the tip of the wire is unchanged at the M2-M3 junction.   6.  Left ICA injection, AP, lateral and oblique views intracranially, final runs:  These views show marked improvement in the left middle cerebral artery filling.  There is residual stenosis, approximately 30% in the mid M1 segment.  We see the origins of both M2 segments well and there is a focal stenosis at the MCA bifurcation as before.  There are multiple segmental stenoses throughout the MCA and JENNIFER territories.  There is flash filling across the anterior communicating artery of the contralateral JENNIFER.  Once again, no aneurysms or vascular malformations are seen.  There is no dissection or contrast extravasation.  The contrast clears without delay in the distal MCA branches.  There remains a capillary deficit in the left angular territory.      OVERALL IMPRESSION:  Greater than 80-90%, flow-limiting left M1 segment symptomatic stenosis.  This was successfully revascularized with balloon angioplasty resulting in residual 20-30% stenosis.         CASI MCHUGH  MD HECTOR             D: 2020   T: 2020   MT: ARY      Name:     ANTHONY OCHOA   MRN:      [id number]        Account:        [id number]   :      1945           Procedure Date: 2020      Document: [id number]

## 2020-09-14 NOTE — PROGRESS NOTES
"  [facility]    Stroke Progress Note    Interval Events  EEG without evidence of seizure overnight. Repeat MRI brain yesterday with new right MCA infarcts, felt to be related to recent cerebral angiogram, as well as increased number of L MCA infarcts. MRA brain showed some reocclusion at the location of the L M1 angioplasty.      Stroke Evaluation summarized:  MRI/Head CT:  MRI 9/5 with acute infarcts of the L basal ganglia, centrum semiovale and corona radiata.   Intracranial Vascular Imaging: CTA head with severe L M1 stenosis and scattered moderate to severe atherosclerotic plaque throughout.   Cervical Carotid and Vertebral Artery Vascular Imaging: CTA neck without significant stenosis  Echocardiogram: EF 60-65%, normal LA, negative bubble  EKG/Telemetry: Sinus with 1st degree AVB  LDL: 81  A1c: 5.5  Troponin: 0.051   Other testing: Not Applicable     Impression & Plan  Scattered intracranial atherosclerosis with severe focal left M1 stenosis, now s/p balloon angioplasty 9/11  Left MCA syndrome  New R MCA infarcts  - Continue Q2 hour neurochecks  - Okay to liberalize SBP to 140-180  - DAPT with ASA 81 mg daily + Plavix 75 mg daily   - Continue Lipitor 40 mg daily  - Consideration for possible stent at site of L M1 angioplasty given stenosis on MRA. Will focus on seizure management at this time, but case discussed with neuro IR who are aware     Non-convulsive status epilepticus (NCSE)  - Continue vEEG   - Continue Keppra 1500 mg q12 hours  - Loaded with Depakote 20 mg/kg today. Will draw level 2 hours post-load. If therapeutic, will consider slow down-titration of Versed and watch for EEG activity     We will follow.    LILLY Louis, CNP  Neurology  To page me or covering stroke neurology team member, click here: AMCOM   Choose \"On Call\" tab at top, then search dropdown box for \"Neurology Adult\", select location, press Enter, then look for stroke/neuro " ICU/telestroke.    ______________________________________________________    Medications   Home Meds  Prior to Admission medications    Medication Sig Start Date End Date Taking? Authorizing Provider   Multiple Vitamins-Minerals (SYSTANE ICAPS AREDS2) CAPS Take 1 capsule by mouth daily   Yes Unknown, Entered By History   multivitamin, therapeutic (THERA-VIT) TABS tablet Take 1 tablet by mouth daily   Yes Unknown, Entered By History       Scheduled Meds    [Held by provider] amLODIPine  5 mg Oral Daily     aspirin  81 mg Oral Daily     atorvastatin  40 mg Oral or NG Tube Daily at 8 pm     chlorhexidine  15 mL Mouth/Throat Q12H     clopidogrel  75 mg Oral Daily     heparin ANTICOAGULANT  5,000 Units Subcutaneous Q12H     levETIRAcetam  1,500 mg Intravenous Q12H     pantoprazole (PROTONIX) IV  40 mg Intravenous Q24H     sodium chloride (PF)  10 mL Intracatheter Q8H     sodium chloride (PF)  3 mL Intracatheter Q8H     valproate (DEPACON) intermittent infusion  20 mg/kg Intravenous Once       Infusion Meds    dextrose       - MEDICATION INSTRUCTIONS -       midazolam 10 mg/hr (09/14/20 0728)       PRN Meds  acetaminophen, acetaminophen, bisacodyl, dextrose, glucose **OR** dextrose **OR** glucagon, hydrALAZINE, labetalol, lidocaine 4%, lidocaine (buffered or not buffered), lidocaine (buffered or not buffered), LORazepam, - MEDICATION INSTRUCTIONS -, melatonin, naloxone, ondansetron **OR** ondansetron, polyethylene glycol, potassium chloride, potassium chloride with lidocaine, potassium chloride, potassium chloride, potassium chloride, prochlorperazine **OR** prochlorperazine **OR** prochlorperazine, senna-docusate **OR** senna-docusate, sodium chloride (PF), sodium chloride (PF), sodium chloride (PF)       PHYSICAL EXAMINATION  Temp:  [97.4  F (36.3  C)-98.1  F (36.7  C)] 98.1  F (36.7  C)  Pulse:  [55-96] 71  Resp:  [11-45] 16  BP: (126-196)/() 196/94  FiO2 (%):  [30 %] 30 %  SpO2:  [97 %-99 %] 98 %      Neurologic  Mental Status:  Intubated, on Versed 10 mg: does not open eyes to voice or noxious stimuli, does not follow commands  Cranial Nerves:  PERRL, difficult to assess facial symmetry with ETT fastener, does not protrude tongue  Motor:  normal muscle tone and bulk, no abnormal movements, twitch in b/l LEs to noxious stimuli, minimal movement in UEs  Reflexes:  toes mute  Sensory:  see motor exam  Coordination:  unable to assess  Station/Gait:  deferred       Imaging  I personally reviewed all imaging; relevant findings per HPI.     Lab Results Data   CBC  Recent Labs   Lab 09/14/20  0510 09/13/20  0604 09/11/20  0522 09/10/20  1315   WBC 7.7  --  9.5 12.0*   RBC 3.66*  --  4.32 4.65   HGB 11.4*  --  13.7 14.6   HCT 35.8  --  42.0 45.2    233 191 206     Basic Metabolic Panel    Recent Labs   Lab 09/14/20  1122 09/14/20  0510 09/13/20  1300 09/13/20  0604  09/12/20  0400   NA  --  145*  --  143  --  141   POTASSIUM 4.2 3.3* 3.8 3.1*   < > 2.9*   CHLORIDE  --  117*  --  114*  --  111*   CO2  --  23  --  21  --  23   BUN  --  17  --  17  --  12   CR  --  0.55  --  0.64  --  0.63   GLC  --  84  --  85  --  94   MARII  --  8.7  --  8.6  --  8.6    < > = values in this interval not displayed.     Liver Panel  Recent Labs   Lab 09/14/20  0510   PROTTOTAL 5.4*   ALBUMIN 2.1*   BILITOTAL 0.4   ALKPHOS 70   AST 27   ALT 20     INR    Recent Labs   Lab Test 09/07/20  0758   INR 1.05      Lipid Profile    Recent Labs   Lab Test 09/05/20  1256   CHOL 186   HDL 87   LDL 81   TRIG 89     A1C    Recent Labs   Lab Test 09/05/20  1256   A1C 5.5     Troponin I  No results for input(s): TROPI in the last 168 hours.

## 2020-09-14 NOTE — PROGRESS NOTES
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  The patient underwent angioplasty for her stenosis and the plan is for her to return to neuro IR tomorrow for further evaluation and therapeutic interventions.  There is still concerned that the patient is seizing for which she is on Versed.  The goal is to achieve burst suppression.        Assessment and plan :     Sherrell Leonard IS a 74 year old female admitted on 9/5/2020 for management of acute respiratory failure, CVA and seizure.   I have personally reviewed the daily labs, imaging studies, cultures and discussed the case with referring physician and consulting physicians.     My assessment and plan by system for this patient is as follows:    Neurology/Psychiatry:   1.  Seizure disorder on continues EEG and Versed infusion.  The goal is burst suppression  2. prn Propofol for ICU sedation  3.  PRN opioids for breakthrough pain  Plan  Discussed with neuro critical care.  The plan is to remain on Versed with the goal of burst suppression.  We will continue propofol for ICU sedation if needed.  Patient will go to interventional radiology tomorrow for possible diagnostic and therapeutic evaluation of remaining stenosis.  Continue with blood pressure goals between 140 and 180.    Cardiovascular:   1.Hemodynamics -normotensive   2.Rhythm -normal sinus rhythm  3. Ischemia -no signs of ischemia  Plan  Right now the patient is maintaining her blood pressure even with increased levels of sedation.  The plan will be to insert a PICC line in case pressors are needed if we have to  increase sedation.      Pulmonary/Ventilator Management:   1. Airway intubated for airway protection  2. Oxygenation/ventilation/mechanics on full ventilatory support  Plan  -Continue current ventilatory management    GI and Nutrition :   1.  Concern for protein calorie malnutrition    Plan  -Given this appears to be a prolonged intubation secondary to underlying neurological disease we will place nasojejunal tube and start tube feeds    Renal/Fluids/Electrolytes:   1.  Creatinine within normal limits no signs of acute kidney injury  2.  Hypokalemia  3.  Appears euvolemic    Plan  -Continue to monitor and replace electrolytes as per protocol.  - generally avoid nephrotoxic agents such as NSAID, IV contrast unless specifically required  - adjust medications as needed for renal clearance  - follow I/O's as appropriate.    Infectious Disease:   1.  E. coli UTI  2.  3.  Plan  -Patient is status post antibiotic treatment we will continue to monitor    Endocrine:   1 concern for stress-induced hyperglycemia  Plan  - ICU insulin protocol, goal sugar <180      Hematology/Oncology:   1. No leukocytosis  2. Anemia, no signs, symptoms of active blood loss  3.  Plan  -Continue to monitor            IV/Access:   1. Venous access -plan for PICC line today  2. Arterial access -no need for arterial line at this time  3.  Plan  - central access required and necessary      ICU Prophylaxis:   1. DVT: Mechanical  2. VAP: HOB 30 degrees, chlorhexidine rinse  3. Stress Ulcer: PPI  4. Restraints: Nonviolent soft two point restraints required and necessary for patient safety and continued cares and good effect as patient continues to pull at necessary lines, tubes despite education and distraction. Will readdress daily.  Restraints not needed at this time   5. Wound care - per unit routine   6. Feeding - we will start tube feeds  7. Family Update: Discussed with daughter plan to keep the patient intubated and ventilated while awaiting  definitive neuro intervention  8. Disposition -ICU        Key goals for next 24 hours:   1.  Maintain the patient on full ventilatory support  2.  Tight blood pressure control with systolic blood pressure between 140 and 180  3.  Start tube feeds               Interim History:     Overall the patient remains intubated on full ventilatory support.  We are waiting neuro intervention tomorrow         Key Medications:       [Held by provider] amLODIPine  5 mg Oral Daily     aspirin  81 mg Oral Daily     atorvastatin  40 mg Oral or NG Tube Daily at 8 pm     chlorhexidine  15 mL Mouth/Throat Q12H     clopidogrel  75 mg Oral Daily     heparin ANTICOAGULANT  5,000 Units Subcutaneous Q12H     levETIRAcetam  1,500 mg Intravenous Q12H     pantoprazole (PROTONIX) IV  40 mg Intravenous Q24H     sodium chloride (PF)  3 mL Intracatheter Q8H       - MEDICATION INSTRUCTIONS -       midazolam 10 mg/hr (09/14/20 0728)               Physical Examination:   Temp:  [97.4  F (36.3  C)-98  F (36.7  C)] 97.4  F (36.3  C)  Pulse:  [55-96] 65  Resp:  [11-45] 11  BP: (116-195)/() 151/69  FiO2 (%):  [30 %] 30 %  SpO2:  [97 %-99 %] 98 %    Intake/Output Summary (Last 24 hours) at 9/14/2020 1108  Last data filed at 9/14/2020 0800  Gross per 24 hour   Intake 417.1 ml   Output --   Net 417.1 ml     Wt Readings from Last 4 Encounters:   09/14/20 70.3 kg (154 lb 15.7 oz)     BP - Mean:  [] 95  Ventilation Mode: CMV/AC  (Continuous Mandatory Ventilation/ Assist Control)  FiO2 (%): 30 %  Rate Set (breaths/minute): 12 breaths/min  Tidal Volume Set (mL): 500 mL  PEEP (cm H2O): 5 cmH2O  Oxygen Concentration (%): 30 %  Resp: 11    No lab results found in last 7 days.    GEN: no acute distress   HEENT: head ncat, sclera anicteric, OP patent, trachea midline   PULM: unlabored synchronous with vent, clear anteriorly    CV/COR: RRR S1S2 no gallop,  No rub, no murmur  ABD: soft nontender, hypoactive bowel sounds, no mass  EXT:  Edema   warm  NEURO:  Consistent with chemical sedation  SKIN: no obvious rash  LINES: clean, dry intact         Data:   All data and imaging reviewed     ROUTINE ICU LABS (Last four results)  CMP  Recent Labs   Lab 09/14/20  0510 09/13/20  1300 09/13/20  0604 09/12/20  0930 09/12/20  0400 09/11/20  0522   *  --  143  --  141 143   POTASSIUM 3.3* 3.8 3.1* 3.7 2.9* 3.4   CHLORIDE 117*  --  114*  --  111* 114*   CO2 23  --  21  --  23 25   ANIONGAP 5  --  8  --  7 4   GLC 84  --  85  --  94 95   BUN 17  --  17  --  12 11   CR 0.55  --  0.64  --  0.63 0.56   GFRESTIMATED >90  --  87  --  87 >90   GFRESTBLACK >90  --  >90  --  >90 >90   MARII 8.7  --  8.6  --  8.6 8.0*   MAG  --   --  2.1  --   --   --    PHOS  --   --  3.5  --   --   --    PROTTOTAL 5.4*  --   --   --   --   --    ALBUMIN 2.1*  --   --   --   --   --    BILITOTAL 0.4  --   --   --   --   --    ALKPHOS 70  --   --   --   --   --    AST 27  --   --   --   --   --    ALT 20  --   --   --   --   --      CBC  Recent Labs   Lab 09/14/20  0510 09/13/20  0604 09/11/20  0522 09/10/20  1315   WBC 7.7  --  9.5 12.0*   RBC 3.66*  --  4.32 4.65   HGB 11.4*  --  13.7 14.6   HCT 35.8  --  42.0 45.2   MCV 98  --  97 97   MCH 31.1  --  31.7 31.4   MCHC 31.8  --  32.6 32.3   RDW 13.4  --  13.1 13.3    233 191 206     INRNo lab results found in last 7 days.  Arterial Blood GasNo lab results found in last 7 days.    All cultures:  Recent Labs   Lab 09/09/20  1509   CULT No growth  Culture negative after 2 days     Recent Results (from the past 24 hour(s))   MRA Brain ([place]) wo Contrast    Narrative    MR ANGIOGRAM OF THE HEAD WITHOUT CONTRAST   9/13/2020 6:01 PM     HISTORY: Right temporal seizures after left MCA angioplasty.    TECHNIQUE: 3D time-of-flight MR angiogram of the head without  contrast.    COMPARISON: CTA head 10 September 2020    FINDINGS: Mild stenosis of the V4 segment of the right vertebral  artery redemonstrated. Mild to moderate stenosis of the  proximal to  mid basilar artery unchanged. Moderate stenoses of the mid and distal  posterior cerebral arteries redemonstrated. Moderate to severe  stenosis of the distal M1 segment of the left middle cerebral artery  is again seen. There are some mild stenoses in M2 and M3 MCA branches  bilaterally. Mild stenosis of the mid A1 segment of the right anterior  cerebral artery. Moderate stenoses of the A3 segments of the anterior  cerebral arteries bilaterally.    Negative for vessel occlusion, aneurysm or AVM.      Impression    IMPRESSION: Multifocal intracranial stenoses of moderate and mild  degree involving the anterior and posterior circulations. May be  effects of atherosclerotic disease versus inflammatory etiology. No  change when compared with the prior CTA of September 10, 2020.        LORENZO PIMENTEL MD   MR Brain w/o & w Contrast    Narrative    MRI BRAIN WITHOUT AND WITH CONTRAST  9/13/2020 6:06 PM     HISTORY: Right temporal onset seizures after left MCA strokes from  left M1 stenosis status post angioplasty without stenting.     TECHNIQUE: Multiplanar, multisequence MRI of the brain without and  with 12 mL Gadavist.      COMPARISON: CT scan brain 12 September 2020, MRI brain 9/7/2020.     FINDINGS: Redemonstration of confluent infarcts in the deep left  frontal white matter extending into the left basal ganglia. There are  a few more, tiny infarcts in the surrounding white matter. Interval  development of a small, new infarct in the left caudate head. Several,  small, scattered acute infarcts in the 2-4 mm range are present within  the frontal and parietal lobes new on the right and increased on the  left. Progression of several tiny infarcts in the posterior left  temporal and occipital lobes. Development of several tiny infarcts in  the left frontal operculum and anterior left insula.    Negative evidence for hemorrhagic transformation, mass effect or  shift. Tiny focus of cortical enhancement in  the left occipital lobe  noted correlating with infarct previously seen on the September 7 MRI  study. Findings compatible with early subacute infarct with associated  parenchymal enhancement. Redemonstration of a very small right middle  fossa meningioma noted measuring 8 mm in diameter.     Mild atrophy.    Severe, chronic small vessel vascular changes in the hemispheric white  matter, basal ganglia, thalami and herman. Negative for hydrocephalus.     The facial structures appear normal. The major arterial T2 flow voids  at the base of the brain appear patent.     Time sequential MRI images of the head were acquired during  administration of intravenous contrast. Color perfusion maps were  created from the time sequential axial source data. Additional image  processing and calculations performed using RAPID software. Volume of  reduced ADC is 13 mm involving the left frontal corona radiata white  matter and left basal ganglia. Delayed contrast arrival noted in the  deep left hemisphere and newly developed in the mid and posterior  right hemisphere with total volume (T-max greater than 6 seconds) of  42 mL. Mismatch volume is 29 mL. Mismatch ratio is 3.2.    This is compared to CT perfusion from September 10, 2020. Previously  seen delayed arrival of contrast in the posterior superior left  hemisphere is no longer evident.      Impression    IMPRESSION:    1. Redemonstration of completed infarct in the deep left frontal  centrum semiovale and corona radiata white matter extending into the  left basal ganglia.  2. Interval development of numerous small bihemispheric acute infarcts  as above. No mass effect or hemorrhagic transformation.  3. Perfusion imaging demonstrating interval development of delayed  contrast arrival in the mid and posterior right hemisphere new since  the previous study from September 10. Multifocal stenoses involving  anterior middle and posterior cerebral arteries on the right noted  which may  account for this. This may be the result of intracranial  atherosclerosis versus inflammatory disease.   4. Findings called to the patient's ICU nurse (JYOTI Roman) at 2155  hours on 13 September 2020.      LORENZO PIMENTEL MD   MRA Neck (Carotids) wo & w Contrast    Narrative    MRA NECK WITHOUT AND WITH CONTRAST  9/13/2020 6:07 PM     HISTORY: Right temporal onset seizures.     TECHNIQUE: 2D time-of-flight MR angiogram of the neck without contrast  and 3D MR angiogram of the neck with 12 mL Gadavist. Estimates of  carotid stenoses are made relative to the distal internal carotid  artery diameters except as noted.     COMPARISON: None.     FINDINGS: Normal origin of the great vessels from the aortic arch.     Right carotid artery: The right common and internal carotid arteries  are patent. No significant stenosis.     Left carotid artery: The left common and internal carotid arteries are  patent. No significant stenosis.     Vertebral arteries: Vertebral arteries appear patent without evidence  of dissection. No significant stenosis.     Mild proximal to mid basilar stenosis.      Impression    IMPRESSION:  1. Normal MR angiogram of the neck.   2. Mild proximal to mid basilar artery stenosis.      LORENZO PIMENTEL MD         Billing: This patient is critically ill: yes. Total critical care time today 32 min.

## 2020-09-14 NOTE — PLAN OF CARE
Neuros continue to include pupils equal, reactive, and responds to painful stimulus BLE. Remains VS on vent.  BP goal clarified with Neuro. Sedated with Versed at 10mg/hr. PICC placed. Started depakote with plans to wean Versed. Will place Keofeed for tube feeds.

## 2020-09-15 ENCOUNTER — HOSPITAL ENCOUNTER (OUTPATIENT)
Dept: NEUROLOGY | Facility: CLINIC | Age: 75
DRG: 003 | End: 2020-09-15
Attending: NURSE PRACTITIONER
Payer: MEDICARE

## 2020-09-15 LAB
ANION GAP SERPL CALCULATED.3IONS-SCNC: 5 MMOL/L (ref 3–14)
BUN SERPL-MCNC: 17 MG/DL (ref 7–30)
CALCIUM SERPL-MCNC: 9.1 MG/DL (ref 8.5–10.1)
CHLORIDE SERPL-SCNC: 113 MMOL/L (ref 94–109)
CO2 SERPL-SCNC: 26 MMOL/L (ref 20–32)
CREAT SERPL-MCNC: 0.54 MG/DL (ref 0.52–1.04)
ERYTHROCYTE [DISTWIDTH] IN BLOOD BY AUTOMATED COUNT: 13.3 % (ref 10–15)
GFR SERPL CREATININE-BSD FRML MDRD: >90 ML/MIN/{1.73_M2}
GLUCOSE BLDC GLUCOMTR-MCNC: 106 MG/DL (ref 70–99)
GLUCOSE BLDC GLUCOMTR-MCNC: 111 MG/DL (ref 70–99)
GLUCOSE BLDC GLUCOMTR-MCNC: 118 MG/DL (ref 70–99)
GLUCOSE BLDC GLUCOMTR-MCNC: 118 MG/DL (ref 70–99)
GLUCOSE BLDC GLUCOMTR-MCNC: 88 MG/DL (ref 70–99)
GLUCOSE BLDC GLUCOMTR-MCNC: 93 MG/DL (ref 70–99)
GLUCOSE SERPL-MCNC: 98 MG/DL (ref 70–99)
HCT VFR BLD AUTO: 36.8 % (ref 35–47)
HGB BLD-MCNC: 11.8 G/DL (ref 11.7–15.7)
LAB SCANNED RESULT: NORMAL
MCH RBC QN AUTO: 31.6 PG (ref 26.5–33)
MCHC RBC AUTO-ENTMCNC: 32.1 G/DL (ref 31.5–36.5)
MCV RBC AUTO: 98 FL (ref 78–100)
PLATELET # BLD AUTO: 264 10E9/L (ref 150–450)
POTASSIUM SERPL-SCNC: 4.2 MMOL/L (ref 3.4–5.3)
RBC # BLD AUTO: 3.74 10E12/L (ref 3.8–5.2)
SODIUM SERPL-SCNC: 144 MMOL/L (ref 133–144)
VALPROATE SERPL-MCNC: 51 MG/L (ref 50–100)
WBC # BLD AUTO: 9.4 10E9/L (ref 4–11)

## 2020-09-15 PROCEDURE — 25800030 ZZH RX IP 258 OP 636: Performed by: ANESTHESIOLOGY

## 2020-09-15 PROCEDURE — 25800030 ZZH RX IP 258 OP 636: Performed by: NURSE PRACTITIONER

## 2020-09-15 PROCEDURE — 25800030 ZZH RX IP 258 OP 636: Performed by: INTERNAL MEDICINE

## 2020-09-15 PROCEDURE — 25000128 H RX IP 250 OP 636: Performed by: NURSE PRACTITIONER

## 2020-09-15 PROCEDURE — 40000275 ZZH STATISTIC RCP TIME EA 10 MIN

## 2020-09-15 PROCEDURE — 40000008 ZZH STATISTIC AIRWAY CARE

## 2020-09-15 PROCEDURE — 40000061 EEG VIDEO 12-26 HR UNMONITORED

## 2020-09-15 PROCEDURE — 25000128 H RX IP 250 OP 636: Performed by: STUDENT IN AN ORGANIZED HEALTH CARE EDUCATION/TRAINING PROGRAM

## 2020-09-15 PROCEDURE — 25000132 ZZH RX MED GY IP 250 OP 250 PS 637: Mod: GY | Performed by: STUDENT IN AN ORGANIZED HEALTH CARE EDUCATION/TRAINING PROGRAM

## 2020-09-15 PROCEDURE — 25000125 ZZHC RX 250: Performed by: NURSE PRACTITIONER

## 2020-09-15 PROCEDURE — 87798 DETECT AGENT NOS DNA AMP: CPT | Performed by: ANESTHESIOLOGY

## 2020-09-15 PROCEDURE — 94003 VENT MGMT INPAT SUBQ DAY: CPT

## 2020-09-15 PROCEDURE — 25000128 H RX IP 250 OP 636: Performed by: PHYSICIAN ASSISTANT

## 2020-09-15 PROCEDURE — 80164 ASSAY DIPROPYLACETIC ACD TOT: CPT | Performed by: NURSE PRACTITIONER

## 2020-09-15 PROCEDURE — 25000128 H RX IP 250 OP 636: Performed by: ANESTHESIOLOGY

## 2020-09-15 PROCEDURE — 27210429 ZZH NUTRITION PRODUCT INTERMEDIATE LITER

## 2020-09-15 PROCEDURE — 99291 CRITICAL CARE FIRST HOUR: CPT | Performed by: ANESTHESIOLOGY

## 2020-09-15 PROCEDURE — 00000146 ZZHCL STATISTIC GLUCOSE BY METER IP

## 2020-09-15 PROCEDURE — 85027 COMPLETE CBC AUTOMATED: CPT | Performed by: INTERNAL MEDICINE

## 2020-09-15 PROCEDURE — 99291 CRITICAL CARE FIRST HOUR: CPT | Performed by: PSYCHIATRY & NEUROLOGY

## 2020-09-15 PROCEDURE — 25000132 ZZH RX MED GY IP 250 OP 250 PS 637: Mod: GY | Performed by: HOSPITALIST

## 2020-09-15 PROCEDURE — 80165 DIPROPYLACETIC ACID FREE: CPT | Performed by: NURSE PRACTITIONER

## 2020-09-15 PROCEDURE — 80048 BASIC METABOLIC PNL TOTAL CA: CPT | Performed by: INTERNAL MEDICINE

## 2020-09-15 PROCEDURE — 40000239 ZZH STATISTIC VAT ROUNDS

## 2020-09-15 PROCEDURE — 20000003 ZZH R&B ICU

## 2020-09-15 RX ORDER — FENTANYL CITRATE 50 UG/ML
25-50 INJECTION, SOLUTION INTRAMUSCULAR; INTRAVENOUS
Status: DISCONTINUED | OUTPATIENT
Start: 2020-09-15 | End: 2020-09-15 | Stop reason: CLARIF

## 2020-09-15 RX ORDER — HYDRALAZINE HYDROCHLORIDE 20 MG/ML
10 INJECTION INTRAMUSCULAR; INTRAVENOUS EVERY 6 HOURS PRN
Status: DISCONTINUED | OUTPATIENT
Start: 2020-09-15 | End: 2020-10-05 | Stop reason: HOSPADM

## 2020-09-15 RX ORDER — DEXAMETHASONE SODIUM PHOSPHATE 4 MG/ML
4 INJECTION, SOLUTION INTRA-ARTICULAR; INTRALESIONAL; INTRAMUSCULAR; INTRAVENOUS; SOFT TISSUE
Status: DISCONTINUED | OUTPATIENT
Start: 2020-09-15 | End: 2020-09-15 | Stop reason: CLARIF

## 2020-09-15 RX ORDER — LABETALOL HYDROCHLORIDE 5 MG/ML
10 INJECTION, SOLUTION INTRAVENOUS
Status: DISCONTINUED | OUTPATIENT
Start: 2020-09-15 | End: 2020-09-15

## 2020-09-15 RX ORDER — SODIUM CHLORIDE, SODIUM LACTATE, POTASSIUM CHLORIDE, CALCIUM CHLORIDE 600; 310; 30; 20 MG/100ML; MG/100ML; MG/100ML; MG/100ML
INJECTION, SOLUTION INTRAVENOUS CONTINUOUS
Status: DISCONTINUED | OUTPATIENT
Start: 2020-09-15 | End: 2020-09-15 | Stop reason: CLARIF

## 2020-09-15 RX ORDER — SODIUM CHLORIDE 9 MG/ML
INJECTION, SOLUTION INTRAVENOUS CONTINUOUS
Status: DISCONTINUED | OUTPATIENT
Start: 2020-09-15 | End: 2020-10-05 | Stop reason: HOSPADM

## 2020-09-15 RX ORDER — HYDROMORPHONE HYDROCHLORIDE 1 MG/ML
.3-.5 INJECTION, SOLUTION INTRAMUSCULAR; INTRAVENOUS; SUBCUTANEOUS EVERY 5 MIN PRN
Status: DISCONTINUED | OUTPATIENT
Start: 2020-09-15 | End: 2020-09-15 | Stop reason: CLARIF

## 2020-09-15 RX ORDER — ONDANSETRON 4 MG/1
4 TABLET, ORALLY DISINTEGRATING ORAL EVERY 30 MIN PRN
Status: DISCONTINUED | OUTPATIENT
Start: 2020-09-15 | End: 2020-09-15 | Stop reason: CLARIF

## 2020-09-15 RX ORDER — ONDANSETRON 2 MG/ML
4 INJECTION INTRAMUSCULAR; INTRAVENOUS EVERY 30 MIN PRN
Status: DISCONTINUED | OUTPATIENT
Start: 2020-09-15 | End: 2020-09-15 | Stop reason: CLARIF

## 2020-09-15 RX ORDER — HYDRALAZINE HYDROCHLORIDE 20 MG/ML
2.5-5 INJECTION INTRAMUSCULAR; INTRAVENOUS EVERY 10 MIN PRN
Status: DISCONTINUED | OUTPATIENT
Start: 2020-09-15 | End: 2020-09-15 | Stop reason: CLARIF

## 2020-09-15 RX ADMIN — CLOPIDOGREL BISULFATE 75 MG: 75 TABLET, FILM COATED ORAL at 08:32

## 2020-09-15 RX ADMIN — LEVETIRACETAM 1500 MG: 500 INJECTION, SOLUTION INTRAVENOUS at 20:50

## 2020-09-15 RX ADMIN — MIDAZOLAM (PF) 1 MG/ML IN 0.9 % SODIUM CHLORIDE INTRAVENOUS SOLUTION 10 MG/HR: at 08:28

## 2020-09-15 RX ADMIN — HYDRALAZINE HYDROCHLORIDE 10 MG: 20 INJECTION INTRAMUSCULAR; INTRAVENOUS at 18:13

## 2020-09-15 RX ADMIN — ASPIRIN 81 MG 81 MG: 81 TABLET ORAL at 08:32

## 2020-09-15 RX ADMIN — VALPROATE SODIUM 750 MG: 100 INJECTION, SOLUTION INTRAVENOUS at 09:08

## 2020-09-15 RX ADMIN — ATORVASTATIN CALCIUM 40 MG: 40 TABLET, FILM COATED ORAL at 20:50

## 2020-09-15 RX ADMIN — SODIUM CHLORIDE, POTASSIUM CHLORIDE, SODIUM LACTATE AND CALCIUM CHLORIDE: 600; 310; 30; 20 INJECTION, SOLUTION INTRAVENOUS at 09:07

## 2020-09-15 RX ADMIN — LABETALOL HYDROCHLORIDE 20 MG: 5 INJECTION INTRAVENOUS at 21:01

## 2020-09-15 RX ADMIN — PANTOPRAZOLE SODIUM 40 MG: 40 TABLET, DELAYED RELEASE ORAL at 08:32

## 2020-09-15 RX ADMIN — MIDAZOLAM (PF) 1 MG/ML IN 0.9 % SODIUM CHLORIDE INTRAVENOUS SOLUTION 6 MG/HR: at 22:18

## 2020-09-15 RX ADMIN — HEPARIN SODIUM 5000 UNITS: 5000 INJECTION, SOLUTION INTRAVENOUS; SUBCUTANEOUS at 20:50

## 2020-09-15 RX ADMIN — LABETALOL HYDROCHLORIDE 20 MG: 5 INJECTION INTRAVENOUS at 17:09

## 2020-09-15 RX ADMIN — LEVETIRACETAM 1500 MG: 500 INJECTION, SOLUTION INTRAVENOUS at 08:52

## 2020-09-15 RX ADMIN — HEPARIN SODIUM 5000 UNITS: 5000 INJECTION, SOLUTION INTRAVENOUS; SUBCUTANEOUS at 08:30

## 2020-09-15 RX ADMIN — CHLORHEXIDINE GLUCONATE 15 ML: 1.2 RINSE ORAL at 07:44

## 2020-09-15 RX ADMIN — VALPROATE SODIUM 750 MG: 100 INJECTION, SOLUTION INTRAVENOUS at 22:00

## 2020-09-15 RX ADMIN — CHLORHEXIDINE GLUCONATE 15 ML: 1.2 RINSE ORAL at 21:00

## 2020-09-15 RX ADMIN — SODIUM CHLORIDE: 9 INJECTION, SOLUTION INTRAVENOUS at 10:33

## 2020-09-15 ASSESSMENT — ACTIVITIES OF DAILY LIVING (ADL)
ADLS_ACUITY_SCORE: 26
ADLS_ACUITY_SCORE: 24
ADLS_ACUITY_SCORE: 26
ADLS_ACUITY_SCORE: 24
ADLS_ACUITY_SCORE: 26
ADLS_ACUITY_SCORE: 24

## 2020-09-15 NOTE — PROGRESS NOTES
Critical Care Progress Note      09/15/2020    Name: Sherrell Leonard MRN#: [id number]   Age: 74 year old YOB: 1945     Hsptl Day# 10  ICU DAY # 10    MV DAY # 10             Problem List:   Active Problems:    Acute encephalopathy           Summary/Hospital Course:   74-year-old female admitted to the ICU for acute ventilatory failure and airway protection after left MCA subcortical stroke and left PCA stroke with multiple stenotic vessels.  Patient remains intubated however in conjunction with neurology we will wean the patient's Versed.  Patient remains on 9 mg of Versed an hour.  No overt signs of seizures at this time.      Assessment and plan :     Sherrell Leonard IS a 74 year old female admitted on 9/5/2020 for management of acute respiratory failure, CVA and seizure.   I have personally reviewed the daily labs, imaging studies, cultures and discussed the case with referring physician and consulting physicians.     My assessment and plan by system for this patient is as follows:    Neurology/Psychiatry:   1.  Seizure disorder on continues EEG and Versed infusion.  The goal is burst suppression  2. prn Propofol for ICU sedation  3.  PRN opioids for breakthrough pain  Plan  Discussed with neuro critical care.  Wean Versed as tolerated while monitoring for seizure activity.  Continue tight blood pressure control of 1 40-1 80.    Cardiovascular:   1.Hemodynamics -normotensive   2.Rhythm -normal sinus rhythm  3. Ischemia -no signs of ischemia  Plan  Right now the patient is maintaining her blood pressure even with increased levels of sedation.  The plan will be to insert a PICC line in case pressors are needed if we have to increase sedation.      Pulmonary/Ventilator Management:   1. Airway intubated for airway protection  2. Oxygenation/ventilation/mechanics on full ventilatory support  Plan  -Continue current ventilatory management.  Even if we are able to wean the patient to pressure support ventilation  she may not be a candidate for extubation at this time given her altered mental status.    GI and Nutrition :   1.  Concern for protein calorie malnutrition    Plan  -Continue to advance tube feeds as tolerated.  Renal/Fluids/Electrolytes:   1.  Creatinine within normal limits no signs of acute kidney injury  2.  Hypokalemia  3.  Appears euvolemic    Plan  -Continue to monitor and replace electrolytes as per protocol.  - generally avoid nephrotoxic agents such as NSAID, IV contrast unless specifically required  - adjust medications as needed for renal clearance  - follow I/O's as appropriate.    Infectious Disease:   1.  E. coli UTI  2.  Concern for either shingles or herpes infection given open wound in patient's groin.  3.  Plan  -Patient is status post antibiotic treatment we will continue to monitor  We will obtain swab for culture of open wound.    Endocrine:   1 concern for stress-induced hyperglycemia  Plan  - ICU insulin protocol, goal sugar <180      Hematology/Oncology:   1. No leukocytosis  2. Anemia, no signs, symptoms of active blood loss  3.  Plan  -Continue to monitor            IV/Access:   1. Venous access -PICC line  2. Arterial access -no need for arterial line at this time  3.  Plan  - central access required and necessary      ICU Prophylaxis:   1. DVT: Mechanical  2. VAP: HOB 30 degrees, chlorhexidine rinse  3. Stress Ulcer: PPI  4. Restraints: Nonviolent soft two point restraints required and necessary for patient safety and continued cares and good effect as patient continues to pull at necessary lines, tubes despite education and distraction. Will readdress daily.  Restraints not needed at this time   5. Wound care - per unit routine   6. Feeding - we will start tube feeds  7. Family Update: Discussed with daughter plan to wean Versed and plan to keep patient on full ventilatory support.  We also discussed concern of lesion in patient's groin consistent with either shingles or herpes.    8.  Disposition -ICU        Key goals for next 24 hours:   1.  Maintain the patient on full ventilatory support  2.  Tight blood pressure control with systolic blood pressure between 140 and 180  3.  Wean Versed as tolerated               Interim History:     Overall the patient remains critically requiring full ventilatory support.  She has no overt signs of seizures however she is on 9 mg of Versed now.  We continue to wean the Versed as tolerated.         Key Medications:       [Held by provider] amLODIPine  5 mg Oral Daily     aspirin  81 mg Per Feeding Tube Daily     atorvastatin  40 mg Oral or NG Tube Daily at 8 pm     chlorhexidine  15 mL Mouth/Throat Q12H     clopidogrel  75 mg Oral Daily     heparin ANTICOAGULANT  5,000 Units Subcutaneous Q12H     levETIRAcetam  1,500 mg Intravenous Q12H     pantoprazole  40 mg Per Feeding Tube Daily     sodium chloride (PF)  10 mL Intracatheter Q8H     sodium chloride (PF)  3 mL Intracatheter Q8H     valproate (DEPACON) intermittent infusion  750 mg Intravenous Q12H       dextrose       lactated ringers 100 mL/hr at 09/15/20 0907     - MEDICATION INSTRUCTIONS -       midazolam 9 mg/hr (09/15/20 1010)     sodium chloride 10 mL/hr at 09/15/20 1033               Physical Examination:   Temp:  [97  F (36.1  C)-98.6  F (37  C)] 98.4  F (36.9  C)  Pulse:  [60-89] 79  Resp:  [10-29] 12  BP: (123-202)/(59-94) 123/59  FiO2 (%):  [30 %] 30 %  SpO2:  [95 %-100 %] 95 %    Intake/Output Summary (Last 24 hours) at 9/14/2020 1108  Last data filed at 9/14/2020 0800  Gross per 24 hour   Intake 417.1 ml   Output --   Net 417.1 ml     Wt Readings from Last 4 Encounters:   09/15/20 69.6 kg (153 lb 7 oz)     BP - Mean:  [] 81  Ventilation Mode: CMV/AC  (Continuous Mandatory Ventilation/ Assist Control)  FiO2 (%): 30 %  Rate Set (breaths/minute): 12 breaths/min  Tidal Volume Set (mL): 500 mL  PEEP (cm H2O): 5 cmH2O  Oxygen Concentration (%): 30 %  Resp: 12    No lab results found in last 7  days.    GEN: no acute distress   HEENT: head ncat, sclera anicteric, OP patent, trachea midline   PULM: unlabored synchronous with vent, clear anteriorly    CV/COR: RRR S1S2 no gallop,  No rub, no murmur  ABD: soft nontender, hypoactive bowel sounds, no mass  EXT:  Edema   warm  NEURO: Consistent with chemical sedation  SKIN: no obvious rash  LINES: clean, dry intact         Data:   All data and imaging reviewed     ROUTINE ICU LABS (Last four results)  CMP  Recent Labs   Lab 09/15/20  0450 09/14/20  1122 09/14/20  0510 09/13/20  1300 09/13/20  0604  09/12/20  0400     --  145*  --  143  --  141   POTASSIUM 4.2 4.2 3.3* 3.8 3.1*   < > 2.9*   CHLORIDE 113*  --  117*  --  114*  --  111*   CO2 26  --  23  --  21  --  23   ANIONGAP 5  --  5  --  8  --  7   GLC 98  --  84  --  85  --  94   BUN 17  --  17  --  17  --  12   CR 0.54  --  0.55  --  0.64  --  0.63   GFRESTIMATED >90  --  >90  --  87  --  87   GFRESTBLACK >90  --  >90  --  >90  --  >90   MARII 9.1  --  8.7  --  8.6  --  8.6   MAG  --   --   --   --  2.1  --   --    PHOS  --   --   --   --  3.5  --   --    PROTTOTAL  --   --  5.4*  --   --   --   --    ALBUMIN  --   --  2.1*  --   --   --   --    BILITOTAL  --   --  0.4  --   --   --   --    ALKPHOS  --   --  70  --   --   --   --    AST  --   --  27  --   --   --   --    ALT  --   --  20  --   --   --   --     < > = values in this interval not displayed.     CBC  Recent Labs   Lab 09/15/20  0450 09/14/20  0510 09/13/20  0604 09/11/20  0522 09/10/20  1315   WBC 9.4 7.7  --  9.5 12.0*   RBC 3.74* 3.66*  --  4.32 4.65   HGB 11.8 11.4*  --  13.7 14.6   HCT 36.8 35.8  --  42.0 45.2   MCV 98 98  --  97 97   MCH 31.6 31.1  --  31.7 31.4   MCHC 32.1 31.8  --  32.6 32.3   RDW 13.3 13.4  --  13.1 13.3    251 233 191 206     INRNo lab results found in last 7 days.  Arterial Blood GasNo lab results found in last 7 days.    All cultures:  Recent Labs   Lab 09/09/20  1509   CULT Culture negative after 5 days   No growth     Recent Results (from the past 24 hour(s))   XR Chest Port 1 View    Narrative    CHEST ONE VIEW PORTABLE  9/14/2020 2:10 PM     HISTORY: Confirm PICC tip placement.    COMPARISON: Chest x-ray 9/10/2020.      Impression    IMPRESSION: Portable chest. Endotracheal tube and nasogastric tube  have been placed and appear in good position. Endotracheal tube is  approximately 3 cm above the essence. Right PICC line is present  terminating near the SVC right atrium junction in good position. No  pneumothorax. Trace bilateral pleural effusions. Heart size appears  enlarged but is stable. No obvious infiltrate or lung consolidation.    NASIM GARNETT MD   XR Abdomen Port 1 View    Narrative    ABDOMEN ONE VIEW PORTABLE  9/14/2020 4:50 PM     HISTORY: Tube feed placement    COMPARISON: September 12, 2020       Impression    IMPRESSION: Feeding tube tip likely coiled in the stomach. Minimal  amount of stool.  Nonobstructed bowel gas pattern.         Billing: This patient is critically ill: yes. Total critical care time today 33 min.

## 2020-09-15 NOTE — PROGRESS NOTES
"  [facility]    Stroke Progress Note    Interval Events  Loaded with Depakote 20 mg/kg x2 yesterday. EEG overnight with diffuse encephalopathy but no evidence of seizure. Plan today to decrease Versed and watch EEG.    Stroke Evaluation summarized:  MRI/Head CT:  MRI 9/5 with acute infarcts of the L basal ganglia, centrum semiovale and corona radiata.   Intracranial Vascular Imaging: CTA head with severe L M1 stenosis and scattered moderate to severe atherosclerotic plaque throughout.   Cervical Carotid and Vertebral Artery Vascular Imaging: CTA neck without significant stenosis  Echocardiogram: EF 60-65%, normal LA, negative bubble  EKG/Telemetry: Sinus with 1st degree AVB  LDL: 81  A1c: 5.5  Troponin: 0.051   Other testing: Not Applicable     Impression & Plan  Scattered intracranial atherosclerosis with severe focal left M1 stenosis, now s/p balloon angioplasty 9/11  Left MCA syndrome  New R MCA infarcts  - Continue Q2 hour neurochecks  - Okay to liberalize SBP to 140-180  - DAPT with ASA 81 mg daily + Plavix 75 mg daily   - Continue Lipitor 40 mg daily  - Consideration for possible stent at site of L M1 angioplasty given stenosis on MRA. Will focus on seizure management at this time, but case discussed with neuro IR who are aware, case would be performed Thursday at the earliest.     Non-convulsive status epilepticus (NCSE)  - Continue vEEG   - Continue Keppra 1500 mg q12 hours  - Loaded with Depakote 20 mg/kg x2 yesterday.   - Depakote level ordered for tonight.   - Decrease Versed by 1 mg/hr every 2 hours. EEG read 1600 at Versed 7 mg/hr shows no seizures.        We will follow.    LILLY Louis, CNP  Neurology  To page me or covering stroke neurology team member, click here: AMCOM   Choose \"On Call\" tab at top, then search dropdown box for \"Neurology Adult\", select location, press Enter, then look for stroke/neuro " ICU/telestroke.    ______________________________________________________    Medications   Home Meds  Prior to Admission medications    Medication Sig Start Date End Date Taking? Authorizing Provider   Multiple Vitamins-Minerals (SYSTANE ICAPS AREDS2) CAPS Take 1 capsule by mouth daily   Yes Unknown, Entered By History   multivitamin, therapeutic (THERA-VIT) TABS tablet Take 1 tablet by mouth daily   Yes Unknown, Entered By History       Scheduled Meds    [Held by provider] amLODIPine  5 mg Oral Daily     aspirin  81 mg Per Feeding Tube Daily     atorvastatin  40 mg Oral or NG Tube Daily at 8 pm     chlorhexidine  15 mL Mouth/Throat Q12H     clopidogrel  75 mg Oral Daily     heparin ANTICOAGULANT  5,000 Units Subcutaneous Q12H     levETIRAcetam  1,500 mg Intravenous Q12H     pantoprazole  40 mg Per Feeding Tube Daily     sodium chloride (PF)  10 mL Intracatheter Q8H     sodium chloride (PF)  3 mL Intracatheter Q8H     valproate (DEPACON) intermittent infusion  750 mg Intravenous Q12H       Infusion Meds    dextrose       - MEDICATION INSTRUCTIONS -       midazolam 6 mg/hr (09/15/20 1559)     sodium chloride 10 mL/hr at 09/15/20 1033       PRN Meds  acetaminophen, acetaminophen, bisacodyl, dextrose, glucose **OR** dextrose **OR** glucagon, labetalol, lidocaine 4%, lidocaine (buffered or not buffered), lidocaine (buffered or not buffered), LORazepam, - MEDICATION INSTRUCTIONS -, melatonin, naloxone, ondansetron **OR** ondansetron, polyethylene glycol, potassium chloride, potassium chloride with lidocaine, potassium chloride, potassium chloride, potassium chloride, prochlorperazine **OR** prochlorperazine **OR** prochlorperazine, senna-docusate **OR** senna-docusate, sodium chloride (PF), sodium chloride (PF), sodium chloride (PF)       PHYSICAL EXAMINATION  Temp:  [97  F (36.1  C)-99.9  F (37.7  C)] 98.6  F (37  C)  Pulse:  [60-80] 76  Resp:  [10-29] 14  BP: (123-202)/(59-91) 162/70  FiO2 (%):  [30 %] 30 %  SpO2:  [95  %-100 %] 97 %     Neurologic  Mental Status:  Intubated, on Versed 10 mg: does not open eyes to voice or noxious stimuli, does not follow commands  Cranial Nerves:  PERRL, blinks to threat bilaterally, difficult to assess facial symmetry with ETT fastener, does not protrude tongue  Motor:  normal muscle tone and bulk, no abnormal movements, withdraws both LEs but left more brisk, no movement in UEs  Reflexes:  toes mute  Sensory:  see motor exam  Coordination:  unable to assess  Station/Gait:  deferred       Imaging  I personally reviewed all imaging; relevant findings per HPI.     Lab Results Data   CBC  Recent Labs   Lab 09/15/20  0450 09/14/20  0510 09/13/20  0604 09/11/20  0522   WBC 9.4 7.7  --  9.5   RBC 3.74* 3.66*  --  4.32   HGB 11.8 11.4*  --  13.7   HCT 36.8 35.8  --  42.0    251 233 191     Basic Metabolic Panel    Recent Labs   Lab 09/15/20  0450 09/14/20  1122 09/14/20  0510  09/13/20  0604     --  145*  --  143   POTASSIUM 4.2 4.2 3.3*   < > 3.1*   CHLORIDE 113*  --  117*  --  114*   CO2 26  --  23  --  21   BUN 17  --  17  --  17   CR 0.54  --  0.55  --  0.64   GLC 98  --  84  --  85   MARII 9.1  --  8.7  --  8.6    < > = values in this interval not displayed.     Liver Panel  Recent Labs   Lab 09/14/20  0510   PROTTOTAL 5.4*   ALBUMIN 2.1*   BILITOTAL 0.4   ALKPHOS 70   AST 27   ALT 20     INR    Recent Labs   Lab Test 09/07/20  0758   INR 1.05      Lipid Profile    Recent Labs   Lab Test 09/05/20  1256   CHOL 186   HDL 87   LDL 81   TRIG 89     A1C    Recent Labs   Lab Test 09/05/20  1256   A1C 5.5     Troponin I  No results for input(s): TROPI in the last 168 hours.

## 2020-09-15 NOTE — PROGRESS NOTES
EEG CLINICAL NEUROPHYSIOLOGY PRELIMINARY REPORT    EEG through approx 830 AM today reviewed. Patient treated with midazolam 10 mg hour, apparently throughout the study tho this is difficult to determine from EMR. Burst suppression pattern predominates with 15 to 20 uV beta in one to two second runs alternating with periods of suppression in which most activity is less than 5 uV in ampltiude. Suppressed periods occupy approx 50% of ongoing activity. Arousal associated with cares such as suctioning increases amplitude and changes frequency of bursts but a burst suppression pattern remains. No seizures.    Study continues consistent with severe diffuse encephalopathy. This may in part be related to anesthetic drips employed. Extent of suppression found on EEG is more extensive than typically seen with this dose of midazolam; however, elderly patients are often more sensitive to this medication. No seizures. Full report to follow.    Mike Haywood MD  Pager [phone]

## 2020-09-15 NOTE — PLAN OF CARE
SR with left BBB, vented 30% rate 12, , PEEP 5, sedated with Versed to keep seizure activity under control, with activity pt has occasional left eye twitching. Neuro assessments q.2hr., without a change. Pt's  updated via phone.

## 2020-09-15 NOTE — PROGRESS NOTES
[facility] ICU RESPIRATORY NOTE        Date of Admission: 9/5/2020    Date of Intubation (most recent): 9/11/2020    Reason for Mechanical Ventilation:Airways protection    Number of Days on Mechanical Ventilation: 5    Met Criteria for Spontaneous Breathing Trial: No    Reason for No Spontaneous Breathing Trial: per MD    Significant Events Today:None overnight.    ABG Results: No lab results found in last 7 days.    Current Vent Settings: Ventilation Mode: CMV/AC  (Continuous Mandatory Ventilation/ Assist Control)  FiO2 (%): 30 %  Rate Set (breaths/minute): 12 breaths/min  Tidal Volume Set (mL): 500 mL  PEEP (cm H2O): 5 cmH2O  Oxygen Concentration (%): 30 %  Resp: 14          Maurice Alvarez, RT

## 2020-09-15 NOTE — PLAN OF CARE
OT- Discussed with RN in rounds. RN requesting to complete therapy orders at this time, as pt remains intubated and not appropriate for therapies. Please reorder therapy when pt is appropriate and can engage in therapy.     Occupational Therapy Discharge Summary    Reason for therapy discharge:    Change in medical status.    Progress towards therapy goal(s). See goals on Care Plan in [facility] electronic health record for goal details.  Goals not met     Therapy recommendation(s):    Please reorder therapy when pt is appropriate and can engage in therapy.

## 2020-09-15 NOTE — PLAN OF CARE
Pt continues on full vent support, versed sedation weaned from 10->6 mL/hr per neuro stroke order. Neuro exam unchanged: Pupils PERRL, withdraws to pain BLE, absence of movement in BUE. SBP<180 maintained with labetalol and hydralazine x1. Tele: SR w/ L-BBB and occ PACs. LS coarse. Low grade temp at times. TF increased to goal rate. Rectal tube inserted due to large watery stools. Urine output borderline low per purewick catheter. Possible herpes/warts noted in genital region, culture collected.  at bedside updated on POC.

## 2020-09-15 NOTE — PLAN OF CARE
Physical Therapy Discharge Summary    Reason for therapy discharge:    Change in medical status.    Progress towards therapy goal(s). See goals on Care Plan in [facility] electronic health record for goal details.      Therapy recommendation(s):    Please reorder therapy when pt is appropriate and can engage in therapy.

## 2020-09-15 NOTE — PROGRESS NOTES
[facility] ICU RESPIRATORY NOTE        Date of Admission: 9/5/2020    Date of Intubation (most recent): 9/11/2020    Reason for Mechanical Ventilation: Airway protection     Number of Days on Mechanical Ventilation: 5    Met Criteria for Spontaneous Breathing Trial: No   Reason for No Spontaneous Breathing Trial: Per MD     Significant Events Today: none     ABG Results: No lab results found in last 7 days.    Current Vent Settings: Ventilation Mode: CMV/AC  (Continuous Mandatory Ventilation/ Assist Control)  FiO2 (%): 30 %  Rate Set (breaths/minute): 12 breaths/min  Tidal Volume Set (mL): 500 mL  PEEP (cm H2O): 5 cmH2O  Oxygen Concentration (%): 30 %  Resp: 11      Plan: Continue on full ventilator support     Andres Moses RT

## 2020-09-16 ENCOUNTER — HOSPITAL ENCOUNTER (OUTPATIENT)
Dept: NEUROLOGY | Facility: CLINIC | Age: 75
DRG: 003 | End: 2020-09-16
Attending: NURSE PRACTITIONER
Payer: MEDICARE

## 2020-09-16 LAB
ANION GAP SERPL CALCULATED.3IONS-SCNC: 3 MMOL/L (ref 3–14)
BUN SERPL-MCNC: 21 MG/DL (ref 7–30)
CALCIUM SERPL-MCNC: 8.7 MG/DL (ref 8.5–10.1)
CHLORIDE SERPL-SCNC: 114 MMOL/L (ref 94–109)
CO2 SERPL-SCNC: 29 MMOL/L (ref 20–32)
CREAT SERPL-MCNC: 0.56 MG/DL (ref 0.52–1.04)
ERYTHROCYTE [DISTWIDTH] IN BLOOD BY AUTOMATED COUNT: 13.3 % (ref 10–15)
GFR SERPL CREATININE-BSD FRML MDRD: >90 ML/MIN/{1.73_M2}
GLUCOSE BLDC GLUCOMTR-MCNC: 133 MG/DL (ref 70–99)
GLUCOSE BLDC GLUCOMTR-MCNC: 144 MG/DL (ref 70–99)
GLUCOSE SERPL-MCNC: 142 MG/DL (ref 70–99)
HCT VFR BLD AUTO: 34.2 % (ref 35–47)
HGB BLD-MCNC: 10.8 G/DL (ref 11.7–15.7)
MCH RBC QN AUTO: 31.1 PG (ref 26.5–33)
MCHC RBC AUTO-ENTMCNC: 31.6 G/DL (ref 31.5–36.5)
MCV RBC AUTO: 99 FL (ref 78–100)
PLATELET # BLD AUTO: 242 10E9/L (ref 150–450)
POTASSIUM SERPL-SCNC: 3.3 MMOL/L (ref 3.4–5.3)
POTASSIUM SERPL-SCNC: 4.3 MMOL/L (ref 3.4–5.3)
RBC # BLD AUTO: 3.47 10E12/L (ref 3.8–5.2)
SODIUM SERPL-SCNC: 146 MMOL/L (ref 133–144)
SPECIMEN TYPE: NORMAL
VALPROATE FREE SERPL-MCNC: 26.7 UG/ML (ref 6–20)
VARICELLA ZOSTER DNA PCR COMMENT: NORMAL
VZV DNA SPEC QL NAA+PROBE: NORMAL
WBC # BLD AUTO: 9.1 10E9/L (ref 4–11)

## 2020-09-16 PROCEDURE — 94003 VENT MGMT INPAT SUBQ DAY: CPT

## 2020-09-16 PROCEDURE — 25000132 ZZH RX MED GY IP 250 OP 250 PS 637: Mod: GY | Performed by: HOSPITALIST

## 2020-09-16 PROCEDURE — 25800030 ZZH RX IP 258 OP 636: Performed by: NURSE PRACTITIONER

## 2020-09-16 PROCEDURE — 25000132 ZZH RX MED GY IP 250 OP 250 PS 637: Mod: GY | Performed by: STUDENT IN AN ORGANIZED HEALTH CARE EDUCATION/TRAINING PROGRAM

## 2020-09-16 PROCEDURE — 40000275 ZZH STATISTIC RCP TIME EA 10 MIN

## 2020-09-16 PROCEDURE — 27210429 ZZH NUTRITION PRODUCT INTERMEDIATE LITER

## 2020-09-16 PROCEDURE — 25000125 ZZHC RX 250: Performed by: NURSE PRACTITIONER

## 2020-09-16 PROCEDURE — 40000239 ZZH STATISTIC VAT ROUNDS

## 2020-09-16 PROCEDURE — 20000003 ZZH R&B ICU

## 2020-09-16 PROCEDURE — 25000132 ZZH RX MED GY IP 250 OP 250 PS 637: Mod: GY | Performed by: ANESTHESIOLOGY

## 2020-09-16 PROCEDURE — 25000128 H RX IP 250 OP 636: Performed by: ANESTHESIOLOGY

## 2020-09-16 PROCEDURE — 25000128 H RX IP 250 OP 636: Performed by: STUDENT IN AN ORGANIZED HEALTH CARE EDUCATION/TRAINING PROGRAM

## 2020-09-16 PROCEDURE — 99291 CRITICAL CARE FIRST HOUR: CPT | Performed by: ANESTHESIOLOGY

## 2020-09-16 PROCEDURE — 99291 CRITICAL CARE FIRST HOUR: CPT | Performed by: PSYCHIATRY & NEUROLOGY

## 2020-09-16 PROCEDURE — 80048 BASIC METABOLIC PNL TOTAL CA: CPT | Performed by: INTERNAL MEDICINE

## 2020-09-16 PROCEDURE — 40000008 ZZH STATISTIC AIRWAY CARE

## 2020-09-16 PROCEDURE — 00000146 ZZHCL STATISTIC GLUCOSE BY METER IP

## 2020-09-16 PROCEDURE — 84132 ASSAY OF SERUM POTASSIUM: CPT | Performed by: ANESTHESIOLOGY

## 2020-09-16 PROCEDURE — 25000128 H RX IP 250 OP 636: Performed by: NURSE PRACTITIONER

## 2020-09-16 PROCEDURE — 25000128 H RX IP 250 OP 636: Performed by: PHYSICIAN ASSISTANT

## 2020-09-16 PROCEDURE — 85027 COMPLETE CBC AUTOMATED: CPT | Performed by: INTERNAL MEDICINE

## 2020-09-16 PROCEDURE — 40000061 EEG VIDEO 12-26 HR UNMONITORED

## 2020-09-16 RX ORDER — CLOPIDOGREL BISULFATE 75 MG/1
75 TABLET ORAL DAILY
Status: DISCONTINUED | OUTPATIENT
Start: 2020-09-17 | End: 2020-10-05 | Stop reason: HOSPADM

## 2020-09-16 RX ORDER — FENTANYL CITRATE 50 UG/ML
25 INJECTION, SOLUTION INTRAMUSCULAR; INTRAVENOUS ONCE
Status: COMPLETED | OUTPATIENT
Start: 2020-09-17 | End: 2020-09-17

## 2020-09-16 RX ORDER — FENTANYL CITRATE 50 UG/ML
INJECTION, SOLUTION INTRAMUSCULAR; INTRAVENOUS
Status: COMPLETED
Start: 2020-09-16 | End: 2020-09-17

## 2020-09-16 RX ADMIN — LEVETIRACETAM 1500 MG: 500 INJECTION, SOLUTION INTRAVENOUS at 20:16

## 2020-09-16 RX ADMIN — CHLORHEXIDINE GLUCONATE 15 ML: 1.2 RINSE ORAL at 19:40

## 2020-09-16 RX ADMIN — HYDRALAZINE HYDROCHLORIDE 10 MG: 20 INJECTION INTRAMUSCULAR; INTRAVENOUS at 06:42

## 2020-09-16 RX ADMIN — ATORVASTATIN CALCIUM 40 MG: 40 TABLET, FILM COATED ORAL at 19:40

## 2020-09-16 RX ADMIN — LABETALOL HYDROCHLORIDE 20 MG: 5 INJECTION INTRAVENOUS at 11:11

## 2020-09-16 RX ADMIN — CLOPIDOGREL BISULFATE 75 MG: 75 TABLET, FILM COATED ORAL at 08:06

## 2020-09-16 RX ADMIN — HEPARIN SODIUM 5000 UNITS: 5000 INJECTION, SOLUTION INTRAVENOUS; SUBCUTANEOUS at 08:06

## 2020-09-16 RX ADMIN — MIDAZOLAM (PF) 1 MG/ML IN 0.9 % SODIUM CHLORIDE INTRAVENOUS SOLUTION 2 MG/HR: at 21:41

## 2020-09-16 RX ADMIN — POTASSIUM CHLORIDE 20 MEQ: 1.5 POWDER, FOR SOLUTION ORAL at 12:14

## 2020-09-16 RX ADMIN — LABETALOL HYDROCHLORIDE 20 MG: 5 INJECTION INTRAVENOUS at 22:35

## 2020-09-16 RX ADMIN — ASPIRIN 81 MG 81 MG: 81 TABLET ORAL at 08:06

## 2020-09-16 RX ADMIN — POTASSIUM CHLORIDE 40 MEQ: 1.5 POWDER, FOR SOLUTION ORAL at 09:03

## 2020-09-16 RX ADMIN — VALPROATE SODIUM 750 MG: 100 INJECTION, SOLUTION INTRAVENOUS at 21:48

## 2020-09-16 RX ADMIN — HEPARIN SODIUM 5000 UNITS: 5000 INJECTION, SOLUTION INTRAVENOUS; SUBCUTANEOUS at 21:40

## 2020-09-16 RX ADMIN — LEVETIRACETAM 1500 MG: 500 INJECTION, SOLUTION INTRAVENOUS at 08:42

## 2020-09-16 RX ADMIN — LABETALOL HYDROCHLORIDE 20 MG: 5 INJECTION INTRAVENOUS at 20:40

## 2020-09-16 RX ADMIN — CHLORHEXIDINE GLUCONATE 15 ML: 1.2 RINSE ORAL at 08:42

## 2020-09-16 RX ADMIN — PANTOPRAZOLE SODIUM 40 MG: 40 TABLET, DELAYED RELEASE ORAL at 08:06

## 2020-09-16 RX ADMIN — MICONAZOLE NITRATE: 20 POWDER TOPICAL at 14:33

## 2020-09-16 RX ADMIN — VALPROATE SODIUM 750 MG: 100 INJECTION, SOLUTION INTRAVENOUS at 08:42

## 2020-09-16 RX ADMIN — LABETALOL HYDROCHLORIDE 20 MG: 5 INJECTION INTRAVENOUS at 06:33

## 2020-09-16 ASSESSMENT — ACTIVITIES OF DAILY LIVING (ADL)
ADLS_ACUITY_SCORE: 20
ADLS_ACUITY_SCORE: 20
ADLS_ACUITY_SCORE: 26
ADLS_ACUITY_SCORE: 20

## 2020-09-16 NOTE — PLAN OF CARE
SR with BBB, vented 30/12/500 PEEP 5, sedated with versed gtt to keep seizure activity under control,urine output adequate, rectal tube in place for liquid stools, goal for SB/P >140 and <180.

## 2020-09-16 NOTE — PROGRESS NOTES
EEG CLINICAL NEUROPHYSIOLOGY PRELIMINARY REPORT    EEG through 730 AM today reviewed. Midazolam 6 mg/hour. Burst suppression pattern when resting quietly. Bilateral independent lateralizing periodic discharges following arousal, left more so than right. Severely attenuated background. No seizures.    Study continues consistent with severe diffuse encephalopathy and bilateral irritative structural lesions. No real change with reduction of midazolam. Thus far no seizures. Will continue video-EEG monitoring. Full report to follow.    Mike Haywood MD  Pager [phone]

## 2020-09-16 NOTE — PROGRESS NOTES
"  [facility]    Stroke Progress Note    Interval Events  Versed weaned from 10 mg/hr to 6 mg/hr yesterday. EEG overnight. Without evidence of seizures but does show some lateralizing periodic discharges, L>R.    Impression & Plan  Scattered intracranial atherosclerosis with severe focal left M1 stenosis, now s/p balloon angioplasty 9/11  Left MCA syndrome  New R MCA infarcts  - Continue Q2 hour neurochecks  - Okay to liberalize SBP to 140-180  - DAPT with ASA 81 mg daily + Plavix 75 mg daily   - Continue Lipitor 40 mg daily  - Consideration for possible stent at site of L M1 angioplasty given stenosis on MRA. Will focus on seizure management at this time, but case discussed with neuro IR who are aware, case would be performed Thursday at the earliest.     Non-convulsive status epilepticus (NCSE), resolved  - Continue vEEG   - Continue Keppra 1500 mg q12 hours  - Loaded with Depakote 20 mg/kg x2 yesterday.   - Depakote level ordered for tonight.   - Decrease Versed by 1 mg/hr every 2 hours.      We will follow.     LILLY Louis, [facility]  Neurology  To page me or covering stroke neurology team member, click here: AMCOM   Choose \"On Call\" tab at top, then search dropdown box for \"Neurology Adult\", select location, press Enter, then look for stroke/neuro ICU/telestroke.    ______________________________________________________    Medications   Home Meds  Prior to Admission medications    Medication Sig Start Date End Date Taking? Authorizing Provider   Multiple Vitamins-Minerals (SYSTANE ICAPS AREDS2) CAPS Take 1 capsule by mouth daily   Yes Unknown, Entered By History   multivitamin, therapeutic (THERA-VIT) TABS tablet Take 1 tablet by mouth daily   Yes Unknown, Entered By History       Scheduled Meds    [Held by provider] amLODIPine  5 mg Oral Daily     aspirin  81 mg Per Feeding Tube Daily     atorvastatin  40 mg Oral or NG Tube Daily at 8 pm     chlorhexidine  15 mL Mouth/Throat Q12H     " clopidogrel  75 mg Oral Daily     heparin ANTICOAGULANT  5,000 Units Subcutaneous Q12H     levETIRAcetam  1,500 mg Intravenous Q12H     pantoprazole  40 mg Per Feeding Tube Daily     sodium chloride (PF)  10 mL Intracatheter Q8H     sodium chloride (PF)  3 mL Intracatheter Q8H     valproate (DEPACON) intermittent infusion  750 mg Intravenous Q12H       Infusion Meds    dextrose       - MEDICATION INSTRUCTIONS -       midazolam 10 mg/hr (09/16/20 0748)     sodium chloride 10 mL/hr at 09/16/20 0749       PRN Meds  acetaminophen, acetaminophen, bisacodyl, dextrose, glucose **OR** dextrose **OR** glucagon, hydrALAZINE, labetalol, lidocaine 4%, lidocaine (buffered or not buffered), lidocaine (buffered or not buffered), LORazepam, - MEDICATION INSTRUCTIONS -, melatonin, naloxone, ondansetron **OR** ondansetron, polyethylene glycol, potassium chloride, potassium chloride with lidocaine, potassium chloride, potassium chloride, potassium chloride, prochlorperazine **OR** prochlorperazine **OR** prochlorperazine, senna-docusate **OR** senna-docusate, sodium chloride (PF), sodium chloride (PF), sodium chloride (PF)       PHYSICAL EXAMINATION  Temp:  [98.4  F (36.9  C)-100.6  F (38.1  C)] 99.7  F (37.6  C)  Pulse:  [63-87] 63  Resp:  [9-30] 12  BP: (123-194)/(59-89) 178/72  FiO2 (%):  [30 %] 30 %  SpO2:  [95 %-99 %] 95 %     Neurologic  Mental Status:  Intubated, on Versed 6 mg: does not open eyes to voice or noxious stimuli, does not follow commands  Cranial Nerves:  PERRL, difficult to assess facial symmetry with ETT fastener, does not protrude tongue  Motor:  normal muscle tone and bulk, no abnormal movements, withdraws both LEs but left more brisk, no movement in UEs  Reflexes:  toes mute  Sensory:  see motor exam  Coordination:  unable to assess  Station/Gait:  deferred     Imaging  I personally reviewed all imaging; relevant findings per HPI.     Lab Results Data   CBC  Recent Labs   Lab 09/16/20  6558 09/15/20  5829  09/14/20  0510   WBC 9.1 9.4 7.7   RBC 3.47* 3.74* 3.66*   HGB 10.8* 11.8 11.4*   HCT 34.2* 36.8 35.8    264 251     Basic Metabolic Panel    Recent Labs   Lab 09/16/20  0435 09/15/20  0450 09/14/20  1122 09/14/20  0510   * 144  --  145*   POTASSIUM 3.3* 4.2 4.2 3.3*   CHLORIDE 114* 113*  --  117*   CO2 29 26  --  23   BUN 21 17  --  17   CR 0.56 0.54  --  0.55   * 98  --  84   MARII 8.7 9.1  --  8.7     Liver Panel  Recent Labs   Lab 09/14/20  0510   PROTTOTAL 5.4*   ALBUMIN 2.1*   BILITOTAL 0.4   ALKPHOS 70   AST 27   ALT 20     INR    Recent Labs   Lab Test 09/07/20  0758   INR 1.05      Lipid Profile    Recent Labs   Lab Test 09/05/20  1256   CHOL 186   HDL 87   LDL 81   TRIG 89     A1C    Recent Labs   Lab Test 09/05/20  1256   A1C 5.5     Troponin I  No results for input(s): TROPI in the last 168 hours.

## 2020-09-16 NOTE — PROGRESS NOTES
EEG CLINICAL NEUROPHYSIOLOGY PRELIMINARY REPORT    EEG through approx 730 PM reviewed. Midazolam down to 6 mg/hour. Bilateral independent lateralizing periodic discharges noted, more persistent on right than left. This is consistent with bilateral irritative structural lesions. No normal background activity. Clearly more multifocal epileptiform activity as midazolam tapered but so far no seizures. Will continue video-EEG monitoring. Full report to follow.    Mike Haywood MD  Pager [phone]

## 2020-09-16 NOTE — PLAN OF CARE
Pt only responds to painful stimuli in LE's otherwise no improvement. Continuous EEG. Versed weaned from 6 to 2. Per neuro, leave Versed @ 2 mg/hr overnight. SR with BBB present. Potassium replaced.  updated at bedside. Continue to monitor.

## 2020-09-16 NOTE — PROGRESS NOTES
[facility] ICU RESPIRATORY NOTE        Date of Admission: 9/5/2020    Date of Intubation (most recent): 9/11/2020    Reason for Mechanical Ventilation: Airway Protection    Number of Days on Mechanical Ventilation: 6      Met Criteria for Spontaneous Breathing Trial: No    Reason for No Spontaneous Breathing Trial: No per MD    Significant Events Today: None    Current Vent Settings: Ventilation Mode: CMV/AC  (Continuous Mandatory Ventilation/ Assist Control)  FiO2 (%): 30 %  Rate Set (breaths/minute): 12 breaths/min  Tidal Volume Set (mL): 500 mL  PEEP (cm H2O): 5 cmH2O  Oxygen Concentration (%): 30 %  Resp: 11    Plan: Will continue full ventilatory support for now and assess for weaning readiness daily.       Sherrell Beckham, RT

## 2020-09-16 NOTE — PROGRESS NOTES
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  Patient remains intubated however in conjunction with neurology we will wean the patient's Versed.  Patient remains on 6 mg of Versed an hour.  No overt signs of seizures at this time.      Assessment and plan :     Sherrell Leonard IS a 74 year old female admitted on 9/5/2020 for management of acute respiratory failure, CVA and seizure.   I have personally reviewed the daily labs, imaging studies, cultures and discussed the case with referring physician and consulting physicians.     My assessment and plan by system for this patient is as follows:    Neurology/Psychiatry:   1.  Seizure disorder on continues EEG and Versed infusion.  The goal is burst suppression  2. prn Propofol for ICU sedation  3.  PRN opioids for breakthrough pain  Plan  The plan will be to continue the patient off of Versed infusion we will monitor for seizure activity.  Patient has tolerated Versed 1 so far with no evidence of seizure activity.    Cardiovascular:   1.Hemodynamics -normotensive   2.Rhythm -normal sinus rhythm  3. Ischemia -no signs of ischemia  Plan  No active cardiovascular issues at this time.    Pulmonary/Ventilator Management:   1. Airway intubated for airway protection  2. Oxygenation/ventilation/mechanics on full ventilatory support  Plan  -Continue current ventilatory management.  Even if we are able to wean the patient to pressure support ventilation she may not be a candidate for extubation at this time given her altered mental status.  I did discuss with patient's   yesterday the possibility of the patient needing tracheostomy if her altered mental status prevents her from being extubated.  We will discuss with family patient's wishes and goals of care if need to reach that point in the patient's management.    GI and Nutrition :   1.  Concern for protein calorie malnutrition    Plan  -Continue to advance tube feeds as tolerated.      Renal/Fluids/Electrolytes:   1.  Creatinine within normal limits no signs of acute kidney injury  2.  Hypokalemia  3.  Appears euvolemic    Plan  -Continue to monitor and replace electrolytes as per protocol.  - generally avoid nephrotoxic agents such as NSAID, IV contrast unless specifically required  - adjust medications as needed for renal clearance  - follow I/O's as appropriate.    Infectious Disease:   1.  E. coli UTI  2.  Culture of open lesion in patient's groin  3.  Plan  -Patient is status post antibiotic treatment we will continue to monitor  Wait for the results of wound culture.    Endocrine:   1 concern for stress-induced hyperglycemia  Plan  - ICU insulin protocol, goal sugar <180      Hematology/Oncology:   1. No leukocytosis  2. Anemia, no signs, symptoms of active blood loss  3.  Plan  -Continue to monitor            IV/Access:   1. Venous access -PICC line  2. Arterial access -no need for arterial line at this time  3.  Plan  - central access required and necessary      ICU Prophylaxis:   1. DVT: Mechanical  2. VAP: HOB 30 degrees, chlorhexidine rinse  3. Stress Ulcer: PPI  4. Restraints: Nonviolent soft two point restraints required and necessary for patient safety and continued cares and good effect as patient continues to pull at necessary lines, tubes despite education and distraction. Will readdress daily.  Restraints not needed at this time   5. Wound care - per unit routine   6. Feeding - we will start tube feeds  7. Family Update: Discussed the patient's continued respiratory dependence.  Discussed plan to continue to  wean Versed.    8. Disposition -ICU        Key goals for next 24 hours:   1.  Maintain the patient on full ventilatory support  2.  Await culture results   3.  Wean Versed as tolerated               Interim History:     Tolerating weaning of Versed  No signs of overt seizures.         Key Medications:       [Held by provider] amLODIPine  5 mg Oral Daily     aspirin  81 mg Per Feeding Tube Daily     atorvastatin  40 mg Oral or NG Tube Daily at 8 pm     chlorhexidine  15 mL Mouth/Throat Q12H     clopidogrel  75 mg Oral Daily     heparin ANTICOAGULANT  5,000 Units Subcutaneous Q12H     levETIRAcetam  1,500 mg Intravenous Q12H     pantoprazole  40 mg Per Feeding Tube Daily     sodium chloride (PF)  10 mL Intracatheter Q8H     sodium chloride (PF)  3 mL Intracatheter Q8H     valproate (DEPACON) intermittent infusion  750 mg Intravenous Q12H       dextrose       - MEDICATION INSTRUCTIONS -       midazolam 10 mg/hr (09/16/20 0748)     sodium chloride 10 mL/hr at 09/16/20 0749               Physical Examination:   Temp:  [98.4  F (36.9  C)-100.6  F (38.1  C)] 98.4  F (36.9  C)  Pulse:  [63-87] 72  Resp:  [9-30] 15  BP: (123-194)/(59-89) 179/76  FiO2 (%):  [30 %] 30 %  SpO2:  [95 %-99 %] 97 %    Intake/Output Summary (Last 24 hours) at 9/14/2020 1108  Last data filed at 9/14/2020 0800  Gross per 24 hour   Intake 417.1 ml   Output --   Net 417.1 ml     Wt Readings from Last 4 Encounters:   09/16/20 69.6 kg (153 lb 7 oz)     BP - Mean:  [] 110  Ventilation Mode: CMV/AC  (Continuous Mandatory Ventilation/ Assist Control)  FiO2 (%): 30 %  Rate Set (breaths/minute): 12 breaths/min  Tidal Volume Set (mL): 500 mL  PEEP (cm H2O): 5 cmH2O  Oxygen Concentration (%): 30 %  Resp: 15    No lab results found in last 7 days.    GEN: no acute distress   HEENT: head ncat, sclera anicteric, OP patent, trachea midline   PULM: unlabored synchronous with vent, clear anteriorly    CV/COR: RRR S1S2 no gallop,  No rub, no murmur  ABD: soft  nontender, hypoactive bowel sounds, no mass  EXT:  Edema   warm  NEURO: Consistent with chemical sedation  SKIN: no obvious rash  LINES: clean, dry intact         Data:   All data and imaging reviewed     ROUTINE ICU LABS (Last four results)  CMP  Recent Labs   Lab 09/16/20  0435 09/15/20  0450 09/14/20  1122 09/14/20  0510  09/13/20  0604   * 144  --  145*  --  143   POTASSIUM 3.3* 4.2 4.2 3.3*   < > 3.1*   CHLORIDE 114* 113*  --  117*  --  114*   CO2 29 26  --  23  --  21   ANIONGAP 3 5  --  5  --  8   * 98  --  84  --  85   BUN 21 17  --  17  --  17   CR 0.56 0.54  --  0.55  --  0.64   GFRESTIMATED >90 >90  --  >90  --  87   GFRESTBLACK >90 >90  --  >90  --  >90   MARII 8.7 9.1  --  8.7  --  8.6   MAG  --   --   --   --   --  2.1   PHOS  --   --   --   --   --  3.5   PROTTOTAL  --   --   --  5.4*  --   --    ALBUMIN  --   --   --  2.1*  --   --    BILITOTAL  --   --   --  0.4  --   --    ALKPHOS  --   --   --  70  --   --    AST  --   --   --  27  --   --    ALT  --   --   --  20  --   --     < > = values in this interval not displayed.     CBC  Recent Labs   Lab 09/16/20  0435 09/15/20  0450 09/14/20  0510 09/13/20  0604 09/11/20  0522   WBC 9.1 9.4 7.7  --  9.5   RBC 3.47* 3.74* 3.66*  --  4.32   HGB 10.8* 11.8 11.4*  --  13.7   HCT 34.2* 36.8 35.8  --  42.0   MCV 99 98 98  --  97   MCH 31.1 31.6 31.1  --  31.7   MCHC 31.6 32.1 31.8  --  32.6   RDW 13.3 13.3 13.4  --  13.1    264 251 233 191     INRNo lab results found in last 7 days.  Arterial Blood GasNo lab results found in last 7 days.    All cultures:  Recent Labs   Lab 09/09/20  1509   CULT Culture negative after 5 days  No growth     No results found for this or any previous visit (from the past 24 hour(s)).      Billing: This patient is critically ill: yes. Total critical care time today 31 min.

## 2020-09-17 ENCOUNTER — HOSPITAL ENCOUNTER (OUTPATIENT)
Dept: NEUROLOGY | Facility: CLINIC | Age: 75
DRG: 003 | End: 2020-09-17
Attending: NURSE PRACTITIONER
Payer: MEDICARE

## 2020-09-17 LAB
ANION GAP SERPL CALCULATED.3IONS-SCNC: 3 MMOL/L (ref 3–14)
BUN SERPL-MCNC: 17 MG/DL (ref 7–30)
CALCIUM SERPL-MCNC: 8.9 MG/DL (ref 8.5–10.1)
CHLORIDE SERPL-SCNC: 112 MMOL/L (ref 94–109)
CO2 SERPL-SCNC: 31 MMOL/L (ref 20–32)
CREAT SERPL-MCNC: 0.56 MG/DL (ref 0.52–1.04)
ERYTHROCYTE [DISTWIDTH] IN BLOOD BY AUTOMATED COUNT: 13.4 % (ref 10–15)
GFR SERPL CREATININE-BSD FRML MDRD: >90 ML/MIN/{1.73_M2}
GLUCOSE BLDC GLUCOMTR-MCNC: 101 MG/DL (ref 70–99)
GLUCOSE BLDC GLUCOMTR-MCNC: 106 MG/DL (ref 70–99)
GLUCOSE BLDC GLUCOMTR-MCNC: 109 MG/DL (ref 70–99)
GLUCOSE BLDC GLUCOMTR-MCNC: 80 MG/DL (ref 70–99)
GLUCOSE SERPL-MCNC: 112 MG/DL (ref 70–99)
HCT VFR BLD AUTO: 36.6 % (ref 35–47)
HGB BLD-MCNC: 11.5 G/DL (ref 11.7–15.7)
MCH RBC QN AUTO: 31.3 PG (ref 26.5–33)
MCHC RBC AUTO-ENTMCNC: 31.4 G/DL (ref 31.5–36.5)
MCV RBC AUTO: 100 FL (ref 78–100)
PLATELET # BLD AUTO: 306 10E9/L (ref 150–450)
POTASSIUM SERPL-SCNC: 4.2 MMOL/L (ref 3.4–5.3)
RBC # BLD AUTO: 3.67 10E12/L (ref 3.8–5.2)
SODIUM SERPL-SCNC: 146 MMOL/L (ref 133–144)
VALPROATE FREE SERPL-MCNC: 16.3 UG/ML (ref 6–20)
VALPROATE SERPL-MCNC: 51 MG/L (ref 50–100)
WBC # BLD AUTO: 10.1 10E9/L (ref 4–11)

## 2020-09-17 PROCEDURE — 99291 CRITICAL CARE FIRST HOUR: CPT | Performed by: PSYCHIATRY & NEUROLOGY

## 2020-09-17 PROCEDURE — 25800030 ZZH RX IP 258 OP 636: Performed by: PHYSICIAN ASSISTANT

## 2020-09-17 PROCEDURE — 25000125 ZZHC RX 250: Performed by: PHYSICIAN ASSISTANT

## 2020-09-17 PROCEDURE — 40000008 ZZH STATISTIC AIRWAY CARE

## 2020-09-17 PROCEDURE — 25000128 H RX IP 250 OP 636: Performed by: NURSE PRACTITIONER

## 2020-09-17 PROCEDURE — 25000132 ZZH RX MED GY IP 250 OP 250 PS 637: Mod: GY | Performed by: HOSPITALIST

## 2020-09-17 PROCEDURE — 40000275 ZZH STATISTIC RCP TIME EA 10 MIN

## 2020-09-17 PROCEDURE — 87529 HSV DNA AMP PROBE: CPT | Performed by: ANESTHESIOLOGY

## 2020-09-17 PROCEDURE — 25000128 H RX IP 250 OP 636: Performed by: INTERNAL MEDICINE

## 2020-09-17 PROCEDURE — 80164 ASSAY DIPROPYLACETIC ACD TOT: CPT | Performed by: PHYSICIAN ASSISTANT

## 2020-09-17 PROCEDURE — 25000128 H RX IP 250 OP 636: Performed by: STUDENT IN AN ORGANIZED HEALTH CARE EDUCATION/TRAINING PROGRAM

## 2020-09-17 PROCEDURE — 25800030 ZZH RX IP 258 OP 636: Performed by: NURSE PRACTITIONER

## 2020-09-17 PROCEDURE — 99291 CRITICAL CARE FIRST HOUR: CPT | Performed by: ANESTHESIOLOGY

## 2020-09-17 PROCEDURE — 80048 BASIC METABOLIC PNL TOTAL CA: CPT | Performed by: INTERNAL MEDICINE

## 2020-09-17 PROCEDURE — 00000146 ZZHCL STATISTIC GLUCOSE BY METER IP

## 2020-09-17 PROCEDURE — 85027 COMPLETE CBC AUTOMATED: CPT | Performed by: INTERNAL MEDICINE

## 2020-09-17 PROCEDURE — 25000128 H RX IP 250 OP 636: Performed by: ANESTHESIOLOGY

## 2020-09-17 PROCEDURE — 95714 VEEG EA 12-26 HR UNMNTR: CPT

## 2020-09-17 PROCEDURE — 25000132 ZZH RX MED GY IP 250 OP 250 PS 637: Mod: GY | Performed by: STUDENT IN AN ORGANIZED HEALTH CARE EDUCATION/TRAINING PROGRAM

## 2020-09-17 PROCEDURE — 20000003 ZZH R&B ICU

## 2020-09-17 PROCEDURE — 27210429 ZZH NUTRITION PRODUCT INTERMEDIATE LITER

## 2020-09-17 PROCEDURE — 25000128 H RX IP 250 OP 636: Performed by: PHYSICIAN ASSISTANT

## 2020-09-17 PROCEDURE — 25000128 H RX IP 250 OP 636

## 2020-09-17 PROCEDURE — 94003 VENT MGMT INPAT SUBQ DAY: CPT

## 2020-09-17 RX ORDER — LORAZEPAM 2 MG/ML
4 INJECTION INTRAMUSCULAR ONCE
Status: COMPLETED | OUTPATIENT
Start: 2020-09-17 | End: 2020-09-17

## 2020-09-17 RX ORDER — FENTANYL CITRATE 50 UG/ML
INJECTION, SOLUTION INTRAMUSCULAR; INTRAVENOUS
Status: DISCONTINUED
Start: 2020-09-17 | End: 2020-09-17 | Stop reason: HOSPADM

## 2020-09-17 RX ORDER — FENTANYL CITRATE 50 UG/ML
25 INJECTION, SOLUTION INTRAMUSCULAR; INTRAVENOUS
Status: DISCONTINUED | OUTPATIENT
Start: 2020-09-17 | End: 2020-10-05 | Stop reason: HOSPADM

## 2020-09-17 RX ORDER — CLOBAZAM 10 MG/1
10 TABLET ORAL 2 TIMES DAILY
Status: DISCONTINUED | OUTPATIENT
Start: 2020-09-17 | End: 2020-09-22

## 2020-09-17 RX ADMIN — FENTANYL CITRATE 25 MCG: 50 INJECTION, SOLUTION INTRAMUSCULAR; INTRAVENOUS at 00:04

## 2020-09-17 RX ADMIN — CLOBAZAM 10 MG: 10 TABLET ORAL at 22:46

## 2020-09-17 RX ADMIN — VALPROATE SODIUM 750 MG: 100 INJECTION, SOLUTION INTRAVENOUS at 10:20

## 2020-09-17 RX ADMIN — ASPIRIN 81 MG 81 MG: 81 TABLET ORAL at 08:02

## 2020-09-17 RX ADMIN — HYDRALAZINE HYDROCHLORIDE 10 MG: 20 INJECTION INTRAMUSCULAR; INTRAVENOUS at 21:31

## 2020-09-17 RX ADMIN — LEVETIRACETAM 1500 MG: 500 INJECTION, SOLUTION INTRAVENOUS at 08:41

## 2020-09-17 RX ADMIN — HEPARIN SODIUM 5000 UNITS: 5000 INJECTION, SOLUTION INTRAVENOUS; SUBCUTANEOUS at 21:00

## 2020-09-17 RX ADMIN — FENTANYL CITRATE 25 MCG: 50 INJECTION, SOLUTION INTRAMUSCULAR; INTRAVENOUS at 04:40

## 2020-09-17 RX ADMIN — HEPARIN SODIUM 5000 UNITS: 5000 INJECTION, SOLUTION INTRAVENOUS; SUBCUTANEOUS at 08:02

## 2020-09-17 RX ADMIN — LABETALOL HYDROCHLORIDE 20 MG: 5 INJECTION INTRAVENOUS at 04:06

## 2020-09-17 RX ADMIN — LEVETIRACETAM 1500 MG: 500 INJECTION, SOLUTION INTRAVENOUS at 20:47

## 2020-09-17 RX ADMIN — HYDRALAZINE HYDROCHLORIDE 10 MG: 20 INJECTION INTRAMUSCULAR; INTRAVENOUS at 04:25

## 2020-09-17 RX ADMIN — CHLORHEXIDINE GLUCONATE 15 ML: 1.2 RINSE ORAL at 07:57

## 2020-09-17 RX ADMIN — LORAZEPAM 4 MG: 2 INJECTION INTRAMUSCULAR; INTRAVENOUS at 21:18

## 2020-09-17 RX ADMIN — CHLORHEXIDINE GLUCONATE 15 ML: 1.2 RINSE ORAL at 20:43

## 2020-09-17 RX ADMIN — VALPROATE SODIUM 750 MG: 100 INJECTION, SOLUTION INTRAVENOUS at 18:31

## 2020-09-17 RX ADMIN — PANTOPRAZOLE SODIUM 40 MG: 40 TABLET, DELAYED RELEASE ORAL at 08:02

## 2020-09-17 RX ADMIN — CLOPIDOGREL BISULFATE 75 MG: 75 TABLET, FILM COATED ORAL at 08:02

## 2020-09-17 RX ADMIN — ATORVASTATIN CALCIUM 40 MG: 40 TABLET, FILM COATED ORAL at 21:00

## 2020-09-17 ASSESSMENT — ACTIVITIES OF DAILY LIVING (ADL)
ADLS_ACUITY_SCORE: 20

## 2020-09-17 NOTE — PROGRESS NOTES
[facility] ICU RESPIRATORY NOTE        Date of Admission: 9/5/2020    Date of Intubation (most recent): 9/11/2020    Reason for Mechanical Ventilation: Airway Protection    Number of Days on Mechanical Ventilation: p    Met Criteria for Spontaneous Breathing Trial: No    Reason for No Spontaneous Breathing Trial: per MD    Significant Events Today: none    ABG Results: No lab results found in last 7 days.    Current Vent Settings: Ventilation Mode: CMV/AC  (Continuous Mandatory Ventilation/ Assist Control)  FiO2 (%): 30 %  Rate Set (breaths/minute): 12 breaths/min  Tidal Volume Set (mL): 500 mL  PEEP (cm H2O): 5 cmH2O  Oxygen Concentration (%): 30 %  Resp: 21          Plan: continue full vent support weaning readiness daily.    Shawn Turner, RT

## 2020-09-17 NOTE — PROGRESS NOTES
EEG CLINICAL NEUROPHYSIOLOGY PRELIMINARY REPORT    EEG through approx 9 AM today reviewed. Midazolam 2 mg/hour. No normal background. EEG variously with burst suppression pattern or bilateral independent lateralizing periodic discharges; more persistent on the right. Runs of lateralizing periodic discharges are brief and do not evolve. No electrographic seizures. No change since yesterday.    EEG continues consistent with severe diffuse encephalopathy. There are indications of independent bilateral structural lesions with seizure generating potential. No seizures as midazolam is being tapered and no substantial change since yesterday. Will continue video-EEG monitoring. Full report to follow.    Mike Haywood MD  Pager [phone]

## 2020-09-17 NOTE — PLAN OF CARE
Versed off at 1300. Patient flutters eyes and withdraws from pain in lower extremities. Plan is to monitor activity off of sedation and then start to pressure support. Sinus rhythm with BBB present.  updated at bedside. Continue to monitor.

## 2020-09-17 NOTE — PROGRESS NOTES
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  Patient remains intubated however in conjunction with neurology we will wean the patient's Versed.  Patient remains on 2 mg of Versed an hour.  No overt signs of seizures at this time.      Assessment and plan :     Sherrell Leonard IS a 74 year old female admitted on 9/5/2020 for management of acute respiratory failure, CVA and seizure.   I have personally reviewed the daily labs, imaging studies, cultures and discussed the case with referring physician and consulting physicians.     My assessment and plan by system for this patient is as follows:    Neurology/Psychiatry:   1.  Seizure disorder on continues EEG and Versed infusion.  The goal is burst suppression  2. prn Propofol for ICU sedation  3.  PRN opioids for breakthrough pain  Plan  The plan will be to continue the patient off of Versed infusion we will monitor for seizure activity.  Patient has tolerated Versed wean so far with no evidence of seizure activity. The plan is to get off versed infusion today    Cardiovascular:   1.Hemodynamics -normotensive   2.Rhythm -normal sinus rhythm  3. Ischemia -no signs of ischemia  Plan  No active cardiovascular issues at this time.    Pulmonary/Ventilator Management:   1. Airway intubated for airway protection  2. Oxygenation/ventilation/mechanics on full ventilatory support  Plan  -Once off Versed, we will start to wean the patient to PSV.  Given her altered mental status, concern about airway protection and patient may not be a candidate for extubation.  I have discussed  there possibility of trach with     GI and Nutrition :   1.  Concern for protein calorie malnutrition    Plan  -Continue to advance tube feeds as tolerated.      Renal/Fluids/Electrolytes:   1.  Creatinine within normal limits no signs of acute kidney injury  2.  Hypokalemia resolving  3.  Appears euvolemic    Plan  -Continue to monitor and replace electrolytes as per protocol.  - generally avoid nephrotoxic agents such as NSAID, IV contrast unless specifically required  - adjust medications as needed for renal clearance  - follow I/O's as appropriate.      Infectious Disease:   1.  E. coli UTI  2.  Culture of open lesion in patient's groin  3.  Plan  -Patient is status post antibiotic treatment we will continue to monitor. WBC count is trending down  Wait for the results of wound culture.    Endocrine:   1 concern for stress-induced hyperglycemia  Plan  - ICU insulin protocol, goal sugar <180      Hematology/Oncology:   1. No leukocytosis  2. Anemia, no signs, symptoms of active blood loss  3.  Plan  -Continue to monitor            IV/Access:   1. Venous access -PICC line  2. Arterial access -no need for arterial line at this time  3.  Plan  - central access required and necessary      ICU Prophylaxis:   1. DVT: Mechanical  2. VAP: HOB 30 degrees, chlorhexidine rinse  3. Stress Ulcer: PPI  4. Restraints: Nonviolent soft two point restraints required and necessary for patient safety and continued cares and good effect as patient continues to pull at necessary lines, tubes despite education and distraction. Will readdress daily.  Restraints not needed at this time   5. Wound care - per unit routine   6. Feeding - we will start tube feeds  7. Family Update:  Discuss with patient's daughter about weaning the versed.  Talked about how the patient may need a day to clear versed before we evaluate neuro status  8. Disposition -ICU        Key goals for next 24 hours:   1.  Maintain the patient on full ventilatory  support  2.  Await culture results   3.  Wean Versed as tolerated               Interim History:     Tolerating weaning of Versed  No signs of overt seizures.         Key Medications:       [Held by provider] amLODIPine  5 mg Oral Daily     aspirin  81 mg Per Feeding Tube Daily     atorvastatin  40 mg Oral or NG Tube Daily at 8 pm     chlorhexidine  15 mL Mouth/Throat Q12H     clopidogrel  75 mg Oral or Feeding Tube Daily     fentaNYL (PF)         heparin ANTICOAGULANT  5,000 Units Subcutaneous Q12H     levETIRAcetam  1,500 mg Intravenous Q12H     pantoprazole  40 mg Per Feeding Tube Daily     sodium chloride (PF)  10 mL Intracatheter Q8H     sodium chloride (PF)  3 mL Intracatheter Q8H     valproate (DEPACON) intermittent infusion  750 mg Intravenous Q12H       dextrose       - MEDICATION INSTRUCTIONS -       midazolam 1 mg/hr (09/17/20 1100)     sodium chloride 20 mL/hr at 09/16/20 2000               Physical Examination:   Temp:  [98  F (36.7  C)-99.5  F (37.5  C)] 98.7  F (37.1  C)  Pulse:  [65-87] 74  Resp:  [11-47] 11  BP: (116-204)/() 146/71  FiO2 (%):  [30 %] 30 %  SpO2:  [95 %-98 %] 95 %    Intake/Output Summary (Last 24 hours) at 9/14/2020 1108  Last data filed at 9/14/2020 0800  Gross per 24 hour   Intake 417.1 ml   Output --   Net 417.1 ml     Wt Readings from Last 4 Encounters:   09/17/20 71.8 kg (158 lb 4.6 oz)     BP - Mean:  [] 103  Ventilation Mode: CMV/AC  (Continuous Mandatory Ventilation/ Assist Control)  FiO2 (%): 30 %  Rate Set (breaths/minute): 12 breaths/min  Tidal Volume Set (mL): 500 mL  PEEP (cm H2O): 5 cmH2O  Oxygen Concentration (%): 30 %  Resp: 11    No lab results found in last 7 days.    GEN: no acute distress   HEENT: head ncat, sclera anicteric, OP patent, trachea midline   PULM: unlabored synchronous with vent, clear anteriorly    CV/COR: RRR S1S2 no gallop,  No rub, no murmur  ABD: soft nontender, hypoactive bowel sounds, no mass  EXT:  Edema   warm  NEURO: Consistent  with chemical sedation, not responsive to deep sternal rub  SKIN: no obvious rash  LINES: clean, dry intact         Data:   All data and imaging reviewed     ROUTINE ICU LABS (Last four results)  CMP  Recent Labs   Lab 09/17/20 0400 09/16/20  1537 09/16/20  0435 09/15/20  0450  09/14/20  0510  09/13/20  0604   *  --  146* 144  --  145*  --  143   POTASSIUM 4.2 4.3 3.3* 4.2   < > 3.3*   < > 3.1*   CHLORIDE 112*  --  114* 113*  --  117*  --  114*   CO2 31  --  29 26  --  23  --  21   ANIONGAP 3  --  3 5  --  5  --  8   *  --  142* 98  --  84  --  85   BUN 17  --  21 17  --  17  --  17   CR 0.56  --  0.56 0.54  --  0.55  --  0.64   GFRESTIMATED >90  --  >90 >90  --  >90  --  87   GFRESTBLACK >90  --  >90 >90  --  >90  --  >90   MARII 8.9  --  8.7 9.1  --  8.7  --  8.6   MAG  --   --   --   --   --   --   --  2.1   PHOS  --   --   --   --   --   --   --  3.5   PROTTOTAL  --   --   --   --   --  5.4*  --   --    ALBUMIN  --   --   --   --   --  2.1*  --   --    BILITOTAL  --   --   --   --   --  0.4  --   --    ALKPHOS  --   --   --   --   --  70  --   --    AST  --   --   --   --   --  27  --   --    ALT  --   --   --   --   --  20  --   --     < > = values in this interval not displayed.     CBC  Recent Labs   Lab 09/17/20  0400 09/16/20  0435 09/15/20  0450 09/14/20  0510   WBC 10.1 9.1 9.4 7.7   RBC 3.67* 3.47* 3.74* 3.66*   HGB 11.5* 10.8* 11.8 11.4*   HCT 36.6 34.2* 36.8 35.8    99 98 98   MCH 31.3 31.1 31.6 31.1   MCHC 31.4* 31.6 32.1 31.8   RDW 13.4 13.3 13.3 13.4    242 264 251     INRNo lab results found in last 7 days.  Arterial Blood GasNo lab results found in last 7 days.    All cultures:  No results for input(s): CULT in the last 168 hours.  No results found for this or any previous visit (from the past 24 hour(s)).      Billing: This patient is critically ill: yes. Total critical care time today 32 min.

## 2020-09-17 NOTE — PROGRESS NOTES
CLINICAL NUTRITION SERVICES - REASSESSMENT NOTE      Malnutrition: (9/14)  % Weight Loss:  Unable to obtain without a nutrition history   % Intake:  </= 50% for >/= 5 days (severe malnutrition)  Subcutaneous Fat Loss:  None observed  Muscle Loss:  None observed  Fluid Retention:  None noted     Malnutrition Diagnosis: Unable to determine due to lack of a nutrition history (patient intubated and not able to provide)       EVALUATION OF PROGRESS TOWARD GOALS   Diet:  NPO on vent     Nutrition Support:  Patient started on TF 9/14, advanced to goal yesterday 9/16, and continues as follows ~    Nutrition Support Enteral:  Type of Feeding Tube: NG  Enteral Frequency:  Continuous  Enteral Regimen: Isosource 1.5 at 40 mL/hr  Total Enteral Provisions: 1440 kcal (27 kcal/kg), 65 g protein (1.2 g/kg), 169 g CHO, 14 g fiber, 730 mL H2O  Free Water Flush: 60 mL every 4 hours       Intake/Tolerance:    Na 146 (H)  BGM < 150  Stool 700 mL yest, 300 mL on 9/15  I/O 1870/2000, wt 71.8 kg (up 5.6 kg from dosing wt)      ASSESSED NUTRITION NEEDS:  Dosing Weight 54 kg (adjusted)   Estimated Energy Needs: 7394-5417 kcals (25-30 Kcal/Kg)  Justification: maintenance and overweight  Estimated Protein Needs: 65-81 grams protein (1.2-1.5 g pro/Kg)  Justification: preservation of lean body mass      Previous Goals (9/14):   Isosource 1.5 at 40 mL/hr will meet % needs   Evaluation: Met    Previous Nutrition Diagnosis (9/14):   Inadequate protein-energy intake related to NPO on vent with plans to start TF today as evidenced by meeting 0% needs   Evaluation: Resolved       CURRENT NUTRITION DIAGNOSIS  No nutrition diagnosis identified at this time    INTERVENTIONS  Recommendations / Nutrition Prescription  Continue Isosource 1.5 at 40 mL/hr as above     Implementation  Collaboration and Referral of Nutrition care:  Patient discussed today during interdisciplinary bedside rounds     Goals  Isosource 1.5 at 40 mL/hr will continue to meet  % needs     MONITORING AND EVALUATION:  Progress towards goals will be monitored and evaluated per protocol and Practice Guidelines    Callie Johnston RD, LD, CNSC   Clinical Dietitian - [facility]

## 2020-09-17 NOTE — PROGRESS NOTES
[facility] ICU RESPIRATORY NOTE        Date of Admission: 9/5/2020    Date of Intubation (most recent):9/11/2020    Reason for Mechanical Ventilation:Airway protection    Number of Days on Mechanical Ventilation:7    Met Criteria for Spontaneous Breathing Trial:No    Reason for No Spontaneous Breathing Trial:Per MD.  Significant Events Today:None    ABG Results: No lab results found in last 7 days.    Current Vent Settings: Ventilation Mode: CMV/AC  (Continuous Mandatory Ventilation/ Assist Control)  FiO2 (%): 30 %  Rate Set (breaths/minute): 120 breaths/min  Tidal Volume Set (mL): 500 mL  PEEP (cm H2O): 5 cmH2O  Oxygen Concentration (%): 30 %  Resp: 14      Plan:Will cont full vent support overnight and will assess for a daily PS trial in the morning.  Kady Bender, RT

## 2020-09-17 NOTE — PLAN OF CARE
Pt remains sedated on low dose versed. BP variable during shift. Labetalol and hydralazine given for HTN. Fentanyl given X2 for pain. Withdraws from pain to lower extremities with very slight contraction noted to upper extremities. Red secretions noted via ETT. Patient breathing above vent this am. Will continue to monitor.

## 2020-09-17 NOTE — PROGRESS NOTES
[facility] ICU RESPIRATORY NOTE        Date of Admission: 9/5/2020    Date of Intubation (most recent):9/11/2020    Reason for Mechanical Ventilation:Airway protection    Number of Days on Mechanical Ventilation:6    Met Criteria for Spontaneous Breathing Trial:No    Reason for No Spontaneous Breathing Trial:Per MD    Significant Events Today:None    ABG Results: No lab results found in last 7 days.    Current Vent Settings: Ventilation Mode: CMV/AC  (Continuous Mandatory Ventilation/ Assist Control)  FiO2 (%): 30 %  Rate Set (breaths/minute): 12 breaths/min  Tidal Volume Set (mL): 500 mL  PEEP (cm H2O): 5 cmH2O  Oxygen Concentration (%): 30 %  Resp: (!) 74      Plan:Will cont full vent support for now and will assess for weaning readiness daily.    Kady Bender, RT

## 2020-09-17 NOTE — PROGRESS NOTES
"  [facility]    Stroke Progress Note    Interval Events  Versed remains at 2mg/hr  BP has been in 120-200 systolic range.  Remains intubated  Prelim EEG read, no change.      Impression & Plan  Scattered intracranial atherosclerosis with severe focal left M1 stenosis, now s/p balloon angioplasty 9/11   Left MCA syndrome  New R MCA infarcts  - Continue Q2 hour neurochecks  - SBP goal 140-180 mmHg  - DAPT with ASA 81 mg daily + Plavix 75 mg daily   - Continue Lipitor 40 mg daily  - Consideration for possible stent at site of L M1 angioplasty given stenosis on MRA. Will focus on seizure management at this time, but potential intervention pending improvement from seizure perspective     Non-convulsive status epilepticus (NCSE), resolved  - Continue vEEG   - Continue Keppra 1500 mg q12 hours  - Continue VPA, increase from 750mg q12 hours to 750mg q8 hours given slightly low level of 51   - Decrease Versed to 1mg/hr at 11 am, then off at 13:00    We will follow.     Elsa Bruno PA-C  Neurology  09/17/2020 8:19 AM  To page me or covering stroke neurology team member, click here: AMCOM  Choose \"On Call\" tab at top, then search dropdown box for \"Neurology Adult\" & press Enter, look for Neuro ICU/Stroke      ______________________________________________________    Medications     Scheduled Meds    [Held by provider] amLODIPine  5 mg Oral Daily     aspirin  81 mg Per Feeding Tube Daily     atorvastatin  40 mg Oral or NG Tube Daily at 8 pm     chlorhexidine  15 mL Mouth/Throat Q12H     clopidogrel  75 mg Oral or Feeding Tube Daily     fentaNYL (PF)         heparin ANTICOAGULANT  5,000 Units Subcutaneous Q12H     levETIRAcetam  1,500 mg Intravenous Q12H     pantoprazole  40 mg Per Feeding Tube Daily     sodium chloride (PF)  10 mL Intracatheter Q8H     sodium chloride (PF)  3 mL Intracatheter Q8H     valproate (DEPACON) intermittent infusion  750 mg Intravenous Q12H       Infusion Meds    dextrose       - " MEDICATION INSTRUCTIONS -       midazolam 2 mg/hr (09/17/20 0759)     sodium chloride 20 mL/hr at 09/16/20 2000       PRN Meds  acetaminophen, acetaminophen, bisacodyl, dextrose, glucose **OR** dextrose **OR** glucagon, fentaNYL, hydrALAZINE, labetalol, lidocaine 4%, lidocaine (buffered or not buffered), lidocaine (buffered or not buffered), LORazepam, - MEDICATION INSTRUCTIONS -, melatonin, miconazole, naloxone, ondansetron **OR** ondansetron, polyethylene glycol, potassium chloride, potassium chloride with lidocaine, potassium chloride, potassium chloride, potassium chloride, prochlorperazine **OR** prochlorperazine **OR** prochlorperazine, senna-docusate **OR** senna-docusate, sodium chloride (PF), sodium chloride (PF), sodium chloride (PF)       PHYSICAL EXAMINATION  Temp:  [98  F (36.7  C)-99.5  F (37.5  C)] 98.7  F (37.1  C)  Pulse:  [65-87] 86  Resp:  [11-47] 15  BP: (116-224)/() 181/86  FiO2 (%):  [30 %] 30 %  SpO2:  [95 %-98 %] 97 %     Neurologic  Mental Status:  Intubated, on Versed 2 mg: does not open eyes to voice or noxious stimuli, does not follow commands  Cranial Nerves:  PERRL, difficult to assess facial symmetry with ETT fastener, does not protrude tongue  Motor:  normal muscle tone and bulk, no abnormal movements, withdraws both LEs but left more brisk, minimal w/d in upper extremities  Reflexes:  toes mute  Sensory:  see motor exam  Coordination:  unable to assess  Station/Gait:  deferred     Imaging  I personally reviewed all imaging; relevant findings per HPI.     Lab Results Data   CBC  Recent Labs   Lab 09/17/20  0400 09/16/20  0435 09/15/20  0450   WBC 10.1 9.1 9.4   RBC 3.67* 3.47* 3.74*   HGB 11.5* 10.8* 11.8   HCT 36.6 34.2* 36.8    242 264     Basic Metabolic Panel    Recent Labs   Lab 09/17/20  0400 09/16/20  1537 09/16/20  0435 09/15/20  0450   *  --  146* 144   POTASSIUM 4.2 4.3 3.3* 4.2   CHLORIDE 112*  --  114* 113*   CO2 31  --  29 26   BUN 17  --  21 17   CR  0.56  --  0.56 0.54   *  --  142* 98   MARII 8.9  --  8.7 9.1     Liver Panel  Recent Labs   Lab 09/14/20  0510   PROTTOTAL 5.4*   ALBUMIN 2.1*   BILITOTAL 0.4   ALKPHOS 70   AST 27   ALT 20     INR    Recent Labs   Lab Test 09/07/20  0758   INR 1.05      Lipid Profile    Recent Labs   Lab Test 09/05/20  1256   CHOL 186   HDL 87   LDL 81   TRIG 89     A1C    Recent Labs   Lab Test 09/05/20  1256   A1C 5.5     Troponin I  No results for input(s): TROPI in the last 168 hours.

## 2020-09-18 ENCOUNTER — HOSPITAL ENCOUNTER (OUTPATIENT)
Dept: NEUROLOGY | Facility: CLINIC | Age: 75
DRG: 003 | End: 2020-09-18
Attending: PHYSICIAN ASSISTANT
Payer: MEDICARE

## 2020-09-18 ENCOUNTER — APPOINTMENT (OUTPATIENT)
Dept: GENERAL RADIOLOGY | Facility: CLINIC | Age: 75
DRG: 003 | End: 2020-09-18
Attending: INTERNAL MEDICINE
Payer: MEDICARE

## 2020-09-18 LAB
ALBUMIN UR-MCNC: 10 MG/DL
ANION GAP SERPL CALCULATED.3IONS-SCNC: 3 MMOL/L (ref 3–14)
APPEARANCE UR: CLEAR
BASOPHILS # BLD AUTO: 0.1 10E9/L (ref 0–0.2)
BASOPHILS NFR BLD AUTO: 0.9 %
BILIRUB UR QL STRIP: NEGATIVE
BUN SERPL-MCNC: 19 MG/DL (ref 7–30)
CALCIUM SERPL-MCNC: 8.5 MG/DL (ref 8.5–10.1)
CHLORIDE SERPL-SCNC: 110 MMOL/L (ref 94–109)
CO2 SERPL-SCNC: 31 MMOL/L (ref 20–32)
COLOR UR AUTO: YELLOW
CREAT SERPL-MCNC: 0.53 MG/DL (ref 0.52–1.04)
DIFFERENTIAL METHOD BLD: ABNORMAL
EOSINOPHIL # BLD AUTO: 0.6 10E9/L (ref 0–0.7)
EOSINOPHIL NFR BLD AUTO: 6.1 %
ERYTHROCYTE [DISTWIDTH] IN BLOOD BY AUTOMATED COUNT: 13.5 % (ref 10–15)
ERYTHROCYTE [DISTWIDTH] IN BLOOD BY AUTOMATED COUNT: 13.5 % (ref 10–15)
GFR SERPL CREATININE-BSD FRML MDRD: >90 ML/MIN/{1.73_M2}
GLUCOSE BLDC GLUCOMTR-MCNC: 105 MG/DL (ref 70–99)
GLUCOSE BLDC GLUCOMTR-MCNC: 109 MG/DL (ref 70–99)
GLUCOSE BLDC GLUCOMTR-MCNC: 85 MG/DL (ref 70–99)
GLUCOSE SERPL-MCNC: 107 MG/DL (ref 70–99)
GLUCOSE UR STRIP-MCNC: NEGATIVE MG/DL
HCT VFR BLD AUTO: 32.5 % (ref 35–47)
HCT VFR BLD AUTO: 35.4 % (ref 35–47)
HGB BLD-MCNC: 10.3 G/DL (ref 11.7–15.7)
HGB BLD-MCNC: 10.9 G/DL (ref 11.7–15.7)
HGB UR QL STRIP: NEGATIVE
HSV1 DNA SPEC QL NAA+PROBE: NEGATIVE
HSV2 DNA SPEC QL NAA+PROBE: NEGATIVE
IMM GRANULOCYTES # BLD: 0.4 10E9/L (ref 0–0.4)
IMM GRANULOCYTES NFR BLD: 3.8 %
KETONES UR STRIP-MCNC: 5 MG/DL
LEUKOCYTE ESTERASE UR QL STRIP: ABNORMAL
LYMPHOCYTES # BLD AUTO: 1.5 10E9/L (ref 0.8–5.3)
LYMPHOCYTES NFR BLD AUTO: 14.8 %
MCH RBC QN AUTO: 30.6 PG (ref 26.5–33)
MCH RBC QN AUTO: 31.5 PG (ref 26.5–33)
MCHC RBC AUTO-ENTMCNC: 30.8 G/DL (ref 31.5–36.5)
MCHC RBC AUTO-ENTMCNC: 31.7 G/DL (ref 31.5–36.5)
MCV RBC AUTO: 99 FL (ref 78–100)
MCV RBC AUTO: 99 FL (ref 78–100)
MONOCYTES # BLD AUTO: 1.4 10E9/L (ref 0–1.3)
MONOCYTES NFR BLD AUTO: 14.1 %
NEUTROPHILS # BLD AUTO: 6 10E9/L (ref 1.6–8.3)
NEUTROPHILS NFR BLD AUTO: 60.3 %
NITRATE UR QL: NEGATIVE
PH UR STRIP: 6.5 PH (ref 5–7)
PLATELET # BLD AUTO: 300 10E9/L (ref 150–450)
PLATELET # BLD AUTO: 305 10E9/L (ref 150–450)
POTASSIUM SERPL-SCNC: 3.5 MMOL/L (ref 3.4–5.3)
PROCALCITONIN SERPL-MCNC: <0.05 NG/ML
RBC # BLD AUTO: 3.27 10E12/L (ref 3.8–5.2)
RBC # BLD AUTO: 3.56 10E12/L (ref 3.8–5.2)
RBC #/AREA URNS AUTO: 1 /HPF (ref 0–2)
SODIUM SERPL-SCNC: 144 MMOL/L (ref 133–144)
SOURCE: ABNORMAL
SP GR UR STRIP: 1.03 (ref 1–1.03)
SPECIMEN SOURCE: NORMAL
SQUAMOUS #/AREA URNS AUTO: 2 /HPF (ref 0–1)
TRANS CELLS #/AREA URNS HPF: 1 /HPF (ref 0–1)
UROBILINOGEN UR STRIP-MCNC: 2 MG/DL (ref 0–2)
VALPROATE SERPL-MCNC: 75 MG/L (ref 50–100)
WBC # BLD AUTO: 9 10E9/L (ref 4–11)
WBC # BLD AUTO: 9.9 10E9/L (ref 4–11)
WBC #/AREA URNS AUTO: 13 /HPF (ref 0–5)

## 2020-09-18 PROCEDURE — 25000132 ZZH RX MED GY IP 250 OP 250 PS 637: Mod: GY | Performed by: INTERNAL MEDICINE

## 2020-09-18 PROCEDURE — 00000146 ZZHCL STATISTIC GLUCOSE BY METER IP

## 2020-09-18 PROCEDURE — 40000008 ZZH STATISTIC AIRWAY CARE

## 2020-09-18 PROCEDURE — 85027 COMPLETE CBC AUTOMATED: CPT | Performed by: INTERNAL MEDICINE

## 2020-09-18 PROCEDURE — 25000128 H RX IP 250 OP 636: Performed by: NURSE PRACTITIONER

## 2020-09-18 PROCEDURE — 40000275 ZZH STATISTIC RCP TIME EA 10 MIN

## 2020-09-18 PROCEDURE — 25800030 ZZH RX IP 258 OP 636: Performed by: NURSE PRACTITIONER

## 2020-09-18 PROCEDURE — 27210429 ZZH NUTRITION PRODUCT INTERMEDIATE LITER

## 2020-09-18 PROCEDURE — 25000128 H RX IP 250 OP 636: Performed by: STUDENT IN AN ORGANIZED HEALTH CARE EDUCATION/TRAINING PROGRAM

## 2020-09-18 PROCEDURE — 40000239 ZZH STATISTIC VAT ROUNDS

## 2020-09-18 PROCEDURE — 85025 COMPLETE CBC W/AUTO DIFF WBC: CPT | Performed by: INTERNAL MEDICINE

## 2020-09-18 PROCEDURE — 81001 URINALYSIS AUTO W/SCOPE: CPT | Performed by: INTERNAL MEDICINE

## 2020-09-18 PROCEDURE — 25000132 ZZH RX MED GY IP 250 OP 250 PS 637: Mod: GY | Performed by: STUDENT IN AN ORGANIZED HEALTH CARE EDUCATION/TRAINING PROGRAM

## 2020-09-18 PROCEDURE — 99291 CRITICAL CARE FIRST HOUR: CPT | Performed by: PSYCHIATRY & NEUROLOGY

## 2020-09-18 PROCEDURE — 20000003 ZZH R&B ICU

## 2020-09-18 PROCEDURE — 87070 CULTURE OTHR SPECIMN AEROBIC: CPT | Performed by: INTERNAL MEDICINE

## 2020-09-18 PROCEDURE — 25000128 H RX IP 250 OP 636: Performed by: ANESTHESIOLOGY

## 2020-09-18 PROCEDURE — 25000128 H RX IP 250 OP 636: Performed by: PHYSICIAN ASSISTANT

## 2020-09-18 PROCEDURE — 25000125 ZZHC RX 250: Performed by: PHYSICIAN ASSISTANT

## 2020-09-18 PROCEDURE — 94003 VENT MGMT INPAT SUBQ DAY: CPT

## 2020-09-18 PROCEDURE — 80165 DIPROPYLACETIC ACID FREE: CPT | Performed by: STUDENT IN AN ORGANIZED HEALTH CARE EDUCATION/TRAINING PROGRAM

## 2020-09-18 PROCEDURE — 84145 PROCALCITONIN (PCT): CPT | Performed by: INTERNAL MEDICINE

## 2020-09-18 PROCEDURE — 80164 ASSAY DIPROPYLACETIC ACD TOT: CPT | Performed by: STUDENT IN AN ORGANIZED HEALTH CARE EDUCATION/TRAINING PROGRAM

## 2020-09-18 PROCEDURE — 80048 BASIC METABOLIC PNL TOTAL CA: CPT | Performed by: INTERNAL MEDICINE

## 2020-09-18 PROCEDURE — 25000132 ZZH RX MED GY IP 250 OP 250 PS 637: Mod: GY | Performed by: HOSPITALIST

## 2020-09-18 PROCEDURE — 25800030 ZZH RX IP 258 OP 636: Performed by: PHYSICIAN ASSISTANT

## 2020-09-18 PROCEDURE — 99291 CRITICAL CARE FIRST HOUR: CPT | Performed by: ANESTHESIOLOGY

## 2020-09-18 PROCEDURE — 71045 X-RAY EXAM CHEST 1 VIEW: CPT

## 2020-09-18 PROCEDURE — 40000061 EEG VIDEO 12-26 HR UNMONITORED

## 2020-09-18 RX ORDER — ACETAMINOPHEN 650 MG/1
650 SUPPOSITORY RECTAL EVERY 4 HOURS PRN
Status: DISCONTINUED | OUTPATIENT
Start: 2020-09-18 | End: 2020-10-05 | Stop reason: HOSPADM

## 2020-09-18 RX ORDER — ACETAMINOPHEN 325 MG/1
650 TABLET ORAL EVERY 4 HOURS PRN
Status: DISCONTINUED | OUTPATIENT
Start: 2020-09-18 | End: 2020-10-05 | Stop reason: HOSPADM

## 2020-09-18 RX ADMIN — HYDRALAZINE HYDROCHLORIDE 10 MG: 20 INJECTION INTRAMUSCULAR; INTRAVENOUS at 15:17

## 2020-09-18 RX ADMIN — HEPARIN SODIUM 5000 UNITS: 5000 INJECTION, SOLUTION INTRAVENOUS; SUBCUTANEOUS at 21:27

## 2020-09-18 RX ADMIN — VALPROATE SODIUM 750 MG: 100 INJECTION, SOLUTION INTRAVENOUS at 11:24

## 2020-09-18 RX ADMIN — HEPARIN SODIUM 5000 UNITS: 5000 INJECTION, SOLUTION INTRAVENOUS; SUBCUTANEOUS at 08:42

## 2020-09-18 RX ADMIN — CLOBAZAM 10 MG: 10 TABLET ORAL at 20:15

## 2020-09-18 RX ADMIN — LABETALOL HYDROCHLORIDE 20 MG: 5 INJECTION INTRAVENOUS at 18:28

## 2020-09-18 RX ADMIN — VALPROATE SODIUM 750 MG: 100 INJECTION, SOLUTION INTRAVENOUS at 02:02

## 2020-09-18 RX ADMIN — CHLORHEXIDINE GLUCONATE 15 ML: 1.2 RINSE ORAL at 20:04

## 2020-09-18 RX ADMIN — CHLORHEXIDINE GLUCONATE 15 ML: 1.2 RINSE ORAL at 07:44

## 2020-09-18 RX ADMIN — ASPIRIN 81 MG 81 MG: 81 TABLET ORAL at 08:42

## 2020-09-18 RX ADMIN — VALPROATE SODIUM 750 MG: 100 INJECTION, SOLUTION INTRAVENOUS at 19:04

## 2020-09-18 RX ADMIN — PANTOPRAZOLE SODIUM 40 MG: 40 TABLET, DELAYED RELEASE ORAL at 08:42

## 2020-09-18 RX ADMIN — CLOBAZAM 10 MG: 10 TABLET ORAL at 08:42

## 2020-09-18 RX ADMIN — CLOPIDOGREL BISULFATE 75 MG: 75 TABLET, FILM COATED ORAL at 08:42

## 2020-09-18 RX ADMIN — ACETAMINOPHEN 650 MG: 325 TABLET, FILM COATED ORAL at 01:58

## 2020-09-18 RX ADMIN — LEVETIRACETAM 1500 MG: 500 INJECTION, SOLUTION INTRAVENOUS at 20:22

## 2020-09-18 RX ADMIN — ATORVASTATIN CALCIUM 40 MG: 40 TABLET, FILM COATED ORAL at 20:15

## 2020-09-18 RX ADMIN — LEVETIRACETAM 1500 MG: 500 INJECTION, SOLUTION INTRAVENOUS at 08:57

## 2020-09-18 RX ADMIN — ACETAMINOPHEN 650 MG: 325 TABLET, FILM COATED ORAL at 18:28

## 2020-09-18 ASSESSMENT — ACTIVITIES OF DAILY LIVING (ADL)
ADLS_ACUITY_SCORE: 20

## 2020-09-18 NOTE — PROGRESS NOTES
[facility] ICU RESPIRATORY NOTE        Date of Admission: 9/5/2020    Date of Intubation (most recent):9/11/2020    Reason for Mechanical Ventilation:Airway protection    Number of Days on Mechanical Ventilation:8    Met Criteria for Spontaneous Breathing Trial: Yes. Pt was on PS 12/5 about 4.5 hrs.     Significant Events Today:Pt tolerated PS trial well.    ABG Results: No lab results found in last 7 days.    Current Vent Settings: Ventilation Mode: CMV/AC  (Continuous Mandatory Ventilation/ Assist Control)  FiO2 (%): 30 %  Rate Set (breaths/minute): 12 breaths/min  Tidal Volume Set (mL): 500 mL  PEEP (cm H2O): 5 cmH2O  Pressure Support (cm H2O): 12 cmH2O  Oxygen Concentration (%): 30 %  Resp: 14    Plan:Will cont full vent support overnight and will assess for another PS trial in the morning.    Kady Bender, RT

## 2020-09-18 NOTE — PROGRESS NOTES
Critical Care Progress Note      09/18/2020    Name: Sherrell Leonard MRN#: [id number]   Age: 74 year old YOB: 1945     Hsptl Day# 13  ICU DAY # 13    MV DAY # 13             Problem List:   Active Problems:    Acute encephalopathy           Summary/Hospital Course:   74-year-old female admitted to the ICU for acute ventilatory failure and airway protection after left MCA subcortical stroke and left PCA stroke with multiple stenotic vessels.  Patient remains intubated.  However Versed had been weaned off however the patient received 4 mg Ativan overnight for questionable seizure activity.  Appreciate neuro critical care input and we will continue to monitor patient for possible seizure activity.  Of concern her neuro status is not improving and given her inability to protect her airway she may require tracheostomy.      Assessment and plan :     Sherrell Leonard IS a 74 year old female admitted on 9/5/2020 for management of acute respiratory failure, CVA and seizure.   I have personally reviewed the daily labs, imaging studies, cultures and discussed the case with referring physician and consulting physicians.     My assessment and plan by system for this patient is as follows:    Neurology/Psychiatry:   1.  Seizure disorder on continues EEG and Versed infusion.  The goal is burst suppression  2. prn Propofol for ICU sedation  3.  PRN opioids for breakthrough pain  Plan  Appreciate neuro critical care input, patient was weaned off Versed.  However the patient did receive 4 mg of Ativan overnight for questionable seizure activity.  We will continue to monitor with neuro critical and adjust antiepileptic drugs as needed.    Cardiovascular:   1.Hemodynamics -normotensive   2.Rhythm -normal sinus rhythm  3. Ischemia -no signs of ischemia  Plan  No active cardiovascular issues at this time.    Pulmonary/Ventilator Management:   1. Airway intubated for airway protection  2. Oxygenation/ventilation/mechanics on full  ventilatory support  Plan  -We will attempt a spontaneous breathing trial today, however I believe the evening patient tolerates her spontaneous breathing trial she would not be candidate for extubation given her altered mental status.  At this time we will maintain on full support with pressure support trials.    GI and Nutrition :   1.  Concern for protein calorie malnutrition    Plan  -Continue to advance tube feeds as tolerated.      Renal/Fluids/Electrolytes:   1.  Creatinine within normal limits no signs of acute kidney injury  2.  Continued hypokalemia  3.  Appears euvolemic    Plan  -Continue to monitor and replace electrolytes as per protocol.  - generally avoid nephrotoxic agents such as NSAID, IV contrast unless specifically required  - adjust medications as needed for renal clearance  - follow I/O's as appropriate.      Infectious Disease:   1.  E. coli UTI  2.  Culture of open lesion in patient's groin  3.  Plan  -Patient is status post antibiotic treatment we will continue to monitor. WBC count is trending down  Wait for the results of wound culture.  Patient did have a fever spike last night.  She was appropriately cultured.  Continue to follow until we have culture results.    Endocrine:   1 concern for stress-induced hyperglycemia  Plan  - ICU insulin protocol, goal sugar <180      Hematology/Oncology:   1. No leukocytosis  2. Anemia, no signs, symptoms of active blood loss  3.  Plan  -Continue to monitor            IV/Access:   1. Venous access -PICC line  2. Arterial access -no need for arterial line at this time  3.  Plan  - central access required and necessary      ICU Prophylaxis:   1. DVT: Mechanical  2. VAP: HOB 30 degrees, chlorhexidine rinse  3. Stress Ulcer: PPI  4. Restraints: Nonviolent soft two point restraints required and necessary for patient safety and continued cares and good effect as patient continues to pull at necessary lines, tubes despite education and distraction. Will  readdress daily.  Restraints not needed at this time   5. Wound care - per unit routine   6. Feeding - we will start tube feeds  7. Family Update: Discussed with daughter potentila need for trach given altered mental status  8. Disposition -ICU        Key goals for next 24 hours:   1.  Spontaneous breathing trial  2.  Monitor for seizure activity  3.  Await results of cultures.               Interim History:     Tolerating weaning of Versed  No signs of overt seizures.         Key Medications:       [Held by provider] amLODIPine  5 mg Oral Daily     aspirin  81 mg Per Feeding Tube Daily     atorvastatin  40 mg Oral or NG Tube Daily at 8 pm     chlorhexidine  15 mL Mouth/Throat Q12H     clobazam  10 mg Oral BID     clopidogrel  75 mg Oral or Feeding Tube Daily     heparin ANTICOAGULANT  5,000 Units Subcutaneous Q12H     levETIRAcetam  1,500 mg Intravenous Q12H     pantoprazole  40 mg Per Feeding Tube Daily     sodium chloride (PF)  10 mL Intracatheter Q8H     valproate (DEPACON) intermittent infusion  750 mg Intravenous Q8H       dextrose       - MEDICATION INSTRUCTIONS -       midazolam Stopped (09/17/20 1300)     sodium chloride 20 mL/hr at 09/16/20 2000               Physical Examination:   Temp:  [98.9  F (37.2  C)-101.2  F (38.4  C)] 98.9  F (37.2  C)  Pulse:  [64-89] 69  Resp:  [11-26] 12  BP: ()/(45-84) 171/70  FiO2 (%):  [30 %] 30 %  SpO2:  [95 %-99 %] 98 %    Intake/Output Summary (Last 24 hours) at 9/14/2020 1108  Last data filed at 9/14/2020 0800  Gross per 24 hour   Intake 417.1 ml   Output --   Net 417.1 ml     Wt Readings from Last 4 Encounters:   09/18/20 70.9 kg (156 lb 4.9 oz)     BP - Mean:  [] 114  Ventilation Mode: CMV/AC  (Continuous Mandatory Ventilation/ Assist Control)  FiO2 (%): 30 %  Rate Set (breaths/minute): 12 breaths/min  Tidal Volume Set (mL): 500 mL  PEEP (cm H2O): 5 cmH2O  Oxygen Concentration (%): 30 %  Resp: 12    No lab results found in last 7 days.    GEN: no acute  distress   HEENT: head ncat, sclera anicteric, OP patent, trachea midline   PULM: unlabored synchronous with vent, clear anteriorly    CV/COR: RRR S1S2 no gallop,  No rub, no murmur  ABD: soft nontender, hypoactive bowel sounds, no mass  EXT:  Edema   warm  NEURO: NONRESPONSIVE TO DEEP STERNAL RUB OFF VERSED INFUSION.    SKIN: no obvious rash  LINES: clean, dry intact         Data:   All data and imaging reviewed     ROUTINE ICU LABS (Last four results)  CMP  Recent Labs   Lab 09/18/20  0550 09/17/20  0400 09/16/20  1537 09/16/20  0435 09/15/20  0450  09/14/20  0510  09/13/20  0604    146*  --  146* 144  --  145*  --  143   POTASSIUM 3.5 4.2 4.3 3.3* 4.2   < > 3.3*   < > 3.1*   CHLORIDE 110* 112*  --  114* 113*  --  117*  --  114*   CO2 31 31  --  29 26  --  23  --  21   ANIONGAP 3 3  --  3 5  --  5  --  8   * 112*  --  142* 98  --  84  --  85   BUN 19 17  --  21 17  --  17  --  17   CR 0.53 0.56  --  0.56 0.54  --  0.55  --  0.64   GFRESTIMATED >90 >90  --  >90 >90  --  >90  --  87   GFRESTBLACK >90 >90  --  >90 >90  --  >90  --  >90   MARII 8.5 8.9  --  8.7 9.1  --  8.7  --  8.6   MAG  --   --   --   --   --   --   --   --  2.1   PHOS  --   --   --   --   --   --   --   --  3.5   PROTTOTAL  --   --   --   --   --   --  5.4*  --   --    ALBUMIN  --   --   --   --   --   --  2.1*  --   --    BILITOTAL  --   --   --   --   --   --  0.4  --   --    ALKPHOS  --   --   --   --   --   --  70  --   --    AST  --   --   --   --   --   --  27  --   --    ALT  --   --   --   --   --   --  20  --   --     < > = values in this interval not displayed.     CBC  Recent Labs   Lab 09/18/20  0550 09/18/20  0110 09/17/20  0400 09/16/20  0435   WBC 9.0 9.9 10.1 9.1   RBC 3.27* 3.56* 3.67* 3.47*   HGB 10.3* 10.9* 11.5* 10.8*   HCT 32.5* 35.4 36.6 34.2*   MCV 99 99 100 99   MCH 31.5 30.6 31.3 31.1   MCHC 31.7 30.8* 31.4* 31.6   RDW 13.5 13.5 13.4 13.3    300 306 242     INRNo lab results found in last 7  days.  Arterial Blood GasNo lab results found in last 7 days.    All cultures:  Recent Labs   Lab 09/18/20  0318   CULT PENDING     Recent Results (from the past 24 hour(s))   XR Chest Port 1 View    Narrative    EXAM: XR CHEST PORT 1 VW  LOCATION: [facility]  DATE/TIME: 9/18/2020 2:29 AM    INDICATION: Intubated.    COMPARISON: 9/14/2020.    FINDINGS: Semiupright portable chest. ET tube approximately 5 cm above the essence. Nasoenteric feeding tube passes into the stomach. Right PICC in place. No pneumothorax. The heart size is normal. The thoracic aorta is calcified and tortuous. There are   mild bibasilar infiltrates similar to the previous exam. Probable bilateral pleural effusions.      Impression    IMPRESSION: Persistent bibasilar infiltrates and pleural effusions.         Billing: This patient is critically ill: yes. Total critical care time today 31 min.

## 2020-09-18 NOTE — PROGRESS NOTES
EEG CLINICAL NEUROPHYSIOLOGY PRELIMINARY REPORT    Persistence of right hemispheric lateralizing periodic discharges has increased, now consistently 1 to 1.5 Hz. No associated jerking or other obvious clinical activity. Lateralizing periodic discharges remain largely confined to right hemisphere especially right parasaggital region. Does not meet standard criteria for ongoing seizures but has changed from this morning.    Discussed with managing service. Will continue video-EEG monitoring. Full report to follow.    Mike Haywood MD  Pager [phone]

## 2020-09-18 NOTE — PROGRESS NOTES
Bladder scan showed 350ml present with multiple scans.  Pt has Pure wick external catheter in proper position.

## 2020-09-18 NOTE — PROGRESS NOTES
SPIRITUAL HEALTH SERVICES  SPIRITUAL ASSESSMENT Progress Note  [facility] ICU     REFERRAL SOURCE: Follow Up    I shared a brief visit with Juan, patient's spouse at patient's bedside today. Juan is known to me from a previous visit. He shares they are doing okay. He named how difficult this is and that he is trying to take it one day at a time. He shares they are well supported by their family and that the   came by yesterday which they appreciated. Juan shared gratitude for the check in. I offered emotional support through reflective listening, validation of emotions/experiences and words of comfort and peace.     PLAN: I will continue to follow for support.     Liz Beavers  Associate    Phone: [phone]  Pager: [phone]

## 2020-09-18 NOTE — PROGRESS NOTES
EEG CLINICAL NEUROPHYSIOLOGY PRELIMINARY REPORT    EEG through approx 10 AM today reviewed. Off sedative drips. Lateralizing periodic discharges became less persistent following lorazapam administration at approx 9 PM yesterday. Subusequently largely burst suppression pattern while left alone. With stimulation right hemispheric lateralizing periodic discharge pattern reemerges for sometimes lengthy periods of time but slower and less sharp than previously. No electrographic seizures.    Study continues consistent with severe diffuse encephalopathy. There evidence of epileptogenic structural lesion over right posterior hemisphere. The persistence of this activity decreased since lorazapam yesterday. No electrographic seizures.    Will continue video-EEG monitoring. Full report to follow.    Mike Haywood MD  Pager [phone]

## 2020-09-18 NOTE — PLAN OF CARE
Pt remains unresponsive. Withdraws in all extremities. EEG in place.   Tele. NSR. Goal SBP <180. Hydralazine given x 1. BP improved.  Remains intubated. Tolerated PS today x 4 hours.   TF at goal. Rectal tube removed.  Purwick in place. Adequate output.    Donell at bedside and updated.

## 2020-09-18 NOTE — PROGRESS NOTES
[facility] ICU RESPIRATORY NOTE        Date of Admission: 9/5/2020    Date of Intubation (most recent): 9/11/20    Reason for Mechanical Ventilation: Airway protection     Number of Days on Mechanical Ventilation: 8    Met Criteria for Spontaneous Breathing Trial: No    Reason for No Spontaneous Breathing Trial: per MD     Significant Events Today: none during this shift     ABG Results: No lab results found in last 7 days.    Current Vent Settings: Ventilation Mode: CMV/AC  (Continuous Mandatory Ventilation/ Assist Control)  FiO2 (%): 30 %  Rate Set (breaths/minute): 12 breaths/min  Tidal Volume Set (mL): 500 mL  PEEP (cm H2O): 5 cmH2O  Oxygen Concentration (%): 30 %  Resp: 19      Skin Assessment: intact     Plan: continue full ventilatory support and assess for daily weaning readiness.     Jacquie Vásquez, RT

## 2020-09-18 NOTE — PROVIDER NOTIFICATION
Temp axillary at start of shift 101.2, removed blankets pt temp did drop but only to 100.3Axillary.  Dr. Sousa notified.  New orders for CXR, CBC, Procalcitonin and a UA along with Tylenol.

## 2020-09-18 NOTE — PLAN OF CARE
Pt remains on CEEG, 4mg Ativan given X1 along with start of Onfi.  MAx temp 101.2, pt ambiently let cool but only to 100.3.  Urine and sputum Cx's sent, tylenol given.  No seizure activity t/o night.  Bloody oral and In line secretions.  T.F. continues at goal rate of 40ml/hr.

## 2020-09-19 ENCOUNTER — APPOINTMENT (OUTPATIENT)
Dept: MRI IMAGING | Facility: CLINIC | Age: 75
DRG: 003 | End: 2020-09-19
Attending: PHYSICIAN ASSISTANT
Payer: MEDICARE

## 2020-09-19 ENCOUNTER — HOSPITAL ENCOUNTER (OUTPATIENT)
Dept: NEUROLOGY | Facility: CLINIC | Age: 75
DRG: 003 | End: 2020-09-19
Attending: PHYSICIAN ASSISTANT
Payer: MEDICARE

## 2020-09-19 LAB
ANION GAP SERPL CALCULATED.3IONS-SCNC: 3 MMOL/L (ref 3–14)
BUN SERPL-MCNC: 18 MG/DL (ref 7–30)
CALCIUM SERPL-MCNC: 8.5 MG/DL (ref 8.5–10.1)
CHLORIDE SERPL-SCNC: 109 MMOL/L (ref 94–109)
CO2 SERPL-SCNC: 33 MMOL/L (ref 20–32)
CREAT SERPL-MCNC: 0.44 MG/DL (ref 0.52–1.04)
ERYTHROCYTE [DISTWIDTH] IN BLOOD BY AUTOMATED COUNT: 13.5 % (ref 10–15)
GFR SERPL CREATININE-BSD FRML MDRD: >90 ML/MIN/{1.73_M2}
GLUCOSE BLDC GLUCOMTR-MCNC: 106 MG/DL (ref 70–99)
GLUCOSE BLDC GLUCOMTR-MCNC: 79 MG/DL (ref 70–99)
GLUCOSE BLDC GLUCOMTR-MCNC: 98 MG/DL (ref 70–99)
GLUCOSE SERPL-MCNC: 120 MG/DL (ref 70–99)
HCT VFR BLD AUTO: 33.9 % (ref 35–47)
HGB BLD-MCNC: 10.7 G/DL (ref 11.7–15.7)
MCH RBC QN AUTO: 31.5 PG (ref 26.5–33)
MCHC RBC AUTO-ENTMCNC: 31.6 G/DL (ref 31.5–36.5)
MCV RBC AUTO: 100 FL (ref 78–100)
PLATELET # BLD AUTO: 326 10E9/L (ref 150–450)
POTASSIUM SERPL-SCNC: 3.5 MMOL/L (ref 3.4–5.3)
RBC # BLD AUTO: 3.4 10E12/L (ref 3.8–5.2)
SODIUM SERPL-SCNC: 145 MMOL/L (ref 133–144)
WBC # BLD AUTO: 8.8 10E9/L (ref 4–11)

## 2020-09-19 PROCEDURE — 25000125 ZZHC RX 250: Performed by: PHYSICIAN ASSISTANT

## 2020-09-19 PROCEDURE — 00000146 ZZHCL STATISTIC GLUCOSE BY METER IP

## 2020-09-19 PROCEDURE — 25000128 H RX IP 250 OP 636: Performed by: PHYSICIAN ASSISTANT

## 2020-09-19 PROCEDURE — 70544 MR ANGIOGRAPHY HEAD W/O DYE: CPT

## 2020-09-19 PROCEDURE — 40000281 ZZH STATISTIC TRANSPORT TIME EA 15 MIN

## 2020-09-19 PROCEDURE — 95714 VEEG EA 12-26 HR UNMNTR: CPT

## 2020-09-19 PROCEDURE — 25000132 ZZH RX MED GY IP 250 OP 250 PS 637: Mod: GY | Performed by: HOSPITALIST

## 2020-09-19 PROCEDURE — 94003 VENT MGMT INPAT SUBQ DAY: CPT

## 2020-09-19 PROCEDURE — 25800030 ZZH RX IP 258 OP 636: Performed by: PHYSICIAN ASSISTANT

## 2020-09-19 PROCEDURE — 25800030 ZZH RX IP 258 OP 636: Performed by: INTERNAL MEDICINE

## 2020-09-19 PROCEDURE — 70551 MRI BRAIN STEM W/O DYE: CPT

## 2020-09-19 PROCEDURE — 25000128 H RX IP 250 OP 636: Performed by: STUDENT IN AN ORGANIZED HEALTH CARE EDUCATION/TRAINING PROGRAM

## 2020-09-19 PROCEDURE — 40000008 ZZH STATISTIC AIRWAY CARE

## 2020-09-19 PROCEDURE — 25800030 ZZH RX IP 258 OP 636: Performed by: NURSE PRACTITIONER

## 2020-09-19 PROCEDURE — 27210429 ZZH NUTRITION PRODUCT INTERMEDIATE LITER

## 2020-09-19 PROCEDURE — 80048 BASIC METABOLIC PNL TOTAL CA: CPT | Performed by: INTERNAL MEDICINE

## 2020-09-19 PROCEDURE — 40000275 ZZH STATISTIC RCP TIME EA 10 MIN

## 2020-09-19 PROCEDURE — 25000132 ZZH RX MED GY IP 250 OP 250 PS 637: Mod: GY | Performed by: STUDENT IN AN ORGANIZED HEALTH CARE EDUCATION/TRAINING PROGRAM

## 2020-09-19 PROCEDURE — 99291 CRITICAL CARE FIRST HOUR: CPT | Performed by: ANESTHESIOLOGY

## 2020-09-19 PROCEDURE — 25000128 H RX IP 250 OP 636: Performed by: NURSE PRACTITIONER

## 2020-09-19 PROCEDURE — 25000132 ZZH RX MED GY IP 250 OP 250 PS 637: Mod: GY | Performed by: INTERNAL MEDICINE

## 2020-09-19 PROCEDURE — 99291 CRITICAL CARE FIRST HOUR: CPT | Performed by: PSYCHIATRY & NEUROLOGY

## 2020-09-19 PROCEDURE — 25000128 H RX IP 250 OP 636: Performed by: ANESTHESIOLOGY

## 2020-09-19 PROCEDURE — 40000239 ZZH STATISTIC VAT ROUNDS

## 2020-09-19 PROCEDURE — 85027 COMPLETE CBC AUTOMATED: CPT | Performed by: INTERNAL MEDICINE

## 2020-09-19 PROCEDURE — 20000003 ZZH R&B ICU

## 2020-09-19 RX ADMIN — ASPIRIN 81 MG 81 MG: 81 TABLET ORAL at 09:35

## 2020-09-19 RX ADMIN — HEPARIN SODIUM 5000 UNITS: 5000 INJECTION, SOLUTION INTRAVENOUS; SUBCUTANEOUS at 09:36

## 2020-09-19 RX ADMIN — LEVETIRACETAM 1500 MG: 500 INJECTION, SOLUTION INTRAVENOUS at 08:12

## 2020-09-19 RX ADMIN — HYDRALAZINE HYDROCHLORIDE 10 MG: 20 INJECTION INTRAMUSCULAR; INTRAVENOUS at 17:02

## 2020-09-19 RX ADMIN — CHLORHEXIDINE GLUCONATE 15 ML: 1.2 RINSE ORAL at 09:27

## 2020-09-19 RX ADMIN — ATORVASTATIN CALCIUM 40 MG: 40 TABLET, FILM COATED ORAL at 19:54

## 2020-09-19 RX ADMIN — PANTOPRAZOLE SODIUM 40 MG: 40 TABLET, DELAYED RELEASE ORAL at 09:35

## 2020-09-19 RX ADMIN — SODIUM CHLORIDE: 9 INJECTION, SOLUTION INTRAVENOUS at 14:16

## 2020-09-19 RX ADMIN — LABETALOL HYDROCHLORIDE 20 MG: 5 INJECTION INTRAVENOUS at 02:19

## 2020-09-19 RX ADMIN — VALPROATE SODIUM 750 MG: 100 INJECTION, SOLUTION INTRAVENOUS at 02:33

## 2020-09-19 RX ADMIN — CLOBAZAM 10 MG: 10 TABLET ORAL at 20:06

## 2020-09-19 RX ADMIN — LEVETIRACETAM 1500 MG: 500 INJECTION, SOLUTION INTRAVENOUS at 19:58

## 2020-09-19 RX ADMIN — HYDRALAZINE HYDROCHLORIDE 10 MG: 20 INJECTION INTRAMUSCULAR; INTRAVENOUS at 09:33

## 2020-09-19 RX ADMIN — CLOPIDOGREL BISULFATE 75 MG: 75 TABLET, FILM COATED ORAL at 09:35

## 2020-09-19 RX ADMIN — MICONAZOLE NITRATE: 20 POWDER TOPICAL at 00:28

## 2020-09-19 RX ADMIN — HEPARIN SODIUM 5000 UNITS: 5000 INJECTION, SOLUTION INTRAVENOUS; SUBCUTANEOUS at 20:05

## 2020-09-19 RX ADMIN — VALPROATE SODIUM 750 MG: 100 INJECTION, SOLUTION INTRAVENOUS at 18:57

## 2020-09-19 RX ADMIN — CLOBAZAM 10 MG: 10 TABLET ORAL at 09:35

## 2020-09-19 RX ADMIN — LABETALOL HYDROCHLORIDE 20 MG: 5 INJECTION INTRAVENOUS at 18:10

## 2020-09-19 RX ADMIN — LABETALOL HYDROCHLORIDE 20 MG: 5 INJECTION INTRAVENOUS at 20:03

## 2020-09-19 RX ADMIN — VALPROATE SODIUM 750 MG: 100 INJECTION, SOLUTION INTRAVENOUS at 09:43

## 2020-09-19 RX ADMIN — CHLORHEXIDINE GLUCONATE 15 ML: 1.2 RINSE ORAL at 19:55

## 2020-09-19 ASSESSMENT — ACTIVITIES OF DAILY LIVING (ADL)
ADLS_ACUITY_SCORE: 20

## 2020-09-19 ASSESSMENT — MIFFLIN-ST. JEOR: SCORE: 1158.25

## 2020-09-19 NOTE — PROGRESS NOTES
[facility] ICU RESPIRATORY NOTE        Date of Admission: 9/5/2020    Date of Intubation (most recent): 9/11/20    Reason for Mechanical Ventilation: AW protection    Number of Days on Mechanical Ventilation: 9    Met Criteria for Spontaneous Breathing Trial: No    Reason for No Spontaneous Breathing Trial: perMD    Significant Events Today: none    ABG Results: No lab results found in last 7 days.    Current Vent Settings: Ventilation Mode: CMV/AC  (Continuous Mandatory Ventilation/ Assist Control)  FiO2 (%): 30 %  Rate Set (breaths/minute): 12 breaths/min  Tidal Volume Set (mL): 500 mL  PEEP (cm H2O): 5 cmH2O  Pressure Support (cm H2O): 12 cmH2O  Oxygen Concentration (%): 30 %  Resp: 12      Skin Assessment: Intact    Plan: Assess for SBT    Mariama Mora RT

## 2020-09-19 NOTE — PROGRESS NOTES
EEG CLINICAL NEUROPHYSIOLOGY PRELIMINARY REPORT    Video EEG through approx 9 AM reviewed. Occasional periods of discontinuous EEG. Longer periods of continuous bifrontal alpha. Long periods of right posterior hemisphere lateralizing periodic discharges. These occur at rate of 1.5 Hz or less. No electrographic seizures. RN makr period of eye flutter 913 AM today; no seizure discharge seen during this period of time.    Study continues consistent with moderate to severe, at times severe diffuse encephalopathy. Continuing evidence of irritative structural lesion right posterior hemisphere. No seizures. Full report to follow.    Mike Haywood MD  Pager [phone]

## 2020-09-19 NOTE — PROGRESS NOTES
"  [facility]    Stroke Progress Note    Interval Events  Preliminary EEG read shows \"Study continues consistent with moderate to severe, at times severe diffuse encephalopathy. Continuing evidence of irritative structural lesion right posterior hemisphere. No seizures.\"  Remains intubated and not following commands  VPA level 75 yesterday afternoon. Check another set of levels tomorrow AM    Impression & Plan  Scattered intracranial atherosclerosis with severe focal left M1 stenosis, now s/p balloon angioplasty 9/11   Left MCA syndrome  New R MCA infarcts  - Continue Q2 hour neurochecks  - SBP goal 140-180 mmHg  - DAPT with ASA 81 mg daily + Plavix 75 mg daily   - Continue Lipitor 40 mg daily  - Consideration for possible stent at site of L M1 angioplasty given stenosis on MRA. Will focus on seizure management at this time, but potential intervention pending improvement from seizure perspective  - Recheck MRI/MRA given lack of significant improvement     Non-convulsive status epilepticus (NCSE), resolved  - Continue vEEG   - Continue Keppra 1500 mg q12 hours  - Continue VPA 750mg q8  - Continue onfi 10mg q12 hours    We will follow.     Elsa Bruno PA-C  Neurology  09/19/2020 8:59 AM  To page me or covering stroke neurology team member, click here: AMCOM  Choose \"On Call\" tab at top, then search dropdown box for \"Neurology Adult\" & press Enter, look for Neuro ICU/Stroke      ______________________________________________________    Medications     Scheduled Meds    [Held by provider] amLODIPine  5 mg Oral Daily     aspirin  81 mg Per Feeding Tube Daily     atorvastatin  40 mg Oral or NG Tube Daily at 8 pm     chlorhexidine  15 mL Mouth/Throat Q12H     clobazam  10 mg Oral BID     clopidogrel  75 mg Oral or Feeding Tube Daily     heparin ANTICOAGULANT  5,000 Units Subcutaneous Q12H     levETIRAcetam  1,500 mg Intravenous Q12H     pantoprazole  40 mg Per Feeding Tube Daily     sodium chloride " (PF)  10 mL Intracatheter Q8H     valproate (DEPACON) intermittent infusion  750 mg Intravenous Q8H       Infusion Meds    dextrose       - MEDICATION INSTRUCTIONS -       sodium chloride 20 mL/hr at 09/16/20 2000       PRN Meds  acetaminophen **OR** acetaminophen, bisacodyl, dextrose, glucose **OR** dextrose **OR** glucagon, fentaNYL, hydrALAZINE, labetalol, lidocaine 4%, lidocaine (buffered or not buffered), LORazepam, - MEDICATION INSTRUCTIONS -, melatonin, miconazole, naloxone, ondansetron **OR** ondansetron, polyethylene glycol, potassium chloride, potassium chloride with lidocaine, potassium chloride, potassium chloride, potassium chloride, prochlorperazine **OR** prochlorperazine **OR** prochlorperazine, senna-docusate **OR** senna-docusate, sodium chloride (PF), sodium chloride (PF)       PHYSICAL EXAMINATION  Temp:  [98.4  F (36.9  C)-100.9  F (38.3  C)] 99.5  F (37.5  C)  Pulse:  [65-87] 67  Resp:  [9-23] 12  BP: (130-184)/(62-86) 135/65  FiO2 (%):  [30 %] 30 %  SpO2:  [97 %-100 %] 98 %     Neurologic  Mental Status:  Intubated, does not open eyes to voice or noxious stimuli, does not follow commands  Cranial Nerves:  PERRL, difficult to assess facial symmetry with ETT fastener, does not protrude tongue  Motor:  normal muscle tone and bulk, no abnormal movements, withdraws both LEs but left more brisk, minimal  w/d in upper extremities R>L  Reflexes:  L toe possibly upgoing, R toe mute  Sensory:  see motor exam  Coordination:  unable to assess  Station/Gait:  deferred     Imaging  I personally reviewed all imaging; relevant findings per HPI.     Lab Results Data   CBC  Recent Labs   Lab 09/19/20  0427 09/18/20  0550 09/18/20  0110   WBC 8.8 9.0 9.9   RBC 3.40* 3.27* 3.56*   HGB 10.7* 10.3* 10.9*   HCT 33.9* 32.5* 35.4    305 300     Basic Metabolic Panel    Recent Labs   Lab 09/19/20  0427 09/18/20  0550 09/17/20  0400   * 144 146*   POTASSIUM 3.5 3.5 4.2   CHLORIDE 109 110* 112*   CO2 33* 31  31   BUN 18 19 17   CR 0.44* 0.53 0.56   * 107* 112*   MARII 8.5 8.5 8.9     Liver Panel  Recent Labs   Lab 09/14/20  0510   PROTTOTAL 5.4*   ALBUMIN 2.1*   BILITOTAL 0.4   ALKPHOS 70   AST 27   ALT 20     INR    Recent Labs   Lab Test 09/07/20  0758   INR 1.05      Lipid Profile    Recent Labs   Lab Test 09/05/20  1256   CHOL 186   HDL 87   LDL 81   TRIG 89     A1C    Recent Labs   Lab Test 09/05/20  1256   A1C 5.5     Troponin I  No results for input(s): TROPI in the last 168 hours.

## 2020-09-19 NOTE — PROGRESS NOTES
Critical Care Progress Note      09/19/2020    Name: Sherrell Leonard MRN#: [id number]   Age: 74 year old YOB: 1945     Hsptl Day# 14  ICU DAY # 14    MV DAY # 14             Problem List:   Active Problems:    Acute encephalopathy           Summary/Hospital Course:   74-year-old female admitted to the ICU for acute ventilatory failure and airway protection after left MCA subcortical stroke and left PCA stroke with multiple stenotic vessels.  Patient remains intubated.  Patient remains off sedation however her neurologic exam is still comatose.  EEG shows no further evidence of seizures.        Assessment and plan :     Sherrell Leonard IS a 74 year old female admitted on 9/5/2020 for management of acute respiratory failure, CVA and seizure.   I have personally reviewed the daily labs, imaging studies, cultures and discussed the case with referring physician and consulting physicians.     My assessment and plan by system for this patient is as follows:    Neurology/Psychiatry:   1.  Seizure disorder on continues EEG and Versed infusion.  The goal is burst suppression  2. prn Propofol for ICU sedation  3.  PRN opioids for breakthrough pain  Plan  Appreciate neuro critical care input.  I would recommend holding benzodiazepines at this time as we will try to evaluate the patient's underlying neurologic status.  Concern that patient will not retain enough for her neurologic status to protect her airway and may need tracheostomy.  This is been discussed with patient's family.    Cardiovascular:   1.Hemodynamics -normotensive   2.Rhythm -normal sinus rhythm  3. Ischemia -no signs of ischemia  Plan  No active cardiovascular issues at this time.    Pulmonary/Ventilator Management:   1. Airway intubated for airway protection  2. Oxygenation/ventilation/mechanics on full ventilatory support  Plan  -We will attempt another spontaneous breathing trial today.  However even if the patient passes a spontaneous breathing trial  she is not a candidate for extubation given the fact that she cannot protect her airway.      GI and Nutrition :   1.  Concern for protein calorie malnutrition    Plan  -Continue to advance tube feeds as tolerated.      Renal/Fluids/Electrolytes:   1.  Creatinine within normal limits no signs of acute kidney injury  2.  Continued hypokalemia  3.  Appears euvolemic    Plan  -Continue to monitor and replace electrolytes as per protocol.  - generally avoid nephrotoxic agents such as NSAID, IV contrast unless specifically required  - adjust medications as needed for renal clearance  - follow I/O's as appropriate.      Infectious Disease:   1.  E. coli UTI  2.  Culture of open lesion in patient's groin  3.  Plan  -Patient is status post antibiotic treatment we will continue to monitor. WBC count is trending down  Wait for the results of wound culture.  Patient did have a fever spike last night.  She was appropriately cultured.  Continue to follow until we have culture results.  Sputum culture showed normal kena so far.    Endocrine:   1 concern for stress-induced hyperglycemia  Plan  - ICU insulin protocol, goal sugar <180      Hematology/Oncology:   1. No leukocytosis  2. Anemia, no signs, symptoms of active blood loss  3.  Plan  -Continue to monitor            IV/Access:   1. Venous access -PICC line  2. Arterial access -no need for arterial line at this time  3.  Plan  - central access required and necessary      ICU Prophylaxis:   1. DVT: Mechanical  2. VAP: HOB 30 degrees, chlorhexidine rinse  3. Stress Ulcer: PPI  4. Restraints: Nonviolent soft two point restraints required and necessary for patient safety and continued cares and good effect as patient continues to pull at necessary lines, tubes despite education and distraction. Will readdress daily.  Restraints not needed at this time   5. Wound care - per unit routine   6. Feeding - we will start tube feeds  7. Family Update: Discussed with daughter mom's  mental status I would be willing to a few more days to evaluate benzodiazepine being eliminated from her system.  However I did express my concern that if her mom was not wake up and she may need tracheostomy.  8. Disposition -ICU        Key goals for next 24 hours:   1.  Spontaneous breathing trial  2.  Monitor for seizure activity  3.  Await results of cultures.               Interim History:     Patient still with altered mental status.  Not a candidate for extubation at this time.         Key Medications:       [Held by provider] amLODIPine  5 mg Oral Daily     aspirin  81 mg Per Feeding Tube Daily     atorvastatin  40 mg Oral or NG Tube Daily at 8 pm     chlorhexidine  15 mL Mouth/Throat Q12H     clobazam  10 mg Oral BID     clopidogrel  75 mg Oral or Feeding Tube Daily     heparin ANTICOAGULANT  5,000 Units Subcutaneous Q12H     levETIRAcetam  1,500 mg Intravenous Q12H     pantoprazole  40 mg Per Feeding Tube Daily     sodium chloride (PF)  10 mL Intracatheter Q8H     valproate (DEPACON) intermittent infusion  750 mg Intravenous Q8H       dextrose       - MEDICATION INSTRUCTIONS -       sodium chloride 20 mL/hr at 09/16/20 2000               Physical Examination:   Temp:  [98.4  F (36.9  C)-100.9  F (38.3  C)] 100.6  F (38.1  C)  Pulse:  [67-87] 74  Resp:  [9-23] 13  BP: (129-196)/(61-86) 196/83  FiO2 (%):  [30 %] 30 %  SpO2:  [97 %-100 %] 97 %    Intake/Output Summary (Last 24 hours) at 9/14/2020 1108  Last data filed at 9/14/2020 0800  Gross per 24 hour   Intake 417.1 ml   Output --   Net 417.1 ml     Wt Readings from Last 4 Encounters:   09/19/20 71 kg (156 lb 8.4 oz)     BP - Mean:  [] 127  Ventilation Mode: CMV/AC  (Continuous Mandatory Ventilation/ Assist Control)  FiO2 (%): 30 %  Rate Set (breaths/minute): 12 breaths/min  Tidal Volume Set (mL): 500 mL  PEEP (cm H2O): 5 cmH2O  Pressure Support (cm H2O): 12 cmH2O  Oxygen Concentration (%): 30 %  Resp: 13    No lab results found in last 7  days.    GEN: no acute distress   HEENT: head ncat, sclera anicteric, OP patent, trachea midline   PULM: unlabored synchronous with vent, clear anteriorly    CV/COR: RRR S1S2 no gallop,  No rub, no murmur  ABD: soft nontender, hypoactive bowel sounds, no mass  EXT:  Edema   warm  NEURO: NONRESPONSIVE TO DEEP STERNAL RUB OFF VERSED INFUSION.    SKIN: no obvious rash  LINES: clean, dry intact         Data:   All data and imaging reviewed     ROUTINE ICU LABS (Last four results)  CMP  Recent Labs   Lab 09/19/20  0427 09/18/20  0550 09/17/20  0400 09/16/20  1537 09/16/20  0435  09/14/20  0510  09/13/20  0604   * 144 146*  --  146*   < > 145*  --  143   POTASSIUM 3.5 3.5 4.2 4.3 3.3*   < > 3.3*   < > 3.1*   CHLORIDE 109 110* 112*  --  114*   < > 117*  --  114*   CO2 33* 31 31  --  29   < > 23  --  21   ANIONGAP 3 3 3  --  3   < > 5  --  8   * 107* 112*  --  142*   < > 84  --  85   BUN 18 19 17  --  21   < > 17  --  17   CR 0.44* 0.53 0.56  --  0.56   < > 0.55  --  0.64   GFRESTIMATED >90 >90 >90  --  >90   < > >90  --  87   GFRESTBLACK >90 >90 >90  --  >90   < > >90  --  >90   MARII 8.5 8.5 8.9  --  8.7   < > 8.7  --  8.6   MAG  --   --   --   --   --   --   --   --  2.1   PHOS  --   --   --   --   --   --   --   --  3.5   PROTTOTAL  --   --   --   --   --   --  5.4*  --   --    ALBUMIN  --   --   --   --   --   --  2.1*  --   --    BILITOTAL  --   --   --   --   --   --  0.4  --   --    ALKPHOS  --   --   --   --   --   --  70  --   --    AST  --   --   --   --   --   --  27  --   --    ALT  --   --   --   --   --   --  20  --   --     < > = values in this interval not displayed.     CBC  Recent Labs   Lab 09/19/20  0427 09/18/20  0550 09/18/20  0110 09/17/20  0400   WBC 8.8 9.0 9.9 10.1   RBC 3.40* 3.27* 3.56* 3.67*   HGB 10.7* 10.3* 10.9* 11.5*   HCT 33.9* 32.5* 35.4 36.6    99 99 100   MCH 31.5 31.5 30.6 31.3   MCHC 31.6 31.7 30.8* 31.4*   RDW 13.5 13.5 13.5 13.4    305 300 306     INRNo lab  results found in last 7 days.  Arterial Blood GasNo lab results found in last 7 days.    All cultures:  Recent Labs   Lab 09/18/20  0318   CULT Light growth  Normal kena to date       No results found for this or any previous visit (from the past 24 hour(s)).      Billing: This patient is critically ill: yes. Total critical care time today 32 min.

## 2020-09-19 NOTE — PLAN OF CARE
Pt remains off sedation with minimal to no response to stimulation, withdraws to cares.  Bloody oral and in line ETT suctioning continues.  Tongue appears bitten despite bite block.   Lungs remain coarse.  External catheter remains in place, marginal urine output t/o  the night.  Labetalol X1 for SBP>180.   EEG continues.

## 2020-09-19 NOTE — PROGRESS NOTES
[facility] ICU RESPIRATORY NOTE        Date of Admission: 9/5/2020    Date of Intubation (most recent): 9/11/2020    Reason for Mechanical Ventilation: 9    Number of Days on Mechanical Ventilation: No    Met Criteria for Spontaneous Breathing Trial: No    Reason for No Spontaneous Breathing Trial: Per MD    Significant Events Today: None    ABG Results: No lab results found in last 7 days.    Current Vent Settings: Ventilation Mode: CMV/AC  (Continuous Mandatory Ventilation/ Assist Control)  FiO2 (%): 30 %  Rate Set (breaths/minute): 12 breaths/min  Tidal Volume Set (mL): 500 mL  PEEP (cm H2O): 5 cmH2O  Pressure Support (cm H2O): 12 cmH2O  Oxygen Concentration (%): 30 %  Resp: 14      Skin Assessment: No issues    Plan: Continue full ventilatory support t/o night shift    Julián Oliveira RT

## 2020-09-19 NOTE — PROGRESS NOTES
"  [facility]    Stroke Progress Note    Interval Events  Preliminary EEG read shows \"Study continues consistent with moderate to severe, at times severe diffuse encephalopathy. Continuing evidence of irritative structural lesion right posterior hemisphere. No seizures.\"  Remains intubated and not following commands  VPA level 75 yesterday afternoon. Check another set of levels tomorrow AM    Impression & Plan  Scattered intracranial atherosclerosis with severe focal left M1 stenosis, now s/p balloon angioplasty 9/11   Left MCA syndrome  New R MCA infarcts  - Continue Q2 hour neurochecks  - SBP goal 140-180 mmHg  - DAPT with ASA 81 mg daily + Plavix 75 mg daily   - Continue Lipitor 40 mg daily  - Consideration for possible stent at site of L M1 angioplasty given stenosis on MRA. Will focus on seizure management at this time, but potential intervention pending improvement from seizure perspective  - Recheck brain MRI given lack of significant improvement     Non-convulsive status epilepticus (NCSE), resolved  - Continue vEEG   - Continue Keppra 1500 mg q12 hours  - Continue VPA 750mg q8  - Continue onfi 10mg q12 hours    We will follow.     Elsa Bruno PA-C  Neurology  09/19/2020 2:19 PM  To page me or covering stroke neurology team member, click here: AMCOM  Choose \"On Call\" tab at top, then search dropdown box for \"Neurology Adult\" & press Enter, look for Neuro ICU/Stroke      ______________________________________________________    Medications     Scheduled Meds    [Held by provider] amLODIPine  5 mg Oral Daily     aspirin  81 mg Per Feeding Tube Daily     atorvastatin  40 mg Oral or NG Tube Daily at 8 pm     chlorhexidine  15 mL Mouth/Throat Q12H     clobazam  10 mg Oral BID     clopidogrel  75 mg Oral or Feeding Tube Daily     heparin ANTICOAGULANT  5,000 Units Subcutaneous Q12H     levETIRAcetam  1,500 mg Intravenous Q12H     pantoprazole  40 mg Per Feeding Tube Daily     sodium chloride " (PF)  10 mL Intracatheter Q8H     valproate (DEPACON) intermittent infusion  750 mg Intravenous Q8H       Infusion Meds    dextrose       - MEDICATION INSTRUCTIONS -       sodium chloride 10 mL/hr at 09/19/20 1416       PRN Meds  acetaminophen **OR** acetaminophen, bisacodyl, dextrose, glucose **OR** dextrose **OR** glucagon, fentaNYL, hydrALAZINE, labetalol, lidocaine 4%, lidocaine (buffered or not buffered), LORazepam, - MEDICATION INSTRUCTIONS -, melatonin, miconazole, naloxone, ondansetron **OR** ondansetron, polyethylene glycol, potassium chloride, potassium chloride with lidocaine, potassium chloride, potassium chloride, potassium chloride, prochlorperazine **OR** prochlorperazine **OR** prochlorperazine, senna-docusate **OR** senna-docusate, sodium chloride (PF), sodium chloride (PF)       PHYSICAL EXAMINATION  Temp:  [98.4  F (36.9  C)-100.9  F (38.3  C)] 99.5  F (37.5  C)  Pulse:  [67-87] 68  Resp:  [11-21] 12  BP: (113-196)/(50-86) 121/52  FiO2 (%):  [30 %] 30 %  SpO2:  [97 %-100 %] 97 %     Neurologic  Mental Status:  Intubated, does not open eyes to voice or noxious stimuli, does not follow commands  Cranial Nerves:  PERRL, difficult to assess facial symmetry with ETT fastener, does not protrude tongue  Motor:  normal muscle tone and bulk, no abnormal movements, withdraws both LEs but left more brisk, minimal  w/d in upper extremities R>L  Reflexes:  L toe possibly upgoing, R toe mute  Sensory:  see motor exam  Coordination:  unable to assess  Station/Gait:  deferred     Imaging  I personally reviewed all imaging; relevant findings per HPI.     Lab Results Data   CBC  Recent Labs   Lab 09/19/20  0427 09/18/20  0550 09/18/20  0110   WBC 8.8 9.0 9.9   RBC 3.40* 3.27* 3.56*   HGB 10.7* 10.3* 10.9*   HCT 33.9* 32.5* 35.4    305 300     Basic Metabolic Panel    Recent Labs   Lab 09/19/20  0427 09/18/20  0550 09/17/20  0400   * 144 146*   POTASSIUM 3.5 3.5 4.2   CHLORIDE 109 110* 112*   CO2 33* 31  31   BUN 18 19 17   CR 0.44* 0.53 0.56   * 107* 112*   MARII 8.5 8.5 8.9     Liver Panel  Recent Labs   Lab 09/14/20  0510   PROTTOTAL 5.4*   ALBUMIN 2.1*   BILITOTAL 0.4   ALKPHOS 70   AST 27   ALT 20     INR    Recent Labs   Lab Test 09/07/20  0758   INR 1.05      Lipid Profile    Recent Labs   Lab Test 09/05/20  1256   CHOL 186   HDL 87   LDL 81   TRIG 89     A1C    Recent Labs   Lab Test 09/05/20  1256   A1C 5.5     Troponin I  No results for input(s): TROPI in the last 168 hours.

## 2020-09-20 ENCOUNTER — HOSPITAL ENCOUNTER (OUTPATIENT)
Dept: NEUROLOGY | Facility: CLINIC | Age: 75
DRG: 003 | End: 2020-09-20
Attending: PHYSICIAN ASSISTANT
Payer: MEDICARE

## 2020-09-20 LAB
ALBUMIN UR-MCNC: NEGATIVE MG/DL
ANION GAP SERPL CALCULATED.3IONS-SCNC: 4 MMOL/L (ref 3–14)
APPEARANCE UR: CLEAR
BACTERIA SPEC CULT: NORMAL
BILIRUB UR QL STRIP: NEGATIVE
BUN SERPL-MCNC: 20 MG/DL (ref 7–30)
CALCIUM SERPL-MCNC: 9 MG/DL (ref 8.5–10.1)
CHLORIDE SERPL-SCNC: 109 MMOL/L (ref 94–109)
CO2 SERPL-SCNC: 32 MMOL/L (ref 20–32)
COLOR UR AUTO: YELLOW
CREAT SERPL-MCNC: 0.52 MG/DL (ref 0.52–1.04)
ERYTHROCYTE [DISTWIDTH] IN BLOOD BY AUTOMATED COUNT: 13.5 % (ref 10–15)
GFR SERPL CREATININE-BSD FRML MDRD: >90 ML/MIN/{1.73_M2}
GLUCOSE SERPL-MCNC: 123 MG/DL (ref 70–99)
GLUCOSE UR STRIP-MCNC: NEGATIVE MG/DL
GRAM STN SPEC: ABNORMAL
GRAM STN SPEC: ABNORMAL
HCT VFR BLD AUTO: 35.5 % (ref 35–47)
HGB BLD-MCNC: 11.3 G/DL (ref 11.7–15.7)
HGB UR QL STRIP: NEGATIVE
KETONES UR STRIP-MCNC: NEGATIVE MG/DL
LEUKOCYTE ESTERASE UR QL STRIP: ABNORMAL
Lab: ABNORMAL
Lab: NORMAL
MCH RBC QN AUTO: 31.6 PG (ref 26.5–33)
MCHC RBC AUTO-ENTMCNC: 31.8 G/DL (ref 31.5–36.5)
MCV RBC AUTO: 99 FL (ref 78–100)
MUCOUS THREADS #/AREA URNS LPF: PRESENT /LPF
NITRATE UR QL: NEGATIVE
PH UR STRIP: 6.5 PH (ref 5–7)
PLATELET # BLD AUTO: 357 10E9/L (ref 150–450)
POTASSIUM SERPL-SCNC: 3.8 MMOL/L (ref 3.4–5.3)
PROCALCITONIN SERPL-MCNC: <0.05 NG/ML
RBC # BLD AUTO: 3.58 10E12/L (ref 3.8–5.2)
RBC #/AREA URNS AUTO: 2 /HPF (ref 0–2)
SODIUM SERPL-SCNC: 145 MMOL/L (ref 133–144)
SOURCE: ABNORMAL
SP GR UR STRIP: 1.02 (ref 1–1.03)
SPECIMEN SOURCE: ABNORMAL
SPECIMEN SOURCE: NORMAL
SQUAMOUS #/AREA URNS AUTO: 2 /HPF (ref 0–1)
UROBILINOGEN UR STRIP-MCNC: NORMAL MG/DL (ref 0–2)
VALPROATE SERPL-MCNC: 73 MG/L (ref 50–100)
WBC # BLD AUTO: 10.2 10E9/L (ref 4–11)
WBC #/AREA URNS AUTO: 2 /HPF (ref 0–5)

## 2020-09-20 PROCEDURE — 25000128 H RX IP 250 OP 636: Performed by: NURSE PRACTITIONER

## 2020-09-20 PROCEDURE — 84145 PROCALCITONIN (PCT): CPT | Performed by: PHYSICIAN ASSISTANT

## 2020-09-20 PROCEDURE — 25000125 ZZHC RX 250: Performed by: PHYSICIAN ASSISTANT

## 2020-09-20 PROCEDURE — 87077 CULTURE AEROBIC IDENTIFY: CPT | Performed by: ANESTHESIOLOGY

## 2020-09-20 PROCEDURE — 25800030 ZZH RX IP 258 OP 636: Performed by: INTERNAL MEDICINE

## 2020-09-20 PROCEDURE — 25000132 ZZH RX MED GY IP 250 OP 250 PS 637: Mod: GY | Performed by: HOSPITALIST

## 2020-09-20 PROCEDURE — 99291 CRITICAL CARE FIRST HOUR: CPT | Performed by: ANESTHESIOLOGY

## 2020-09-20 PROCEDURE — 25000132 ZZH RX MED GY IP 250 OP 250 PS 637: Mod: GY | Performed by: INTERNAL MEDICINE

## 2020-09-20 PROCEDURE — 81001 URINALYSIS AUTO W/SCOPE: CPT | Performed by: ANESTHESIOLOGY

## 2020-09-20 PROCEDURE — 25000132 ZZH RX MED GY IP 250 OP 250 PS 637: Mod: GY | Performed by: STUDENT IN AN ORGANIZED HEALTH CARE EDUCATION/TRAINING PROGRAM

## 2020-09-20 PROCEDURE — 25000128 H RX IP 250 OP 636: Performed by: STUDENT IN AN ORGANIZED HEALTH CARE EDUCATION/TRAINING PROGRAM

## 2020-09-20 PROCEDURE — 25000128 H RX IP 250 OP 636: Performed by: PHYSICIAN ASSISTANT

## 2020-09-20 PROCEDURE — 25800030 ZZH RX IP 258 OP 636: Performed by: NURSE PRACTITIONER

## 2020-09-20 PROCEDURE — 27210429 ZZH NUTRITION PRODUCT INTERMEDIATE LITER

## 2020-09-20 PROCEDURE — 87186 SC STD MICRODIL/AGAR DIL: CPT | Performed by: ANESTHESIOLOGY

## 2020-09-20 PROCEDURE — 40000061 EEG VIDEO 12-26 HR UNMONITORED

## 2020-09-20 PROCEDURE — 25800030 ZZH RX IP 258 OP 636: Performed by: PHYSICIAN ASSISTANT

## 2020-09-20 PROCEDURE — 87040 BLOOD CULTURE FOR BACTERIA: CPT | Performed by: ANESTHESIOLOGY

## 2020-09-20 PROCEDURE — 87070 CULTURE OTHR SPECIMN AEROBIC: CPT | Performed by: ANESTHESIOLOGY

## 2020-09-20 PROCEDURE — 80165 DIPROPYLACETIC ACID FREE: CPT | Performed by: PHYSICIAN ASSISTANT

## 2020-09-20 PROCEDURE — 25000125 ZZHC RX 250: Performed by: ANESTHESIOLOGY

## 2020-09-20 PROCEDURE — 20000003 ZZH R&B ICU

## 2020-09-20 PROCEDURE — 87205 SMEAR GRAM STAIN: CPT | Performed by: ANESTHESIOLOGY

## 2020-09-20 PROCEDURE — 40000008 ZZH STATISTIC AIRWAY CARE

## 2020-09-20 PROCEDURE — 80048 BASIC METABOLIC PNL TOTAL CA: CPT | Performed by: INTERNAL MEDICINE

## 2020-09-20 PROCEDURE — 99291 CRITICAL CARE FIRST HOUR: CPT | Performed by: PSYCHIATRY & NEUROLOGY

## 2020-09-20 PROCEDURE — 85027 COMPLETE CBC AUTOMATED: CPT | Performed by: INTERNAL MEDICINE

## 2020-09-20 PROCEDURE — 25000128 H RX IP 250 OP 636: Performed by: ANESTHESIOLOGY

## 2020-09-20 PROCEDURE — 94003 VENT MGMT INPAT SUBQ DAY: CPT

## 2020-09-20 PROCEDURE — 80164 ASSAY DIPROPYLACETIC ACD TOT: CPT | Performed by: PHYSICIAN ASSISTANT

## 2020-09-20 PROCEDURE — 40000275 ZZH STATISTIC RCP TIME EA 10 MIN

## 2020-09-20 PROCEDURE — 40000239 ZZH STATISTIC VAT ROUNDS

## 2020-09-20 PROCEDURE — 25800030 ZZH RX IP 258 OP 636: Performed by: ANESTHESIOLOGY

## 2020-09-20 RX ORDER — METOPROLOL TARTRATE 1 MG/ML
5 INJECTION, SOLUTION INTRAVENOUS EVERY 6 HOURS
Status: DISCONTINUED | OUTPATIENT
Start: 2020-09-20 | End: 2020-09-26

## 2020-09-20 RX ADMIN — ACETAMINOPHEN 650 MG: 325 TABLET, FILM COATED ORAL at 07:58

## 2020-09-20 RX ADMIN — SODIUM CHLORIDE, POTASSIUM CHLORIDE, SODIUM LACTATE AND CALCIUM CHLORIDE 500 ML: 600; 310; 30; 20 INJECTION, SOLUTION INTRAVENOUS at 09:00

## 2020-09-20 RX ADMIN — VALPROATE SODIUM 750 MG: 100 INJECTION, SOLUTION INTRAVENOUS at 18:50

## 2020-09-20 RX ADMIN — CHLORHEXIDINE GLUCONATE 15 ML: 1.2 RINSE ORAL at 20:20

## 2020-09-20 RX ADMIN — CLOBAZAM 10 MG: 10 TABLET ORAL at 20:19

## 2020-09-20 RX ADMIN — HYDRALAZINE HYDROCHLORIDE 10 MG: 20 INJECTION INTRAMUSCULAR; INTRAVENOUS at 03:04

## 2020-09-20 RX ADMIN — SODIUM CHLORIDE: 9 INJECTION, SOLUTION INTRAVENOUS at 04:00

## 2020-09-20 RX ADMIN — VALPROATE SODIUM 750 MG: 100 INJECTION, SOLUTION INTRAVENOUS at 10:11

## 2020-09-20 RX ADMIN — LABETALOL HYDROCHLORIDE 20 MG: 5 INJECTION INTRAVENOUS at 13:02

## 2020-09-20 RX ADMIN — ASPIRIN 81 MG 81 MG: 81 TABLET ORAL at 09:41

## 2020-09-20 RX ADMIN — CLOPIDOGREL BISULFATE 75 MG: 75 TABLET, FILM COATED ORAL at 09:40

## 2020-09-20 RX ADMIN — ATORVASTATIN CALCIUM 40 MG: 40 TABLET, FILM COATED ORAL at 20:19

## 2020-09-20 RX ADMIN — LEVETIRACETAM 1500 MG: 500 INJECTION, SOLUTION INTRAVENOUS at 20:20

## 2020-09-20 RX ADMIN — HEPARIN SODIUM 5000 UNITS: 5000 INJECTION, SOLUTION INTRAVENOUS; SUBCUTANEOUS at 20:19

## 2020-09-20 RX ADMIN — HYDRALAZINE HYDROCHLORIDE 10 MG: 20 INJECTION INTRAMUSCULAR; INTRAVENOUS at 11:40

## 2020-09-20 RX ADMIN — LEVETIRACETAM 1500 MG: 500 INJECTION, SOLUTION INTRAVENOUS at 07:40

## 2020-09-20 RX ADMIN — METOPROLOL TARTRATE 5 MG: 5 INJECTION, SOLUTION INTRAVENOUS at 20:19

## 2020-09-20 RX ADMIN — CLOBAZAM 10 MG: 10 TABLET ORAL at 09:41

## 2020-09-20 RX ADMIN — VALPROATE SODIUM 750 MG: 100 INJECTION, SOLUTION INTRAVENOUS at 01:49

## 2020-09-20 RX ADMIN — METOPROLOL TARTRATE 5 MG: 5 INJECTION, SOLUTION INTRAVENOUS at 09:45

## 2020-09-20 RX ADMIN — CHLORHEXIDINE GLUCONATE 15 ML: 1.2 RINSE ORAL at 07:57

## 2020-09-20 RX ADMIN — HEPARIN SODIUM 5000 UNITS: 5000 INJECTION, SOLUTION INTRAVENOUS; SUBCUTANEOUS at 09:47

## 2020-09-20 RX ADMIN — PANTOPRAZOLE SODIUM 40 MG: 40 TABLET, DELAYED RELEASE ORAL at 10:11

## 2020-09-20 RX ADMIN — METOPROLOL TARTRATE 5 MG: 5 INJECTION, SOLUTION INTRAVENOUS at 17:00

## 2020-09-20 RX ADMIN — ACETAMINOPHEN 650 MG: 325 TABLET, FILM COATED ORAL at 16:29

## 2020-09-20 ASSESSMENT — ACTIVITIES OF DAILY LIVING (ADL)
ADLS_ACUITY_SCORE: 22

## 2020-09-20 ASSESSMENT — MIFFLIN-ST. JEOR: SCORE: 1113.25

## 2020-09-20 NOTE — PROGRESS NOTES
Critical Care Progress Note      09/20/2020    Name: Sherrell Leonard MRN#: [id number]   Age: 74 year old YOB: 1945     Hsptl Day# 15  ICU DAY # 15    MV DAY # 15             Problem List:   Active Problems:    Acute encephalopathy           Summary/Hospital Course:   74-year-old female admitted to the ICU for acute ventilatory failure and airway protection after left MCA subcortical stroke and left PCA stroke with multiple stenotic vessels.  Patient remains intubated.  Patient remains off sedation however her neurologic exam is still comatose.  Patient also continues with low-grade fevers at this time.  She does not have a leukocytosis.      Assessment and plan :     Sherrell Leonard IS a 74 year old female admitted on 9/5/2020 for management of acute respiratory failure, CVA and seizure.   I have personally reviewed the daily labs, imaging studies, cultures and discussed the case with referring physician and consulting physicians.     My assessment and plan by system for this patient is as follows:    Neurology/Psychiatry:   1.  Seizure disorder on continues EEG and Versed infusion.  The goal is burst suppression  2. prn Propofol for ICU sedation  3.  PRN opioids for breakthrough pain  Plan  Appreciate neuro critical care input.  I would recommend holding benzodiazepines at this time as we will try to evaluate the patient's underlying neurologic status.  Concerned that this is the patient's neurologic status since the patient has been off benzodiazepine for multiple days.  Will discuss with neuro critical care about plan for altered mental status.  Given the patient's altered mental status I would recommend tracheostomy at this time.    Cardiovascular:   1.Hemodynamics -hypotensive at times  2.Rhythm -normal sinus rhythm  3. Ischemia -no signs of ischemia  Plan  Patient is hypotensive at times requiring PRN hydralazine and labetalol.  I started the patient on low-dose metoprolol today in order to try to wean  this patient off PRN dosing.    Pulmonary/Ventilator Management:   1. Airway intubated for airway protection  2. Oxygenation/ventilation/mechanics on full ventilatory support  Plan  -We will attempt another spontaneous breathing trial today.  However even if the patient passes a spontaneous breathing trial she is not a candidate for extubation given the fact that she cannot protect her airway.      GI and Nutrition :   1.  Concern for protein calorie malnutrition    Plan  -Continue to advance tube feeds as tolerated.      Renal/Fluids/Electrolytes:   1.  Creatinine within normal limits no signs of acute kidney injury  2.  Continued hypokalemia  3.  Appears euvolemic    Plan  -Continue to monitor and replace electrolytes as per protocol.  - generally avoid nephrotoxic agents such as NSAID, IV contrast unless specifically required  - adjust medications as needed for renal clearance  - follow I/O's as appropriate.      Infectious Disease:   1.  E. coli UTI  2.  Culture of open lesion in patient's groin  3.  Plan  -Patient is status post antibiotic treatment we will continue to monitor. WBC count is trending down  Wait for the results of wound culture.  Of concern is continued fever spikes.  This could either be neurogenic versus infectious in etiology.  The patient's white count is normal at this time.  I did reculture the patient this a.m. and send procalcitonin.  If her procalcitonin is extremely elevated would consider starting antibiotics.    Endocrine:   1 concern for stress-induced hyperglycemia  Plan  - ICU insulin protocol, goal sugar <180      Hematology/Oncology:   1. No leukocytosis  2. Anemia, no signs, symptoms of active blood loss  3.  Plan  -Continue to monitor            IV/Access:   1. Venous access -PICC line  2. Arterial access -no need for arterial line at this time  3.  Plan  - central access required and necessary      ICU Prophylaxis:   1. DVT: Mechanical  2. VAP: HOB 30 degrees, chlorhexidine  rinse  3. Stress Ulcer: PPI  4. Restraints: Nonviolent soft two point restraints required and necessary for patient safety and continued cares and good effect as patient continues to pull at necessary lines, tubes despite education and distraction. Will readdress daily.  Restraints not needed at this time   5. Wound care - per unit routine   6. Feeding - we will start tube feeds  7. Family Update: Attempted to reach daughter this morning.  I left a message that I will be here at 11:00 to answer any questions.  8. Disposition -ICU        Key goals for next 24 hours:   1.  Spontaneous breathing trial  2.  Await results of prolactin level  3.  Await results of cultures.               Interim History:     Patient still with altered mental status.  Not a candidate for extubation at this time.         Key Medications:       [Held by provider] amLODIPine  5 mg Oral Daily     aspirin  81 mg Per Feeding Tube Daily     atorvastatin  40 mg Oral or NG Tube Daily at 8 pm     chlorhexidine  15 mL Mouth/Throat Q12H     clobazam  10 mg Oral BID     clopidogrel  75 mg Oral or Feeding Tube Daily     heparin ANTICOAGULANT  5,000 Units Subcutaneous Q12H     lactated ringers  500 mL Intravenous Once     levETIRAcetam  1,500 mg Intravenous Q12H     metoprolol  5 mg Intravenous Q6H     pantoprazole  40 mg Per Feeding Tube Daily     sodium chloride (PF)  10 mL Intracatheter Q8H     valproate (DEPACON) intermittent infusion  750 mg Intravenous Q8H       dextrose       - MEDICATION INSTRUCTIONS -       sodium chloride 10 mL/hr at 09/20/20 0400               Physical Examination:   Temp:  [98  F (36.7  C)-100.4  F (38  C)] 100.2  F (37.9  C)  Pulse:  [67-88] 73  Resp:  [11-24] 12  BP: (112-208)/(47-94) 112/47  FiO2 (%):  [30 %] 30 %  SpO2:  [93 %-99 %] 96 %    Intake/Output Summary (Last 24 hours) at 9/14/2020 1108  Last data filed at 9/14/2020 0800  Gross per 24 hour   Intake 417.1 ml   Output --   Net 417.1 ml     Wt Readings from Last 4  Encounters:   09/20/20 66.5 kg (146 lb 9.7 oz)     BP - Mean:  [] 72  Ventilation Mode: CMV/AC  (Continuous Mandatory Ventilation/ Assist Control)  FiO2 (%): 30 %  Rate Set (breaths/minute): 12 breaths/min  Tidal Volume Set (mL): 500 mL  PEEP (cm H2O): 5 cmH2O  Pressure Support (cm H2O): 12 cmH2O  Oxygen Concentration (%): 30 %  Resp: 12    No lab results found in last 7 days.    GEN: no acute distress   HEENT: head ncat, sclera anicteric, OP patent, trachea midline   PULM: unlabored synchronous with vent, clear anteriorly    CV/COR: RRR S1S2 no gallop,  No rub, no murmur  ABD: soft nontender, hypoactive bowel sounds, no mass  EXT:  Edema   warm  NEURO: NONRESPONSIVE TO DEEP STERNAL RUB OFF VERSED INFUSION.    SKIN: no obvious rash  LINES: clean, dry intact         Data:   All data and imaging reviewed     ROUTINE ICU LABS (Last four results)  CMP  Recent Labs   Lab 09/20/20  0403 09/19/20  0427 09/18/20  0550 09/17/20  0400  09/14/20  0510   * 145* 144 146*   < > 145*   POTASSIUM 3.8 3.5 3.5 4.2   < > 3.3*   CHLORIDE 109 109 110* 112*   < > 117*   CO2 32 33* 31 31   < > 23   ANIONGAP 4 3 3 3   < > 5   * 120* 107* 112*   < > 84   BUN 20 18 19 17   < > 17   CR 0.52 0.44* 0.53 0.56   < > 0.55   GFRESTIMATED >90 >90 >90 >90   < > >90   GFRESTBLACK >90 >90 >90 >90   < > >90   MARII 9.0 8.5 8.5 8.9   < > 8.7   PROTTOTAL  --   --   --   --   --  5.4*   ALBUMIN  --   --   --   --   --  2.1*   BILITOTAL  --   --   --   --   --  0.4   ALKPHOS  --   --   --   --   --  70   AST  --   --   --   --   --  27   ALT  --   --   --   --   --  20    < > = values in this interval not displayed.     CBC  Recent Labs   Lab 09/20/20  0403 09/19/20  0427 09/18/20  0550 09/18/20  0110   WBC 10.2 8.8 9.0 9.9   RBC 3.58* 3.40* 3.27* 3.56*   HGB 11.3* 10.7* 10.3* 10.9*   HCT 35.5 33.9* 32.5* 35.4   MCV 99 100 99 99   MCH 31.6 31.5 31.5 30.6   MCHC 31.8 31.6 31.7 30.8*   RDW 13.5 13.5 13.5 13.5    326 305 300     INRNo  lab results found in last 7 days.  Arterial Blood GasNo lab results found in last 7 days.    All cultures:  Recent Labs   Lab 09/18/20  0318   CULT Light growth  Normal kena       Recent Results (from the past 24 hour(s))   MR Brain w/o Contrast    Narrative    MRI OF THE BRAIN WITHOUT CONTRAST 9/19/2020 6:42 PM     COMPARISON: Brain MR 9/13/2020.    HISTORY:  Evaluate degree of stroke, continued encephalopathy.     TECHNIQUE:   Brain: Axial diffusion-weighted with ADC map, T2-weighted with fat  saturation, T1-weighted and turboFLAIR and sagittal T1-weighted images  of the brain were obtained without intravenous contrast.     FINDINGS: Numerous recent ischemic infarcts scattered throughout the  cerebral hemispheres bilaterally with the greatest confluence of  infarcts in the deep white matter of the left frontal lobe extending  into the left basal ganglia again noted without definite change in  number or extent from the comparison study.    There is moderate diffuse cerebral volume loss. There are numerous  scattered focal and extensive confluent periventricular areas of  abnormal T2 signal hyperintensity in the cerebral white matter  bilaterally that are consistent with sequela of chronic small vessel  ischemic disease. The ventricles and basal cisterns are within normal  limits in configuration given the degree of cerebral volume loss.   There is no midline shift.  There are no extra-axial fluid  collections.  There is no evidence for acute intracranial hemorrhage.    There is no sinusitis or mastoiditis.      Impression    IMPRESSION:    1. Numerous scattered recent ischemic infarcts throughout the cerebral  hemispheres bilaterally again noted without definite change in size or  number. The greatest confluence of infarcts remains in the deep white  matter of the left frontal lobe.  2. Diffuse cerebral volume loss and cerebral white matter changes  consistent with chronic small vessel ischemic disease.    ANTELMO  MD APARNA   MRA Brain ([place]) wo Contrast    Narrative    MR ANGIOGRAM OF THE HEAD WITHOUT CONTRAST   9/19/2020 6:44 PM     COMPARISON: [place] MRA 9/13/2020.    HISTORY: Stroke.    TECHNIQUE:  3D time-of-flight MR angiogram of the head without  contrast. MIP reconstruction of all MR angiographic data was  performed.    FINDINGS: High-grade stenosis/subtotal occlusion of the distal M1  segment of the left middle cerebral artery is again noted without  change. The left middle cerebral artery branches remain patent and  unremarkable. Irregularity in contour and mild narrowing throughout  the P2 segments of the posterior cerebral arteries bilaterally again  noted. The bilateral distal internal carotid, basilar, bilateral  anterior cerebral, and right middle cerebral arteries are patent and  unremarkable with no evidence for cerebral artery stenosis or  aneurysm. The anterior communicating artery is patent and  unremarkable. The posterior communicating arteries bilaterally are  patent and unremarkable.       Impression    IMPRESSION:  1. Presumed areas of atherosclerotic narrowing involving the distal M1  segment of the left middle cerebral artery and P2 segments of the  posterior cerebral arteries bilaterally again noted.  2. Otherwise, normal [place] MRA. No significant change from  the comparison study.    ANTELMO BRAUN MD         Billing: This patient is critically ill: yes. Total critical care time today 31 min.

## 2020-09-20 NOTE — PROGRESS NOTES
[facility] ICU RESPIRATORY NOTE        Date of Admission: 9/5/2020    Date of Intubation (most recent): 9/11/2020    Reason for Mechanical Ventilation: Airway Protection    Number of Days on Mechanical Ventilation: 10    Met Criteria for Spontaneous Breathing Trial: Yes went 3 hours on 10/5 and 30%.    Significant Events Today: None    ABG Results: No lab results found in last 7 days.    Current Vent Settings: Ventilation Mode: CMV/AC  (Continuous Mandatory Ventilation/ Assist Control)  FiO2 (%): 30 %  Rate Set (breaths/minute): 12 breaths/min  Tidal Volume Set (mL): 500 mL  PEEP (cm H2O): 5 cmH2O  Pressure Support (cm H2O): 10 cmH2O  Oxygen Concentration (%): 30 %  Resp: 11      Skin Assessment: No issues    Plan: Continue full ventilatory support t/o night shift.    Julián Oliveira, RT

## 2020-09-20 NOTE — PROGRESS NOTES
EEG CLINICAL NEUROPHYSIOLOGY PRELIMINARY REPORT    Video EEG through approx 10 AM today reviewed. As best as I can tell from EMR she is not currently being treated with anesthetic drips. EEG occasionally discontinuous with runs of 10 uV alpha alternating with periods of significant attenuation. At other times runs of right posterior hemisphere lateralizing periodic discharges, almost always in form of blunted sharp waves at frequency of 1 Hz or less. Rare independent left hemispheric sharp waves. Runs of lateralizing periodic sharp waves are often but not always stimulus induced, related to cares, etc. No seizures.    Study continues consistent with severe diffuse encephalopathy. There is continued evidence of structural abnormalities with seizure generating potential in right posterior hemisphere and evidence of much less persistent left hemispheric focal cortical irritability. No seizures. Full report to follow.    Mike Haywood MD  Pager [phone]

## 2020-09-20 NOTE — PROGRESS NOTES
"  [facility]    Stroke Progress Note    Interval Events  Preliminary EEG read with borderline improvement  Remains intubated and not following commands  VPA level 73  today    Impression & Plan  Scattered intracranial atherosclerosis with severe focal left M1 stenosis, now s/p balloon angioplasty 9/11   Left MCA syndrome  New R MCA infarcts  - Continue Q2 hour neurochecks  - SBP goal 140-180 mmHg  - DAPT with ASA 81 mg daily + Plavix 75 mg daily   - Continue Lipitor 40 mg daily  - Consideration for possible stent at site of L M1 angioplasty given stenosis on MRA. Will focus on seizure management at this time, but potential intervention pending improvement from seizure perspective  - Repeat MRI shows no new infarcts or findings     Non-convulsive status epilepticus (NCSE), resolved  - Continue vEEG   - Continue Keppra 1500 mg q12 hours  - Continue VPA 750mg q8  - Continue onfi 10mg q12 hours    We will follow.     Elsa Bruno PA-C  Neurology  09/20/2020 12:56 PM  To page me or covering stroke neurology team member, click here: AMCOM  Choose \"On Call\" tab at top, then search dropdown box for \"Neurology Adult\" & press Enter, look for Neuro ICU/Stroke      ______________________________________________________    Medications     Scheduled Meds    [Held by provider] amLODIPine  5 mg Oral Daily     aspirin  81 mg Per Feeding Tube Daily     atorvastatin  40 mg Oral or NG Tube Daily at 8 pm     chlorhexidine  15 mL Mouth/Throat Q12H     clobazam  10 mg Oral BID     clopidogrel  75 mg Oral or Feeding Tube Daily     heparin ANTICOAGULANT  5,000 Units Subcutaneous Q12H     levETIRAcetam  1,500 mg Intravenous Q12H     metoprolol  5 mg Intravenous Q6H     pantoprazole  40 mg Per Feeding Tube Daily     sodium chloride (PF)  10 mL Intracatheter Q8H     valproate (DEPACON) intermittent infusion  750 mg Intravenous Q8H       Infusion Meds    dextrose       - MEDICATION INSTRUCTIONS -       sodium chloride 10 " mL/hr at 09/20/20 0400       PRN Meds  acetaminophen **OR** acetaminophen, bisacodyl, dextrose, glucose **OR** dextrose **OR** glucagon, fentaNYL, hydrALAZINE, labetalol, lidocaine 4%, lidocaine (buffered or not buffered), LORazepam, - MEDICATION INSTRUCTIONS -, melatonin, miconazole, naloxone, ondansetron **OR** ondansetron, polyethylene glycol, potassium chloride, potassium chloride with lidocaine, potassium chloride, potassium chloride, potassium chloride, prochlorperazine **OR** prochlorperazine **OR** prochlorperazine, senna-docusate **OR** senna-docusate, sodium chloride (PF), sodium chloride (PF)       PHYSICAL EXAMINATION  Temp:  [98  F (36.7  C)-100.4  F (38  C)] 100.1  F (37.8  C)  Pulse:  [64-88] 67  Resp:  [7-88] 18  BP: (112-208)/(47-94) 188/83  FiO2 (%):  [30 %] 30 %  SpO2:  [93 %-99 %] 97 %     Neurologic  Mental Status:  Intubated, does not open eyes to voice or noxious stimuli, does not follow commands  Cranial Nerves:  PERRL, difficult to assess facial symmetry with ETT fastener, does not protrude tongue  Motor:  normal muscle tone and bulk, no abnormal movements, withdraws both LEs but left more brisk, minimal  w/d in upper extremities R>L  Reflexes:  L toe possibly upgoing, R toe mute  Sensory:  see motor exam  Coordination:  unable to assess  Station/Gait:  deferred     Imaging  I personally reviewed all imaging; relevant findings per HPI.     Lab Results Data   CBC  Recent Labs   Lab 09/20/20  0403 09/19/20  0427 09/18/20  0550   WBC 10.2 8.8 9.0   RBC 3.58* 3.40* 3.27*   HGB 11.3* 10.7* 10.3*   HCT 35.5 33.9* 32.5*    326 305     Basic Metabolic Panel    Recent Labs   Lab 09/20/20  0403 09/19/20  0427 09/18/20  0550   * 145* 144   POTASSIUM 3.8 3.5 3.5   CHLORIDE 109 109 110*   CO2 32 33* 31   BUN 20 18 19   CR 0.52 0.44* 0.53   * 120* 107*   MARII 9.0 8.5 8.5     Liver Panel  Recent Labs   Lab 09/14/20  0510   PROTTOTAL 5.4*   ALBUMIN 2.1*   BILITOTAL 0.4   ALKPHOS 70   AST  27   ALT 20     INR    Recent Labs   Lab Test 09/07/20  0758   INR 1.05      Lipid Profile    Recent Labs   Lab Test 09/05/20  1256   CHOL 186   HDL 87   LDL 81   TRIG 89     A1C    Recent Labs   Lab Test 09/05/20  1256   A1C 5.5     Troponin I  No results for input(s): TROPI in the last 168 hours.

## 2020-09-20 NOTE — PROGRESS NOTES
[facility] ICU RESPIRATORY NOTE        Date of Admission: 9/5/2020    Date of Intubation (most recent): 9/11/20    Reason for Mechanical Ventilation: airway protection     Number of Days on Mechanical Ventilation: 10    Met Criteria for Spontaneous Breathing Trial: No    Reason for No Spontaneous Breathing Trial: per MD     Significant Events Today: none     ABG Results: No lab results found in last 7 days.    Current Vent Settings: Ventilation Mode: CMV/AC  (Continuous Mandatory Ventilation/ Assist Control)  FiO2 (%): 30 %  Rate Set (breaths/minute): 12 breaths/min  Tidal Volume Set (mL): 500 mL  PEEP (cm H2O): 5 cmH2O  Pressure Support (cm H2O): 12 cmH2O  Oxygen Concentration (%): 30 %  Resp: 12      Skin Assessment: intact    Plan: continue full ventilatory support and assess for daily weaning readiness.    Jacquie Vásquez, RT

## 2020-09-20 NOTE — PLAN OF CARE
[id number]  Neuro: same, unresponsive except slight withdraw to pain.  Lungs: Weaned 3 hrs 10/5, 's, RR up to 27 towards end of wean and with turning.  Suctioned creamy, red tinged secretions   CV: Hydralazine x 1, Labetalol X 1 to keep SBP < 180.  Metoprolol added  GI: no stools, passing flatus  U/O adequate via external cath.   at bedside updated

## 2020-09-20 NOTE — PLAN OF CARE
Shift 8870-4104: HTN. Neuro: Does not follow, withdraws in all extremities; continuous EEG. Pulm: Lungs: diminished throughout, mechanically ventilated, small amount of secretions w/ in-line suctioning, large amount orally at times; continues red-streaked secretions. GI/: Pure wick in place: adequate U/O; BS present, still no BM. Access: PICC x 3 lumens; NS for TKO. No pain per CPOT. Skin: Tongue remains edematous especially to right side, bleeding.    New:  - PRN IV Labetalol + IV Hydralazine given for HTN mgmt.

## 2020-09-20 NOTE — PLAN OF CARE
[id number]  Neuro: Withdraws legs to pain more quickly.  Withdraws R arm slightly and L none for me.  Does not awaken.  No sedation.  Lungs: Suctioned rare blood tinged to white secretions.  CV: Hydralazine X 2, Labetalol X 1 for SBP>180.  GI: no stool  U/O 550cc plus 1 incontinence.  Temp up to 100.4.   updated.

## 2020-09-21 ENCOUNTER — HOSPITAL ENCOUNTER (OUTPATIENT)
Dept: NEUROLOGY | Facility: CLINIC | Age: 75
DRG: 003 | End: 2020-09-21
Attending: PHYSICIAN ASSISTANT
Payer: MEDICARE

## 2020-09-21 LAB
ANION GAP SERPL CALCULATED.3IONS-SCNC: 4 MMOL/L (ref 3–14)
BUN SERPL-MCNC: 20 MG/DL (ref 7–30)
CALCIUM SERPL-MCNC: 8.4 MG/DL (ref 8.5–10.1)
CHLORIDE SERPL-SCNC: 109 MMOL/L (ref 94–109)
CO2 SERPL-SCNC: 32 MMOL/L (ref 20–32)
CREAT SERPL-MCNC: 0.54 MG/DL (ref 0.52–1.04)
ERYTHROCYTE [DISTWIDTH] IN BLOOD BY AUTOMATED COUNT: 13.6 % (ref 10–15)
GFR SERPL CREATININE-BSD FRML MDRD: >90 ML/MIN/{1.73_M2}
GLUCOSE SERPL-MCNC: 134 MG/DL (ref 70–99)
HCT VFR BLD AUTO: 33.3 % (ref 35–47)
HGB BLD-MCNC: 10.5 G/DL (ref 11.7–15.7)
MAGNESIUM SERPL-MCNC: 2.2 MG/DL (ref 1.6–2.3)
MCH RBC QN AUTO: 31.4 PG (ref 26.5–33)
MCHC RBC AUTO-ENTMCNC: 31.5 G/DL (ref 31.5–36.5)
MCV RBC AUTO: 100 FL (ref 78–100)
PHOSPHATE SERPL-MCNC: 2.9 MG/DL (ref 2.5–4.5)
PLATELET # BLD AUTO: 311 10E9/L (ref 150–450)
POTASSIUM SERPL-SCNC: 3.4 MMOL/L (ref 3.4–5.3)
RBC # BLD AUTO: 3.34 10E12/L (ref 3.8–5.2)
SODIUM SERPL-SCNC: 145 MMOL/L (ref 133–144)
VALPROATE FREE SERPL-MCNC: 28.4 UG/ML (ref 6–20)
VALPROATE FREE SERPL-MCNC: 39.8 UG/ML (ref 6–20)
WBC # BLD AUTO: 11.3 10E9/L (ref 4–11)

## 2020-09-21 PROCEDURE — 25000128 H RX IP 250 OP 636: Performed by: NURSE PRACTITIONER

## 2020-09-21 PROCEDURE — 25800030 ZZH RX IP 258 OP 636: Performed by: PHYSICIAN ASSISTANT

## 2020-09-21 PROCEDURE — 27210429 ZZH NUTRITION PRODUCT INTERMEDIATE LITER

## 2020-09-21 PROCEDURE — 25000132 ZZH RX MED GY IP 250 OP 250 PS 637: Mod: GY | Performed by: INTERNAL MEDICINE

## 2020-09-21 PROCEDURE — 25800030 ZZH RX IP 258 OP 636: Performed by: NURSE PRACTITIONER

## 2020-09-21 PROCEDURE — 25000128 H RX IP 250 OP 636: Performed by: PHYSICIAN ASSISTANT

## 2020-09-21 PROCEDURE — 40000239 ZZH STATISTIC VAT ROUNDS

## 2020-09-21 PROCEDURE — 25000128 H RX IP 250 OP 636: Performed by: ANESTHESIOLOGY

## 2020-09-21 PROCEDURE — 95714 VEEG EA 12-26 HR UNMNTR: CPT

## 2020-09-21 PROCEDURE — 25000125 ZZHC RX 250: Performed by: ANESTHESIOLOGY

## 2020-09-21 PROCEDURE — 25000128 H RX IP 250 OP 636: Performed by: STUDENT IN AN ORGANIZED HEALTH CARE EDUCATION/TRAINING PROGRAM

## 2020-09-21 PROCEDURE — 25000132 ZZH RX MED GY IP 250 OP 250 PS 637: Mod: GY | Performed by: STUDENT IN AN ORGANIZED HEALTH CARE EDUCATION/TRAINING PROGRAM

## 2020-09-21 PROCEDURE — 80048 BASIC METABOLIC PNL TOTAL CA: CPT | Performed by: INTERNAL MEDICINE

## 2020-09-21 PROCEDURE — 85027 COMPLETE CBC AUTOMATED: CPT | Performed by: INTERNAL MEDICINE

## 2020-09-21 PROCEDURE — 40000275 ZZH STATISTIC RCP TIME EA 10 MIN

## 2020-09-21 PROCEDURE — 20000003 ZZH R&B ICU

## 2020-09-21 PROCEDURE — 25000125 ZZHC RX 250: Performed by: PHYSICIAN ASSISTANT

## 2020-09-21 PROCEDURE — 84100 ASSAY OF PHOSPHORUS: CPT | Performed by: INTERNAL MEDICINE

## 2020-09-21 PROCEDURE — 94003 VENT MGMT INPAT SUBQ DAY: CPT

## 2020-09-21 PROCEDURE — 99291 CRITICAL CARE FIRST HOUR: CPT | Performed by: PSYCHIATRY & NEUROLOGY

## 2020-09-21 PROCEDURE — 25000132 ZZH RX MED GY IP 250 OP 250 PS 637: Mod: GY | Performed by: HOSPITALIST

## 2020-09-21 PROCEDURE — 83735 ASSAY OF MAGNESIUM: CPT | Performed by: INTERNAL MEDICINE

## 2020-09-21 RX ADMIN — HEPARIN SODIUM 5000 UNITS: 5000 INJECTION, SOLUTION INTRAVENOUS; SUBCUTANEOUS at 08:12

## 2020-09-21 RX ADMIN — LABETALOL HYDROCHLORIDE 20 MG: 5 INJECTION INTRAVENOUS at 04:36

## 2020-09-21 RX ADMIN — AMLODIPINE BESYLATE 5 MG: 5 TABLET ORAL at 11:44

## 2020-09-21 RX ADMIN — ACETAMINOPHEN 650 MG: 325 TABLET, FILM COATED ORAL at 20:36

## 2020-09-21 RX ADMIN — CLOBAZAM 10 MG: 10 TABLET ORAL at 08:04

## 2020-09-21 RX ADMIN — METOPROLOL TARTRATE 5 MG: 5 INJECTION, SOLUTION INTRAVENOUS at 02:35

## 2020-09-21 RX ADMIN — LEVETIRACETAM 1500 MG: 500 INJECTION, SOLUTION INTRAVENOUS at 20:07

## 2020-09-21 RX ADMIN — ATORVASTATIN CALCIUM 40 MG: 40 TABLET, FILM COATED ORAL at 20:07

## 2020-09-21 RX ADMIN — CHLORHEXIDINE GLUCONATE 15 ML: 1.2 RINSE ORAL at 20:08

## 2020-09-21 RX ADMIN — VALPROATE SODIUM 750 MG: 100 INJECTION, SOLUTION INTRAVENOUS at 02:35

## 2020-09-21 RX ADMIN — CLOPIDOGREL BISULFATE 75 MG: 75 TABLET, FILM COATED ORAL at 08:04

## 2020-09-21 RX ADMIN — ASPIRIN 81 MG 81 MG: 81 TABLET ORAL at 08:04

## 2020-09-21 RX ADMIN — HYDRALAZINE HYDROCHLORIDE 10 MG: 20 INJECTION INTRAMUSCULAR; INTRAVENOUS at 17:05

## 2020-09-21 RX ADMIN — LEVETIRACETAM 1500 MG: 500 INJECTION, SOLUTION INTRAVENOUS at 08:11

## 2020-09-21 RX ADMIN — METOPROLOL TARTRATE 5 MG: 5 INJECTION, SOLUTION INTRAVENOUS at 14:50

## 2020-09-21 RX ADMIN — VALPROATE SODIUM 750 MG: 100 INJECTION, SOLUTION INTRAVENOUS at 10:27

## 2020-09-21 RX ADMIN — VALPROATE SODIUM 1000 MG: 100 INJECTION, SOLUTION INTRAVENOUS at 20:41

## 2020-09-21 RX ADMIN — PANTOPRAZOLE SODIUM 40 MG: 40 TABLET, DELAYED RELEASE ORAL at 08:04

## 2020-09-21 RX ADMIN — CLOBAZAM 10 MG: 10 TABLET ORAL at 20:07

## 2020-09-21 RX ADMIN — METOPROLOL TARTRATE 5 MG: 5 INJECTION, SOLUTION INTRAVENOUS at 20:07

## 2020-09-21 RX ADMIN — CHLORHEXIDINE GLUCONATE 15 ML: 1.2 RINSE ORAL at 08:00

## 2020-09-21 RX ADMIN — HEPARIN SODIUM 5000 UNITS: 5000 INJECTION, SOLUTION INTRAVENOUS; SUBCUTANEOUS at 20:07

## 2020-09-21 RX ADMIN — METOPROLOL TARTRATE 5 MG: 5 INJECTION, SOLUTION INTRAVENOUS at 08:09

## 2020-09-21 ASSESSMENT — ACTIVITIES OF DAILY LIVING (ADL)
ADLS_ACUITY_SCORE: 22

## 2020-09-21 ASSESSMENT — MIFFLIN-ST. JEOR: SCORE: 1101.25

## 2020-09-21 NOTE — PLAN OF CARE
Patient remains intubated on full vent settings. Unresponsive, withdraws to pain on all extremities. EEG monitoring continued. Hypertensive, PRN labetalol given. Purewick in place. TF infusing at goal rate of 40ml/hr. Will continue to monitor.

## 2020-09-21 NOTE — PROGRESS NOTES
[facility] ICU RESPIRATORY NOTE        Date of Admission: 9/5/2020    Date of Intubation (most recent):  9/11/20    Reason for Mechanical Ventilation:  Resp failure, AW protection    Number of Days on Mechanical Ventilation:  11    Met Criteria for Spontaneous Breathing Trial: Yes 10/5 for 45 minutes    Significant Events Today:  None    ABG Results: No lab results found in last 7 days.    Current Vent Settings: Ventilation Mode: CMV/AC  (Continuous Mandatory Ventilation/ Assist Control)  FiO2 (%): 30 %  Rate Set (breaths/minute): 12 breaths/min  Tidal Volume Set (mL): 500 mL  PEEP (cm H2O): 5 cmH2O  Pressure Support (cm H2O): 10 cmH2O  Oxygen Concentration (%): 30 %  Resp: 11      Plan:  Pt to remain on full vent support overnight    Judah Cruz, RT     35w4d with DM-type 2 on glyburide, gestational HTN, Hep B chronic carrier,  x 2.     Here for OB visit and NST secondary to GDM-A2 and gestational HTN.  No major omplaints.   Very active fetus.  Denies vaginal bleeding, leakage of fluid, or contractions.  Denies headaches, vision changes, epigastric pain, or acute swelling.    Glucose log reviewed since last visit .  Fasting 82-90 and 2 hrs .  Continue glyburide to 1.25 mg qhs and 2.5 mg at qnoon.  Go to L&D if fasting > 120, 2hr > 160.     BP log reviewed since last visit, mostly normal range, [id number].   Continue BP monitoring TID.  Go to L&D if BP > 150/95.  PreE precautions.    NST: Baseline 145, moderate variability, positive acceleration, no decelerations.   [place]: No contraction.  Continue twice wkly NST until delivery.    Plan for delivery ~37-38 wks secondary to gestational HTN.    Labor/DM/preE precautions.  Kick counts.  Voiced understanding.

## 2020-09-21 NOTE — PROGRESS NOTES
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  Patient remains intubated.  Patient remains off sedation however her neurologic exam is still comatose.  Patient also continues with low-grade fevers at this time.  She does not have a leukocytosis.    9/21- Still not responding in spite of all sedation being discontinued for 96 hours      Assessment and plan :     Sherrell Leonard IS a 74 year old female admitted on 9/5/2020 for management of acute respiratory failure, CVA and seizure.   I have personally reviewed the daily labs, imaging studies, cultures and discussed the case with referring physician and consulting physicians.     My assessment and plan by system for this patient is as follows:    Neurology/Psychiatry:   1.  Seizure disorder on continues EEG and Versed infusion.  The goal is burst suppression  2. prn Propofol for ICU sedation  3.  PRN opioids for breakthrough pain  Plan  Appreciate neuro critical care input. Likely residual effects from sedating medications are cause of encephalopathy. Will continue to hold all sedating medications.  Cardiovascular:   1.Hemodynamics -hypotensive at times  2.Rhythm -normal sinus rhythm  3. Ischemia -no signs of ischemia  Plan  Patient is hypertensive at times requiring PRN hydralazine and labetalol.  I started the patient on amlodipine today in order to try to wean this patient off PRN dosing.    Pulmonary/Ventilator Management:   1. Airway intubated for airway protection  2. Oxygenation/ventilation/mechanics on full ventilatory support  Plan  -We will attempt another  spontaneous breathing trial today.  However even if the patient passes a spontaneous breathing trial she is not a candidate for extubation given the fact that she cannot protect her airway.      GI and Nutrition :   1.  Concern for protein calorie malnutrition    Plan  -Continue to advance tube feeds as tolerated.      Renal/Fluids/Electrolytes:   1.  Creatinine within normal limits no signs of acute kidney injury  2.  Continued hypokalemia  3.  Appears euvolemic    Plan  -Continue to monitor and replace electrolytes as per protocol.  - generally avoid nephrotoxic agents such as NSAID, IV contrast unless specifically required  - adjust medications as needed for renal clearance  - follow I/O's as appropriate.      Infectious Disease:   1.  E. coli UTI    Plan  -Patient is status post antibiotic treatment we will continue to monitor. WBC count is mildly elevated    Of concern is continued fever spikes.  This could either be neurogenic versus infectious in etiology.  The patient's white count is 11. Procalcitonin remains normal.  Endocrine:   1 concern for stress-induced hyperglycemia  Plan  - ICU insulin protocol, goal sugar <180      Hematology/Oncology:   1. Minimal leukocytosis  2. Anemia, no signs, symptoms of active blood loss  3.  Plan  -Continue to monitor            IV/Access:   1. Venous access -PICC line  2. Arterial access -no need for arterial line at this time  3.  Plan  - central access required and necessary      ICU Prophylaxis:   1. DVT: Mechanical  2. VAP: HOB 30 degrees, chlorhexidine rinse  3. Stress Ulcer: PPI  4. Restraints: Nonviolent soft two point restraints required and necessary for patient safety and continued cares and good effect as patient continues to pull at necessary lines, tubes despite education and distraction. Will readdress daily.  Restraints not needed at this time   5. Wound care - per unit routine   6. Feeding - we will start tube feeds  7. Family Update: Attempted to reach  daughter this morning.  I left a message that I will be here at 11:00 to answer any questions.  8. Disposition -ICU        Key goals for next 24 hours:   1.  Spontaneous breathing trial  2.  Await culture results. Gram negative rods in sputum but normal procalcitonin               Interim History:     Patient still with altered mental status.  Not a candidate for extubation at this time.         Key Medications:       amLODIPine  5 mg Oral Daily     aspirin  81 mg Per Feeding Tube Daily     atorvastatin  40 mg Oral or NG Tube Daily at 8 pm     chlorhexidine  15 mL Mouth/Throat Q12H     clobazam  10 mg Oral BID     clopidogrel  75 mg Oral or Feeding Tube Daily     heparin ANTICOAGULANT  5,000 Units Subcutaneous Q12H     levETIRAcetam  1,500 mg Intravenous Q12H     metoprolol  5 mg Intravenous Q6H     pantoprazole  40 mg Per Feeding Tube Daily     sodium chloride (PF)  10 mL Intracatheter Q8H     valproate (DEPACON) intermittent infusion  750 mg Intravenous Q8H       dextrose       - MEDICATION INSTRUCTIONS -       sodium chloride 10 mL/hr at 09/20/20 0400               Physical Examination:   Temp:  [98.5  F (36.9  C)-100.5  F (38.1  C)] 100.5  F (38.1  C)  Pulse:  [65-79] 69  Resp:  [11-39] 11  BP: (116-193)/(56-91) 147/61  FiO2 (%):  [30 %] 30 %  SpO2:  [93 %-98 %] 95 %    Intake/Output Summary (Last 24 hours) at 9/21/2020 1338  Last data filed at 9/21/2020 1000  Gross per 24 hour   Intake 1460 ml   Output 1150 ml   Net 310 ml         Wt Readings from Last 4 Encounters:   09/21/20 65.3 kg (143 lb 15.4 oz)     BP - Mean:  [] 96  Ventilation Mode: CMV/AC  (Continuous Mandatory Ventilation/ Assist Control)  FiO2 (%): 30 %  Rate Set (breaths/minute): 12 breaths/min  Tidal Volume Set (mL): 500 mL  PEEP (cm H2O): 5 cmH2O  Pressure Support (cm H2O): 10 cmH2O  Oxygen Concentration (%): 30 %  Resp: 11    No lab results found in last 7 days.    GEN: no acute distress   HEENT: head ncat, sclera anicteric, OP patent,  trachea midline   PULM: unlabored synchronous with vent, clear anteriorly    CV/COR: RRR S1S2 no gallop,  No rub, no murmur  ABD: soft nontender, hypoactive bowel sounds, no mass  EXT:  Edema   warm  NEURO: Still unresponsive  SKIN: no obvious rash  LINES: clean, dry intact         Data:   All data and imaging reviewed     ROUTINE ICU LABS (Last four results)  CMP  Recent Labs   Lab 09/21/20  0445 09/20/20  0403 09/19/20  0427 09/18/20  0550   * 145* 145* 144   POTASSIUM 3.4 3.8 3.5 3.5   CHLORIDE 109 109 109 110*   CO2 32 32 33* 31   ANIONGAP 4 4 3 3   * 123* 120* 107*   BUN 20 20 18 19   CR 0.54 0.52 0.44* 0.53   GFRESTIMATED >90 >90 >90 >90   GFRESTBLACK >90 >90 >90 >90   MARII 8.4* 9.0 8.5 8.5   MAG 2.2  --   --   --    PHOS 2.9  --   --   --      CBC  Recent Labs   Lab 09/21/20  0445 09/20/20  0403 09/19/20  0427 09/18/20  0550   WBC 11.3* 10.2 8.8 9.0   RBC 3.34* 3.58* 3.40* 3.27*   HGB 10.5* 11.3* 10.7* 10.3*   HCT 33.3* 35.5 33.9* 32.5*    99 100 99   MCH 31.4 31.6 31.5 31.5   MCHC 31.5 31.8 31.6 31.7   RDW 13.6 13.5 13.5 13.5    357 326 305     INRNo lab results found in last 7 days.  Arterial Blood GasNo lab results found in last 7 days.    All cultures:  Recent Labs   Lab 09/20/20  1200 09/20/20  0930 09/18/20  0318   CULT Culture in progress No growth after 18 hours Light growth  Normal kena       No results found for this or any previous visit (from the past 24 hour(s)).      Billing: This patient is critically ill: yes. Total critical care time today 35 min.    Carlos Petty MD  [facility] Intensivist Service

## 2020-09-21 NOTE — PROGRESS NOTES
"  [facility]    Stroke Progress Note    Interval Events  Preliminary EEG read with borderline improvement  Remains intubated and not following commands  No seizures  No sedative drips  VPA level (free) resulted high    Impression & Plan  Scattered intracranial atherosclerosis with severe focal left M1 stenosis, now s/p balloon angioplasty 9/11   Left MCA syndrome  New R MCA infarcts  - Continue Q2 hour neurochecks  - SBP goal 140-180 mmHg  - DAPT with ASA 81 mg daily + Plavix 75 mg daily   - Continue Lipitor 40 mg daily  - Consideration for possible stent at site of L M1 angioplasty given stenosis on MRA. Will focus on seizure management at this time, but potential intervention pending improvement from seizure perspective  - Repeat MRI shows no new infarcts or findings     Non-convulsive status epilepticus (NCSE), resolved  - Continue vEEG   - Continue Keppra 1500 mg q12 hours  - Continue VPA - reduce from 750mg q8 hours to 1000mg q12 hours. Recheck total & free level in AM  - Continue onfi 10mg q12 hours    We will follow.     Elsa Bruno PA-C  Neurology  09/21/2020 3:26 PM  To page me or covering stroke neurology team member, click here: [facility]COM  Choose \"On Call\" tab at top, then search dropdown box for \"Neurology Adult\" & press Enter, look for Neuro ICU/Stroke      ______________________________________________________    Medications     Scheduled Meds    amLODIPine  5 mg Oral Daily     aspirin  81 mg Per Feeding Tube Daily     atorvastatin  40 mg Oral or NG Tube Daily at 8 pm     chlorhexidine  15 mL Mouth/Throat Q12H     clobazam  10 mg Oral BID     clopidogrel  75 mg Oral or Feeding Tube Daily     heparin ANTICOAGULANT  5,000 Units Subcutaneous Q12H     levETIRAcetam  1,500 mg Intravenous Q12H     metoprolol  5 mg Intravenous Q6H     pantoprazole  40 mg Per Feeding Tube Daily     sodium chloride (PF)  10 mL Intracatheter Q8H     valproate (DEPACON) intermittent infusion  1,000 mg " Intravenous Q12H       Infusion Meds    dextrose       - MEDICATION INSTRUCTIONS -       sodium chloride 10 mL/hr at 09/20/20 0400       PRN Meds  acetaminophen **OR** acetaminophen, bisacodyl, dextrose, glucose **OR** dextrose **OR** glucagon, fentaNYL, hydrALAZINE, labetalol, lidocaine 4%, lidocaine (buffered or not buffered), LORazepam, - MEDICATION INSTRUCTIONS -, melatonin, miconazole, naloxone, ondansetron **OR** ondansetron, polyethylene glycol, potassium chloride, potassium chloride with lidocaine, potassium chloride, potassium chloride, potassium chloride, prochlorperazine **OR** prochlorperazine **OR** prochlorperazine, senna-docusate **OR** senna-docusate, sodium chloride (PF), sodium chloride (PF)       PHYSICAL EXAMINATION  Temp:  [98.5  F (36.9  C)-100.5  F (38.1  C)] 100.5  F (38.1  C)  Pulse:  [65-79] 73  Resp:  [11-34] 11  BP: (124-193)/(56-91) 159/70  FiO2 (%):  [30 %] 30 %  SpO2:  [94 %-98 %] 95 %     Neurologic  Mental Status:  Intubated, does not open eyes to voice or noxious stimuli, does not follow commands  Cranial Nerves:  PERRL, difficult to assess facial symmetry with ETT fastener, does not protrude tongue  Motor:  normal muscle tone and bulk, no abnormal movements, withdraws both LEs but left more brisk, no withdraw in upper extremities  Reflexes:  L toe possibly upgoing, R toe mute  Sensory:  see motor exam  Coordination:  unable to assess  Station/Gait:  deferred     Imaging  I personally reviewed all imaging; relevant findings per HPI.     Lab Results Data   CBC  Recent Labs   Lab 09/21/20 0445 09/20/20 0403 09/19/20  0427   WBC 11.3* 10.2 8.8   RBC 3.34* 3.58* 3.40*   HGB 10.5* 11.3* 10.7*   HCT 33.3* 35.5 33.9*    357 326     Basic Metabolic Panel    Recent Labs   Lab 09/21/20 0445 09/20/20  0403 09/19/20  0427   * 145* 145*   POTASSIUM 3.4 3.8 3.5   CHLORIDE 109 109 109   CO2 32 32 33*   BUN 20 20 18   CR 0.54 0.52 0.44*   * 123* 120*   MARII 8.4* 9.0 8.5      Liver Panel  No results for input(s): PROTTOTAL, ALBUMIN, BILITOTAL, ALKPHOS, AST, ALT, BILIDIRECT in the last 168 hours.  INR    Recent Labs   Lab Test 09/07/20  0758   INR 1.05      Lipid Profile    Recent Labs   Lab Test 09/05/20  1256   CHOL 186   HDL 87   LDL 81   TRIG 89     A1C    Recent Labs   Lab Test 09/05/20  1256   A1C 5.5     Troponin I  No results for input(s): TROPI in the last 168 hours.

## 2020-09-21 NOTE — PROGRESS NOTES
[facility] ICU RESPIRATORY NOTE           Date of Admission: 9/5/2020     Date of Intubation (most recent): 9/11/2020     Reason for Mechanical Ventilation: Airway Protection     Number of Days on Mechanical Ventilation: 11     Met Criteria for Spontaneous Breathing Trial: yes      Significant Events Today: none overnight.      ABG Results: No lab results found in last 7 days.    Current Vent Settings: Ventilation Mode: CMV/AC  (Continuous Mandatory Ventilation/ Assist Control)  FiO2 (%): 30 %  Rate Set (breaths/minute): 12 breaths/min  Tidal Volume Set (mL): 500 mL  PEEP (cm H2O): 5 cmH2O  Pressure Support (cm H2O): 10 cmH2O  Oxygen Concentration (%): 30 %  Resp: 11        Skin Assessment: No issues     Plan: Continue full ventilatory support. RT to follow MD orders.      Steve Rodriguez, RT

## 2020-09-21 NOTE — PROGRESS NOTES
CLINICAL NUTRITION SERVICES - REASSESSMENT NOTE      Malnutrition:  (9/14)  % Weight Loss:  Unable to obtain without a nutrition history   % Intake:  </= 50% for >/= 5 days (severe malnutrition)  Subcutaneous Fat Loss:  None observed  Muscle Loss:  None observed  Fluid Retention:  None noted     Malnutrition Diagnosis: Unable to determine due to lack of a nutrition history (patient intubated and not able to provide)       EVALUATION OF PROGRESS TOWARD GOALS   Diet:  NPO on vent    Nutrition Support:  TF continues as below:    Nutrition Support Enteral:  Type of Feeding Tube: NG  Enteral Frequency:  Continuous  Enteral Regimen: Isosource 1.5 at 40 mL/hr  Total Enteral Provisions: 1440 kcal (27 kcal/kg), 65 g protein (1.2 g/kg), 169 g CHO, 14 g fiber, 730 mL H2O  Free Water Flush: 60 mL every 4 hours     Intake/Tolerance:   Although nursing flow sheets indicate last recorded stool 9/18, bedside RN in rounds today indicated that patient was stooling throughout the week-end.  Na 145 (H).  Glucose 125 - acceptable.  Wt:  65.3 kg (down 0.9 kg since admit).      ASSESSED NUTRITION NEEDS:  Dosing Weight 54 kg (adjusted for overweight)   Estimated Energy Needs: 3572-6540 kcals (25-30 Kcal/Kg)  Justification: maintenance and overweight  Estimated Protein Needs: 65-81 grams protein (1.2-1.5 g pro/Kg)  Justification: preservation of lean body mass    NEW FINDINGS:   Pt remains unresponsive.   May need to consider trach.    Previous Goals (9/17):   Isosource 1.5 at 40 mL/hr will continue to meet % needs   Evaluation: Met    Previous Nutrition Diagnosis (9/17):   No nutrition diagnosis identified at this time  Evaluation: No change      CURRENT NUTRITION DIAGNOSIS  No nutrition diagnosis identified at this time       INTERVENTIONS  Recommendations / Nutrition Prescription  Continue TF Isosource 1.5 at 40 mL/hr as above     Implementation  Collaboration and Referral of Nutrition care    Goals  TF Isosource 1.5 at 40 mL/hr  will continue to meet % needs       MONITORING AND EVALUATION:  Progress towards goals will be monitored and evaluated per protocol and Practice Guidelines    Jocy Edgar RD, LD, CNSC

## 2020-09-21 NOTE — PLAN OF CARE
No improvement in mental status, not waking.  Weak withdrawal from noxious stim on 4 extremities.  PERRL; eyelids seem to have some rhythmic twitching.  EEG continues.   here during the day, updated by neuro crit.  LS cta, tolerated about and hour of PSV today.  TF at goal rate.  BS active, no BM today.  UOP good via purewick.

## 2020-09-22 LAB
ANION GAP SERPL CALCULATED.3IONS-SCNC: 2 MMOL/L (ref 3–14)
BACTERIA SPEC CULT: ABNORMAL
BACTERIA SPEC CULT: ABNORMAL
BUN SERPL-MCNC: 23 MG/DL (ref 7–30)
CALCIUM SERPL-MCNC: 9 MG/DL (ref 8.5–10.1)
CHLORIDE SERPL-SCNC: 111 MMOL/L (ref 94–109)
CO2 SERPL-SCNC: 34 MMOL/L (ref 20–32)
CREAT SERPL-MCNC: 0.62 MG/DL (ref 0.52–1.04)
ERYTHROCYTE [DISTWIDTH] IN BLOOD BY AUTOMATED COUNT: 13.7 % (ref 10–15)
GFR SERPL CREATININE-BSD FRML MDRD: 88 ML/MIN/{1.73_M2}
GLUCOSE SERPL-MCNC: 117 MG/DL (ref 70–99)
HCT VFR BLD AUTO: 33.8 % (ref 35–47)
HGB BLD-MCNC: 10.6 G/DL (ref 11.7–15.7)
Lab: ABNORMAL
MCH RBC QN AUTO: 31.5 PG (ref 26.5–33)
MCHC RBC AUTO-ENTMCNC: 31.4 G/DL (ref 31.5–36.5)
MCV RBC AUTO: 100 FL (ref 78–100)
PLATELET # BLD AUTO: 271 10E9/L (ref 150–450)
POTASSIUM SERPL-SCNC: 3.5 MMOL/L (ref 3.4–5.3)
RBC # BLD AUTO: 3.37 10E12/L (ref 3.8–5.2)
SODIUM SERPL-SCNC: 147 MMOL/L (ref 133–144)
SPECIMEN SOURCE: ABNORMAL
VALPROATE SERPL-MCNC: 64 MG/L (ref 50–100)
WBC # BLD AUTO: 10.5 10E9/L (ref 4–11)

## 2020-09-22 PROCEDURE — 80048 BASIC METABOLIC PNL TOTAL CA: CPT | Performed by: INTERNAL MEDICINE

## 2020-09-22 PROCEDURE — 25000132 ZZH RX MED GY IP 250 OP 250 PS 637: Mod: GY | Performed by: PHYSICIAN ASSISTANT

## 2020-09-22 PROCEDURE — 25000132 ZZH RX MED GY IP 250 OP 250 PS 637: Mod: GY | Performed by: INTERNAL MEDICINE

## 2020-09-22 PROCEDURE — 40000239 ZZH STATISTIC VAT ROUNDS

## 2020-09-22 PROCEDURE — 25000132 ZZH RX MED GY IP 250 OP 250 PS 637: Mod: GY | Performed by: HOSPITALIST

## 2020-09-22 PROCEDURE — 94003 VENT MGMT INPAT SUBQ DAY: CPT

## 2020-09-22 PROCEDURE — 25000125 ZZHC RX 250: Performed by: PHYSICIAN ASSISTANT

## 2020-09-22 PROCEDURE — 25000128 H RX IP 250 OP 636: Performed by: PHYSICIAN ASSISTANT

## 2020-09-22 PROCEDURE — 20000003 ZZH R&B ICU

## 2020-09-22 PROCEDURE — 25000128 H RX IP 250 OP 636: Performed by: NURSE PRACTITIONER

## 2020-09-22 PROCEDURE — 25000128 H RX IP 250 OP 636: Performed by: ANESTHESIOLOGY

## 2020-09-22 PROCEDURE — 99291 CRITICAL CARE FIRST HOUR: CPT | Performed by: INTERNAL MEDICINE

## 2020-09-22 PROCEDURE — 40000275 ZZH STATISTIC RCP TIME EA 10 MIN

## 2020-09-22 PROCEDURE — 27210429 ZZH NUTRITION PRODUCT INTERMEDIATE LITER

## 2020-09-22 PROCEDURE — 85027 COMPLETE CBC AUTOMATED: CPT | Performed by: INTERNAL MEDICINE

## 2020-09-22 PROCEDURE — 25800030 ZZH RX IP 258 OP 636: Performed by: NURSE PRACTITIONER

## 2020-09-22 PROCEDURE — 25800030 ZZH RX IP 258 OP 636: Performed by: PHYSICIAN ASSISTANT

## 2020-09-22 PROCEDURE — 25000132 ZZH RX MED GY IP 250 OP 250 PS 637: Mod: GY | Performed by: STUDENT IN AN ORGANIZED HEALTH CARE EDUCATION/TRAINING PROGRAM

## 2020-09-22 PROCEDURE — 80164 ASSAY DIPROPYLACETIC ACD TOT: CPT | Performed by: PHYSICIAN ASSISTANT

## 2020-09-22 PROCEDURE — 25000125 ZZHC RX 250: Performed by: ANESTHESIOLOGY

## 2020-09-22 PROCEDURE — 99291 CRITICAL CARE FIRST HOUR: CPT | Performed by: PSYCHIATRY & NEUROLOGY

## 2020-09-22 PROCEDURE — 80165 DIPROPYLACETIC ACID FREE: CPT | Performed by: PHYSICIAN ASSISTANT

## 2020-09-22 RX ORDER — AMLODIPINE BESYLATE 5 MG/1
5 TABLET ORAL DAILY
Status: DISCONTINUED | OUTPATIENT
Start: 2020-09-23 | End: 2020-09-26

## 2020-09-22 RX ADMIN — CLOBAZAM 5 MG: 10 TABLET ORAL at 21:24

## 2020-09-22 RX ADMIN — AMLODIPINE BESYLATE 5 MG: 5 TABLET ORAL at 08:03

## 2020-09-22 RX ADMIN — HEPARIN SODIUM 5000 UNITS: 5000 INJECTION, SOLUTION INTRAVENOUS; SUBCUTANEOUS at 08:31

## 2020-09-22 RX ADMIN — CLOPIDOGREL BISULFATE 75 MG: 75 TABLET, FILM COATED ORAL at 08:03

## 2020-09-22 RX ADMIN — ACETAMINOPHEN 650 MG: 325 TABLET, FILM COATED ORAL at 01:47

## 2020-09-22 RX ADMIN — HYDRALAZINE HYDROCHLORIDE 10 MG: 20 INJECTION INTRAMUSCULAR; INTRAVENOUS at 12:05

## 2020-09-22 RX ADMIN — VALPROATE SODIUM 1000 MG: 100 INJECTION, SOLUTION INTRAVENOUS at 08:25

## 2020-09-22 RX ADMIN — METOPROLOL TARTRATE 5 MG: 5 INJECTION, SOLUTION INTRAVENOUS at 19:43

## 2020-09-22 RX ADMIN — ASPIRIN 81 MG 81 MG: 81 TABLET ORAL at 08:03

## 2020-09-22 RX ADMIN — PANTOPRAZOLE SODIUM 40 MG: 40 TABLET, DELAYED RELEASE ORAL at 08:13

## 2020-09-22 RX ADMIN — METOPROLOL TARTRATE 5 MG: 5 INJECTION, SOLUTION INTRAVENOUS at 08:35

## 2020-09-22 RX ADMIN — METOPROLOL TARTRATE 5 MG: 5 INJECTION, SOLUTION INTRAVENOUS at 14:44

## 2020-09-22 RX ADMIN — HEPARIN SODIUM 5000 UNITS: 5000 INJECTION, SOLUTION INTRAVENOUS; SUBCUTANEOUS at 21:24

## 2020-09-22 RX ADMIN — VALPROATE SODIUM 1000 MG: 100 INJECTION, SOLUTION INTRAVENOUS at 21:24

## 2020-09-22 RX ADMIN — LEVETIRACETAM 1500 MG: 500 INJECTION, SOLUTION INTRAVENOUS at 19:43

## 2020-09-22 RX ADMIN — CHLORHEXIDINE GLUCONATE 15 ML: 1.2 RINSE ORAL at 08:18

## 2020-09-22 RX ADMIN — CHLORHEXIDINE GLUCONATE 15 ML: 1.2 RINSE ORAL at 19:44

## 2020-09-22 RX ADMIN — LEVETIRACETAM 1500 MG: 500 INJECTION, SOLUTION INTRAVENOUS at 08:02

## 2020-09-22 RX ADMIN — ATORVASTATIN CALCIUM 40 MG: 40 TABLET, FILM COATED ORAL at 19:43

## 2020-09-22 RX ADMIN — HYDRALAZINE HYDROCHLORIDE 10 MG: 20 INJECTION INTRAMUSCULAR; INTRAVENOUS at 20:31

## 2020-09-22 RX ADMIN — CLOBAZAM 10 MG: 10 TABLET ORAL at 08:03

## 2020-09-22 ASSESSMENT — ACTIVITIES OF DAILY LIVING (ADL)
ADLS_ACUITY_SCORE: 22

## 2020-09-22 ASSESSMENT — MIFFLIN-ST. JEOR: SCORE: 1117.25

## 2020-09-22 NOTE — PROGRESS NOTES
Critical Care Progress Note      09/22/2020    Name: Sherrell Leonard MRN#: [id number]   Age: 74 year old YOB: 1945     Hsptl Day# 17  ICU DAY # 15    MV DAY # 15             Problem List:   Active Problems:    Acute encephalopathy           Summary/Hospital Course:   74-year-old female admitted to the ICU for acute ventilatory failure and airway protection after left MCA subcortical stroke and left PCA stroke with multiple stenotic vessels.  Patient remains intubated.  Patient remains off sedation however her neurologic exam is still comatose.  Patient also continues with low-grade fevers at this time.  She does not have a leukocytosis.    9/21- Still not responding in spite of all sedation being discontinued for 96 hours    9/22- Still not responsive on my exam.By report from neurology, perhaps some improvement in EEG.      Assessment and plan :     Sherrell Leonard IS a 74 year old female admitted on 9/5/2020 for management of acute respiratory failure, CVA and seizure.   I have personally reviewed the daily labs, imaging studies, cultures and discussed the case with referring physician and consulting physicians.     My assessment and plan by system for this patient is as follows:    Neurology/Psychiatry:   1.  Seizure disorder on continues EEG and Versed infusion.  The goal is burst suppression  2. prn Propofol for ICU sedation  3.  PRN opioids for breakthrough pain  Plan  Appreciate neuro critical care input. Likely residual effects from sedating medications are cause of encephalopathy. Will continue to hold all sedating medications.  Cardiovascular:   1.Hemodynamics -hypotensive at times  2.Rhythm -normal sinus rhythm  3. Ischemia -no signs of ischemia  Plan  Patient is hypertensive at times requiring PRN hydralazine and labetalol.  I started the patient on amlodipine today in order to try to wean this patient off PRN dosing.    Pulmonary/Ventilator Management:   1. Airway intubated for airway  protection  2. Oxygenation/ventilation/mechanics on full ventilatory support  Plan  -We will attempt another spontaneous breathing trial today.  However even if the patient passes a spontaneous breathing trial she is not a candidate for extubation given the fact that she cannot protect her airway.      GI and Nutrition :   1.  Concern for protein calorie malnutrition    Plan  -Continue tube feeds as tolerated.      Renal/Fluids/Electrolytes:   1.  Creatinine within normal limits no signs of acute kidney injury  2.  Continued hypokalemia  3.  Appears euvolemic    Plan  -Continue to monitor and replace electrolytes as per protocol.  - generally avoid nephrotoxic agents such as NSAID, IV contrast unless specifically required  - adjust medications as needed for renal clearance  - follow I/O's as appropriate.      Infectious Disease:   1.  E. coli UTI    Plan  -Patient is status post antibiotic treatment we will continue to monitor. WBC count is mildly elevated    Of concern is continued fever spikes.  This could either be neurogenic versus infectious in etiology.  The patient's white count is 11. Procalcitonin remains normal.  Endocrine:   1 concern for stress-induced hyperglycemia  Plan  - ICU insulin protocol, goal sugar <180      Hematology/Oncology:   1. Minimal leukocytosis  2. Anemia, no signs, symptoms of active blood loss  3.  Plan  -Continue to monitor            IV/Access:   1. Venous access -PICC line  2. Arterial access -no need for arterial line at this time  3.  Plan  - central access required and necessary      ICU Prophylaxis:   1. DVT: Mechanical  2. VAP: HOB 30 degrees, chlorhexidine rinse  3. Stress Ulcer: PPI  4. Restraints: Nonviolent soft two point restraints required and necessary for patient safety and continued cares and good effect as patient continues to pull at necessary lines, tubes despite education and distraction. Will readdress daily.  Restraints not needed at this time   5. Wound care -  per unit routine   6. Feeding - we will start tube feeds  7. Family Update: Attempted to reach daughter this morning.  I left a message that I will be here at 11:00 to answer any questions.  8. Disposition -ICU        Key goals for next 24 hours:   1.  Spontaneous breathing trial  2.  Await culture results. Gram negative rods in sputum but normal procalcitonin               Interim History:     Patient still with altered mental status.  Not a candidate for extubation at this time.         Key Medications:       [START ON 9/23/2020] amLODIPine  5 mg Oral or Feeding Tube Daily     aspirin  81 mg Per Feeding Tube Daily     atorvastatin  40 mg Oral or NG Tube Daily at 8 pm     chlorhexidine  15 mL Mouth/Throat Q12H     clobazam  5 mg Oral BID     clopidogrel  75 mg Oral or Feeding Tube Daily     heparin ANTICOAGULANT  5,000 Units Subcutaneous Q12H     levETIRAcetam  1,500 mg Intravenous Q12H     metoprolol  5 mg Intravenous Q6H     pantoprazole  40 mg Per Feeding Tube Daily     sodium chloride (PF)  10 mL Intracatheter Q8H     valproate (DEPACON) intermittent infusion  1,000 mg Intravenous Q12H       dextrose       - MEDICATION INSTRUCTIONS -       sodium chloride 10 mL/hr at 09/20/20 0400               Physical Examination:   Temp:  [99.2  F (37.3  C)-100.7  F (38.2  C)] 100.1  F (37.8  C)  Pulse:  [6-76] 71  Resp:  [11-23] 15  BP: (103-190)/(47-88) 167/73  FiO2 (%):  [30 %] 30 %  SpO2:  [93 %-97 %] 95 %    Intake/Output Summary (Last 24 hours) at 9/22/2020 1805  Last data filed at 9/22/2020 1600  Gross per 24 hour   Intake 1890 ml   Output 1175 ml   Net 715 ml             Wt Readings from Last 4 Encounters:   09/22/20 66.9 kg (147 lb 7.8 oz)     BP - Mean:  [] 116  Ventilation Mode: CMV/AC  (Continuous Mandatory Ventilation/ Assist Control)  FiO2 (%): 30 %  Rate Set (breaths/minute): 12 breaths/min  Tidal Volume Set (mL): 500 mL  PEEP (cm H2O): 5 cmH2O  Pressure Support (cm H2O): 10 cmH2O  Oxygen Concentration  (%): 30 %  Resp: 15    No lab results found in last 7 days.    GEN: no acute distress   HEENT: head ncat, sclera anicteric, OP patent, trachea midline   PULM: unlabored synchronous with vent, clear anteriorly    CV/COR: RRR S1S2 no gallop,  No rub, no murmur  ABD: soft nontender, hypoactive bowel sounds, no mass  EXT:  Edema   warm  NEURO: Still unresponsive  SKIN: no obvious rash  LINES: clean, dry intact         Data:   All data and imaging reviewed     ROUTINE ICU LABS (Last four results)  CMP  Recent Labs   Lab 09/22/20  0525 09/21/20  0445 09/20/20  0403 09/19/20  0427   * 145* 145* 145*   POTASSIUM 3.5 3.4 3.8 3.5   CHLORIDE 111* 109 109 109   CO2 34* 32 32 33*   ANIONGAP 2* 4 4 3   * 134* 123* 120*   BUN 23 20 20 18   CR 0.62 0.54 0.52 0.44*   GFRESTIMATED 88 >90 >90 >90   GFRESTBLACK >90 >90 >90 >90   MARII 9.0 8.4* 9.0 8.5   MAG  --  2.2  --   --    PHOS  --  2.9  --   --      CBC  Recent Labs   Lab 09/22/20  0525 09/21/20  0445 09/20/20  0403 09/19/20  0427   WBC 10.5 11.3* 10.2 8.8   RBC 3.37* 3.34* 3.58* 3.40*   HGB 10.6* 10.5* 11.3* 10.7*   HCT 33.8* 33.3* 35.5 33.9*    100 99 100   MCH 31.5 31.4 31.6 31.5   MCHC 31.4* 31.5 31.8 31.6   RDW 13.7 13.6 13.5 13.5    311 357 326     INRNo lab results found in last 7 days.  Arterial Blood GasNo lab results found in last 7 days.    All cultures:  Recent Labs   Lab 09/20/20  1200 09/20/20  0930 09/18/20  0318   CULT Light growth  Staphylococcus aureus  Susceptibility testing in progress  *  Light growth  Corynebacterium species  Identification obtained by MALDI-TOF mass spectrometry research use only database. Test   characteristics determined and verified by the [facility]   ([facility]) [place], [place].  Susceptibility testing not routinely done  * No growth after 2 days Light growth  Normal kena       No results found for this or any previous visit (from the past 24 hour(s)).      Billing: This patient is  critically ill: yes. Total critical care time today 35 min.    Carlos Petty MD  [facility] Intensivist Service

## 2020-09-22 NOTE — PROGRESS NOTES
Day 10 EEG; continued to day 11 by Shaniqua Bruno, Critical Care PA.     With the exception of the ECG lead, the patient is wearing MRI-conditional leads.    LORA Tran T./ABHI

## 2020-09-22 NOTE — PROGRESS NOTES
"  [facility]    Stroke Progress Note    Interval Events  -180 systolic with one drop to 103 systolic  Remains intubated and not following commands  No seizures  No sedative drips  Minimal fever x 1 overnight, to 100.7  Prelim EEG read this morning shows continued improvement -- PLEDs have decreased significantly and patient has more defined sleep/wake cycles with total cessation of PLEDs during sleep.    Impression & Plan  Scattered intracranial atherosclerosis with severe focal left M1 stenosis, now s/p balloon angioplasty 9/11   Left MCA syndrome  New R MCA infarcts  - Continue Q2 hour neurochecks  - SBP goal 140-180 mmHg  - DAPT with ASA 81 mg daily + Plavix 75 mg daily   - Continue Lipitor 40 mg daily  - Consideration for possible stent at site of L M1 angioplasty given stenosis on MRA. Will focus on seizure management at this time, but potential intervention pending improvement from seizure perspective  - Repeat MRI shows no new infarcts or findings     Non-convulsive status epilepticus (NCSE), resolved  Ongoing severe encephalopathy  - Continue vEEG   - Continue Keppra 1500 mg q12 hours  - Continue VPA - reduced on 9/21 from 750mg q8 hours to 1000mg q12 hours. Recheck total & free level tonight at 8pm trough (levels)  - Continue onfi -- reduce dose today from 10mg q12 hours to 5mg q12 hours to try to promote wakefulness    We will follow. Updated daughter Varsha    Elsa Bruno PA-C  Neurology  09/22/2020 9:23 AM  To page me or covering stroke neurology team member, click here: AMCOM  Choose \"On Call\" tab at top, then search dropdown box for \"Neurology Adult\" & press Enter, look for Neuro ICU/Stroke      ______________________________________________________    Medications     Scheduled Meds    amLODIPine  5 mg Oral Daily     aspirin  81 mg Per Feeding Tube Daily     atorvastatin  40 mg Oral or NG Tube Daily at 8 pm     chlorhexidine  15 mL Mouth/Throat Q12H     clobazam  10 mg Oral " BID     clopidogrel  75 mg Oral or Feeding Tube Daily     heparin ANTICOAGULANT  5,000 Units Subcutaneous Q12H     levETIRAcetam  1,500 mg Intravenous Q12H     metoprolol  5 mg Intravenous Q6H     pantoprazole  40 mg Per Feeding Tube Daily     sodium chloride (PF)  10 mL Intracatheter Q8H     valproate (DEPACON) intermittent infusion  1,000 mg Intravenous Q12H       Infusion Meds    dextrose       - MEDICATION INSTRUCTIONS -       sodium chloride 10 mL/hr at 09/20/20 0400       PRN Meds  acetaminophen **OR** acetaminophen, bisacodyl, dextrose, glucose **OR** dextrose **OR** glucagon, fentaNYL, hydrALAZINE, labetalol, lidocaine 4%, lidocaine (buffered or not buffered), LORazepam, - MEDICATION INSTRUCTIONS -, melatonin, miconazole, naloxone, ondansetron **OR** ondansetron, polyethylene glycol, potassium chloride, potassium chloride with lidocaine, potassium chloride, potassium chloride, potassium chloride, prochlorperazine **OR** prochlorperazine **OR** prochlorperazine, senna-docusate **OR** senna-docusate, sodium chloride (PF), sodium chloride (PF)       PHYSICAL EXAMINATION  Temp:  [99.2  F (37.3  C)-100.7  F (38.2  C)] 99.5  F (37.5  C)  Pulse:  [61-75] 75  Resp:  [11-22] 17  BP: (103-180)/(47-81) 137/59  FiO2 (%):  [30 %] 30 %  SpO2:  [93 %-97 %] 95 %     Neurologic  Mental Status:  Intubated, does not open eyes to voice or noxious stimuli, does not follow commands  Cranial Nerves:  PERRL, difficult to assess facial symmetry with ETT fastener, does not protrude tongue  Motor:  normal muscle tone and bulk, no abnormal movements, withdraws both LEs but left more brisk, RUE abnormal extension and LUE no definite movement at all to noxious  Reflexes:  b/l toes upgoing  Sensory:  see motor exam  Coordination:  unable to assess  Station/Gait:  deferred     Imaging  I personally reviewed all imaging; relevant findings per HPI.     Lab Results Data   CBC  Recent Labs   Lab 09/22/20  0525 09/21/20  6525 09/20/20  9347    WBC 10.5 11.3* 10.2   RBC 3.37* 3.34* 3.58*   HGB 10.6* 10.5* 11.3*   HCT 33.8* 33.3* 35.5    311 357     Basic Metabolic Panel    Recent Labs   Lab 09/22/20  0525 09/21/20  0445 09/20/20  0403   * 145* 145*   POTASSIUM 3.5 3.4 3.8   CHLORIDE 111* 109 109   CO2 34* 32 32   BUN 23 20 20   CR 0.62 0.54 0.52   * 134* 123*   MARII 9.0 8.4* 9.0     Liver Panel  No results for input(s): PROTTOTAL, ALBUMIN, BILITOTAL, ALKPHOS, AST, ALT, BILIDIRECT in the last 168 hours.  INR    Recent Labs   Lab Test 09/07/20  0758   INR 1.05      Lipid Profile    Recent Labs   Lab Test 09/05/20  1256   CHOL 186   HDL 87   LDL 81   TRIG 89     A1C    Recent Labs   Lab Test 09/05/20  1256   A1C 5.5     Troponin I  No results for input(s): TROPI in the last 168 hours.

## 2020-09-22 NOTE — PLAN OF CARE
No change in unresponsiveness, does not open eyes, only weak withdrawal from noxious stim, is PERRL.  EEG continues.  Does have intermittent rhythmic twitching of eyelids, seems to occur more during stim/turns.  BP high but within params.  T ax 100.  LS cta  BS active, no BM today.  TF at goal.     at bedside much of afternoon, was updated.    Adequate UOP via Purewick.

## 2020-09-22 NOTE — PROGRESS NOTES
[facility] ICU RESPIRATORY NOTE        Date of Admission: 9/5/2020    Date of Intubation (most recent): 09/11/20    Reason for Mechanical Ventilation: Airway Protection    Number of Days on Mechanical Ventilation: 12    Significant Events Today: none overnight    ABG Results: No lab results found in last 7 days.    Current Vent Settings: Ventilation Mode: CMV/AC  (Continuous Mandatory Ventilation/ Assist Control)  FiO2 (%): 30 %  Rate Set (breaths/minute): 12 breaths/min  Tidal Volume Set (mL): 500 mL  PEEP (cm H2O): 5 cmH2O  Pressure Support (cm H2O): 10 cmH2O  Oxygen Concentration (%): 30 %  Resp: 12      Plan: Continue vent support, daily SBT as tolerated    Preethi Hsieh, RT

## 2020-09-22 NOTE — PLAN OF CARE
Neuros unchanged. Pt calm, eye lid twitching noted, eyes opened once during turn/repo. Pt withdraws very slightly from pain.Temps 100-100.7, Tylenol given x2, lowest temp 99.4. Sched Metoprolol held x1 due to SBP parameters of 140-180.  Pt tolerating vent, settings unchanged overnight. Two blisters to R arm, near PICC line dressing, GUICHO no drainage. TF at goal rate. Purewick in place.

## 2020-09-23 ENCOUNTER — HOSPITAL ENCOUNTER (OUTPATIENT)
Dept: NEUROLOGY | Facility: CLINIC | Age: 75
DRG: 003 | End: 2020-09-23
Attending: PHYSICIAN ASSISTANT
Payer: MEDICARE

## 2020-09-23 LAB
ANION GAP SERPL CALCULATED.3IONS-SCNC: 2 MMOL/L (ref 3–14)
BUN SERPL-MCNC: 24 MG/DL (ref 7–30)
CALCIUM SERPL-MCNC: 8.8 MG/DL (ref 8.5–10.1)
CHLORIDE SERPL-SCNC: 112 MMOL/L (ref 94–109)
CO2 SERPL-SCNC: 34 MMOL/L (ref 20–32)
CREAT SERPL-MCNC: 0.52 MG/DL (ref 0.52–1.04)
ERYTHROCYTE [DISTWIDTH] IN BLOOD BY AUTOMATED COUNT: 13.7 % (ref 10–15)
GFR SERPL CREATININE-BSD FRML MDRD: >90 ML/MIN/{1.73_M2}
GLUCOSE SERPL-MCNC: 130 MG/DL (ref 70–99)
HCT VFR BLD AUTO: 32.9 % (ref 35–47)
HGB BLD-MCNC: 10.3 G/DL (ref 11.7–15.7)
MCH RBC QN AUTO: 31.4 PG (ref 26.5–33)
MCHC RBC AUTO-ENTMCNC: 31.3 G/DL (ref 31.5–36.5)
MCV RBC AUTO: 100 FL (ref 78–100)
PLATELET # BLD AUTO: 283 10E9/L (ref 150–450)
POTASSIUM SERPL-SCNC: 3.3 MMOL/L (ref 3.4–5.3)
POTASSIUM SERPL-SCNC: 3.8 MMOL/L (ref 3.4–5.3)
PROCALCITONIN SERPL-MCNC: <0.05 NG/ML
RBC # BLD AUTO: 3.28 10E12/L (ref 3.8–5.2)
SODIUM SERPL-SCNC: 148 MMOL/L (ref 133–144)
WBC # BLD AUTO: 12.6 10E9/L (ref 4–11)

## 2020-09-23 PROCEDURE — 25000132 ZZH RX MED GY IP 250 OP 250 PS 637: Mod: GY | Performed by: INTERNAL MEDICINE

## 2020-09-23 PROCEDURE — 25000128 H RX IP 250 OP 636: Performed by: PHYSICIAN ASSISTANT

## 2020-09-23 PROCEDURE — 95714 VEEG EA 12-26 HR UNMNTR: CPT

## 2020-09-23 PROCEDURE — 40000008 ZZH STATISTIC AIRWAY CARE

## 2020-09-23 PROCEDURE — 20000003 ZZH R&B ICU

## 2020-09-23 PROCEDURE — 25000125 ZZHC RX 250: Performed by: PHYSICIAN ASSISTANT

## 2020-09-23 PROCEDURE — 25000128 H RX IP 250 OP 636: Performed by: ANESTHESIOLOGY

## 2020-09-23 PROCEDURE — 25000132 ZZH RX MED GY IP 250 OP 250 PS 637: Mod: GY | Performed by: HOSPITALIST

## 2020-09-23 PROCEDURE — 99291 CRITICAL CARE FIRST HOUR: CPT | Performed by: INTERNAL MEDICINE

## 2020-09-23 PROCEDURE — 27210429 ZZH NUTRITION PRODUCT INTERMEDIATE LITER

## 2020-09-23 PROCEDURE — 25000125 ZZHC RX 250: Performed by: ANESTHESIOLOGY

## 2020-09-23 PROCEDURE — 84132 ASSAY OF SERUM POTASSIUM: CPT | Performed by: INTERNAL MEDICINE

## 2020-09-23 PROCEDURE — 84145 PROCALCITONIN (PCT): CPT | Performed by: INTERNAL MEDICINE

## 2020-09-23 PROCEDURE — 94003 VENT MGMT INPAT SUBQ DAY: CPT

## 2020-09-23 PROCEDURE — 25800030 ZZH RX IP 258 OP 636: Performed by: PHYSICIAN ASSISTANT

## 2020-09-23 PROCEDURE — 80048 BASIC METABOLIC PNL TOTAL CA: CPT | Performed by: INTERNAL MEDICINE

## 2020-09-23 PROCEDURE — 25000128 H RX IP 250 OP 636: Performed by: NURSE PRACTITIONER

## 2020-09-23 PROCEDURE — 85027 COMPLETE CBC AUTOMATED: CPT | Performed by: INTERNAL MEDICINE

## 2020-09-23 PROCEDURE — 40000275 ZZH STATISTIC RCP TIME EA 10 MIN

## 2020-09-23 PROCEDURE — 25800030 ZZH RX IP 258 OP 636: Performed by: NURSE PRACTITIONER

## 2020-09-23 PROCEDURE — 99291 CRITICAL CARE FIRST HOUR: CPT | Performed by: PSYCHIATRY & NEUROLOGY

## 2020-09-23 PROCEDURE — 25000128 H RX IP 250 OP 636: Performed by: INTERNAL MEDICINE

## 2020-09-23 RX ORDER — CEFTRIAXONE 2 G/1
2 INJECTION, POWDER, FOR SOLUTION INTRAMUSCULAR; INTRAVENOUS EVERY 24 HOURS
Status: COMPLETED | OUTPATIENT
Start: 2020-09-23 | End: 2020-09-28

## 2020-09-23 RX ADMIN — HYDRALAZINE HYDROCHLORIDE 10 MG: 20 INJECTION INTRAMUSCULAR; INTRAVENOUS at 16:40

## 2020-09-23 RX ADMIN — HEPARIN SODIUM 5000 UNITS: 5000 INJECTION, SOLUTION INTRAVENOUS; SUBCUTANEOUS at 08:26

## 2020-09-23 RX ADMIN — ACETAMINOPHEN 650 MG: 325 TABLET, FILM COATED ORAL at 01:21

## 2020-09-23 RX ADMIN — AMLODIPINE BESYLATE 5 MG: 5 TABLET ORAL at 08:20

## 2020-09-23 RX ADMIN — METOPROLOL TARTRATE 5 MG: 5 INJECTION, SOLUTION INTRAVENOUS at 03:23

## 2020-09-23 RX ADMIN — METOPROLOL TARTRATE 5 MG: 5 INJECTION, SOLUTION INTRAVENOUS at 08:19

## 2020-09-23 RX ADMIN — POTASSIUM CHLORIDE 40 MEQ: 1.5 POWDER, FOR SOLUTION ORAL at 06:16

## 2020-09-23 RX ADMIN — MICONAZOLE NITRATE: 20 POWDER TOPICAL at 01:22

## 2020-09-23 RX ADMIN — CHLORHEXIDINE GLUCONATE 15 ML: 1.2 RINSE ORAL at 19:40

## 2020-09-23 RX ADMIN — HEPARIN SODIUM 5000 UNITS: 5000 INJECTION, SOLUTION INTRAVENOUS; SUBCUTANEOUS at 22:28

## 2020-09-23 RX ADMIN — PANTOPRAZOLE SODIUM 40 MG: 40 TABLET, DELAYED RELEASE ORAL at 08:20

## 2020-09-23 RX ADMIN — LEVETIRACETAM 1500 MG: 500 INJECTION, SOLUTION INTRAVENOUS at 08:15

## 2020-09-23 RX ADMIN — ATORVASTATIN CALCIUM 40 MG: 40 TABLET, FILM COATED ORAL at 19:39

## 2020-09-23 RX ADMIN — METOPROLOL TARTRATE 5 MG: 5 INJECTION, SOLUTION INTRAVENOUS at 14:32

## 2020-09-23 RX ADMIN — POTASSIUM CHLORIDE 20 MEQ: 1.5 POWDER, FOR SOLUTION ORAL at 08:20

## 2020-09-23 RX ADMIN — ASPIRIN 81 MG 81 MG: 81 TABLET ORAL at 08:20

## 2020-09-23 RX ADMIN — CEFTRIAXONE SODIUM 2 G: 2 INJECTION, POWDER, FOR SOLUTION INTRAMUSCULAR; INTRAVENOUS at 12:13

## 2020-09-23 RX ADMIN — CHLORHEXIDINE GLUCONATE 15 ML: 1.2 RINSE ORAL at 08:25

## 2020-09-23 RX ADMIN — METOPROLOL TARTRATE 5 MG: 5 INJECTION, SOLUTION INTRAVENOUS at 19:39

## 2020-09-23 RX ADMIN — VALPROATE SODIUM 1000 MG: 100 INJECTION, SOLUTION INTRAVENOUS at 22:27

## 2020-09-23 RX ADMIN — LEVETIRACETAM 1500 MG: 500 INJECTION, SOLUTION INTRAVENOUS at 19:39

## 2020-09-23 RX ADMIN — VALPROATE SODIUM 1000 MG: 100 INJECTION, SOLUTION INTRAVENOUS at 08:41

## 2020-09-23 RX ADMIN — CLOPIDOGREL BISULFATE 75 MG: 75 TABLET, FILM COATED ORAL at 08:20

## 2020-09-23 ASSESSMENT — ACTIVITIES OF DAILY LIVING (ADL)
ADLS_ACUITY_SCORE: 22

## 2020-09-23 ASSESSMENT — MIFFLIN-ST. JEOR: SCORE: 1127.25

## 2020-09-23 NOTE — PLAN OF CARE
Pt with no change  in unresponsiveness this shift. Does not open eyes, only weak withdrawal from noxious stim, is PERRL.  EEG continues.  Pt does have intermittent rhythmic twitching of eyelids, seems to occur more during stim/turns. Significant amount oral secretion, oral care done x 4.  Neuro check per order Q 2 H.  BP elevated , hydralazin given x 1.  T ax 100.  LS cta  BS active, no BM today.  TF at goal tolerate well.   Pure wick in place with adequate output.

## 2020-09-23 NOTE — PROGRESS NOTES
[facility] ICU RESPIRATORY NOTE        Date of Admission: 9/5/2020    Date of Intubation (most recent):9/11/2020    Reason for Mechanical Ventilation:Airway protection    Number of Days on Mechanical Ventilation:13    Met Criteria for Spontaneous Breathing Trial:Yes. PS 10/5 for 4 hrs.    Significant Events Today:None    ABG Results: No lab results found in last 7 days.    Current Vent Settings: Ventilation Mode: CMV/AC  (Continuous Mandatory Ventilation/ Assist Control)  FiO2 (%): 30 %  Rate Set (breaths/minute): 12 breaths/min  Tidal Volume Set (mL): 500 mL  PEEP (cm H2O): 5 cmH2O  Pressure Support (cm H2O): 10 cmH2O  Oxygen Concentration (%): 30 %  Resp: 20    Plan:Will cont full vent support overnight and will assess for another PS trial in the morning.    Kady Bender, RT

## 2020-09-23 NOTE — PROGRESS NOTES
"  [facility]    Stroke Progress Note    Interval Events  -170s with one drop to 120. Tmax 100.7 overnight. Exam with minimal improvement, now opening eyes to noxious stimuli. Per RN, patient with copious thick secretions. Staph aureus grown in sputum, now on Ceftriaxone.     Impression & Plan  Scattered intracranial atherosclerosis with severe focal left M1 stenosis, now s/p balloon angioplasty 9/11   Left MCA syndrome  New R MCA infarcts  - Continue Q2 hour neurochecks  - SBP goal 140-180 mmHg  - DAPT with ASA 81 mg daily + Plavix 75 mg daily   - Continue Lipitor 40 mg daily  - Consideration for possible stent at site of L M1 angioplasty given stenosis on MRA. Will focus on seizure management at this time, but potential intervention pending improvement from seizure perspective  - Repeat MRI shows no new infarcts or findings     Non-convulsive status epilepticus (NCSE), resolved  Ongoing severe encephalopathy  - Continue vEEG   - Continue Keppra 1500 mg q12 hours  - Continue VPA - reduced on 9/21 from 750mg q8 hours to 1000mg q12 hours. Recheck total & free level tonight at 8pm trough (levels)  - EEG with continued improvement, now <5% LPDs. Will discontinue Onfi     We will follow.     LILLY Louis, CNP  Neurology  To page me or covering stroke neurology team member, click here: AMCOM   Choose \"On Call\" tab at top, then search dropdown box for \"Neurology Adult\", select location, press Enter, then look for stroke/neuro ICU/telestroke.    ______________________________________________________    Medications   Home Meds  Prior to Admission medications    Medication Sig Start Date End Date Taking? Authorizing Provider   Multiple Vitamins-Minerals (SYSTANE ICAPS AREDS2) CAPS Take 1 capsule by mouth daily   Yes Unknown, Entered By History   multivitamin, therapeutic (THERA-VIT) TABS tablet Take 1 tablet by mouth daily   Yes Unknown, Entered By History       Scheduled Meds    amLODIPine  5 mg " Oral or Feeding Tube Daily     aspirin  81 mg Per Feeding Tube Daily     atorvastatin  40 mg Oral or NG Tube Daily at 8 pm     chlorhexidine  15 mL Mouth/Throat Q12H     clobazam  5 mg Oral BID     clopidogrel  75 mg Oral or Feeding Tube Daily     heparin ANTICOAGULANT  5,000 Units Subcutaneous Q12H     levETIRAcetam  1,500 mg Intravenous Q12H     metoprolol  5 mg Intravenous Q6H     pantoprazole  40 mg Per Feeding Tube Daily     sodium chloride (PF)  10 mL Intracatheter Q8H     valproate (DEPACON) intermittent infusion  1,000 mg Intravenous Q12H       Infusion Meds    dextrose       - MEDICATION INSTRUCTIONS -       sodium chloride Stopped (09/23/20 0000)       PRN Meds  acetaminophen **OR** acetaminophen, bisacodyl, dextrose, glucose **OR** dextrose **OR** glucagon, fentaNYL, hydrALAZINE, labetalol, lidocaine 4%, lidocaine (buffered or not buffered), LORazepam, - MEDICATION INSTRUCTIONS -, melatonin, miconazole, naloxone, ondansetron **OR** ondansetron, polyethylene glycol, potassium chloride, potassium chloride with lidocaine, potassium chloride, potassium chloride, potassium chloride, prochlorperazine **OR** prochlorperazine **OR** prochlorperazine, senna-docusate **OR** senna-docusate, sodium chloride (PF), sodium chloride (PF)       PHYSICAL EXAMINATION  Temp:  [99.3  F (37.4  C)-100.7  F (38.2  C)] 100.1  F (37.8  C)  Pulse:  [6-76] 73  Resp:  [12-23] 12  BP: (120-190)/(60-88) 162/74  FiO2 (%):  [30 %] 30 %  SpO2:  [93 %-98 %] 98 %     Neurologic  Mental Status:  Intubated, opens eyes to noxious stimuli, does not follow commands  Cranial Nerves:  PERRL, difficult to assess facial symmetry with ETT fastener, does not protrude tongue  Motor:  normal muscle tone and bulk, no abnormal movements, withdraws both LEs , no definite movement in UEs to noxious  Reflexes:  b/l toes upgoing  Sensory:  see motor exam  Coordination:  unable to assess  Station/Gait:  deferred     Imaging  I personally reviewed all imaging;  relevant findings per HPI.     Lab Results Data   CBC  Recent Labs   Lab 09/23/20  0415 09/22/20  0525 09/21/20  0445   WBC 12.6* 10.5 11.3*   RBC 3.28* 3.37* 3.34*   HGB 10.3* 10.6* 10.5*   HCT 32.9* 33.8* 33.3*    271 311     Basic Metabolic Panel    Recent Labs   Lab 09/23/20  0415 09/22/20  0525 09/21/20  0445   * 147* 145*   POTASSIUM 3.3* 3.5 3.4   CHLORIDE 112* 111* 109   CO2 34* 34* 32   BUN 24 23 20   CR 0.52 0.62 0.54   * 117* 134*   MARII 8.8 9.0 8.4*     Liver Panel  No results for input(s): PROTTOTAL, ALBUMIN, BILITOTAL, ALKPHOS, AST, ALT, BILIDIRECT in the last 168 hours.  INR    Recent Labs   Lab Test 09/07/20  0758   INR 1.05      Lipid Profile    Recent Labs   Lab Test 09/05/20  1256   CHOL 186   HDL 87   LDL 81   TRIG 89     A1C    Recent Labs   Lab Test 09/05/20  1256   A1C 5.5     Troponin I  No results for input(s): TROPI in the last 168 hours.

## 2020-09-23 NOTE — PLAN OF CARE
Tmax 38.  Neuros: PERRL. Withdraws from pain.  No response to voice.  Resp: CMV 30% 12/500/5  LS diminished.  SR.  GI: TF at goal.  No BM.  Minimal UOP via purewick, bladder scanned for 15 ml.  K replaced, needs 20 mEq at 0800.

## 2020-09-23 NOTE — PROGRESS NOTES
[facility] ICU RESPIRATORY NOTE        Date of Admission: 9/5/2020    Date of Intubation (most recent): 9/11/20    Reason for Mechanical Ventilation: Airway protection     Number of Days on Mechanical Ventilation: 13    Met Criteria for Spontaneous Breathing Trial: No    Reason for No Spontaneous Breathing Trial: per MD     Significant Events Today: none     ABG Results: No lab results found in last 7 days.    Current Vent Settings: Ventilation Mode: CMV/AC  (Continuous Mandatory Ventilation/ Assist Control)  FiO2 (%): 30 %  Rate Set (breaths/minute): 12 breaths/min  Tidal Volume Set (mL): 500 mL  PEEP (cm H2O): 5 cmH2O  Pressure Support (cm H2O): 10 cmH2O  Oxygen Concentration (%): 30 %  Resp: 12      Skin Assessment: intact     Plan: continue full vent support .    Jacquie Vásquez, RT

## 2020-09-23 NOTE — PROGRESS NOTES
CM    I: SW consult for discharge planning will be closed. Awaiting goals of care. Please re-consult if needed.     P: No SW intervention anticipated at this time.    TASH Guadarrama   [facility]  [phone]

## 2020-09-23 NOTE — PROGRESS NOTES
Critical Care Progress Note      09/23/2020    Name: Sherrell Leonard MRN#: [id number]   Age: 74 year old YOB: 1945     Hsptl Day# 18  ICU DAY # 15    MV DAY # 15             Problem List:   Active Problems:    Acute encephalopathy           Summary/Hospital Course:   74-year-old female admitted to the ICU for acute ventilatory failure and airway protection after left MCA subcortical stroke and left PCA stroke with multiple stenotic vessels.  Patient remains intubated.  Patient remains off sedation however her neurologic exam is still comatose.  Patient also continues with low-grade fevers at this time.  She does not have a leukocytosis.    9/21- Still not responding in spite of all sedation being discontinued for 96 hours    9/22- Still not responsive on my exam.By report from neurology, perhaps some improvement in EEG.    9/23- Opened eyes, but no following commands. Increasing WBC, low grade temp and Staph aureus in sputum. Ceftriaxone started      Assessment and plan :     Sherrell Leonard IS a 74 year old female admitted on 9/5/2020 for management of acute respiratory failure, CVA and seizure.   I have personally reviewed the daily labs, imaging studies, cultures and discussed the case with referring physician and consulting physicians.     My assessment and plan by system for this patient is as follows:    Neurology/Psychiatry:   1.  Seizure disorder on continues EEG and Versed infusion.  The goal is burst suppression  2. prn Propofol for ICU sedation  3.  PRN opioids for breakthrough pain  Plan  Appreciate neuro critical care input. Likely residual effects from sedating medications are cause of encephalopathy. Will continue to hold all sedating medications.  Cardiovascular:   1.Hemodynamics -hypotensive at times  2.Rhythm -normal sinus rhythm  3. Ischemia -no signs of ischemia  Plan  Patient is hypertensive at times requiring PRN hydralazine and labetalol.  I started the patient on amlodipine in order to  try to wean this patient off PRN dosing.    Pulmonary/Ventilator Management:   1. Airway intubated for airway protection  2. Oxygenation/ventilation/mechanics on full ventilatory support  3. Increased sputum production with fever. Staph aureus in sputum.  Plan  -We will attempt another spontaneous breathing trial today.  However even if the patient passes a spontaneous breathing trial she is not a candidate for extubation given the fact that she cannot protect her airway.      GI and Nutrition :   1.  Concern for protein calorie malnutrition    Plan  -Continue tube feeds as tolerated.      Renal/Fluids/Electrolytes:   1.  Creatinine within normal limits no signs of acute kidney injury  2.  Continued hypokalemia  3.  Appears euvolemic    Plan  -Continue to monitor and replace electrolytes as per protocol.  - generally avoid nephrotoxic agents such as NSAID, IV contrast unless specifically required  - adjust medications as needed for renal clearance  - follow I/O's as appropriate.      Infectious Disease:   1.  Staph aureus tracheitis vs. pneumonia    Plan  -Start ceftriaxone    Endocrine:   1 concern for stress-induced hyperglycemia  Plan  - ICU insulin protocol, goal sugar <180      Hematology/Oncology:   1. Moderatel leukocytosis  2. Anemia, no signs, symptoms of active blood loss  3.  Plan  -Continue to monitor            IV/Access:   1. Venous access -PICC line  2. Arterial access -no need for arterial line at this time  3.  Plan  - central access required and necessary      ICU Prophylaxis:   1. DVT: Mechanical  2. VAP: HOB 30 degrees, chlorhexidine rinse  3. Stress Ulcer: PPI  4. Restraints: Nonviolent soft two point restraints required and necessary for patient safety and continued cares and good effect as patient continues to pull at necessary lines, tubes despite education and distraction. Will readdress daily.  Restraints not needed at this time   5. Wound care - per unit routine   6. Feeding - we will  start tube feeds      Key goals for next 24 hours:   1.  Spontaneous breathing trial  2.  Start ceftriaxone               Interim History:     Patient still with altered mental status.  Increasing WBC and fever         Key Medications:       amLODIPine  5 mg Oral or Feeding Tube Daily     aspirin  81 mg Per Feeding Tube Daily     atorvastatin  40 mg Oral or NG Tube Daily at 8 pm     cefTRIAXone  2 g Intravenous Q24H     chlorhexidine  15 mL Mouth/Throat Q12H     clopidogrel  75 mg Oral or Feeding Tube Daily     heparin ANTICOAGULANT  5,000 Units Subcutaneous Q12H     levETIRAcetam  1,500 mg Intravenous Q12H     metoprolol  5 mg Intravenous Q6H     pantoprazole  40 mg Per Feeding Tube Daily     sodium chloride (PF)  10 mL Intracatheter Q8H     valproate (DEPACON) intermittent infusion  1,000 mg Intravenous Q12H       dextrose       - MEDICATION INSTRUCTIONS -       sodium chloride Stopped (09/23/20 0000)               Physical Examination:   Temp:  [99.3  F (37.4  C)-100.7  F (38.2  C)] 99.5  F (37.5  C)  Pulse:  [59-76] 68  Resp:  [12-26] 22  BP: (120-184)/(60-87) 170/84  FiO2 (%):  [30 %] 30 %  SpO2:  [93 %-98 %] 96 %    Intake/Output Summary (Last 24 hours) at 9/23/2020 1303  Last data filed at 9/23/2020 1200  Gross per 24 hour   Intake 1640 ml   Output 920 ml   Net 720 ml                 Wt Readings from Last 4 Encounters:   09/23/20 67.9 kg (149 lb 11.1 oz)     BP - Mean:  [] 129  Ventilation Mode: PS  (Pressure Support)  FiO2 (%): 30 %  Rate Set (breaths/minute): 12 breaths/min  Tidal Volume Set (mL): 500 mL  PEEP (cm H2O): 5 cmH2O  Pressure Support (cm H2O): 10 cmH2O  Oxygen Concentration (%): 30 %  Resp: 22    No lab results found in last 7 days.    GEN: no acute distress   HEENT: head ncat, sclera anicteric, OP patent, trachea midline   PULM: unlabored synchronous with vent, clear anteriorly    CV/COR: RRR S1S2 no gallop,  No rub, no murmur  ABD: soft nontender, hypoactive bowel sounds, no mass  EXT:   Edema   warm  NEURO: Still unresponsive  SKIN: no obvious rash  LINES: clean, dry intact         Data:   All data and imaging reviewed     ROUTINE ICU LABS (Last four results)  CMP  Recent Labs   Lab 09/23/20  0415 09/22/20  0525 09/21/20 0445 09/20/20  0403   * 147* 145* 145*   POTASSIUM 3.3* 3.5 3.4 3.8   CHLORIDE 112* 111* 109 109   CO2 34* 34* 32 32   ANIONGAP 2* 2* 4 4   * 117* 134* 123*   BUN 24 23 20 20   CR 0.52 0.62 0.54 0.52   GFRESTIMATED >90 88 >90 >90   GFRESTBLACK >90 >90 >90 >90   MARII 8.8 9.0 8.4* 9.0   MAG  --   --  2.2  --    PHOS  --   --  2.9  --      CBC  Recent Labs   Lab 09/23/20  0415 09/22/20  0525 09/21/20 0445 09/20/20  0403   WBC 12.6* 10.5 11.3* 10.2   RBC 3.28* 3.37* 3.34* 3.58*   HGB 10.3* 10.6* 10.5* 11.3*   HCT 32.9* 33.8* 33.3* 35.5    100 100 99   MCH 31.4 31.5 31.4 31.6   MCHC 31.3* 31.4* 31.5 31.8   RDW 13.7 13.7 13.6 13.5    271 311 357     INRNo lab results found in last 7 days.  Arterial Blood GasNo lab results found in last 7 days.    All cultures:  Recent Labs   Lab 09/20/20  1200 09/20/20  0930 09/18/20  0318   CULT Light growth  Staphylococcus aureus  *  Light growth  Corynebacterium species  Identification obtained by MALDI-TOF mass spectrometry research use only database. Test   characteristics determined and verified by the Infectious Diseases Diagnostic Laboratory   ([facility]) [place], [place].  Susceptibility testing not routinely done  * No growth after 3 days Light growth  Normal kena       No results found for this or any previous visit (from the past 24 hour(s)).      Billing: This patient is critically ill: yes. Total critical care time today 35 min.    Carlos Petty MD  [facility] Intensivist Service

## 2020-09-23 NOTE — PLAN OF CARE
Neuros largely unchanged, perhaps more eyelid twitching when stimulated.  Weak withdrawal from noxious stim on all 4.   Vented, did tolerate PSV today.  LS dim, somewhat coarse.    BS active, no BM.  Good UOP via Purewick.  WBC trending up, has fever, lots of oral/ETT secretions; abx added today.   at bedside much of afternoon, was updated.

## 2020-09-24 ENCOUNTER — HOSPITAL ENCOUNTER (OUTPATIENT)
Dept: NEUROLOGY | Facility: CLINIC | Age: 75
DRG: 003 | End: 2020-09-24
Attending: PHYSICIAN ASSISTANT
Payer: MEDICARE

## 2020-09-24 LAB
ALBUMIN SERPL-MCNC: 1.8 G/DL (ref 3.4–5)
ALP SERPL-CCNC: 62 U/L (ref 40–150)
ALT SERPL W P-5'-P-CCNC: 35 U/L (ref 0–50)
AMMONIA PLAS-SCNC: 24 UMOL/L (ref 10–50)
AST SERPL W P-5'-P-CCNC: 29 U/L (ref 0–45)
BILIRUB DIRECT SERPL-MCNC: <0.1 MG/DL (ref 0–0.2)
BILIRUB SERPL-MCNC: 0.2 MG/DL (ref 0.2–1.3)
GLUCOSE BLDC GLUCOMTR-MCNC: 103 MG/DL (ref 70–99)
GLUCOSE BLDC GLUCOMTR-MCNC: 130 MG/DL (ref 70–99)
POTASSIUM SERPL-SCNC: 3.3 MMOL/L (ref 3.4–5.3)
POTASSIUM SERPL-SCNC: 3.4 MMOL/L (ref 3.4–5.3)
PROT SERPL-MCNC: 5.4 G/DL (ref 6.8–8.8)
VALPROATE FREE SERPL-MCNC: 31.3 UG/ML (ref 6–20)

## 2020-09-24 PROCEDURE — 25000128 H RX IP 250 OP 636: Performed by: NURSE PRACTITIONER

## 2020-09-24 PROCEDURE — 40000061 EEG VIDEO 12-26 HR UNMONITORED

## 2020-09-24 PROCEDURE — 00000146 ZZHCL STATISTIC GLUCOSE BY METER IP

## 2020-09-24 PROCEDURE — 25000128 H RX IP 250 OP 636: Performed by: INTERNAL MEDICINE

## 2020-09-24 PROCEDURE — 25000128 H RX IP 250 OP 636: Performed by: ANESTHESIOLOGY

## 2020-09-24 PROCEDURE — 40000008 ZZH STATISTIC AIRWAY CARE

## 2020-09-24 PROCEDURE — 99291 CRITICAL CARE FIRST HOUR: CPT | Performed by: INTERNAL MEDICINE

## 2020-09-24 PROCEDURE — 20000003 ZZH R&B ICU

## 2020-09-24 PROCEDURE — 40000275 ZZH STATISTIC RCP TIME EA 10 MIN

## 2020-09-24 PROCEDURE — 25800030 ZZH RX IP 258 OP 636: Performed by: NURSE PRACTITIONER

## 2020-09-24 PROCEDURE — 99291 CRITICAL CARE FIRST HOUR: CPT | Performed by: PSYCHIATRY & NEUROLOGY

## 2020-09-24 PROCEDURE — 25000125 ZZHC RX 250: Performed by: PHYSICIAN ASSISTANT

## 2020-09-24 PROCEDURE — 40000239 ZZH STATISTIC VAT ROUNDS

## 2020-09-24 PROCEDURE — 27210429 ZZH NUTRITION PRODUCT INTERMEDIATE LITER

## 2020-09-24 PROCEDURE — 94003 VENT MGMT INPAT SUBQ DAY: CPT

## 2020-09-24 PROCEDURE — 80076 HEPATIC FUNCTION PANEL: CPT | Performed by: HOSPITALIST

## 2020-09-24 PROCEDURE — 25000128 H RX IP 250 OP 636: Performed by: PHYSICIAN ASSISTANT

## 2020-09-24 PROCEDURE — 25000132 ZZH RX MED GY IP 250 OP 250 PS 637: Mod: GY | Performed by: INTERNAL MEDICINE

## 2020-09-24 PROCEDURE — 25000125 ZZHC RX 250: Performed by: ANESTHESIOLOGY

## 2020-09-24 PROCEDURE — 25800030 ZZH RX IP 258 OP 636: Performed by: INTERNAL MEDICINE

## 2020-09-24 PROCEDURE — 82140 ASSAY OF AMMONIA: CPT | Performed by: PSYCHIATRY & NEUROLOGY

## 2020-09-24 PROCEDURE — 25800030 ZZH RX IP 258 OP 636: Performed by: PHYSICIAN ASSISTANT

## 2020-09-24 PROCEDURE — 25000132 ZZH RX MED GY IP 250 OP 250 PS 637: Mod: GY | Performed by: HOSPITALIST

## 2020-09-24 PROCEDURE — 84132 ASSAY OF SERUM POTASSIUM: CPT | Performed by: HOSPITALIST

## 2020-09-24 RX ADMIN — CLOPIDOGREL BISULFATE 75 MG: 75 TABLET, FILM COATED ORAL at 08:50

## 2020-09-24 RX ADMIN — PANTOPRAZOLE SODIUM 40 MG: 40 TABLET, DELAYED RELEASE ORAL at 08:50

## 2020-09-24 RX ADMIN — METOPROLOL TARTRATE 5 MG: 5 INJECTION, SOLUTION INTRAVENOUS at 08:58

## 2020-09-24 RX ADMIN — HEPARIN SODIUM 5000 UNITS: 5000 INJECTION, SOLUTION INTRAVENOUS; SUBCUTANEOUS at 08:45

## 2020-09-24 RX ADMIN — AMLODIPINE BESYLATE 5 MG: 5 TABLET ORAL at 08:50

## 2020-09-24 RX ADMIN — HEPARIN SODIUM 5000 UNITS: 5000 INJECTION, SOLUTION INTRAVENOUS; SUBCUTANEOUS at 20:25

## 2020-09-24 RX ADMIN — VALPROATE SODIUM 1000 MG: 100 INJECTION, SOLUTION INTRAVENOUS at 20:25

## 2020-09-24 RX ADMIN — MICONAZOLE NITRATE: 20 POWDER TOPICAL at 05:10

## 2020-09-24 RX ADMIN — SODIUM CHLORIDE: 9 INJECTION, SOLUTION INTRAVENOUS at 11:24

## 2020-09-24 RX ADMIN — VALPROATE SODIUM 1000 MG: 100 INJECTION, SOLUTION INTRAVENOUS at 09:34

## 2020-09-24 RX ADMIN — CHLORHEXIDINE GLUCONATE 15 ML: 1.2 RINSE ORAL at 08:42

## 2020-09-24 RX ADMIN — ASPIRIN 81 MG 81 MG: 81 TABLET ORAL at 08:50

## 2020-09-24 RX ADMIN — CHLORHEXIDINE GLUCONATE 15 ML: 1.2 RINSE ORAL at 20:03

## 2020-09-24 RX ADMIN — METOPROLOL TARTRATE 5 MG: 5 INJECTION, SOLUTION INTRAVENOUS at 19:50

## 2020-09-24 RX ADMIN — LEVETIRACETAM 1500 MG: 500 INJECTION, SOLUTION INTRAVENOUS at 08:54

## 2020-09-24 RX ADMIN — CEFTRIAXONE SODIUM 2 G: 2 INJECTION, POWDER, FOR SOLUTION INTRAMUSCULAR; INTRAVENOUS at 11:20

## 2020-09-24 RX ADMIN — ATORVASTATIN CALCIUM 40 MG: 40 TABLET, FILM COATED ORAL at 19:50

## 2020-09-24 RX ADMIN — METOPROLOL TARTRATE 5 MG: 5 INJECTION, SOLUTION INTRAVENOUS at 14:39

## 2020-09-24 RX ADMIN — LEVETIRACETAM 1500 MG: 500 INJECTION, SOLUTION INTRAVENOUS at 19:50

## 2020-09-24 RX ADMIN — HYDRALAZINE HYDROCHLORIDE 10 MG: 20 INJECTION INTRAMUSCULAR; INTRAVENOUS at 20:25

## 2020-09-24 RX ADMIN — HYDRALAZINE HYDROCHLORIDE 10 MG: 20 INJECTION INTRAMUSCULAR; INTRAVENOUS at 05:15

## 2020-09-24 RX ADMIN — MICONAZOLE NITRATE: 20 POWDER TOPICAL at 09:23

## 2020-09-24 ASSESSMENT — ACTIVITIES OF DAILY LIVING (ADL)
ADLS_ACUITY_SCORE: 22

## 2020-09-24 ASSESSMENT — MIFFLIN-ST. JEOR: SCORE: 1133.25

## 2020-09-24 ASSESSMENT — VISUAL ACUITY
OU: OTHER (SEE COMMENT)

## 2020-09-24 NOTE — PROGRESS NOTES
CLINICAL NUTRITION SERVICES - REASSESSMENT NOTE      Malnutrition: (9/14)  % Weight Loss:  Unable to obtain without a nutrition history   % Intake:  </= 50% for >/= 5 days (severe malnutrition)  Subcutaneous Fat Loss:  None observed  Muscle Loss:  None observed  Fluid Retention:  None noted     Malnutrition Diagnosis: Unable to determine due to lack of a nutrition history (patient intubated and not able to provide)       EVALUATION OF PROGRESS TOWARD GOALS   Diet:  NPO    Nutrition Support:  Patient continues on goal TF as follows ~    Nutrition Support Enteral:  Type of Feeding Tube: NG  Enteral Frequency:  Continuous  Enteral Regimen: Isosource 1.5 at 40 mL/hr  Total Enteral Provisions: 1440 kcal (27 kcal/kg), 65 g protein (1.2 g/kg), 169 g CHO, 14 g fiber, 730 mL H2O  Free Water Flush: 60 mL every 4 hours       Intake/Tolerance:    Labs noted and acceptable today  BM x 1 today and x 1 yest   Weight 68.5 kg (up 2.3 kg from admit), I/O 1700/1675      ASSESSED NUTRITION NEEDS:  Dosing Weight 54 kg (adjusted for overweight)   Estimated Energy Needs: 5510-6433 kcals (25-30 Kcal/Kg)  Justification: maintenance and overweight  Estimated Protein Needs: 65-81 grams protein (1.2-1.5 g pro/Kg)  Justification: preservation of lean body mass      Previous Goals (9/21):   TF Isosource 1.5 at 40 mL/hr will continue to meet % needs   Evaluation: Met    Previous Nutrition Diagnosis (9/21):   No nutrition diagnosis identified at this time   Evaluation: No change      CURRENT NUTRITION DIAGNOSIS  No nutrition diagnosis identified at this time     INTERVENTIONS  Recommendations / Nutrition Prescription  Continue Isosource 1.5 at 40 mL/hr as above     Implementation  Collaboration and Referral of Nutrition care:  Patient discussed today during interdisciplinary bedside rounds     Goals  Isosource 1.5 at 40 mL/hr will continue to meet % needs     MONITORING AND EVALUATION:  Progress towards goals will be monitored and  evaluated per protocol and Practice Guidelines    Callie Johnston RD, LD, CNSC   Clinical Dietitian - [facility]

## 2020-09-24 NOTE — PROGRESS NOTES
"  [facility]    Stroke Progress Note    Interval Events  Pressure support trial x4 hours yesterday. Tmax 100.2 overnight. EEG this morning with some increase in LPDs but no significant change.      Impression & Plan  Scattered intracranial atherosclerosis with severe focal left M1 stenosis, now s/p balloon angioplasty 9/11   Left MCA syndrome  New R MCA infarcts  - Continue Q2 hour neurochecks  - SBP goal 140-180 mmHg  - DAPT with ASA 81 mg daily + Plavix 75 mg daily   - Continue Lipitor 40 mg daily  - Consideration for possible stent at site of L M1 angioplasty given stenosis on MRA. Will focus on seizure management at this time, but potential intervention pending improvement from seizure perspective  - Repeat MRI shows no new infarcts or findings     Non-convulsive status epilepticus (NCSE), resolved  Ongoing severe encephalopathy  - Continue vEEG   - Continue Keppra 1500 mg q12 hours  - Continue VPA - reduced on 9/21 from 750mg q8 hours to 1000mg q12 hours. Continue this dose.   - LFTs, ammonia, albumin ordered.      We will follow.     LILLY Louis, CNP  Neurology  To page me or covering stroke neurology team member, click here: AMCOM   Choose \"On Call\" tab at top, then search dropdown box for \"Neurology Adult\", select location, press Enter, then look for stroke/neuro ICU/telestroke.    ______________________________________________________    Medications   Home Meds  Prior to Admission medications    Medication Sig Start Date End Date Taking? Authorizing Provider   Multiple Vitamins-Minerals (SYSTANE ICAPS AREDS2) CAPS Take 1 capsule by mouth daily   Yes Unknown, Entered By History   multivitamin, therapeutic (THERA-VIT) TABS tablet Take 1 tablet by mouth daily   Yes Unknown, Entered By History       Scheduled Meds    amLODIPine  5 mg Oral or Feeding Tube Daily     aspirin  81 mg Per Feeding Tube Daily     atorvastatin  40 mg Oral or NG Tube Daily at 8 pm     cefTRIAXone  2 g Intravenous " Q24H     chlorhexidine  15 mL Mouth/Throat Q12H     clopidogrel  75 mg Oral or Feeding Tube Daily     heparin ANTICOAGULANT  5,000 Units Subcutaneous Q12H     levETIRAcetam  1,500 mg Intravenous Q12H     metoprolol  5 mg Intravenous Q6H     pantoprazole  40 mg Per Feeding Tube Daily     sodium chloride (PF)  10 mL Intracatheter Q8H     valproate (DEPACON) intermittent infusion  1,000 mg Intravenous Q12H       Infusion Meds    dextrose       - MEDICATION INSTRUCTIONS -       sodium chloride 10 mL/hr at 09/23/20 2000       PRN Meds  acetaminophen **OR** acetaminophen, bisacodyl, dextrose, glucose **OR** dextrose **OR** glucagon, fentaNYL, hydrALAZINE, labetalol, lidocaine 4%, lidocaine (buffered or not buffered), LORazepam, - MEDICATION INSTRUCTIONS -, melatonin, miconazole, naloxone, ondansetron **OR** ondansetron, polyethylene glycol, potassium chloride, potassium chloride with lidocaine, potassium chloride, potassium chloride, potassium chloride, prochlorperazine **OR** prochlorperazine **OR** prochlorperazine, senna-docusate **OR** senna-docusate, sodium chloride (PF), sodium chloride (PF)       PHYSICAL EXAMINATION  Temp:  [98.8  F (37.1  C)-100.2  F (37.9  C)] 100.1  F (37.8  C)  Pulse:  [59-78] 74  Resp:  [11-26] 16  BP: (119-197)/(58-92) 162/68  FiO2 (%):  [30 %] 30 %  SpO2:  [94 %-98 %] 96 %     Neurologic  Mental Status:  Intubated, opens eyes to noxious stimuli, does not follow commands  Cranial Nerves:  PERRL, difficult to assess facial symmetry with ETT fastener, does not protrude tongue  Motor:  normal muscle tone and bulk, no abnormal movements, withdraws both LEs , no definite movement in UEs to noxious  Reflexes:  b/l toes upgoing  Sensory:  see motor exam  Coordination:  unable to assess  Station/Gait:  deferred     Imaging  I personally reviewed all imaging; relevant findings per HPI.     Lab Results Data   CBC  Recent Labs   Lab 09/23/20  0415 09/22/20  0525 09/21/20  0445   WBC 12.6* 10.5 11.3*    RBC 3.28* 3.37* 3.34*   HGB 10.3* 10.6* 10.5*   HCT 32.9* 33.8* 33.3*    271 311     Basic Metabolic Panel    Recent Labs   Lab 09/24/20  0655 09/23/20  1211 09/23/20  0415 09/22/20  0525 09/21/20  0445   NA  --   --  148* 147* 145*   POTASSIUM 3.4 3.8 3.3* 3.5 3.4   CHLORIDE  --   --  112* 111* 109   CO2  --   --  34* 34* 32   BUN  --   --  24 23 20   CR  --   --  0.52 0.62 0.54   GLC  --   --  130* 117* 134*   MARII  --   --  8.8 9.0 8.4*     Liver Panel  No results for input(s): PROTTOTAL, ALBUMIN, BILITOTAL, ALKPHOS, AST, ALT, BILIDIRECT in the last 168 hours.  INR    Recent Labs   Lab Test 09/07/20  0758   INR 1.05      Lipid Profile    Recent Labs   Lab Test 09/05/20  1256   CHOL 186   HDL 87   LDL 81   TRIG 89     A1C    Recent Labs   Lab Test 09/05/20  1256   A1C 5.5     Troponin I  No results for input(s): TROPI in the last 168 hours.

## 2020-09-24 NOTE — PLAN OF CARE
Febrile.  Neuro: inconsistent slight opening of eyes with stimulation.  Not following commands.  PERRL. Conjugate.  Withdraws in all four extremities.  EEG-in-place.  Resp:  CMV 30% 12/500/5.  Thick creamy secretions.  SR. TF at goal.  BM on PMs.  Good UOP via purewick.

## 2020-09-24 NOTE — PLAN OF CARE
Neuro: withdraws from pain BLE. Right arm withdrew once from pain. Otherwise did not move upper extremities but would grimace face or open eyes to nailbed pressure. Would also open eyes when performing cares. BP remained under 180. No BM. Purewick utilized. Adequate urine output. Bottom is red and excoriated. Tongue is swollen and bleeding. WOC consulted. Sat in chair for 2 hours this shift.

## 2020-09-24 NOTE — PROGRESS NOTES
Critical Care Progress Note      09/24/2020    Name: Sherrell Leonard MRN#: [id number]   Age: 74 year old YOB: 1945     Hsptl Day# 19  ICU DAY # 15    MV DAY # 15             Problem List:   Active Problems:    Acute encephalopathy           Summary/Hospital Course:   74-year-old female admitted to the ICU for acute ventilatory failure and airway protection after left MCA subcortical stroke and left PCA stroke with multiple stenotic vessels.  Patient remains intubated.  Patient remains off sedation however her neurologic exam is still comatose.  Patient also continues with low-grade fevers at this time.  She does not have a leukocytosis.    9/21- Still not responding in spite of all sedation being discontinued for 96 hours    9/22- Still not responsive on my exam.By report from neurology, perhaps some improvement in EEG.    9/23- Opened eyes, but no following commands. Increasing WBC, low grade temp and Staph aureus in sputum. Ceftriaxone started    9/24- No significant change on my exam       Assessment and plan :     Sherrell Leonard IS a 74 year old female admitted on 9/5/2020 for management of acute respiratory failure, CVA and seizure.   I have personally reviewed the daily labs, imaging studies, cultures and discussed the case with referring physician and consulting physicians.     My assessment and plan by system for this patient is as follows:    Neurology/Psychiatry:   1.  Seizure disorder on continues EEG and Versed infusion.  The goal is burst suppression  2. prn Propofol for ICU sedation  3.  PRN opioids for breakthrough pain  Plan  Appreciate neuro critical care input. Likely residual effects from sedating medications are cause of encephalopathy. Will continue to hold all sedating medications.  Cardiovascular:   1.Hemodynamics -hypotensive at times  2.Rhythm -normal sinus rhythm  3. Ischemia -no signs of ischemia  Plan  Patient is hypertensive at times requiring PRN hydralazine and labetalol.  I  started the patient on amlodipine in order to try to wean this patient off PRN dosing.    Pulmonary/Ventilator Management:   1. Airway intubated for airway protection  2. Oxygenation/ventilation/mechanics on full ventilatory support  3. Increased sputum production with fever. Staph aureus in sputum.  Plan  -We will attempt another spontaneous breathing trial today.  However even if the patient passes a spontaneous breathing trial she is not a candidate for extubation given the fact that she cannot protect her airway.      GI and Nutrition :   1.  Concern for protein calorie malnutrition    Plan  -Continue tube feeds as tolerated.      Renal/Fluids/Electrolytes:   1.  Creatinine within normal limits no signs of acute kidney injury  2.  Continued hypokalemia  3.  Appears euvolemic    Plan  -Continue to monitor and replace electrolytes as per protocol.  - generally avoid nephrotoxic agents such as NSAID, IV contrast unless specifically required  - adjust medications as needed for renal clearance  - follow I/O's as appropriate.      Infectious Disease:   1.  Staph aureus tracheitis vs. pneumonia    Plan  -Start ceftriaxone    Endocrine:   1 concern for stress-induced hyperglycemia  Plan  - ICU insulin protocol, goal sugar <180      Hematology/Oncology:   1. Moderatel leukocytosis  2. Anemia, no signs, symptoms of active blood loss  3.  Plan  -Continue to monitor            IV/Access:   1. Venous access -PICC line  2. Arterial access -no need for arterial line at this time  3.  Plan  - central access required and necessary      ICU Prophylaxis:   1. DVT: Mechanical  2. VAP: HOB 30 degrees, chlorhexidine rinse  3. Stress Ulcer: PPI  4. Restraints: Nonviolent soft two point restraints required and necessary for patient safety and continued cares and good effect as patient continues to pull at necessary lines, tubes despite education and distraction. Will readdress daily.  Restraints not needed at this time   5. Wound care  - per unit routine   6. Feeding - we will start tube feeds      Key goals for next 24 hours:   1.  Spontaneous breathing trial  2.  Continue antibiotics.  3. Begin tracheostomy discussions               Interim History:     Patient still with altered mental status.  Increasing WBC and fever         Key Medications:       amLODIPine  5 mg Oral or Feeding Tube Daily     aspirin  81 mg Per Feeding Tube Daily     atorvastatin  40 mg Oral or NG Tube Daily at 8 pm     cefTRIAXone  2 g Intravenous Q24H     chlorhexidine  15 mL Mouth/Throat Q12H     clopidogrel  75 mg Oral or Feeding Tube Daily     heparin ANTICOAGULANT  5,000 Units Subcutaneous Q12H     levETIRAcetam  1,500 mg Intravenous Q12H     metoprolol  5 mg Intravenous Q6H     pantoprazole  40 mg Per Feeding Tube Daily     sodium chloride (PF)  10 mL Intracatheter Q8H     valproate (DEPACON) intermittent infusion  1,000 mg Intravenous Q12H       dextrose       - MEDICATION INSTRUCTIONS -       sodium chloride 10 mL/hr at 09/24/20 1124               Physical Examination:   Temp:  [98.8  F (37.1  C)-100.2  F (37.9  C)] 100  F (37.8  C)  Pulse:  [59-78] 67  Resp:  [11-28] 24  BP: (119-197)/(58-92) 150/63  FiO2 (%):  [30 %] 30 %  SpO2:  [94 %-98 %] 95 %    Intake/Output Summary (Last 24 hours) at 9/24/2020 1735  Last data filed at 9/24/2020 1000  Gross per 24 hour   Intake 1530 ml   Output 1050 ml   Net 480 ml               Wt Readings from Last 4 Encounters:   09/24/20 68.5 kg (151 lb 0.2 oz)     BP - Mean:  [] 94  Ventilation Mode: CMV/AC  (Continuous Mandatory Ventilation/ Assist Control)  FiO2 (%): 30 %  Rate Set (breaths/minute): 12 breaths/min  Tidal Volume Set (mL): 500 mL  PEEP (cm H2O): 5 cmH2O  Pressure Support (cm H2O): 10 cmH2O  Oxygen Concentration (%): 30 %  Resp: 24    No lab results found in last 7 days.    GEN: no acute distress   HEENT: head ncat, sclera anicteric, OP patent, trachea midline   PULM: unlabored synchronous with vent, clear  anteriorly    CV/COR: RRR S1S2 no gallop,  No rub, no murmur  ABD: soft nontender, hypoactive bowel sounds, no mass  EXT:  Edema   warm  NEURO: Still unresponsive  SKIN: no obvious rash  LINES: clean, dry intact         Data:   All data and imaging reviewed     ROUTINE ICU LABS (Last four results)  CMP  Recent Labs   Lab 09/24/20  0655 09/23/20  1211 09/23/20  0415 09/22/20  0525 09/21/20  0445 09/20/20  0403   NA  --   --  148* 147* 145* 145*   POTASSIUM 3.4 3.8 3.3* 3.5 3.4 3.8   CHLORIDE  --   --  112* 111* 109 109   CO2  --   --  34* 34* 32 32   ANIONGAP  --   --  2* 2* 4 4   GLC  --   --  130* 117* 134* 123*   BUN  --   --  24 23 20 20   CR  --   --  0.52 0.62 0.54 0.52   GFRESTIMATED  --   --  >90 88 >90 >90   GFRESTBLACK  --   --  >90 >90 >90 >90   MARII  --   --  8.8 9.0 8.4* 9.0   MAG  --   --   --   --  2.2  --    PHOS  --   --   --   --  2.9  --    PROTTOTAL 5.4*  --   --   --   --   --    ALBUMIN 1.8*  --   --   --   --   --    BILITOTAL 0.2  --   --   --   --   --    ALKPHOS 62  --   --   --   --   --    AST 29  --   --   --   --   --    ALT 35  --   --   --   --   --      CBC  Recent Labs   Lab 09/23/20  0415 09/22/20  0525 09/21/20 0445 09/20/20  0403   WBC 12.6* 10.5 11.3* 10.2   RBC 3.28* 3.37* 3.34* 3.58*   HGB 10.3* 10.6* 10.5* 11.3*   HCT 32.9* 33.8* 33.3* 35.5    100 100 99   MCH 31.4 31.5 31.4 31.6   MCHC 31.3* 31.4* 31.5 31.8   RDW 13.7 13.7 13.6 13.5    271 311 357     INRNo lab results found in last 7 days.  Arterial Blood GasNo lab results found in last 7 days.    All cultures:  Recent Labs   Lab 09/20/20  1200 09/20/20  0930 09/18/20  0318   CULT Light growth  Staphylococcus aureus  *  Light growth  Corynebacterium species  Identification obtained by MALDI-TOF mass spectrometry research use only database. Test   characteristics determined and verified by the [facility]   ([facility]) [place], [place].  Susceptibility testing not routinely done  * No  growth after 4 days Light growth  Normal kena       No results found for this or any previous visit (from the past 24 hour(s)).      Billing: This patient is critically ill: yes. Total critical care time today 35 min.    Carlos Petty MD  [facility] Intensivist Service

## 2020-09-24 NOTE — PROGRESS NOTES
It was noted today that pt's lips and tongue were swollen. The orange bite block was switched out for a molar bite block. The swelling appears to have lessened throughout the afternoon. Bedside RN and MD aware. Wound consult in place.  Will cont to monitor.  9/24/2020  Kady Bender, RT

## 2020-09-25 ENCOUNTER — APPOINTMENT (OUTPATIENT)
Dept: GENERAL RADIOLOGY | Facility: CLINIC | Age: 75
DRG: 003 | End: 2020-09-25
Attending: INTERNAL MEDICINE
Payer: MEDICARE

## 2020-09-25 LAB
GLUCOSE BLDC GLUCOMTR-MCNC: 126 MG/DL (ref 70–99)
PLATELET # BLD AUTO: 259 10E9/L (ref 150–450)
POTASSIUM SERPL-SCNC: 3.5 MMOL/L (ref 3.4–5.3)

## 2020-09-25 PROCEDURE — 71045 X-RAY EXAM CHEST 1 VIEW: CPT

## 2020-09-25 PROCEDURE — 40000008 ZZH STATISTIC AIRWAY CARE

## 2020-09-25 PROCEDURE — 25000128 H RX IP 250 OP 636: Performed by: ANESTHESIOLOGY

## 2020-09-25 PROCEDURE — 25000128 H RX IP 250 OP 636: Performed by: INTERNAL MEDICINE

## 2020-09-25 PROCEDURE — 99291 CRITICAL CARE FIRST HOUR: CPT | Performed by: INTERNAL MEDICINE

## 2020-09-25 PROCEDURE — 84132 ASSAY OF SERUM POTASSIUM: CPT | Performed by: HOSPITALIST

## 2020-09-25 PROCEDURE — 25000128 H RX IP 250 OP 636: Performed by: PHYSICIAN ASSISTANT

## 2020-09-25 PROCEDURE — 40000275 ZZH STATISTIC RCP TIME EA 10 MIN

## 2020-09-25 PROCEDURE — 25000128 H RX IP 250 OP 636: Performed by: STUDENT IN AN ORGANIZED HEALTH CARE EDUCATION/TRAINING PROGRAM

## 2020-09-25 PROCEDURE — 25000125 ZZHC RX 250: Performed by: ANESTHESIOLOGY

## 2020-09-25 PROCEDURE — 20000003 ZZH R&B ICU

## 2020-09-25 PROCEDURE — 00000146 ZZHCL STATISTIC GLUCOSE BY METER IP

## 2020-09-25 PROCEDURE — 25000132 ZZH RX MED GY IP 250 OP 250 PS 637: Mod: GY | Performed by: HOSPITALIST

## 2020-09-25 PROCEDURE — 25800030 ZZH RX IP 258 OP 636: Performed by: INTERNAL MEDICINE

## 2020-09-25 PROCEDURE — 25800030 ZZH RX IP 258 OP 636: Performed by: NURSE PRACTITIONER

## 2020-09-25 PROCEDURE — 85049 AUTOMATED PLATELET COUNT: CPT | Performed by: HOSPITALIST

## 2020-09-25 PROCEDURE — 94003 VENT MGMT INPAT SUBQ DAY: CPT

## 2020-09-25 PROCEDURE — 99291 CRITICAL CARE FIRST HOUR: CPT | Performed by: PSYCHIATRY & NEUROLOGY

## 2020-09-25 PROCEDURE — 25000128 H RX IP 250 OP 636: Performed by: NURSE PRACTITIONER

## 2020-09-25 PROCEDURE — 25000125 ZZHC RX 250: Performed by: PHYSICIAN ASSISTANT

## 2020-09-25 PROCEDURE — 25800030 ZZH RX IP 258 OP 636: Performed by: PHYSICIAN ASSISTANT

## 2020-09-25 PROCEDURE — G0463 HOSPITAL OUTPT CLINIC VISIT: HCPCS

## 2020-09-25 RX ADMIN — METOPROLOL TARTRATE 5 MG: 5 INJECTION, SOLUTION INTRAVENOUS at 22:22

## 2020-09-25 RX ADMIN — LABETALOL HYDROCHLORIDE 20 MG: 5 INJECTION INTRAVENOUS at 18:24

## 2020-09-25 RX ADMIN — METOPROLOL TARTRATE 5 MG: 5 INJECTION, SOLUTION INTRAVENOUS at 01:59

## 2020-09-25 RX ADMIN — AMLODIPINE BESYLATE 5 MG: 5 TABLET ORAL at 08:25

## 2020-09-25 RX ADMIN — CHLORHEXIDINE GLUCONATE 15 ML: 1.2 RINSE ORAL at 07:52

## 2020-09-25 RX ADMIN — CLOPIDOGREL BISULFATE 75 MG: 75 TABLET, FILM COATED ORAL at 08:24

## 2020-09-25 RX ADMIN — HYDRALAZINE HYDROCHLORIDE 10 MG: 20 INJECTION INTRAMUSCULAR; INTRAVENOUS at 07:03

## 2020-09-25 RX ADMIN — SODIUM CHLORIDE: 9 INJECTION, SOLUTION INTRAVENOUS at 14:29

## 2020-09-25 RX ADMIN — VALPROATE SODIUM 1000 MG: 100 INJECTION, SOLUTION INTRAVENOUS at 20:35

## 2020-09-25 RX ADMIN — PANTOPRAZOLE SODIUM 40 MG: 40 TABLET, DELAYED RELEASE ORAL at 08:24

## 2020-09-25 RX ADMIN — MICONAZOLE NITRATE: 20 POWDER TOPICAL at 14:29

## 2020-09-25 RX ADMIN — ATORVASTATIN CALCIUM 40 MG: 40 TABLET, FILM COATED ORAL at 19:56

## 2020-09-25 RX ADMIN — LEVETIRACETAM 1500 MG: 500 INJECTION, SOLUTION INTRAVENOUS at 19:57

## 2020-09-25 RX ADMIN — METOPROLOL TARTRATE 5 MG: 5 INJECTION, SOLUTION INTRAVENOUS at 10:13

## 2020-09-25 RX ADMIN — HEPARIN SODIUM 5000 UNITS: 5000 INJECTION, SOLUTION INTRAVENOUS; SUBCUTANEOUS at 20:32

## 2020-09-25 RX ADMIN — MICONAZOLE NITRATE: 20 POWDER TOPICAL at 10:35

## 2020-09-25 RX ADMIN — CEFTRIAXONE SODIUM 2 G: 2 INJECTION, POWDER, FOR SOLUTION INTRAMUSCULAR; INTRAVENOUS at 12:07

## 2020-09-25 RX ADMIN — VALPROATE SODIUM 1000 MG: 100 INJECTION, SOLUTION INTRAVENOUS at 10:14

## 2020-09-25 RX ADMIN — HEPARIN SODIUM 5000 UNITS: 5000 INJECTION, SOLUTION INTRAVENOUS; SUBCUTANEOUS at 08:24

## 2020-09-25 RX ADMIN — LEVETIRACETAM 1500 MG: 500 INJECTION, SOLUTION INTRAVENOUS at 08:24

## 2020-09-25 RX ADMIN — MICONAZOLE NITRATE: 20 POWDER TOPICAL at 06:02

## 2020-09-25 RX ADMIN — ASPIRIN 81 MG 81 MG: 81 TABLET ORAL at 08:24

## 2020-09-25 RX ADMIN — METOPROLOL TARTRATE 5 MG: 5 INJECTION, SOLUTION INTRAVENOUS at 16:03

## 2020-09-25 RX ADMIN — POTASSIUM CHLORIDE 40 MEQ: 1.5 POWDER, FOR SOLUTION ORAL at 00:01

## 2020-09-25 RX ADMIN — CHLORHEXIDINE GLUCONATE 15 ML: 1.2 RINSE ORAL at 19:56

## 2020-09-25 ASSESSMENT — MIFFLIN-ST. JEOR: SCORE: 1128.25

## 2020-09-25 ASSESSMENT — ACTIVITIES OF DAILY LIVING (ADL)
ADLS_ACUITY_SCORE: 22

## 2020-09-25 ASSESSMENT — VISUAL ACUITY: OU: OTHER (SEE COMMENT)

## 2020-09-25 NOTE — PROGRESS NOTES
[facility] ICU RESPIRATORY NOTE        Date of Admission: 9/5/2020    Date of Intubation (most recent):9/11/2020    Reason for Mechanical Ventilation:Airway protection    Number of Days on Mechanical Ventilation: 14    Met Criteria for Spontaneous Breathing Trial:yes      Significant Events Today: None    ABG Results: No lab results found in last 7 days.    Current Vent Settings: Ventilation Mode: CMV/AC  (Continuous Mandatory Ventilation/ Assist Control)  FiO2 (%): 30 %  Rate Set (breaths/minute): 12 breaths/min  Tidal Volume Set (mL): 500 mL  PEEP (cm H2O): 5 cmH2O  Pressure Support (cm H2O): 10 cmH2O  Oxygen Concentration (%): 30 %  Resp: 13      Will continue to monitor pt on full vent support overnight and pressure support trial can be done during the day.      Maurice Alvarez, RT

## 2020-09-25 NOTE — PLAN OF CARE
Neuro: fluttering of eyelids, no tracking.  Weak withdraw in all four extremities. PERRL.  Continuous EEG.  LS Coarse. CMV 30% 12/500/5  CXR pending.  SB to SR.  Goal SBP < 180.  NG 80 cm at nostril.  TF at goal.  Adequate uop via purewick.  Numerous skin issues, see flowsheet.

## 2020-09-25 NOTE — PROGRESS NOTES
[facility] Nurse Inpatient Pressure Injury Assessment   Reason for consultation: Evaluate and treat Right tongue      ASSESSMENT  Right tip of tongue does not appear to communicate with ETT and is unlikely pressure in nature. Pt did have seizures and swelling of tongue, possible trauma related.   Status: initial assessment      Cleft: Incontinence associated dermatitis  Status: initial assessment    BL Axilla: Interigo with fungal component  Status: initial assessment  Recommend provider order: None, at this time     TREATMENT PLAN  Tongue wound: TID and PRN  Ensure frequent and complete oral cares to promote moist mucous membranes  Disengage tongue from the tube with use of tongue depressor and reposition tongue frequently     Linda Cleft: BID and PRN  After any incontinent episode cleanse with vadim cleanse and protect and ekiko dry wipes/washcloths. Ensure area is completely dry. Apply thin layer of critic aid barrier paste. Remove only soiled paste, then reapply thin layer. If complete removal is needed use baby/mineral oil.   *Avoid pre moisten wipes.   *Avoid use of brief  *Use single covidien pad and limit number of linens underneath patient.     BL axilla: BID and PRN  1. Cleanse with water and mild soap. Ensure area is completely dry  2. Lightly dust area with antifungal powder and gently rub in  3. Allow air flow to area as much as possible.   Use antifungal powder 3-5 days to manage fungal rash then discontinue and switch to Interdry AG      Orders Written  WOC Nurse follow-up plan:weekly  Nursing to notify the Provider(s) and re-consult the WOC Nurse if wound(s) deteriorates or new skin concern.    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  Patient remains intubated.  Patient remains off sedation however her neurologic exam  is still comatose.  Patient also continues with low-grade fevers at this time.  She does not have a leukocytosis.       Objective Data  Containment of urine/stool: Incontinence Protocol and External catheter    Current Diet/ Nutrition:  Orders Placed This Encounter      NPO per Anesthesia Guidelines for Procedure/Surgery Except for: Meds      Output:   I/O last 3 completed shifts:  In: 2300 [I.V.:540; NG/GT:660]  Out: 1168 [Urine:1168]    Risk Assessment:   Sensory Perception: 1-->completely limited  Moisture: 3-->occasionally moist  Activity: 2-->chairfast  Mobility: 1-->completely immobile  Nutrition: 3-->adequate  Friction and Shear: 2-->potential problem  Nikita Score: 12      Labs:   Recent Labs   Lab 20  0655 20  0415   ALBUMIN 1.8*  --    HGB  --  10.3*   WBC  --  12.6*       Physical Exam  Skin inspection: focused BL arms, coccyx, tongue      Wound Location:  Tongue      Date of last Photo 20  Wound History: Pt with seizures unclear if possible trauma to tongue.   Measurements (length x width x depth, in cm) 0.4 cm x 0.5 cm  x  0 cm   Wound Base:  100 % white firm devitalized tissue, moist  Tunneling N/A  Undermining N/A  Palpation of the wound bed: firm   Periwound skin: intact  Color: normal and consistent with surrounding tissue  Temperature: normal   Drainage:, none  Description of drainage: none  Odor: none  Pain: unable to assess due to  sedation        Wound Location:   Cleft  Date of last Photo Photo  did not save  Wound History: Pt with deep josesito cleft, moisture trapping, and incontinence   Measurements (length x width x depth, in cm) 5 cm x 5 cm  x  0 cm   Wound Base:  Severe denudement, superficial epidermal sloughing with few pinpoint open areas, macerated tissue directly over  cleft  Periwound skin: intact  Color: normal and consistent with surrounding tissue  Temperature: normal   Drainage:, none  Description of drainage: none  Odor: none  Pain: unable to assess  due to  sedation , none      Wound Location:  BL axilla   Date of last Photo none taken  Wound History: Pt with moisture trapping and fungal rash to R axilla  Measurements (length x width x depth, in cm) R 11 cm x 7 cm  x  0 cm         Left-8cm x 8cm x 0cm  Wound Base:  Right- Diffuse erythema with scattered satellite lesions and few pustules at lower base of chest/axilla area, Left- red denuded moist tissue  Periwound skin: intact  Color: normal and consistent with surrounding tissue  Temperature: normal   Drainage:, none  Description of drainage: none  Odor: none  Pain: unable to assess due to  sedation , none    Interventions  Current support surface: Standard  Low air loss mattress  Current off-loading measures: Pillows and Wedge positioning system  Repositioning aid: Pillows  Visual inspection of wound(s) completed   Tube Securement: ETAD  Wound Care: was done per plan of care.  Supplies: at bedside  Educated provided: importance of repositioning, plan of care, Moisture management and Off-loading pressure  Education provided to: nurse  Discussed importance of:repositioning every 2 hours, off-loading pressure to wound and moisture management  Discussed plan of care with Nurse    Stan Palomino RN CWOCN

## 2020-09-25 NOTE — PROGRESS NOTES
Weakly flutters eyes to voice/repeated stimulus. No spontaneous movement. Minimal withdrawal in extremities. SR on tele. pS trial at 10/5 for ~40 min. Minimal secretions through ET suction. Lungs clear. No BM. Urine output adequate. Miconazole powder in armpits for fungal rash.  at bedside and updated.

## 2020-09-25 NOTE — PROGRESS NOTES
"  [facility]    Stroke Progress Note    Interval Events  No significant change in exam. EEG continues to improve, okay to discontinue.     Impression & Plan  Scattered intracranial atherosclerosis with severe focal left M1 stenosis, now s/p balloon angioplasty 9/11   Left MCA syndrome  New R MCA infarcts  - SBP goal 140-180 mmHg  - DAPT with ASA 81 mg daily + Plavix 75 mg daily   - Continue Lipitor 40 mg daily  - Consideration for possible stent at site of L M1 angioplasty given stenosis on MRA. Will focus on seizure management at this time, but potential intervention pending improvement from seizure perspective  - Repeat MRI shows no new infarcts or findings     Non-convulsive status epilepticus (NCSE), resolved  Ongoing severe encephalopathy  - Will discontinue vEEG today  - Continue Keppra 1500 mg q12 hours  - Continue VPA - reduced on 9/21 from 750mg q8 hours to 1000mg q12 hours. Continue this dose.     We will follow.    LILLY Louis, [facility]  Neurology  To page me or covering stroke neurology team member, click here: AMCOM   Choose \"On Call\" tab at top, then search dropdown box for \"Neurology Adult\", select location, press Enter, then look for stroke/neuro ICU/telestroke.    ______________________________________________________    Medications   Home Meds  Prior to Admission medications    Medication Sig Start Date End Date Taking? Authorizing Provider   Multiple Vitamins-Minerals (SYSTANE ICAPS AREDS2) CAPS Take 1 capsule by mouth daily   Yes Unknown, Entered By History   multivitamin, therapeutic (THERA-VIT) TABS tablet Take 1 tablet by mouth daily   Yes Unknown, Entered By History       Scheduled Meds    amLODIPine  5 mg Oral or Feeding Tube Daily     aspirin  81 mg Per Feeding Tube Daily     atorvastatin  40 mg Oral or NG Tube Daily at 8 pm     cefTRIAXone  2 g Intravenous Q24H     chlorhexidine  15 mL Mouth/Throat Q12H     clopidogrel  75 mg Oral or Feeding Tube Daily     heparin " ANTICOAGULANT  5,000 Units Subcutaneous Q12H     levETIRAcetam  1,500 mg Intravenous Q12H     metoprolol  5 mg Intravenous Q6H     pantoprazole  40 mg Per Feeding Tube Daily     sodium chloride (PF)  10 mL Intracatheter Q8H     valproate (DEPACON) intermittent infusion  1,000 mg Intravenous Q12H       Infusion Meds    dextrose       - MEDICATION INSTRUCTIONS -       sodium chloride 10 mL/hr at 09/24/20 1124       PRN Meds  acetaminophen **OR** acetaminophen, bisacodyl, dextrose, glucose **OR** dextrose **OR** glucagon, fentaNYL, hydrALAZINE, labetalol, lidocaine 4%, lidocaine (buffered or not buffered), LORazepam, - MEDICATION INSTRUCTIONS -, melatonin, miconazole, naloxone, ondansetron **OR** ondansetron, polyethylene glycol, potassium chloride, potassium chloride with lidocaine, potassium chloride, potassium chloride, potassium chloride, prochlorperazine **OR** prochlorperazine **OR** prochlorperazine, senna-docusate **OR** senna-docusate, sodium chloride (PF), sodium chloride (PF)       PHYSICAL EXAMINATION  Temp:  [98.4  F (36.9  C)-100  F (37.8  C)] 98.4  F (36.9  C)  Pulse:  [53-73] 64  Resp:  [11-28] 15  BP: (120-199)/(58-90) 184/83  FiO2 (%):  [30 %] 30 %  SpO2:  [91 %-97 %] 95 %     Neurologic  Mental Status:  Intubated, opens eyes to noxious stimuli, does not follow commands  Cranial Nerves:  PERRL, difficult to assess facial symmetry with ETT fastener, does not protrude tongue  Motor:  normal muscle tone and bulk, no abnormal movements, withdraws both LEs , muscle contraction without obvious movement to noxious in UEs  Reflexes:  b/l toes upgoing  Sensory:  see motor exam  Coordination:  unable to assess  Station/Gait:  deferred     Imaging  I personally reviewed all imaging; relevant findings per HPI.     Lab Results Data   CBC  Recent Labs   Lab 09/25/20  0445 09/23/20  0415 09/22/20  0525 09/21/20  0445   WBC  --  12.6* 10.5 11.3*   RBC  --  3.28* 3.37* 3.34*   HGB  --  10.3* 10.6* 10.5*   HCT  --   32.9* 33.8* 33.3*    283 271 311     Basic Metabolic Panel    Recent Labs   Lab 09/25/20 0445 09/24/20 2000 09/24/20  0655  09/23/20 0415 09/22/20 0525 09/21/20 0445   NA  --   --   --   --  148* 147* 145*   POTASSIUM 3.5 3.3* 3.4   < > 3.3* 3.5 3.4   CHLORIDE  --   --   --   --  112* 111* 109   CO2  --   --   --   --  34* 34* 32   BUN  --   --   --   --  24 23 20   CR  --   --   --   --  0.52 0.62 0.54   GLC  --   --   --   --  130* 117* 134*   MARII  --   --   --   --  8.8 9.0 8.4*    < > = values in this interval not displayed.     Liver Panel  Recent Labs   Lab 09/24/20  0655   PROTTOTAL 5.4*   ALBUMIN 1.8*   BILITOTAL 0.2   ALKPHOS 62   AST 29   ALT 35     INR    Recent Labs   Lab Test 09/07/20  0758   INR 1.05      Lipid Profile    Recent Labs   Lab Test 09/05/20  1256   CHOL 186   HDL 87   LDL 81   TRIG 89     A1C    Recent Labs   Lab Test 09/05/20  1256   A1C 5.5     Troponin I  No results for input(s): TROPI in the last 168 hours.

## 2020-09-25 NOTE — PROGRESS NOTES
Spontaneous Breathing Trial    Criteria met for SBT (Y/N):Yes    Settings:    PS:10  PEEP:5  FiO2 30%    Measured Parameters:    RR:18  Tidal volume:362 ml    Patient tolerance:Good. Pt was placed back on CMV settings due to end of designated trial. No plan for extubation today due to neuro status.    Duration of SBT:45 minutes.   Plan:Will assess for another daily PS trial in the morning.    9/25/2020  Kady Bender, RT

## 2020-09-26 LAB
ALBUMIN SERPL-MCNC: 2 G/DL (ref 3.4–5)
ALP SERPL-CCNC: 66 U/L (ref 40–150)
ALT SERPL W P-5'-P-CCNC: 29 U/L (ref 0–50)
ANION GAP SERPL CALCULATED.3IONS-SCNC: 3 MMOL/L (ref 3–14)
AST SERPL W P-5'-P-CCNC: 21 U/L (ref 0–45)
BACTERIA SPEC CULT: NO GROWTH
BASOPHILS # BLD AUTO: 0.1 10E9/L (ref 0–0.2)
BASOPHILS NFR BLD AUTO: 0.7 %
BILIRUB SERPL-MCNC: 0.2 MG/DL (ref 0.2–1.3)
BUN SERPL-MCNC: 21 MG/DL (ref 7–30)
CALCIUM SERPL-MCNC: 8.9 MG/DL (ref 8.5–10.1)
CHLORIDE SERPL-SCNC: 113 MMOL/L (ref 94–109)
CO2 SERPL-SCNC: 33 MMOL/L (ref 20–32)
CREAT SERPL-MCNC: 0.5 MG/DL (ref 0.52–1.04)
DIFFERENTIAL METHOD BLD: ABNORMAL
EOSINOPHIL # BLD AUTO: 0.5 10E9/L (ref 0–0.7)
EOSINOPHIL NFR BLD AUTO: 4.9 %
ERYTHROCYTE [DISTWIDTH] IN BLOOD BY AUTOMATED COUNT: 13.8 % (ref 10–15)
GFR SERPL CREATININE-BSD FRML MDRD: >90 ML/MIN/{1.73_M2}
GLUCOSE BLDC GLUCOMTR-MCNC: 91 MG/DL (ref 70–99)
GLUCOSE SERPL-MCNC: 119 MG/DL (ref 70–99)
HCT VFR BLD AUTO: 35.3 % (ref 35–47)
HGB BLD-MCNC: 10.8 G/DL (ref 11.7–15.7)
IMM GRANULOCYTES # BLD: 0.4 10E9/L (ref 0–0.4)
IMM GRANULOCYTES NFR BLD: 4.1 %
LYMPHOCYTES # BLD AUTO: 1.1 10E9/L (ref 0.8–5.3)
LYMPHOCYTES NFR BLD AUTO: 10.3 %
MCH RBC QN AUTO: 31.1 PG (ref 26.5–33)
MCHC RBC AUTO-ENTMCNC: 30.6 G/DL (ref 31.5–36.5)
MCV RBC AUTO: 102 FL (ref 78–100)
MONOCYTES # BLD AUTO: 1.7 10E9/L (ref 0–1.3)
MONOCYTES NFR BLD AUTO: 16.6 %
NEUTROPHILS # BLD AUTO: 6.6 10E9/L (ref 1.6–8.3)
NEUTROPHILS NFR BLD AUTO: 63.4 %
NRBC # BLD AUTO: 0.1 10*3/UL
NRBC BLD AUTO-RTO: 1 /100
PLATELET # BLD AUTO: 281 10E9/L (ref 150–450)
POTASSIUM SERPL-SCNC: 3.3 MMOL/L (ref 3.4–5.3)
POTASSIUM SERPL-SCNC: 4 MMOL/L (ref 3.4–5.3)
PROT SERPL-MCNC: 5.5 G/DL (ref 6.8–8.8)
RBC # BLD AUTO: 3.47 10E12/L (ref 3.8–5.2)
SODIUM SERPL-SCNC: 149 MMOL/L (ref 133–144)
SPECIMEN SOURCE: NORMAL
WBC # BLD AUTO: 10.3 10E9/L (ref 4–11)

## 2020-09-26 PROCEDURE — 25000128 H RX IP 250 OP 636: Performed by: INTERNAL MEDICINE

## 2020-09-26 PROCEDURE — 25000132 ZZH RX MED GY IP 250 OP 250 PS 637: Mod: GY | Performed by: HOSPITALIST

## 2020-09-26 PROCEDURE — 25000128 H RX IP 250 OP 636: Performed by: ANESTHESIOLOGY

## 2020-09-26 PROCEDURE — 27210429 ZZH NUTRITION PRODUCT INTERMEDIATE LITER

## 2020-09-26 PROCEDURE — 84132 ASSAY OF SERUM POTASSIUM: CPT | Performed by: HOSPITALIST

## 2020-09-26 PROCEDURE — 25000125 ZZHC RX 250: Performed by: ANESTHESIOLOGY

## 2020-09-26 PROCEDURE — 99232 SBSQ HOSP IP/OBS MODERATE 35: CPT | Performed by: PSYCHIATRY & NEUROLOGY

## 2020-09-26 PROCEDURE — 25000128 H RX IP 250 OP 636: Performed by: PHYSICIAN ASSISTANT

## 2020-09-26 PROCEDURE — 25800030 ZZH RX IP 258 OP 636: Performed by: INTERNAL MEDICINE

## 2020-09-26 PROCEDURE — 25000128 H RX IP 250 OP 636: Performed by: STUDENT IN AN ORGANIZED HEALTH CARE EDUCATION/TRAINING PROGRAM

## 2020-09-26 PROCEDURE — 94003 VENT MGMT INPAT SUBQ DAY: CPT

## 2020-09-26 PROCEDURE — 40000275 ZZH STATISTIC RCP TIME EA 10 MIN

## 2020-09-26 PROCEDURE — 40000008 ZZH STATISTIC AIRWAY CARE

## 2020-09-26 PROCEDURE — 00000146 ZZHCL STATISTIC GLUCOSE BY METER IP

## 2020-09-26 PROCEDURE — 20000003 ZZH R&B ICU

## 2020-09-26 PROCEDURE — 25800030 ZZH RX IP 258 OP 636: Performed by: PHYSICIAN ASSISTANT

## 2020-09-26 PROCEDURE — 25000128 H RX IP 250 OP 636: Performed by: NURSE PRACTITIONER

## 2020-09-26 PROCEDURE — 25000125 ZZHC RX 250: Performed by: PHYSICIAN ASSISTANT

## 2020-09-26 PROCEDURE — 80053 COMPREHEN METABOLIC PANEL: CPT | Performed by: INTERNAL MEDICINE

## 2020-09-26 PROCEDURE — 25000132 ZZH RX MED GY IP 250 OP 250 PS 637: Mod: GY | Performed by: INTERNAL MEDICINE

## 2020-09-26 PROCEDURE — 25800030 ZZH RX IP 258 OP 636: Performed by: NURSE PRACTITIONER

## 2020-09-26 PROCEDURE — 40000239 ZZH STATISTIC VAT ROUNDS

## 2020-09-26 PROCEDURE — 85025 COMPLETE CBC W/AUTO DIFF WBC: CPT | Performed by: INTERNAL MEDICINE

## 2020-09-26 PROCEDURE — 99291 CRITICAL CARE FIRST HOUR: CPT | Performed by: INTERNAL MEDICINE

## 2020-09-26 RX ORDER — AMLODIPINE BESYLATE 10 MG/1
10 TABLET ORAL DAILY
Status: DISCONTINUED | OUTPATIENT
Start: 2020-09-27 | End: 2020-10-05 | Stop reason: HOSPADM

## 2020-09-26 RX ORDER — CARBOXYMETHYLCELLULOSE SODIUM 5 MG/ML
2 SOLUTION/ DROPS OPHTHALMIC
Status: DISCONTINUED | OUTPATIENT
Start: 2020-09-26 | End: 2020-10-05 | Stop reason: HOSPADM

## 2020-09-26 RX ORDER — ATROPINE SULFATE 0.1 MG/ML
INJECTION INTRAVENOUS
Status: DISCONTINUED
Start: 2020-09-26 | End: 2020-09-26 | Stop reason: WASHOUT

## 2020-09-26 RX ORDER — METOPROLOL TARTRATE 25 MG/1
25 TABLET, FILM COATED ORAL 2 TIMES DAILY
Status: DISCONTINUED | OUTPATIENT
Start: 2020-09-26 | End: 2020-09-27

## 2020-09-26 RX ORDER — AMLODIPINE BESYLATE 5 MG/1
5 TABLET ORAL ONCE
Status: COMPLETED | OUTPATIENT
Start: 2020-09-26 | End: 2020-09-26

## 2020-09-26 RX ADMIN — METOPROLOL TARTRATE 25 MG: 25 TABLET, FILM COATED ORAL at 20:28

## 2020-09-26 RX ADMIN — DOCUSATE SODIUM 50 MG AND SENNOSIDES 8.6 MG 1 TABLET: 8.6; 5 TABLET, FILM COATED ORAL at 15:27

## 2020-09-26 RX ADMIN — CARBOXYMETHYLCELLULOSE SODIUM 2 DROP: 5 SOLUTION/ DROPS OPHTHALMIC at 20:41

## 2020-09-26 RX ADMIN — ASPIRIN 81 MG 81 MG: 81 TABLET ORAL at 08:08

## 2020-09-26 RX ADMIN — CLOPIDOGREL BISULFATE 75 MG: 75 TABLET, FILM COATED ORAL at 08:08

## 2020-09-26 RX ADMIN — METOPROLOL TARTRATE 5 MG: 5 INJECTION, SOLUTION INTRAVENOUS at 04:00

## 2020-09-26 RX ADMIN — HEPARIN SODIUM 5000 UNITS: 5000 INJECTION, SOLUTION INTRAVENOUS; SUBCUTANEOUS at 08:07

## 2020-09-26 RX ADMIN — LABETALOL HYDROCHLORIDE 20 MG: 5 INJECTION INTRAVENOUS at 02:24

## 2020-09-26 RX ADMIN — HEPARIN SODIUM 5000 UNITS: 5000 INJECTION, SOLUTION INTRAVENOUS; SUBCUTANEOUS at 20:41

## 2020-09-26 RX ADMIN — SODIUM CHLORIDE: 9 INJECTION, SOLUTION INTRAVENOUS at 20:23

## 2020-09-26 RX ADMIN — POTASSIUM CHLORIDE 40 MEQ: 1.5 POWDER, FOR SOLUTION ORAL at 15:23

## 2020-09-26 RX ADMIN — CHLORHEXIDINE GLUCONATE 15 ML: 1.2 RINSE ORAL at 08:07

## 2020-09-26 RX ADMIN — AMLODIPINE BESYLATE 5 MG: 5 TABLET ORAL at 08:08

## 2020-09-26 RX ADMIN — LEVETIRACETAM 1500 MG: 500 INJECTION, SOLUTION INTRAVENOUS at 20:23

## 2020-09-26 RX ADMIN — HYDRALAZINE HYDROCHLORIDE 10 MG: 20 INJECTION INTRAMUSCULAR; INTRAVENOUS at 05:16

## 2020-09-26 RX ADMIN — POTASSIUM CHLORIDE 20 MEQ: 1.5 POWDER, FOR SOLUTION ORAL at 18:04

## 2020-09-26 RX ADMIN — AMLODIPINE BESYLATE 5 MG: 5 TABLET ORAL at 15:23

## 2020-09-26 RX ADMIN — PANTOPRAZOLE SODIUM 40 MG: 40 TABLET, DELAYED RELEASE ORAL at 08:08

## 2020-09-26 RX ADMIN — CHLORHEXIDINE GLUCONATE 15 ML: 1.2 RINSE ORAL at 20:23

## 2020-09-26 RX ADMIN — VALPROATE SODIUM 1000 MG: 100 INJECTION, SOLUTION INTRAVENOUS at 08:32

## 2020-09-26 RX ADMIN — MICONAZOLE NITRATE: 20 POWDER TOPICAL at 08:02

## 2020-09-26 RX ADMIN — VALPROATE SODIUM 1000 MG: 100 INJECTION, SOLUTION INTRAVENOUS at 20:41

## 2020-09-26 RX ADMIN — ATORVASTATIN CALCIUM 40 MG: 40 TABLET, FILM COATED ORAL at 20:23

## 2020-09-26 RX ADMIN — LEVETIRACETAM 1500 MG: 500 INJECTION, SOLUTION INTRAVENOUS at 08:06

## 2020-09-26 RX ADMIN — CEFTRIAXONE SODIUM 2 G: 2 INJECTION, POWDER, FOR SOLUTION INTRAMUSCULAR; INTRAVENOUS at 11:39

## 2020-09-26 ASSESSMENT — ACTIVITIES OF DAILY LIVING (ADL)
ADLS_ACUITY_SCORE: 22

## 2020-09-26 ASSESSMENT — MIFFLIN-ST. JEOR: SCORE: 1131.25

## 2020-09-26 ASSESSMENT — VISUAL ACUITY
OU: NOT TESTABLE
OU: NOT TESTABLE

## 2020-09-26 NOTE — PROGRESS NOTES
Critical Care Progress Note      09/26/2020    Name: Sherrell Leonard MRN#: [id number]   Age: 74 year old YOB: 1945     Hsptl Day# 21  ICU DAY # 15    MV DAY # 15             Problem List:   Active Problems:    Acute encephalopathy           Summary/Hospital Course:   74-year-old female admitted to the ICU for acute ventilatory failure and airway protection after left MCA subcortical stroke and left PCA stroke with multiple stenotic vessels.  Patient remains intubated.  Patient remains off sedation however her neurologic exam is still comatose.  Patient also continues with low-grade fevers at this time.  She does not have a leukocytosis.    9/21- Still not responding in spite of all sedation being discontinued for 96 hours    9/22- Still not responsive on my exam.By report from neurology, perhaps some improvement in EEG.    9/23- Opened eyes, but no following commands. Increasing WBC, low grade temp and Staph aureus in sputum. Ceftriaxone started    9/24- No significant change on my exam     9/25- No changes on EEG or exam    Assessment and plan :     Sherrell Leonard IS a 74 year old female admitted on 9/5/2020 for management of acute respiratory failure, CVA and seizure.   I have personally reviewed the daily labs, imaging studies, cultures and discussed the case with referring physician and consulting physicians.     My assessment and plan by system for this patient is as follows:    Neurology/Psychiatry:   1.  Seizure disorder on continues EEG and Versed infusion.  The goal is burst suppression  2. prn Propofol for ICU sedation  3.  PRN opioids for breakthrough pain  Plan  Appreciate neuro critical care input. Likely residual effects from sedating medications are primary cause of encephalopathy. Will continue to hold all sedating medications.  Cardiovascular:   1.Hemodynamics -stable  2.Rhythm -normal sinus rhythm  3. Ischemia -no signs of ischemia  Plan  Continue amlodipine    Pulmonary/Ventilator  Management:   1. Airway intubated for airway protection  2. Oxygenation/ventilation/mechanics on full ventilatory support  3. Increased sputum production with fever. Staph aureus in sputum. On Ceftriaxone  Plan  -We will attempt another spontaneous breathing trial today.  However, even if the patient passes a spontaneous breathing trial she is not a candidate for extubation given the fact that she cannot protect her airway.      GI and Nutrition :   1.  Concern for protein calorie malnutrition    Plan  -Continue tube feeds as tolerated.      Renal/Fluids/Electrolytes:   1.  Creatinine within normal limits no signs of acute kidney injury  2.  Continued hypokalemia  3.  Appears euvolemic    Plan  -Continue to monitor and replace electrolytes as per protocol.  - generally avoid nephrotoxic agents such as NSAID, IV contrast unless specifically required  - adjust medications as needed for renal clearance  - follow I/O's as appropriate.      Infectious Disease:   1.  Staph aureus tracheitis vs. pneumonia    Plan  -On ceftriaxone    Endocrine:   1 concern for stress-induced hyperglycemia  Plan  - ICU insulin protocol, goal sugar <180      Hematology/Oncology:   1. Moderate leukocytosis  2. Anemia, no signs, symptoms of active blood loss  3.  Plan  -Continue to monitor            IV/Access:   1. Venous access -PICC line  2. Arterial access -no need for arterial line at this time  3.  Plan  - central access required and necessary      ICU Prophylaxis:   1. DVT: Mechanical  2. VAP: HOB 30 degrees, chlorhexidine rinse  3. Stress Ulcer: PPI  4. Restraints: Nonviolent soft two point restraints required and necessary for patient safety and continued cares and good effect as patient continues to pull at necessary lines, tubes despite education and distraction. Will readdress daily.  Restraints not needed at this time   5. Wound care - per unit routine   6. Feeding - continue tube feeds      Key goals for next 24 hours:   1.   Spontaneous breathing trial  2.  Continue antibiotics.  3. Begin tracheostomy discussions with                Interim History:     Patient still with altered mental status.  Increased WBC and continued low grade fever         Key Medications:       amLODIPine  5 mg Oral or Feeding Tube Daily     aspirin  81 mg Per Feeding Tube Daily     atorvastatin  40 mg Oral or NG Tube Daily at 8 pm     cefTRIAXone  2 g Intravenous Q24H     chlorhexidine  15 mL Mouth/Throat Q12H     clopidogrel  75 mg Oral or Feeding Tube Daily     heparin ANTICOAGULANT  5,000 Units Subcutaneous Q12H     levETIRAcetam  1,500 mg Intravenous Q12H     metoprolol  5 mg Intravenous Q6H     pantoprazole  40 mg Per Feeding Tube Daily     sodium chloride (PF)  10 mL Intracatheter Q8H     valproate (DEPACON) intermittent infusion  1,000 mg Intravenous Q12H       dextrose       - MEDICATION INSTRUCTIONS -       sodium chloride 10 mL/hr at 09/25/20 1429               Physical Examination:   Temp:  [98.9  F (37.2  C)-99.9  F (37.7  C)] 98.9  F (37.2  C)  Pulse:  [57-75] 70  Resp:  [11-23] 15  BP: (125-206)/(62-99) 166/76  FiO2 (%):  [30 %] 30 %  SpO2:  [91 %-100 %] 98 %    Intake/Output Summary (Last 24 hours) at 9/26/2020 0635  Last data filed at 9/26/2020 0600  Gross per 24 hour   Intake 1930 ml   Output 2875 ml   Net -945 ml                   Wt Readings from Last 4 Encounters:   09/26/20 68.3 kg (150 lb 9.2 oz)     BP - Mean:  [] 126  Ventilation Mode: CMV/AC  (Continuous Mandatory Ventilation/ Assist Control)  FiO2 (%): 30 %  Rate Set (breaths/minute): 12 breaths/min  Tidal Volume Set (mL): 500 mL  PEEP (cm H2O): 5 cmH2O  Pressure Support (cm H2O): 5 cmH2O  Oxygen Concentration (%): 30 %  Resp: 15    No lab results found in last 7 days.    GEN: no acute distress   HEENT: head ncat, sclera anicteric, OP patent, trachea midline   PULM: unlabored synchronous with vent, clear anteriorly    CV/COR: RRR S1S2 no gallop,  No rub, no murmur  ABD:  soft nontender, hypoactive bowel sounds, no mass  EXT:  Edema   warm  NEURO: Still unresponsive  SKIN: no obvious rash  LINES: clean, dry intact         Data:   All data and imaging reviewed     ROUTINE ICU LABS (Last four results)  CMP  Recent Labs   Lab 09/25/20 0445 09/24/20 2000 09/24/20 0655 09/23/20  1211 09/23/20 0415 09/22/20 0525 09/21/20 0445 09/20/20  0403   NA  --   --   --   --  148* 147* 145* 145*   POTASSIUM 3.5 3.3* 3.4 3.8 3.3* 3.5 3.4 3.8   CHLORIDE  --   --   --   --  112* 111* 109 109   CO2  --   --   --   --  34* 34* 32 32   ANIONGAP  --   --   --   --  2* 2* 4 4   GLC  --   --   --   --  130* 117* 134* 123*   BUN  --   --   --   --  24 23 20 20   CR  --   --   --   --  0.52 0.62 0.54 0.52   GFRESTIMATED  --   --   --   --  >90 88 >90 >90   GFRESTBLACK  --   --   --   --  >90 >90 >90 >90   MARII  --   --   --   --  8.8 9.0 8.4* 9.0   MAG  --   --   --   --   --   --  2.2  --    PHOS  --   --   --   --   --   --  2.9  --    PROTTOTAL  --   --  5.4*  --   --   --   --   --    ALBUMIN  --   --  1.8*  --   --   --   --   --    BILITOTAL  --   --  0.2  --   --   --   --   --    ALKPHOS  --   --  62  --   --   --   --   --    AST  --   --  29  --   --   --   --   --    ALT  --   --  35  --   --   --   --   --      CBC  Recent Labs   Lab 09/25/20 0445 09/23/20 0415 09/22/20 0525 09/21/20 0445 09/20/20  0403   WBC  --  12.6* 10.5 11.3* 10.2   RBC  --  3.28* 3.37* 3.34* 3.58*   HGB  --  10.3* 10.6* 10.5* 11.3*   HCT  --  32.9* 33.8* 33.3* 35.5   MCV  --  100 100 100 99   MCH  --  31.4 31.5 31.4 31.6   MCHC  --  31.3* 31.4* 31.5 31.8   RDW  --  13.7 13.7 13.6 13.5    283 271 311 357     INRNo lab results found in last 7 days.  Arterial Blood GasNo lab results found in last 7 days.    All cultures:  Recent Labs   Lab 09/20/20  1200 09/20/20  0930   CULT Light growth  Staphylococcus aureus  *  Light growth  Corynebacterium species  Identification obtained by MALDI-TOF mass spectrometry  research use only database. Test   characteristics determined and verified by the Infect[facility] Diagnostic [facility]   ([facility]) [place], [place].  Susceptibility testing not routinely done  * No growth after 5 days     No results found for this or any previous visit (from the past 24 hour(s)).      Billing: This patient is critically ill: yes. Total critical care time today 35 min.    Carlos Petty MD  [facility] Intensivist Service

## 2020-09-26 NOTE — PROGRESS NOTES
Counts include 234 beds at the [facility] ICU RESPIRATORY NOTE           Date of Admission: 9/5/2020     Date of Intubation (most recent):9/11/2020     Reason for Mechanical Ventilation:Airway protection     Number of Days on Mechanical Ventilation:16     Met Criteria for Spontaneous Breathing Trial: No     Significant Events Today:None     ABG Results: No lab results found in last 7 days.    Ventilation Mode: CMV/AC  (Continuous Mandatory Ventilation/ Assist Control)  FiO2 (%): 30 %  Rate Set (breaths/minute): 12 breaths/min  Tidal Volume Set (mL): 500 mL  PEEP (cm H2O): 5 cmH2O  Pressure Support (cm H2O): 5 cmH2O  Oxygen Concentration (%): 30 %  Resp: 16    Will continue to follow.     Gabino Torres, RT

## 2020-09-26 NOTE — PROGRESS NOTES
"  [facility]    Stroke Progress Note    Interval Events  No events overnight, EEG discontinued yesterday.    Impression & Plan  Scattered intracranial atherosclerosis with severe focal left M1 stenosis, now s/p balloon angioplasty 9/11   Left MCA syndrome  New R MCA infarcts  - SBP goal 140-180 mmHg  - DAPT with ASA 81 mg daily + Plavix 75 mg daily   - Continue Lipitor 40 mg daily  - Consideration for possible stent at site of L M1 angioplasty given stenosis on MRA. Will focus on seizure management at this time, but potential intervention pending improvement from seizure perspective  - Repeat MRI shows no new infarcts or findings     Non-convulsive status epilepticus (NCSE), resolved  Ongoing severe encephalopathy  - vEEG discontinued  - Continue Keppra 1500 mg q12 hours  - Continue VPA - reduced on 9/21 from 750mg q8 hours to 1000mg q12 hours. Continue this dose.      We will follow.     LILLY Louis, [facility]  Neurology  To page me or covering stroke neurology team member, click here: AMCOM   Choose \"On Call\" tab at top, then search dropdown box for \"Neurology Adult\", select location, press Enter, then look for stroke/neuro ICU/telestroke.    ______________________________________________________    Medications   Home Meds  Prior to Admission medications    Medication Sig Start Date End Date Taking? Authorizing Provider   Multiple Vitamins-Minerals (SYSTANE ICAPS AREDS2) CAPS Take 1 capsule by mouth daily   Yes Unknown, Entered By History   multivitamin, therapeutic (THERA-VIT) TABS tablet Take 1 tablet by mouth daily   Yes Unknown, Entered By History       Scheduled Meds    amLODIPine  5 mg Oral or Feeding Tube Daily     aspirin  81 mg Per Feeding Tube Daily     atorvastatin  40 mg Oral or NG Tube Daily at 8 pm     cefTRIAXone  2 g Intravenous Q24H     chlorhexidine  15 mL Mouth/Throat Q12H     clopidogrel  75 mg Oral or Feeding Tube Daily     heparin ANTICOAGULANT  5,000 Units Subcutaneous Q12H "     levETIRAcetam  1,500 mg Intravenous Q12H     metoprolol  5 mg Intravenous Q6H     pantoprazole  40 mg Per Feeding Tube Daily     sodium chloride (PF)  10 mL Intracatheter Q8H     valproate (DEPACON) intermittent infusion  1,000 mg Intravenous Q12H       Infusion Meds    dextrose       - MEDICATION INSTRUCTIONS -       sodium chloride 10 mL/hr at 09/25/20 1429       PRN Meds  acetaminophen **OR** acetaminophen, bisacodyl, artificial tears ophthalmic solution, dextrose, glucose **OR** dextrose **OR** glucagon, fentaNYL, hydrALAZINE, labetalol, lidocaine 4%, lidocaine (buffered or not buffered), LORazepam, - MEDICATION INSTRUCTIONS -, melatonin, miconazole, naloxone, ondansetron **OR** ondansetron, polyethylene glycol, potassium chloride, potassium chloride with lidocaine, potassium chloride, potassium chloride, potassium chloride, prochlorperazine **OR** prochlorperazine **OR** prochlorperazine, senna-docusate **OR** senna-docusate, sodium chloride (PF)       PHYSICAL EXAMINATION  Temp:  [98.9  F (37.2  C)-99.9  F (37.7  C)] 98.9  F (37.2  C)  Pulse:  [57-75] 70  Resp:  [11-23] 15  BP: (125-206)/(62-99) 166/76  FiO2 (%):  [30 %] 30 %  SpO2:  [91 %-100 %] 98 %     Neurologic  Mental Status:  Intubated, opens eyes to voice and noxious stimuli, does not follow commands  Cranial Nerves:  PERRL, difficult to assess facial symmetry with ETT fastener, does not protrude tongue  Motor:  normal muscle tone and bulk, no abnormal movements, withdraws both LEs , muscle contraction without obvious movement to noxious in UEs  Reflexes:  b/l toes upgoing  Sensory:  see motor exam  Coordination:  unable to assess  Station/Gait:  deferred     Imaging  I personally reviewed all imaging; relevant findings per HPI.     Lab Results Data   CBC  Recent Labs   Lab 09/26/20  0658 09/25/20  0445 09/23/20  0415 09/22/20  0525   WBC 10.3  --  12.6* 10.5   RBC 3.47*  --  3.28* 3.37*   HGB 10.8*  --  10.3* 10.6*   HCT 35.3  --  32.9* 33.8*     259 283 271     Basic Metabolic Panel    Recent Labs   Lab 09/25/20 0445 09/24/20 2000 09/24/20 0655  09/23/20  0415 09/22/20 0525 09/21/20 0445   NA  --   --   --   --  148* 147* 145*   POTASSIUM 3.5 3.3* 3.4   < > 3.3* 3.5 3.4   CHLORIDE  --   --   --   --  112* 111* 109   CO2  --   --   --   --  34* 34* 32   BUN  --   --   --   --  24 23 20   CR  --   --   --   --  0.52 0.62 0.54   GLC  --   --   --   --  130* 117* 134*   MARII  --   --   --   --  8.8 9.0 8.4*    < > = values in this interval not displayed.     Liver Panel  Recent Labs   Lab 09/24/20  0655   PROTTOTAL 5.4*   ALBUMIN 1.8*   BILITOTAL 0.2   ALKPHOS 62   AST 29   ALT 35     INR    Recent Labs   Lab Test 09/07/20  0758   INR 1.05      Lipid Profile    Recent Labs   Lab Test 09/05/20  1256   CHOL 186   HDL 87   LDL 81   TRIG 89     A1C    Recent Labs   Lab Test 09/05/20  1256   A1C 5.5     Troponin I  No results for input(s): TROPI in the last 168 hours.

## 2020-09-26 NOTE — PROGRESS NOTES
Critical Care Progress Note      09/26/2020    Name: Sherrell Leonard MRN#: [id number]   Age: 74 year old YOB: 1945     Hsptl Day# 21  ICU DAY # 15    MV DAY # 15             Problem List:   Strokes  Seizures  Encephalopathy  Ventilator support  Hypertension          Summary/Hospital Course:   74-year-old female admitted to the ICU for acute ventilatory failure and airway protection after left MCA subcortical stroke and left PCA stroke with multiple stenotic vessels.  Patient remains intubated.  Patient remains off sedation however her neurologic exam is still comatose.  Patient also continues with low-grade fevers at this time.  She does not have a leukocytosis.    9/21- Still not responding in spite of all sedation being discontinued for 96 hours    9/22- Still not responsive on my exam.By report from neurology, perhaps some improvement in EEG.    9/23- Opened eyes, but no following commands. Increasing WBC, low grade temp and Staph aureus in sputum. Ceftriaxone started    9/25- No changes on exam but EEG without seizure     Assessment and plan :     Sherrell Leonard Is a 74 year old female admitted on 9/5/2020 for management of acute respiratory failure, CVA and seizure.       Neurology/Psychiatry:   1.  Seizure are under control with keppra and valproate but neuro exam is not better than eyes open, spontaneous blink and yawns.    Plan  Continue off continuous sedatives but continue AE medications.     CV:  Hypertension persists, NSR   P: increase amlodipine to 10 and change iv metoprolol to 25 BID.     Pulmonary/Ventilator Management:   1. Airway intubated for airway protection  Day 15 ventilator   Can do PS trials but not awake enough for extubation.    3. Increased sputum production with fever. Staph aureus in sputum. On Ceftriaxone  Plan  Continue full ventilator support   Introduced trach idea to  if not improving in next several days.        GI and Nutrition :   -Continue tube feeds as  tolerated.      Renal/Fluids/Electrolytes:   1.  Creatinine within normal limits  Increasing hypernatremia    P: increase tube feeds  free water         Infectious Disease:   1.  Staph aureus tracheitis vs. pneumonia    Plan  -On ceftriaxone--last day today     Endocrine:   Glucoses ok.    Plan  - ICU insulin protocol, goal sugar <180      Hematology/Oncology:   1. Normal WBC now   2. Anemia, no signs, symptoms of active blood loss  3.  Plan  -Continue to monitor        IV/Access:   1. Venous access -PICC line  3.  Plan  - central access required and necessary      ICU Prophylaxis:   1. DVT: Mechanical  2. VAP: HOB 30 degrees, chlorhexidine rinse  3. Stress Ulcer: PPI  4. Restraints:  Restraints not needed at this time   5. Wound care - per unit routine   6. Feeding - continue tube feeds  Updated      Key goals for next 24 hours:   Increase amlodipine  Change metoprolol  Increase free water to 100 q  4           Interim History:     Patient still with altered mental status.          Key Medications:       [START ON 9/27/2020] amLODIPine  10 mg Oral or Feeding Tube Daily     amLODIPine  5 mg Oral Once     aspirin  81 mg Per Feeding Tube Daily     atorvastatin  40 mg Oral or NG Tube Daily at 8 pm     cefTRIAXone  2 g Intravenous Q24H     chlorhexidine  15 mL Mouth/Throat Q12H     clopidogrel  75 mg Oral or Feeding Tube Daily     heparin ANTICOAGULANT  5,000 Units Subcutaneous Q12H     levETIRAcetam  1,500 mg Intravenous Q12H     metoprolol tartrate  25 mg Per Feeding Tube BID     pantoprazole  40 mg Per Feeding Tube Daily     sodium chloride (PF)  10 mL Intracatheter Q8H     valproate (DEPACON) intermittent infusion  1,000 mg Intravenous Q12H       dextrose       - MEDICATION INSTRUCTIONS -       sodium chloride 10 mL/hr at 09/26/20 0800               Physical Examination:   Temp:  [97.8  F (36.6  C)-99.9  F (37.7  C)] 97.8  F (36.6  C)  Pulse:  [62-72] 71  Resp:  [11-17] 17  BP: (125-206)/(60-99) 180/81  FiO2  (%):  [30 %] 30 %  SpO2:  [91 %-100 %] 95 %        2200 urinary output   Wt Readings from Last 4 Encounters:   09/26/20 68.3 kg (150 lb 9.2 oz)     BP - Mean:  [] 122  Ventilation Mode: CMV/AC  (Continuous Mandatory Ventilation/ Assist Control)  FiO2 (%): 30 %  Rate Set (breaths/minute): 12 breaths/min  Tidal Volume Set (mL): 500 mL  PEEP (cm H2O): 5 cmH2O  Pressure Support (cm H2O): 5 cmH2O  Oxygen Concentration (%): 30 %  Resp: 17    GEN: no acute distress;  Eyes open, blinks but does not withdraw to pain.Some spontaneous yawn and swallow pupils 3  Mm bilateral   HEENT:   ETT ok   Respiratory:  Non labored, does ok on PS 10. Clear     CV/COR: RRR S1S2 no gallop,  No rub, no murmur  ABD: soft nontender,   EXT:  Edema   warm  SKIN: no rash  LINES: clean, dry intact         Data:   All data and imaging reviewed     ROUTINE ICU LABS (Last four results)  CMP  Recent Labs   Lab 09/26/20  0658 09/25/20  0445 09/24/20 2000 09/24/20  0655  09/23/20  0415 09/22/20  0525 09/21/20  0445   *  --   --   --   --  148* 147* 145*   POTASSIUM 3.3* 3.5 3.3* 3.4   < > 3.3* 3.5 3.4   CHLORIDE 113*  --   --   --   --  112* 111* 109   CO2 33*  --   --   --   --  34* 34* 32   ANIONGAP 3  --   --   --   --  2* 2* 4   *  --   --   --   --  130* 117* 134*   BUN 21  --   --   --   --  24 23 20   CR 0.50*  --   --   --   --  0.52 0.62 0.54   GFRESTIMATED >90  --   --   --   --  >90 88 >90   GFRESTBLACK >90  --   --   --   --  >90 >90 >90   MARII 8.9  --   --   --   --  8.8 9.0 8.4*   MAG  --   --   --   --   --   --   --  2.2   PHOS  --   --   --   --   --   --   --  2.9   PROTTOTAL 5.5*  --   --  5.4*  --   --   --   --    ALBUMIN 2.0*  --   --  1.8*  --   --   --   --    BILITOTAL 0.2  --   --  0.2  --   --   --   --    ALKPHOS 66  --   --  62  --   --   --   --    AST 21  --   --  29  --   --   --   --    ALT 29  --   --  35  --   --   --   --     < > = values in this interval not displayed.     CBC  Recent Labs   Lab  09/26/20  0658 09/25/20  0445 09/23/20  0415 09/22/20  0525 09/21/20  0445   WBC 10.3  --  12.6* 10.5 11.3*   RBC 3.47*  --  3.28* 3.37* 3.34*   HGB 10.8*  --  10.3* 10.6* 10.5*   HCT 35.3  --  32.9* 33.8* 33.3*   *  --  100 100 100   MCH 31.1  --  31.4 31.5 31.4   MCHC 30.6*  --  31.3* 31.4* 31.5   RDW 13.8  --  13.7 13.7 13.6    259 283 271 311     INRNo lab results found in last 7 days.  Arterial Blood GasNo lab results found in last 7 days.    All cultures:  Recent Labs   Lab 09/20/20  1200 09/20/20  0930   CULT Light growth  Staphylococcus aureus  *  Light growth  Corynebacterium species  Identification obtained by MALDI-TOF mass spectrometry research use only database. Test   characteristics determined and verified by the [facility]   ([facility]) .  Susceptibility testing not routinely done  * No growth     No results found for this or any previous visit (from the past 24 hour(s)).      Billing: This patient is critically ill: yes. Encephalopathy coma, respiratory failure, Total critical care time today 35 min.

## 2020-09-27 ENCOUNTER — APPOINTMENT (OUTPATIENT)
Dept: MRI IMAGING | Facility: CLINIC | Age: 75
DRG: 003 | End: 2020-09-27
Attending: NURSE PRACTITIONER
Payer: MEDICARE

## 2020-09-27 LAB
ANION GAP SERPL CALCULATED.3IONS-SCNC: 3 MMOL/L (ref 3–14)
BASE EXCESS BLDV CALC-SCNC: 8.4 MMOL/L
BUN SERPL-MCNC: 21 MG/DL (ref 7–30)
CALCIUM SERPL-MCNC: 8.6 MG/DL (ref 8.5–10.1)
CHLORIDE SERPL-SCNC: 112 MMOL/L (ref 94–109)
CO2 SERPL-SCNC: 32 MMOL/L (ref 20–32)
CREAT SERPL-MCNC: 0.54 MG/DL (ref 0.52–1.04)
GFR SERPL CREATININE-BSD FRML MDRD: >90 ML/MIN/{1.73_M2}
GLUCOSE BLDC GLUCOMTR-MCNC: 89 MG/DL (ref 70–99)
GLUCOSE SERPL-MCNC: 101 MG/DL (ref 70–99)
HCO3 BLDV-SCNC: 33 MMOL/L (ref 21–28)
O2/TOTAL GAS SETTING VFR VENT: 40 %
OXYHGB MFR BLDV: 75 %
PCO2 BLDV: 45 MM HG (ref 40–50)
PH BLDV: 7.47 PH (ref 7.32–7.43)
PO2 BLDV: 40 MM HG (ref 25–47)
POTASSIUM SERPL-SCNC: 3.4 MMOL/L (ref 3.4–5.3)
SODIUM SERPL-SCNC: 147 MMOL/L (ref 133–144)
VALPROATE SERPL-MCNC: 60 MG/L (ref 50–100)

## 2020-09-27 PROCEDURE — 82805 BLOOD GASES W/O2 SATURATION: CPT | Performed by: INTERNAL MEDICINE

## 2020-09-27 PROCEDURE — 40000239 ZZH STATISTIC VAT ROUNDS

## 2020-09-27 PROCEDURE — 25000128 H RX IP 250 OP 636: Performed by: STUDENT IN AN ORGANIZED HEALTH CARE EDUCATION/TRAINING PROGRAM

## 2020-09-27 PROCEDURE — 20000003 ZZH R&B ICU

## 2020-09-27 PROCEDURE — 27210429 ZZH NUTRITION PRODUCT INTERMEDIATE LITER

## 2020-09-27 PROCEDURE — 25000125 ZZHC RX 250: Performed by: PHYSICIAN ASSISTANT

## 2020-09-27 PROCEDURE — 94003 VENT MGMT INPAT SUBQ DAY: CPT

## 2020-09-27 PROCEDURE — 25800030 ZZH RX IP 258 OP 636: Performed by: PHYSICIAN ASSISTANT

## 2020-09-27 PROCEDURE — 99291 CRITICAL CARE FIRST HOUR: CPT | Performed by: INTERNAL MEDICINE

## 2020-09-27 PROCEDURE — 80048 BASIC METABOLIC PNL TOTAL CA: CPT | Performed by: INTERNAL MEDICINE

## 2020-09-27 PROCEDURE — 99232 SBSQ HOSP IP/OBS MODERATE 35: CPT | Performed by: PSYCHIATRY & NEUROLOGY

## 2020-09-27 PROCEDURE — 25000128 H RX IP 250 OP 636: Performed by: NURSE PRACTITIONER

## 2020-09-27 PROCEDURE — 25000128 H RX IP 250 OP 636: Performed by: PHYSICIAN ASSISTANT

## 2020-09-27 PROCEDURE — 80165 DIPROPYLACETIC ACID FREE: CPT | Performed by: NURSE PRACTITIONER

## 2020-09-27 PROCEDURE — 00000146 ZZHCL STATISTIC GLUCOSE BY METER IP

## 2020-09-27 PROCEDURE — 25800030 ZZH RX IP 258 OP 636: Performed by: INTERNAL MEDICINE

## 2020-09-27 PROCEDURE — 25000132 ZZH RX MED GY IP 250 OP 250 PS 637: Mod: GY | Performed by: HOSPITALIST

## 2020-09-27 PROCEDURE — 80177 DRUG SCRN QUAN LEVETIRACETAM: CPT | Performed by: NURSE PRACTITIONER

## 2020-09-27 PROCEDURE — 40000281 ZZH STATISTIC TRANSPORT TIME EA 15 MIN

## 2020-09-27 PROCEDURE — 25800030 ZZH RX IP 258 OP 636: Performed by: NURSE PRACTITIONER

## 2020-09-27 PROCEDURE — 25000132 ZZH RX MED GY IP 250 OP 250 PS 637: Mod: GY | Performed by: INTERNAL MEDICINE

## 2020-09-27 PROCEDURE — 80164 ASSAY DIPROPYLACETIC ACD TOT: CPT | Performed by: NURSE PRACTITIONER

## 2020-09-27 PROCEDURE — 25000128 H RX IP 250 OP 636: Performed by: INTERNAL MEDICINE

## 2020-09-27 PROCEDURE — 40000275 ZZH STATISTIC RCP TIME EA 10 MIN

## 2020-09-27 PROCEDURE — 70553 MRI BRAIN STEM W/O & W/DYE: CPT

## 2020-09-27 PROCEDURE — 40000008 ZZH STATISTIC AIRWAY CARE

## 2020-09-27 PROCEDURE — A9585 GADOBUTROL INJECTION: HCPCS | Performed by: HOSPITALIST

## 2020-09-27 PROCEDURE — 25500064 ZZH RX 255 OP 636: Performed by: HOSPITALIST

## 2020-09-27 RX ORDER — GADOBUTROL 604.72 MG/ML
6 INJECTION INTRAVENOUS ONCE
Status: COMPLETED | OUTPATIENT
Start: 2020-09-27 | End: 2020-09-27

## 2020-09-27 RX ORDER — AMANTADINE HYDROCHLORIDE 100 MG/1
100 CAPSULE, GELATIN COATED ORAL DAILY
Status: DISCONTINUED | OUTPATIENT
Start: 2020-09-28 | End: 2020-09-27

## 2020-09-27 RX ORDER — METOPROLOL TARTRATE 50 MG
50 TABLET ORAL 2 TIMES DAILY
Status: DISCONTINUED | OUTPATIENT
Start: 2020-09-27 | End: 2020-09-29

## 2020-09-27 RX ORDER — AMANTADINE HYDROCHLORIDE 100 MG/1
100 CAPSULE, GELATIN COATED ORAL
Status: DISCONTINUED | OUTPATIENT
Start: 2020-09-28 | End: 2020-10-05 | Stop reason: HOSPADM

## 2020-09-27 RX ADMIN — CARBOXYMETHYLCELLULOSE SODIUM 2 DROP: 5 SOLUTION/ DROPS OPHTHALMIC at 20:40

## 2020-09-27 RX ADMIN — CHLORHEXIDINE GLUCONATE 15 ML: 1.2 RINSE ORAL at 20:40

## 2020-09-27 RX ADMIN — CEFTRIAXONE SODIUM 2 G: 2 INJECTION, POWDER, FOR SOLUTION INTRAMUSCULAR; INTRAVENOUS at 13:15

## 2020-09-27 RX ADMIN — MICONAZOLE NITRATE: 20 POWDER TOPICAL at 08:02

## 2020-09-27 RX ADMIN — ASPIRIN 81 MG 81 MG: 81 TABLET ORAL at 08:01

## 2020-09-27 RX ADMIN — PANTOPRAZOLE SODIUM 40 MG: 40 TABLET, DELAYED RELEASE ORAL at 08:00

## 2020-09-27 RX ADMIN — AMLODIPINE BESYLATE 10 MG: 10 TABLET ORAL at 08:03

## 2020-09-27 RX ADMIN — LABETALOL HYDROCHLORIDE 20 MG: 5 INJECTION INTRAVENOUS at 16:04

## 2020-09-27 RX ADMIN — VALPROATE SODIUM 1000 MG: 100 INJECTION, SOLUTION INTRAVENOUS at 08:44

## 2020-09-27 RX ADMIN — DOCUSATE SODIUM AND SENNOSIDES 2 TABLET: 8.6; 5 TABLET, FILM COATED ORAL at 08:05

## 2020-09-27 RX ADMIN — MICONAZOLE NITRATE: 20 POWDER TOPICAL at 00:46

## 2020-09-27 RX ADMIN — ATORVASTATIN CALCIUM 40 MG: 40 TABLET, FILM COATED ORAL at 20:40

## 2020-09-27 RX ADMIN — CLOPIDOGREL BISULFATE 75 MG: 75 TABLET, FILM COATED ORAL at 08:00

## 2020-09-27 RX ADMIN — MICONAZOLE NITRATE: 20 POWDER TOPICAL at 20:40

## 2020-09-27 RX ADMIN — CARBOXYMETHYLCELLULOSE SODIUM 2 DROP: 5 SOLUTION/ DROPS OPHTHALMIC at 00:46

## 2020-09-27 RX ADMIN — SODIUM CHLORIDE: 9 INJECTION, SOLUTION INTRAVENOUS at 21:54

## 2020-09-27 RX ADMIN — METOPROLOL TARTRATE 25 MG: 25 TABLET, FILM COATED ORAL at 08:01

## 2020-09-27 RX ADMIN — HEPARIN SODIUM 5000 UNITS: 5000 INJECTION, SOLUTION INTRAVENOUS; SUBCUTANEOUS at 20:47

## 2020-09-27 RX ADMIN — LEVETIRACETAM 1500 MG: 500 INJECTION, SOLUTION INTRAVENOUS at 23:20

## 2020-09-27 RX ADMIN — LEVETIRACETAM 1500 MG: 500 INJECTION, SOLUTION INTRAVENOUS at 08:01

## 2020-09-27 RX ADMIN — CHLORHEXIDINE GLUCONATE 15 ML: 1.2 RINSE ORAL at 08:02

## 2020-09-27 RX ADMIN — VALPROATE SODIUM 1000 MG: 100 INJECTION, SOLUTION INTRAVENOUS at 21:54

## 2020-09-27 RX ADMIN — METOPROLOL TARTRATE 50 MG: 50 TABLET, FILM COATED ORAL at 20:40

## 2020-09-27 RX ADMIN — GADOBUTROL 6 ML: 604.72 INJECTION INTRAVENOUS at 14:55

## 2020-09-27 RX ADMIN — HEPARIN SODIUM 5000 UNITS: 5000 INJECTION, SOLUTION INTRAVENOUS; SUBCUTANEOUS at 08:11

## 2020-09-27 ASSESSMENT — ACTIVITIES OF DAILY LIVING (ADL)
ADLS_ACUITY_SCORE: 22

## 2020-09-27 ASSESSMENT — MIFFLIN-ST. JEOR: SCORE: 1103.25

## 2020-09-27 NOTE — PROGRESS NOTES
[facility] ICU RESPIRATORY NOTE           Date of Admission: 9/5/2020     Date of Intubation (most recent):9/11/2020     Reason for Mechanical Ventilation:Airway protection    Number of Days on Mechanical Ventilation:17     Met Criteria for Spontaneous Breathing Trial: No     Significant Events Today:None     ABG Results: No lab results found in last 7 days.    Ventilation Mode: CMV/AC  (Continuous Mandatory Ventilation/ Assist Control)  FiO2 (%): 40 %  Rate Set (breaths/minute): 12 breaths/min  Tidal Volume Set (mL): 500 mL  PEEP (cm H2O): 5 cmH2O  Oxygen Concentration (%): 30 %  Resp: 16    Will continue to follow.     Gabino Torres, RT

## 2020-09-27 NOTE — PROGRESS NOTES
[facility] ICU RESPIRATORY NOTE        Date of Admission: 9/5/2020    Date of Intubation (most recent): 9/11/2020    Reason for Mechanical Ventilation: Airway protection    Number of Days on Mechanical Ventilation: 17    Met Criteria for Spontaneous Breathing Trial: Yes    Significant Events Today: MRI and 30 mins 10/5 PS trial.    ABG Results:   Recent Labs   Lab 09/27/20  0427   O2PER 40       Current Vent Settings: Ventilation Mode: CMV/AC  (Continuous Mandatory Ventilation/ Assist Control)  FiO2 (%): 40 %  Rate Set (breaths/minute): 12 breaths/min  Tidal Volume Set (mL): 500 mL  PEEP (cm H2O): 5 cmH2O  Pressure Support (cm H2O): 10 cmH2O  Oxygen Concentration (%): 30 %  Resp: 24      Skin Assessment: Intact    Plan: Continue on full support and wean as tolerated.    Elio Barry, RT

## 2020-09-27 NOTE — PROGRESS NOTES
Critical Care Progress Note      09/27/2020    Name: Sherrell Leonard MRN#: [id number]   Age: 74 year old YOB: 1945     [facility] Day# 22                 Problem List:   Strokes  Seizures  Encephalopathy  Ventilator support  Hypertension          Summary/Hospital Course:   74-year-old female admitted to the ICU for acute ventilatory failure and airway protection after left MCA subcortical stroke and left PCA stroke with multiple stenotic vessels.  Patient remains intubated.  Patient remains off sedation however her neurologic exam is still comatose.  Patient also continues with low-grade fevers at this time.  She does not have a leukocytosis.    9/21- Still not responding in spite of all sedation being discontinued for 96 hours    9/22- Still not responsive on my exam.By report from neurology, perhaps some improvement in EEG.    9/23- Opened eyes, but no following commands. Increasing WBC, low grade temp and Staph aureus in sputum. Ceftriaxone started    9/25- No changes on exam but EEG without seizure   9/27 eyes open but not following commands     Assessment and plan :     Sherrell Leonard Is a 74 year old female admitted on 9/5/2020 for management of acute respiratory failure, CVA and seizure.       Neurology/Psychiatry:   1.  Seizure are under control with keppra and valproate but neuro exam is not better than eyes open, spontaneous blink and yawns.    Plan  Continue off continuous sedatives but continue AE medications.     CV:  Hypertension persists, NSR   P: continue amlodipine 10 and increase metoprolol to 50  BID.     Pulmonary/Ventilator Management:   1. Airway intubated for airway protection  Day 16  ventilator   Can do PS trials easily but not awake enough for extubation.    3. Increased sputum production with fever. Staph aureus in sputum. On Ceftriaxone Day 5   Plan    Continue full ventilator support   Yesterday introduced trach idea to  if not improving in next several days.        GI and  Nutrition :   -Continue tube feeds as tolerated.      Renal/Fluids/Electrolytes:   1.  Creatinine within normal limits  Improving hypernatremia    P: continue tube feeds  free water         Infectious Disease:   1.  Staph aureus tracheitis vs. pneumonia    Plan  -On ceftriaxone--last day today     Endocrine:   Glucoses ok.    Plan  - ICU insulin protocol, goal sugar <180      Hematology/Oncology:   1. Normal WBC now   2. Anemia, no signs, symptoms of active blood loss  3.  Plan  -Continue to monitor        IV/Access:   1. Venous access -PICC line  3.  Plan  - central access required and necessary      ICU Prophylaxis:   1. DVT: Mechanical  2. VAP: HOB 30 degrees, chlorhexidine rinse  3. Stress Ulcer: PPI   6. Feeding - continue tube feeds    Key goals for next 24 hours:   Increase amlodipine  Increase metoprolol to 50 BID           Interim History:     Patient still with altered mental status.          Key Medications:       amLODIPine  10 mg Oral or Feeding Tube Daily     aspirin  81 mg Per Feeding Tube Daily     atorvastatin  40 mg Oral or NG Tube Daily at 8 pm     cefTRIAXone  2 g Intravenous Q24H     chlorhexidine  15 mL Mouth/Throat Q12H     clopidogrel  75 mg Oral or Feeding Tube Daily     heparin ANTICOAGULANT  5,000 Units Subcutaneous Q12H     levETIRAcetam  1,500 mg Intravenous Q12H     metoprolol tartrate  50 mg Per Feeding Tube BID     pantoprazole  40 mg Per Feeding Tube Daily     sodium chloride (PF)  10 mL Intracatheter Q8H     valproate (DEPACON) intermittent infusion  1,000 mg Intravenous Q12H       dextrose       - MEDICATION INSTRUCTIONS -       sodium chloride 10 mL/hr at 09/27/20 0818               Physical Examination:   Temp:  [98.1  F (36.7  C)-99.6  F (37.6  C)] 98.1  F (36.7  C)  Pulse:  [60-75] 63  Resp:  [11-24] 12  BP: (117-199)/(55-95) 161/74  FiO2 (%):  [30 %-40 %] 40 %  SpO2:  [90 %-99 %] 98 %        1700  urinary output   Wt Readings from Last 4 Encounters:   09/27/20 65.5 kg (144 lb  6.4 oz)     BP - Mean:  [] 113  Ventilation Mode: CMV/AC  (Continuous Mandatory Ventilation/ Assist Control)  FiO2 (%): 40 %  Rate Set (breaths/minute): 12 breaths/min  Tidal Volume Set (mL): 500 mL  PEEP (cm H2O): 5 cmH2O  Oxygen Concentration (%): 30 %  Resp: 12    GEN: no acute distress;  Eyes open, blinks but does not withdraw to pain or blink to threat. Some spontaneous yawn and swallow pupils 3  Mm bilateral   HEENT:   ETT ok   Respiratory:  Non labored, does ok on PS 10. Clear     CV/COR: RRR S1S2 no gallop,  No rub, no murmur  ABD: soft nontender,   EXT:  Edema   warm  SKIN: no rash  LINES: clean, dry intact         Data:   All data and imaging reviewed     ROUTINE ICU LABS (Last four results)  CMP  Recent Labs   Lab 09/27/20  0427 09/26/20  2150 09/26/20  0658 09/25/20  0445  09/24/20  0655  09/23/20  0415 09/22/20  0525 09/21/20  0445   *  --  149*  --   --   --   --  148* 147* 145*   POTASSIUM 3.4 4.0 3.3* 3.5   < > 3.4   < > 3.3* 3.5 3.4   CHLORIDE 112*  --  113*  --   --   --   --  112* 111* 109   CO2 32  --  33*  --   --   --   --  34* 34* 32   ANIONGAP 3  --  3  --   --   --   --  2* 2* 4   *  --  119*  --   --   --   --  130* 117* 134*   BUN 21  --  21  --   --   --   --  24 23 20   CR 0.54  --  0.50*  --   --   --   --  0.52 0.62 0.54   GFRESTIMATED >90  --  >90  --   --   --   --  >90 88 >90   GFRESTBLACK >90  --  >90  --   --   --   --  >90 >90 >90   MARII 8.6  --  8.9  --   --   --   --  8.8 9.0 8.4*   MAG  --   --   --   --   --   --   --   --   --  2.2   PHOS  --   --   --   --   --   --   --   --   --  2.9   PROTTOTAL  --   --  5.5*  --   --  5.4*  --   --   --   --    ALBUMIN  --   --  2.0*  --   --  1.8*  --   --   --   --    BILITOTAL  --   --  0.2  --   --  0.2  --   --   --   --    ALKPHOS  --   --  66  --   --  62  --   --   --   --    AST  --   --  21  --   --  29  --   --   --   --    ALT  --   --  29  --   --  35  --   --   --   --     < > = values in this interval  not displayed.     CBC  Recent Labs   Lab 09/26/20  0658 09/25/20  0445 09/23/20  0415 09/22/20  0525 09/21/20  0445   WBC 10.3  --  12.6* 10.5 11.3*   RBC 3.47*  --  3.28* 3.37* 3.34*   HGB 10.8*  --  10.3* 10.6* 10.5*   HCT 35.3  --  32.9* 33.8* 33.3*   *  --  100 100 100   MCH 31.1  --  31.4 31.5 31.4   MCHC 30.6*  --  31.3* 31.4* 31.5   RDW 13.8  --  13.7 13.7 13.6    259 283 271 311     INRNo lab results found in last 7 days.  Arterial Blood Gas  Recent Labs   Lab 09/27/20  0427   O2PER 40         Billing: This patient is critically ill: yes. Encephalopathy coma, respiratory failure, Total critical care time today 35 min.

## 2020-09-27 NOTE — PROGRESS NOTES
End of Shift Nursing Summary  09/26/20 0700 to 1930   Neuro:  No change from yesterday. Blinks eyes to noxious/repeated stimulus. Withdraws weakly from BUE, and more briskly from BLE. PERRL.  Pain: CPOT 0  CV:  SR. -180  Pulm: Lungs coarse this evening. Increased pale yellow secretions through ET suction. Thick oral secretions.  GI/: Urine output adequate. No BM this shift. Senna given. Tube feeds continue.  Skin: Bruised. Rash in armpits and groin.  Fever: Afebrile.  Lines/drips: Right PICC.  Additional: Up to chair x2.     *See EPIC flowsheet for full nursing assessment findings    Jacqueline Brock RN

## 2020-09-27 NOTE — PROGRESS NOTES
"  [facility]    Stroke Progress Note    Interval Events  No change in neuro exam today.    Impression & Plan  Scattered intracranial atherosclerosis with severe focal left M1 stenosis, now s/p balloon angioplasty 9/11   Left MCA syndrome  New R MCA infarcts  - SBP goal 140-180 mmHg  - DAPT with ASA 81 mg daily + Plavix 75 mg daily   - Continue Lipitor 40 mg daily  - Consideration for possible stent at site of L M1 angioplasty given stenosis on MRA. Will focus on seizure management at this time, but potential intervention pending improvement from seizure perspective  - Repeat MRI brain ordered     Non-convulsive status epilepticus (NCSE), resolved  Ongoing severe encephalopathy  - vEEG discontinued  - Continue Keppra 1500 mg q12 hours  - Continue VPA - reduced on 9/21 from 750mg q8 hours to 1000mg q12 hours. Continue this dose.  - Repeat AED levels ordered     Will likely invite palliative care into discussion for long term care planning. Will discuss with Donell today.     We will follow.    LILLY Louis, CNP  Neurology  To page me or covering stroke neurology team member, click here: AMCOM   Choose \"On Call\" tab at top, then search dropdown box for \"Neurology Adult\", select location, press Enter, then look for stroke/neuro ICU/telestroke.    ______________________________________________________    Medications   Home Meds  Prior to Admission medications    Medication Sig Start Date End Date Taking? Authorizing Provider   Multiple Vitamins-Minerals (SYSTANE ICAPS AREDS2) CAPS Take 1 capsule by mouth daily   Yes Unknown, Entered By History   multivitamin, therapeutic (THERA-VIT) TABS tablet Take 1 tablet by mouth daily   Yes Unknown, Entered By History       Scheduled Meds    amLODIPine  10 mg Oral or Feeding Tube Daily     aspirin  81 mg Per Feeding Tube Daily     atorvastatin  40 mg Oral or NG Tube Daily at 8 pm     cefTRIAXone  2 g Intravenous Q24H     chlorhexidine  15 mL Mouth/Throat Q12H "     clopidogrel  75 mg Oral or Feeding Tube Daily     heparin ANTICOAGULANT  5,000 Units Subcutaneous Q12H     levETIRAcetam  1,500 mg Intravenous Q12H     metoprolol tartrate  25 mg Per Feeding Tube BID     pantoprazole  40 mg Per Feeding Tube Daily     sodium chloride (PF)  10 mL Intracatheter Q8H     valproate (DEPACON) intermittent infusion  1,000 mg Intravenous Q12H       Infusion Meds    dextrose       - MEDICATION INSTRUCTIONS -       sodium chloride 10 mL/hr at 09/26/20 2023       PRN Meds  acetaminophen **OR** acetaminophen, bisacodyl, artificial tears ophthalmic solution, dextrose, glucose **OR** dextrose **OR** glucagon, fentaNYL, hydrALAZINE, labetalol, lidocaine 4%, lidocaine (buffered or not buffered), - MEDICATION INSTRUCTIONS -, melatonin, miconazole, naloxone, ondansetron **OR** ondansetron, polyethylene glycol, potassium chloride, potassium chloride with lidocaine, potassium chloride, potassium chloride, potassium chloride, prochlorperazine **OR** prochlorperazine **OR** prochlorperazine, senna-docusate **OR** senna-docusate, sodium chloride (PF)       PHYSICAL EXAMINATION  Temp:  [97.8  F (36.6  C)-99.6  F (37.6  C)] 98.6  F (37  C)  Pulse:  [61-75] 61  Resp:  [12-24] 16  BP: (117-187)/(55-94) 117/59  FiO2 (%):  [30 %-40 %] 40 %  SpO2:  [91 %-99 %] 94 %     Neurologic  Mental Status:  Intubated, half opens eyes to voice and noxious stimuli, does not follow commands  Cranial Nerves:  PERRL, difficult to assess facial symmetry with ETT fastener, does not protrude tongue  Motor:  normal muscle tone and bulk, no abnormal movements, withdraws both LEs , muscle contraction without obvious movement to noxious in UEs  Reflexes:  b/l toes upgoing  Sensory:  see motor exam  Coordination:  unable to assess  Station/Gait:  deferred     Imaging  I personally reviewed all imaging; relevant findings per HPI.     Lab Results Data   CBC  Recent Labs   Lab 09/26/20  0658 09/25/20  0445 09/23/20  0415 09/22/20  0510    WBC 10.3  --  12.6* 10.5   RBC 3.47*  --  3.28* 3.37*   HGB 10.8*  --  10.3* 10.6*   HCT 35.3  --  32.9* 33.8*    259 283 271     Basic Metabolic Panel    Recent Labs   Lab 09/27/20  0427 09/26/20  2150 09/26/20  0658  09/23/20  0415   *  --  149*  --  148*   POTASSIUM 3.4 4.0 3.3*   < > 3.3*   CHLORIDE 112*  --  113*  --  112*   CO2 32  --  33*  --  34*   BUN 21  --  21  --  24   CR 0.54  --  0.50*  --  0.52   *  --  119*  --  130*   MARII 8.6  --  8.9  --  8.8    < > = values in this interval not displayed.     Liver Panel  Recent Labs   Lab 09/26/20  0658 09/24/20  0655   PROTTOTAL 5.5* 5.4*   ALBUMIN 2.0* 1.8*   BILITOTAL 0.2 0.2   ALKPHOS 66 62   AST 21 29   ALT 29 35     INR    Recent Labs   Lab Test 09/07/20  0758   INR 1.05      Lipid Profile    Recent Labs   Lab Test 09/05/20  1256   CHOL 186   HDL 87   LDL 81   TRIG 89     A1C    Recent Labs   Lab Test 09/05/20  1256   A1C 5.5     Troponin I  No results for input(s): TROPI in the last 168 hours.

## 2020-09-28 ENCOUNTER — HOSPITAL ENCOUNTER (OUTPATIENT)
Dept: NEUROLOGY | Facility: CLINIC | Age: 75
DRG: 003 | End: 2020-09-28
Attending: PHYSICIAN ASSISTANT
Payer: MEDICARE

## 2020-09-28 LAB
ANION GAP SERPL CALCULATED.3IONS-SCNC: 2 MMOL/L (ref 3–14)
BUN SERPL-MCNC: 20 MG/DL (ref 7–30)
CALCIUM SERPL-MCNC: 8.7 MG/DL (ref 8.5–10.1)
CHLORIDE SERPL-SCNC: 110 MMOL/L (ref 94–109)
CO2 SERPL-SCNC: 33 MMOL/L (ref 20–32)
CREAT SERPL-MCNC: 0.47 MG/DL (ref 0.52–1.04)
GFR SERPL CREATININE-BSD FRML MDRD: >90 ML/MIN/{1.73_M2}
GLUCOSE BLDC GLUCOMTR-MCNC: 107 MG/DL (ref 70–99)
GLUCOSE BLDC GLUCOMTR-MCNC: 110 MG/DL (ref 70–99)
GLUCOSE BLDC GLUCOMTR-MCNC: 112 MG/DL (ref 70–99)
GLUCOSE BLDC GLUCOMTR-MCNC: 99 MG/DL (ref 70–99)
GLUCOSE SERPL-MCNC: 118 MG/DL (ref 70–99)
MAGNESIUM SERPL-MCNC: 2.1 MG/DL (ref 1.6–2.3)
PHOSPHATE SERPL-MCNC: 3.1 MG/DL (ref 2.5–4.5)
PLATELET # BLD AUTO: 263 10E9/L (ref 150–450)
POTASSIUM SERPL-SCNC: 3.2 MMOL/L (ref 3.4–5.3)
POTASSIUM SERPL-SCNC: 4.3 MMOL/L (ref 3.4–5.3)
SODIUM SERPL-SCNC: 145 MMOL/L (ref 133–144)

## 2020-09-28 PROCEDURE — 25800030 ZZH RX IP 258 OP 636: Performed by: PHYSICIAN ASSISTANT

## 2020-09-28 PROCEDURE — 40000275 ZZH STATISTIC RCP TIME EA 10 MIN

## 2020-09-28 PROCEDURE — 94003 VENT MGMT INPAT SUBQ DAY: CPT

## 2020-09-28 PROCEDURE — 99232 SBSQ HOSP IP/OBS MODERATE 35: CPT | Performed by: PSYCHIATRY & NEUROLOGY

## 2020-09-28 PROCEDURE — 99207 ZZC APP CREDIT; MD BILLING SHARED VISIT: CPT | Performed by: PHYSICIAN ASSISTANT

## 2020-09-28 PROCEDURE — 25800030 ZZH RX IP 258 OP 636: Performed by: NURSE PRACTITIONER

## 2020-09-28 PROCEDURE — 85049 AUTOMATED PLATELET COUNT: CPT | Performed by: HOSPITALIST

## 2020-09-28 PROCEDURE — 40000008 ZZH STATISTIC AIRWAY CARE

## 2020-09-28 PROCEDURE — 40000239 ZZH STATISTIC VAT ROUNDS

## 2020-09-28 PROCEDURE — 25000125 ZZHC RX 250: Performed by: PHYSICIAN ASSISTANT

## 2020-09-28 PROCEDURE — 25000132 ZZH RX MED GY IP 250 OP 250 PS 637: Mod: GY | Performed by: INTERNAL MEDICINE

## 2020-09-28 PROCEDURE — 99291 CRITICAL CARE FIRST HOUR: CPT | Performed by: INTERNAL MEDICINE

## 2020-09-28 PROCEDURE — 25000128 H RX IP 250 OP 636: Performed by: PHYSICIAN ASSISTANT

## 2020-09-28 PROCEDURE — 00000146 ZZHCL STATISTIC GLUCOSE BY METER IP

## 2020-09-28 PROCEDURE — 83735 ASSAY OF MAGNESIUM: CPT | Performed by: HOSPITALIST

## 2020-09-28 PROCEDURE — 80048 BASIC METABOLIC PNL TOTAL CA: CPT | Performed by: HOSPITALIST

## 2020-09-28 PROCEDURE — 25000128 H RX IP 250 OP 636: Performed by: INTERNAL MEDICINE

## 2020-09-28 PROCEDURE — 99223 1ST HOSP IP/OBS HIGH 75: CPT | Performed by: NURSE PRACTITIONER

## 2020-09-28 PROCEDURE — 94640 AIRWAY INHALATION TREATMENT: CPT | Mod: 76

## 2020-09-28 PROCEDURE — 84100 ASSAY OF PHOSPHORUS: CPT | Performed by: HOSPITALIST

## 2020-09-28 PROCEDURE — 20000003 ZZH R&B ICU

## 2020-09-28 PROCEDURE — 25000132 ZZH RX MED GY IP 250 OP 250 PS 637: Mod: GY | Performed by: HOSPITALIST

## 2020-09-28 PROCEDURE — 95714 VEEG EA 12-26 HR UNMNTR: CPT

## 2020-09-28 PROCEDURE — 84132 ASSAY OF SERUM POTASSIUM: CPT | Performed by: INTERNAL MEDICINE

## 2020-09-28 PROCEDURE — 25000132 ZZH RX MED GY IP 250 OP 250 PS 637: Mod: GY | Performed by: NURSE PRACTITIONER

## 2020-09-28 PROCEDURE — 25000128 H RX IP 250 OP 636: Performed by: NURSE PRACTITIONER

## 2020-09-28 RX ADMIN — METOPROLOL TARTRATE 50 MG: 50 TABLET, FILM COATED ORAL at 20:28

## 2020-09-28 RX ADMIN — CHLORHEXIDINE GLUCONATE 15 ML: 1.2 RINSE ORAL at 08:10

## 2020-09-28 RX ADMIN — AMANTADINE HYDROCHLORIDE 100 MG: 100 CAPSULE ORAL at 12:04

## 2020-09-28 RX ADMIN — CARBOXYMETHYLCELLULOSE SODIUM 2 DROP: 5 SOLUTION/ DROPS OPHTHALMIC at 07:38

## 2020-09-28 RX ADMIN — MICONAZOLE NITRATE: 20 POWDER TOPICAL at 03:41

## 2020-09-28 RX ADMIN — AMLODIPINE BESYLATE 10 MG: 10 TABLET ORAL at 08:09

## 2020-09-28 RX ADMIN — VALPROATE SODIUM 1000 MG: 100 INJECTION, SOLUTION INTRAVENOUS at 08:44

## 2020-09-28 RX ADMIN — CEFTRIAXONE SODIUM 2 G: 2 INJECTION, POWDER, FOR SOLUTION INTRAMUSCULAR; INTRAVENOUS at 12:04

## 2020-09-28 RX ADMIN — HEPARIN SODIUM 5000 UNITS: 5000 INJECTION, SOLUTION INTRAVENOUS; SUBCUTANEOUS at 08:10

## 2020-09-28 RX ADMIN — POTASSIUM CHLORIDE 40 MEQ: 1.5 POWDER, FOR SOLUTION ORAL at 05:30

## 2020-09-28 RX ADMIN — LEVETIRACETAM 1500 MG: 500 INJECTION, SOLUTION INTRAVENOUS at 20:28

## 2020-09-28 RX ADMIN — ASPIRIN 81 MG 81 MG: 81 TABLET ORAL at 08:09

## 2020-09-28 RX ADMIN — CHLORHEXIDINE GLUCONATE 15 ML: 1.2 RINSE ORAL at 20:29

## 2020-09-28 RX ADMIN — POTASSIUM CHLORIDE 20 MEQ: 1.5 POWDER, FOR SOLUTION ORAL at 07:38

## 2020-09-28 RX ADMIN — LEVETIRACETAM 1500 MG: 500 INJECTION, SOLUTION INTRAVENOUS at 08:13

## 2020-09-28 RX ADMIN — AMANTADINE HYDROCHLORIDE 100 MG: 100 CAPSULE ORAL at 05:29

## 2020-09-28 RX ADMIN — METOPROLOL TARTRATE 50 MG: 50 TABLET, FILM COATED ORAL at 12:04

## 2020-09-28 RX ADMIN — PANTOPRAZOLE SODIUM 40 MG: 40 TABLET, DELAYED RELEASE ORAL at 08:10

## 2020-09-28 RX ADMIN — ATORVASTATIN CALCIUM 40 MG: 40 TABLET, FILM COATED ORAL at 20:28

## 2020-09-28 RX ADMIN — HEPARIN SODIUM 5000 UNITS: 5000 INJECTION, SOLUTION INTRAVENOUS; SUBCUTANEOUS at 20:28

## 2020-09-28 RX ADMIN — CLOPIDOGREL BISULFATE 75 MG: 75 TABLET, FILM COATED ORAL at 08:09

## 2020-09-28 RX ADMIN — VALPROATE SODIUM 1000 MG: 100 INJECTION, SOLUTION INTRAVENOUS at 22:14

## 2020-09-28 ASSESSMENT — ACTIVITIES OF DAILY LIVING (ADL)
ADLS_ACUITY_SCORE: 26
ADLS_ACUITY_SCORE: 22

## 2020-09-28 ASSESSMENT — MIFFLIN-ST. JEOR: SCORE: 1079.25

## 2020-09-28 NOTE — PROGRESS NOTES
[facility] ICU RESPIRATORY NOTE        Date of Admission: 9/5/2020    Date of Intubation (most recent): 9/11/2020    Reason for Mechanical Ventilation: protect airway    Number of Days on Mechanical Ventilation: 18    Met Criteria for Spontaneous Breathing Trial: No    Reason for No Spontaneous Breathing Trial: EEG test today    Significant Events Today: None    ABG Results:   Recent Labs   Lab 09/27/20  0427   O2PER 40       Current Vent Settings: Ventilation Mode: CMV/AC  (Continuous Mandatory Ventilation/ Assist Control)  FiO2 (%): 30 %  Rate Set (breaths/minute): 12 breaths/min  Tidal Volume Set (mL): 500 mL  PEEP (cm H2O): 5 cmH2O  Pressure Support (cm H2O): 10 cmH2O  Oxygen Concentration (%): 30 %  Resp: 11      Skin Assessment:intact    Plan: continue cares    Tobias Sun, RT

## 2020-09-28 NOTE — PROGRESS NOTES
CLINICAL NUTRITION SERVICES - REASSESSMENT NOTE      Malnutrition: (9/14)  % Weight Loss:  Unable to obtain without a nutrition history   % Intake:  </= 50% for >/= 5 days (severe malnutrition)  Subcutaneous Fat Loss:  None observed  Muscle Loss:  None observed  Fluid Retention:  None noted     Malnutrition Diagnosis: Unable to determine due to lack of a nutrition history (patient intubated and not able to provide)        EVALUATION OF PROGRESS TOWARD GOALS   Diet:    NPO    Nutrition Support:    Type of Feeding Tube: NG  Enteral Frequency:  Continuous  Enteral Regimen: Isosource 1.5 at 40 mL/hr  Total Enteral Provisions: 1440 kcal (27 kcal/kg), 65 g protein (1.2 g/kg), 169 g CHO, 14 g fiber, 730 mL H2O  Free Water Flush: (9/27) 100 mL every 4 hours   TOTAL FLUID (TF + flushes) = 1330 mL/day    Intake/Tolerance:    Chart reviewed  Pt tolerating TF at goal rate  BM x2 past 24 hrs  BGM: 118, 107, 99    9/26: Na 149 (H)  9/27: Na 147 (H)  9/28: Na 145 (H)           K 3.2 (L - on replacement protocol)           Mg 2.1           Phos 3.1      ASSESSED NUTRITION NEEDS:  Dosing Weight 54 kg (adjusted for overweight)   Estimated Energy Needs: 6930-2056 kcals (25-30 Kcal/Kg)  Justification: maintenance and overweight  Estimated Protein Needs: 65-81 grams protein (1.2-1.5 g pro/Kg)  Justification: preservation of lean body mass      NEW FINDINGS:   Vitals:    09/05/20 1239 09/06/20 0623 09/10/20 0113 09/10/20 1815   Weight: 63.5 kg (140 lb) 68.6 kg (151 lb 4.8 oz) 66.2 kg (145 lb 14.4 oz) 66.3 kg (146 lb 2.6 oz)    09/11/20 0400 09/11/20 2115 09/12/20 0400 09/13/20 0000   Weight: 69.5 kg (153 lb 3.5 oz) 68.2 kg (150 lb 5.7 oz) 68.2 kg (150 lb 5.7 oz) 70.9 kg (156 lb 4.9 oz)    09/14/20 0600 09/15/20 0400 09/16/20 0300 09/17/20 0400   Weight: 70.3 kg (154 lb 15.7 oz) 69.6 kg (153 lb 7 oz) 69.6 kg (153 lb 7 oz) 71.8 kg (158 lb 4.6 oz)    09/18/20 0200 09/19/20 0200 09/20/20 0029 09/21/20 0200   Weight: 70.9 kg (156 lb 4.9 oz)  71 kg (156 lb 8.4 oz) 66.5 kg (146 lb 9.7 oz) 65.3 kg (143 lb 15.4 oz)    20 0530 20 0013 20 0515 20 0540   Weight: 66.9 kg (147 lb 7.8 oz) 67.9 kg (149 lb 11.1 oz) 68.5 kg (151 lb 0.2 oz) 68 kg (149 lb 14.6 oz)    20 0300 20 0100 20 0400   Weight: 68.3 kg (150 lb 9.2 oz) 65.5 kg (144 lb 6.4 oz) 63.1 kg (139 lb 1.8 oz)     Plan for Trach : WOCN:  Right tip of tongue does not appear to communicate with ETT and is unlikely pressure in nature. Pt did have seizures and swelling of tongue, possible trauma related.   Status: initial assessment      Linda Cleft: Incontinence associated dermatitis  Status: initial assessment     BL Axilla: Interigo with fungal component  Status: initial assessment  Recommend provider order: None, at this time    Previous Goals ():   Isosource 1.5 at 40 mL/hr will continue to meet % needs   Evaluation: Met    Previous Nutrition Diagnosis ():   No nutrition diagnosis identified at this time   Evaluation: No change        CURRENT NUTRITION DIAGNOSIS  No nutrition diagnosis identified at this time     INTERVENTIONS  Recommendations / Nutrition Prescription  NPO  TF as above    Implementation  No intervention at this time    Goals  EN to meet % est needs      MONITORING AND EVALUATION:  Progress towards goals will be monitored and evaluated per protocol and Practice Guidelines

## 2020-09-28 NOTE — PROGRESS NOTES
LTV  Day 1 started @ 4132. Ordered by Octavia Cowan. Explained procedure to patient prior to hook up.   Video Observation initiated, patient informed. Neurology stated video and audio were medically necessary for testing    Ale Ramey

## 2020-09-28 NOTE — PROGRESS NOTES
Contacted to general surgery regarding PEG placement tomorrow. Per MD, plan to do PEG placement at the same time with Trach and they will work to coordinate regarding Trach timing.

## 2020-09-28 NOTE — PROGRESS NOTES
[facility] ICU RESPIRATORY NOTE           Date of Admission: 9/5/2020     Date of Intubation (most recent): 9/11/2020     Reason for Mechanical Ventilation: Airway protection     Number of Days on Mechanical Ventilation: 18     Met Criteria for Spontaneous Breathing Trial: Yes     Significant Events Today: None overnight     ABG Results:   Recent Labs   Lab 09/27/20  0427   O2PER 40     Ventilation Mode: CMV/AC  (Continuous Mandatory Ventilation/ Assist Control)  FiO2 (%): 30 %  Rate Set (breaths/minute): 12 breaths/min  Tidal Volume Set (mL): 500 mL  PEEP (cm H2O): 5 cmH2O  Pressure Support (cm H2O): 10 cmH2O  Oxygen Concentration (%): 30 %  Resp: 12    Gabino Torres, RT

## 2020-09-28 NOTE — CONSULTS
[facility]  Palliative Care Consultation Note    Patient: Sherrell Leonard  Date of Admission:  9/5/2020    Requesting Clinician / Team: Ary Briceño  Reason for consult: Goals of care    Recommendations:    Goals remain restorative at this time (see discussion below)    These recommendations have been discussed with Dr Alan Zepeda.      Thank you for the opportunity to participate in the care of this patient and family. Our team: will continue to follow.     During regular M-F work hours -- if you are not sure who specifically to contact -- please contact us on our team pager at [phone].    After regular work hours and on weekends/holidays, you can call our answering service at [phone]. Also, who's on call for us is available in [employer] Web.     Olga CARR, [facility]  Palliative Medicine  [phone]   [phone] (team pager)  [phone] (after hours/weekends)    Attestation:  Total time on the floor involved in the patient's care: 85 minutes  Total time spent in counseling/care coordination: >50%      Assessments:  Sherrell Leonard is an 74 year old female withHTN, HL who presented on 9/5/2020 with aphasia and confusion secondary to Left M1 stenosis and BG infarction. She subsequently underwent angioplasty which was complicated by mild embolic shower. Following this procedure she was noted to have seizures. She remains in the ICU on mechanical ventilation.     Today, the patient was seen for:  Goals of Care    Participated in family meeting with Ms Leonard's  (Donell), daughter (Varsha), and two sons (Moncho and Dada) by phone. Dr Galicia, Dr Phillips (Neurology), and myself were medical staff involved in meeting. Dr Galicia and Dr Phillips provided a medical update and overview of Ms Leonard's care up until today. They both shared the difficulty in predicting what Ms Leonard's recovery will look like, but feel it is likely she will continue to have deficits including right sided physical weakness  and aphasia. They discussed the option of continuing with aggressive medical cares which would include placement of a trach and PEG and transfer to LTACH vs shifting the focus of our care to Ms Leonard's comfort. Family in agreement Ms Leonard would want to continue to live and be around for her children and grandchildren. Considering we do not have a good prediction for what her recovery will look like they feel it is too soon to stop trying to get her better. After the meeting concluded I was able to spend time with family to introduce the role of the Palliative Care team. Shared that our team will continue to be an extra layer of support to patient and family which they were appreciative of.     Prognosis, Goals, & Planning:      Functional Status just prior to hospitalization: Not assessed      Prognosis, Goals, and/or Advance Care Planning were addressed today: Yes        Summary/Comments:       Patient's decision making preferences: unable to assess          Patient has decision-making capacity today for complex decisions: No            I have concerns about the patient/family's health literacy today: No           Patient has a completed Health Care Directive: No.       Code status: Full Code    Coping, Meaning, & Spirituality:   Mood, coping, and/or meaning in the context of serious illness were addressed today: No  Summary/Comments:     Social:     Living situation: lives with her  in their home in [place]    Key family / caregivers:  for 57 years to Donell, has a daughter Tania and two sons (Moncho & Dada), grandchildren    History of Present Illness:  History gathered today from: family/loved ones, medical chart, medical team members    Adopted from H&P  Sherrell Leonard is a 74 year old female who presents with confusion.  Her  reports he noticed some mild confusion on Thursday.  He also reports some slight slurring of the speech.  He noted no focal weakness or facial droop, however, his  granddaughter reported to him that she felt a facial droop was present.  They had dinner with some friends a couple evenings ago who contacted him later on expressing some concern for her confusion.  He notes that she has had trouble walking but this is not necessarily an acute issue.     The patient is unable to provide any reliable history.  She is unable to reliably answer even yes/no questions.  She is unable to state her name.  She seems to have dense expressive aphasia and has significant difficulty following even simple commands       Key Palliative Symptom Data:  We are not helping to manage these symptoms currently in this patient.      ROS:  Comprehensive ROS is reviewed and is negative except as per HPI.     Past Medical History:  History reviewed. No pertinent past medical history.     Past Surgical History:  Past Surgical History:   Procedure Laterality Date     IR CAROTID CEREBRAL ANGIOGRAM LEFT  9/11/2020         Family History:  History reviewed. No pertinent family history.      Allergies:  No Known Allergies     Medications:  I have reviewed this patient's medication profile and medications from this hospitalization.   Current Facility-Administered Medications Ordered in Epic   Medication Dose Route Frequency Last Rate Last Dose     acetaminophen (TYLENOL) tablet 650 mg  650 mg Oral Q4H PRN   650 mg at 09/23/20 0121    Or     acetaminophen (TYLENOL) Suppository 650 mg  650 mg Rectal Q4H PRN         amantadine (SYMMETREL) capsule 100 mg  100 mg Oral 2 times per day   100 mg at 09/28/20 1204     amLODIPine (NORVASC) tablet 10 mg  10 mg Oral or Feeding Tube Daily   10 mg at 09/28/20 0809     aspirin (ASA) chewable tablet 81 mg  81 mg Per Feeding Tube Daily   81 mg at 09/28/20 0809     atorvastatin (LIPITOR) tablet 40 mg  40 mg Oral or NG Tube Daily at 8 pm   40 mg at 09/27/20 2040     bisacodyl (DULCOLAX) Suppository 10 mg  10 mg Rectal Daily PRN         carboxymethylcellulose PF (REFRESH PLUS) 0.5 %  ophthalmic solution 2 drop  2 drop Ophthalmic Q1H PRN   2 drop at 09/28/20 0738     cefTRIAXone (ROCEPHIN) 2 g vial to attach to  ml bag for ADULTS or NS 50 ml bag for PEDS  2 g Intravenous Q24H 200 mL/hr at 09/24/20 1120 2 g at 09/28/20 1204     chlorhexidine (PERIDEX) 0.12 % solution 15 mL  15 mL Mouth/Throat Q12H   15 mL at 09/28/20 0810     clopidogrel (PLAVIX) tablet 75 mg  75 mg Oral or Feeding Tube Daily   75 mg at 09/28/20 0809     dextrose 10% infusion   Intravenous Continuous PRN         glucose gel 15-30 g  15-30 g Oral Q15 Min PRN        Or     dextrose 50 % injection 25-50 mL  25-50 mL Intravenous Q15 Min PRN        Or     glucagon injection 1 mg  1 mg Subcutaneous Q15 Min PRN         fentaNYL (PF) (SUBLIMAZE) injection 25 mcg  25 mcg Intravenous Q3H PRN   25 mcg at 09/17/20 0440     heparin ANTICOAGULANT injection 5,000 Units  5,000 Units Subcutaneous Q12H   5,000 Units at 09/28/20 0810     hydrALAZINE (APRESOLINE) injection 10 mg  10 mg Intravenous Q6H PRN   10 mg at 09/26/20 0516     labetalol (NORMODYNE/TRANDATE) injection 20 mg  20 mg Intravenous Q1H PRN   20 mg at 09/27/20 1604     levETIRAcetam (KEPPRA) 1,500 mg in sodium chloride 0.9 % 100 mL intermittent infusion  1,500 mg Intravenous Q12H 400 mL/hr at 09/28/20 0813 1,500 mg at 09/28/20 0813     lidocaine (LMX4) cream   Topical Q1H PRN         lidocaine 1 % 0.1-1 mL  0.1-1 mL Other Q1H PRN   2 mL at 09/09/20 1505     Medication Instruction   Does not apply Continuous PRN         melatonin tablet 1 mg  1 mg Oral At Bedtime PRN         metoprolol tartrate (LOPRESSOR) tablet 50 mg  50 mg Per Feeding Tube BID   50 mg at 09/28/20 1204     miconazole (MICATIN) 2 % powder   Topical Q1H PRN         naloxone (NARCAN) injection 0.1-0.4 mg  0.1-0.4 mg Intravenous Q2 Min PRN         ondansetron (ZOFRAN-ODT) ODT tab 4 mg  4 mg Oral Q6H PRN        Or     ondansetron (ZOFRAN) injection 4 mg  4 mg Intravenous Q6H PRN         pantoprazole (PROTONIX) 2  mg/mL suspension 40 mg  40 mg Per Feeding Tube Daily   40 mg at 09/28/20 0810     polyethylene glycol (MIRALAX) Packet 17 g  17 g Oral Daily PRN         potassium chloride (KLOR-CON) Packet 20-40 mEq  20-40 mEq Oral or Feeding Tube Q2H PRN   20 mEq at 09/28/20 0738     potassium chloride 10 mEq in 100 mL intermittent infusion with 10 mg lidocaine  10 mEq Intravenous Q1H  mL/hr at 09/06/20 0258 10 mEq at 09/11/20 0026     potassium chloride 10 mEq in 100 mL sterile water intermittent infusion (premix)  10 mEq Intravenous Q1H PRN         potassium chloride 20 mEq in 50 mL intermittent infusion  20 mEq Intravenous Q1H PRN 50 mL/hr at 09/12/20 0715 20 mEq at 09/12/20 0715     potassium chloride ER (KLOR-CON M) CR tablet 20-40 mEq  20-40 mEq Oral Q2H PRN   20 mEq at 09/07/20 1928     prochlorperazine (COMPAZINE) injection 5 mg  5 mg Intravenous Q6H PRN        Or     prochlorperazine (COMPAZINE) tablet 5 mg  5 mg Oral Q6H PRN        Or     prochlorperazine (COMPAZINE) suppository 12.5 mg  12.5 mg Rectal Q12H PRN         senna-docusate (SENOKOT-S/PERICOLACE) 8.6-50 MG per tablet 1 tablet  1 tablet Oral BID PRN   1 tablet at 09/26/20 1527    Or     senna-docusate (SENOKOT-S/PERICOLACE) 8.6-50 MG per tablet 2 tablet  2 tablet Oral BID PRN   2 tablet at 09/27/20 0805     sodium chloride (PF) 0.9% PF flush 10 mL  10 mL Intracatheter Q8H   10 mL at 09/28/20 0810     sodium chloride (PF) 0.9% PF flush 10-20 mL  10-20 mL Intracatheter q1 min prn   20 mL at 09/23/20 0328     sodium chloride 0.9% infusion   Intravenous Continuous 10 mL/hr at 09/27/20 2154       valproate (DEPACON) 1,000 mg in sodium chloride 0.9 % 50 mL intermittent infusion  1,000 mg Intravenous Q12H 50 mL/hr at 09/28/20 0844 1,000 mg at 09/28/20 0844     No current [facility]-ordered outpatient medications on file.       Physical Exam:  Vital Signs: Temp: 98.5  F (36.9  C) Temp src: Axillary BP: (!) 165/80 Pulse: 67   Resp: 13 SpO2: 97 % O2 Device: Mechanical  Ventilator    Vitals:    09/26/20 0300 09/27/20 0100 09/28/20 0400   Weight: 68.3 kg (150 lb 9.2 oz) 65.5 kg (144 lb 6.4 oz) 63.1 kg (139 lb 1.8 oz)     CONSTITUTIONAL: NAD, on mechanical ventilator   HEENT: NCAT, ET tube present  CARDIOVASCULAR: exam deferred   RESPIRATORY: NL respiratory effort on mechanical ventilation  GASTROINTESTINAL: exam deferred  MUSCULOSKELETAL: no movement witnessed    SKIN: Warm and intact. No concerning lesions or rashes on exposed skin surfaces   NEUROLOGIC: not responsive, on mechanical ventilator   PSYCH: not responsive, on mechanical ventilator    Data reviewed:  Recent imaging reviewed, my comments on pertinents:   9/27 MRI brain  IMPRESSION:    1. Temporal evolution of multiple supratentorial infarctions including  a large area of involvement in the basal ganglia on the left side.  There is enhancement associated with several of these lesions. No  evidence of hemorrhagic transformation.  2. No new infarction is identified.  3. Moderate small vessel disease.       Recent lab data reviewed, my comments on pertinents:   Results for orders placed or performed during the hospital encounter of 09/05/20 (from the past 24 hour(s))   MR Brain w/o & w Contrast    Narrative    MRI BRAIN WITHOUT AND WITH CONTRAST  9/27/2020 3:33 PM    HISTORY:  Stroke, follow-up. Bilateral anterior circulation infarcts .    TECHNIQUE:  Multiplanar, multisequence MRI of the brain without and  with 6 mL Gadavist    COMPARISON: Brain MRI 9/19/2020    FINDINGS:     Intracranial contents: Again seen are multiple ischemic infarctions  involving the supratentorial brain, left more than right including a  large area of involvement in the basal ganglia on the left side. There  is some enhancement of the basal ganglia on the left side as well as  multiple areas of enhancement in the subcortical regions of the  supratentorial brain bilaterally, left more than right. There is no  evidence of hemorrhagic transformation.  There is moderate edema in the  basal ganglia on the left side. There is moderate generalized cerebral  volume loss. Patchy and confluent FLAIR signal hyperintensity in the  white matter is nonspecific, likely due to moderate small vessel  ischemic disease. No mass lesion is identified.    Sella:  No significant abnormality accounting for technique.     Orbit: No significant abnormality accounting for technique.      Sinus/mastoids: There is moderate fluid signal in the mastoid air  cells bilaterally. The paranasal sinuses are clear.    Bones/soft tissues: No aggressive osseous lesion involving the  calvarium, skull base, or visualized upper cervical spine.       Impression    IMPRESSION:    1. Temporal evolution of multiple supratentorial infarctions including  a large area of involvement in the basal ganglia on the left side.  There is enhancement associated with several of these lesions. No  evidence of hemorrhagic transformation.  2. No new infarction is identified.  3. Moderate small vessel disease.        BACILIO CHOPRA MD   Glucose by meter   Result Value Ref Range    Glucose 89 70 - 99 mg/dL   Valproic acid   Result Value Ref Range    Valproic Acid Level 60 50 - 100 mg/L   Platelet count   Result Value Ref Range    Platelet Count 263 150 - 450 10e9/L   Magnesium   Result Value Ref Range    Magnesium 2.1 1.6 - 2.3 mg/dL   Phosphorus   Result Value Ref Range    Phosphorus 3.1 2.5 - 4.5 mg/dL   Basic metabolic panel   Result Value Ref Range    Sodium 145 (H) 133 - 144 mmol/L    Potassium 3.2 (L) 3.4 - 5.3 mmol/L    Chloride 110 (H) 94 - 109 mmol/L    Carbon Dioxide 33 (H) 20 - 32 mmol/L    Anion Gap 2 (L) 3 - 14 mmol/L    Glucose 118 (H) 70 - 99 mg/dL    Urea Nitrogen 20 7 - 30 mg/dL    Creatinine 0.47 (L) 0.52 - 1.04 mg/dL    GFR Estimate >90 >60 mL/min/[1.73_m2]    GFR Estimate If Black >90 >60 mL/min/[1.73_m2]    Calcium 8.7 8.5 - 10.1 mg/dL   Glucose by meter   Result Value Ref Range    Glucose 107 (H) 70 - 99  mg/dL   Potassium   Result Value Ref Range    Potassium 4.3 3.4 - 5.3 mmol/L

## 2020-09-28 NOTE — PROGRESS NOTES
Neuro's unchanged. Opens eyes spontaneously, doesn't follow commands. SR with 1 degree AVB, BBB. Tube feed at goal with flush. EEG restarted today. Care conference done today. Trach/PEG planned tomorrow.  by bedside, supportive. Will cont to closely monitor.

## 2020-09-28 NOTE — PROGRESS NOTES
"  [facility]    Stroke Progress Note    Interval Events  No acute events overnight. Care conference with family scheduled for today at 11 am. Repeat MRI reveals evolution of infarcts.    Impression & plan  Scattered intracranial atherosclerosis with severe focal left M1 stenosis, now s/p balloon angioplasty 9/11   Left MCA syndrome  New R MCA infarcts  - SBP goal 140-180 mmHg  - DAPT with ASA 81 mg daily + Plavix 75 mg daily   - Continue Lipitor 40 mg daily  - Consideration for possible stent at site of L M1 angioplasty given stenosis on MRA. Will focus on seizure management at this time, but potential intervention pending improvement from seizure perspective     Non-convulsive status epilepticus (NCSE), resolved  Ongoing severe encephalopathy  - vEEG ordered, will follow   - Continue Keppra 1500 mg q12 hours  - Continue VPA - reduced on 9/21 from 750mg q8 hours to 1000mg q12 hours. Continue this dose.  - Recent Depakote level: 60   - Keppra level pending     We will continue to follow     Octavia Cowan PA-C   Neurology  To page me or covering stroke neurology team member, click here: [employer]   Choose \"On Call\" tab at top, then search dropdown box for \"Neurology Adult\", select location, press Enter, then look for stroke/neuro ICU/telestroke.  ______________________________________________________    Medications   Home Meds  Prior to Admission medications    Medication Sig Start Date End Date Taking? Authorizing Provider   Multiple Vitamins-Minerals (SYSTANE ICAPS AREDS2) CAPS Take 1 capsule by mouth daily   Yes Unknown, Entered By History   multivitamin, therapeutic (THERA-VIT) TABS tablet Take 1 tablet by mouth daily   Yes Unknown, Entered By History       Scheduled Meds    amantadine  100 mg Oral 2 times per day     amLODIPine  10 mg Oral or Feeding Tube Daily     aspirin  81 mg Per Feeding Tube Daily     atorvastatin  40 mg Oral or NG Tube Daily at 8 pm     cefTRIAXone  2 g Intravenous Q24H     " chlorhexidine  15 mL Mouth/Throat Q12H     clopidogrel  75 mg Oral or Feeding Tube Daily     heparin ANTICOAGULANT  5,000 Units Subcutaneous Q12H     levETIRAcetam  1,500 mg Intravenous Q12H     metoprolol tartrate  50 mg Per Feeding Tube BID     pantoprazole  40 mg Per Feeding Tube Daily     sodium chloride (PF)  10 mL Intracatheter Q8H     valproate (DEPACON) intermittent infusion  1,000 mg Intravenous Q12H       Infusion Meds    dextrose       - MEDICATION INSTRUCTIONS -       sodium chloride 10 mL/hr at 09/27/20 2154       PRN Meds  acetaminophen **OR** acetaminophen, bisacodyl, artificial tears ophthalmic solution, dextrose, glucose **OR** dextrose **OR** glucagon, fentaNYL, hydrALAZINE, labetalol, lidocaine 4%, lidocaine (buffered or not buffered), - MEDICATION INSTRUCTIONS -, melatonin, miconazole, naloxone, ondansetron **OR** ondansetron, polyethylene glycol, potassium chloride, potassium chloride with lidocaine, potassium chloride, potassium chloride, potassium chloride, prochlorperazine **OR** prochlorperazine **OR** prochlorperazine, senna-docusate **OR** senna-docusate, sodium chloride (PF)       PHYSICAL EXAMINATION  Temp:  [98  F (36.7  C)-99.9  F (37.7  C)] 98.5  F (36.9  C)  Pulse:  [54-69] 67  Resp:  [12-24] 13  BP: (122-194)/(57-91) 165/80  FiO2 (%):  [30 %-40 %] 30 %  SpO2:  [94 %-99 %] 97 %     Neurologic  Mental Status: Intubated, does not open eyes to command or noxious stimuli, not following commands  Cranial Nerves: PERRL, rhythmic eye twitching, does not protrude tongue, cough reflex intact   Motor:  normal muscle tone and bulk, does not withdraw to noxious stimuli in any extremity   Reflexes: deferred   Sensory: see motor exam   Coordination:  unable to assess  Station/Gait:  deferred       Imaging  I personally reviewed all imaging; relevant findings per HPI.     Lab Results Data   CBC  Recent Labs   Lab 09/28/20  0422 09/26/20  0658 09/25/20  0445 09/23/20  0415 09/22/20  0525   WBC  --   10.3  --  12.6* 10.5   RBC  --  3.47*  --  3.28* 3.37*   HGB  --  10.8*  --  10.3* 10.6*   HCT  --  35.3  --  32.9* 33.8*    281 259 283 271     Basic Metabolic Panel    Recent Labs   Lab 09/28/20  0950 09/28/20  0422 09/27/20  0427  09/26/20  0658   NA  --  145* 147*  --  149*   POTASSIUM 4.3 3.2* 3.4   < > 3.3*   CHLORIDE  --  110* 112*  --  113*   CO2  --  33* 32  --  33*   BUN  --  20 21  --  21   CR  --  0.47* 0.54  --  0.50*   GLC  --  118* 101*  --  119*   MARII  --  8.7 8.6  --  8.9    < > = values in this interval not displayed.     Liver Panel  Recent Labs   Lab 09/26/20  0658 09/24/20  0655   PROTTOTAL 5.5* 5.4*   ALBUMIN 2.0* 1.8*   BILITOTAL 0.2 0.2   ALKPHOS 66 62   AST 21 29   ALT 29 35     INR    Recent Labs   Lab Test 09/07/20  0758   INR 1.05      Lipid Profile    Recent Labs   Lab Test 09/05/20  1256   CHOL 186   HDL 87   LDL 81   TRIG 89     A1C    Recent Labs   Lab Test 09/05/20  1256   A1C 5.5     Troponin I  No results for input(s): TROPI in the last 168 hours.

## 2020-09-28 NOTE — PROGRESS NOTES
End of Shift Nursing Summary  09/27/20 0700 to 1930   Neuro:  Opening eyes spontaneously today. Not arousing to voice. Blinks eyes to noxious/repeated stimulus. Withdraws weakly from BUE, and more briskly from BLE. PERRL.  Pain: CPOT 0  CV:  SR. -180. PRN labetalol x1  Pulm: Thick pale yellow secretions through ET suction. Thick oral secretions. CPAP/PS 10/5.  GI/: Urine output adequate. BM x2, soft brown stool. Tube feeds continue.  Skin: Unchanged  Fever: Afebrile.  Lines/drips: Right PICC.  Additional: Up to chair x1.     *See EPIC flowsheet for full nursing assessment findings    Jacqueline Brock RN

## 2020-09-29 ENCOUNTER — APPOINTMENT (OUTPATIENT)
Dept: SURGERY | Facility: PHYSICIAN GROUP | Age: 75
End: 2020-09-29
Payer: MEDICARE

## 2020-09-29 ENCOUNTER — ANESTHESIA (OUTPATIENT)
Dept: SURGERY | Facility: CLINIC | Age: 75
DRG: 003 | End: 2020-09-29
Payer: MEDICARE

## 2020-09-29 ENCOUNTER — ANESTHESIA EVENT (OUTPATIENT)
Dept: SURGERY | Facility: CLINIC | Age: 75
DRG: 003 | End: 2020-09-29
Payer: MEDICARE

## 2020-09-29 ENCOUNTER — HOSPITAL ENCOUNTER (OUTPATIENT)
Dept: NEUROLOGY | Facility: CLINIC | Age: 75
DRG: 003 | End: 2020-09-29
Attending: PHYSICIAN ASSISTANT
Payer: MEDICARE

## 2020-09-29 LAB
GLUCOSE BLDC GLUCOMTR-MCNC: 100 MG/DL (ref 70–99)
GLUCOSE BLDC GLUCOMTR-MCNC: 106 MG/DL (ref 70–99)
GLUCOSE BLDC GLUCOMTR-MCNC: 83 MG/DL (ref 70–99)
GLUCOSE BLDC GLUCOMTR-MCNC: 86 MG/DL (ref 70–99)
GLUCOSE BLDC GLUCOMTR-MCNC: 91 MG/DL (ref 70–99)
LEVETIRACETAM SERPL-MCNC: 24 UG/ML (ref 12–46)
VALPROATE FREE SERPL-MCNC: 29.2 UG/ML (ref 6–20)

## 2020-09-29 PROCEDURE — 25000128 H RX IP 250 OP 636: Performed by: PHYSICIAN ASSISTANT

## 2020-09-29 PROCEDURE — 27210794 ZZH OR GENERAL SUPPLY STERILE: Performed by: THORACIC SURGERY (CARDIOTHORACIC VASCULAR SURGERY)

## 2020-09-29 PROCEDURE — 99232 SBSQ HOSP IP/OBS MODERATE 35: CPT | Performed by: PSYCHIATRY & NEUROLOGY

## 2020-09-29 PROCEDURE — 43246 EGD PLACE GASTROSTOMY TUBE: CPT | Performed by: SURGERY

## 2020-09-29 PROCEDURE — 25000128 H RX IP 250 OP 636: Performed by: NURSE PRACTITIONER

## 2020-09-29 PROCEDURE — 99291 CRITICAL CARE FIRST HOUR: CPT | Performed by: INTERNAL MEDICINE

## 2020-09-29 PROCEDURE — 25000125 ZZHC RX 250: Performed by: THORACIC SURGERY (CARDIOTHORACIC VASCULAR SURGERY)

## 2020-09-29 PROCEDURE — 25000128 H RX IP 250 OP 636: Performed by: SURGERY

## 2020-09-29 PROCEDURE — 94003 VENT MGMT INPAT SUBQ DAY: CPT

## 2020-09-29 PROCEDURE — 00000146 ZZHCL STATISTIC GLUCOSE BY METER IP

## 2020-09-29 PROCEDURE — 25000125 ZZHC RX 250: Performed by: PHYSICIAN ASSISTANT

## 2020-09-29 PROCEDURE — 25000128 H RX IP 250 OP 636: Performed by: NURSE ANESTHETIST, CERTIFIED REGISTERED

## 2020-09-29 PROCEDURE — 40000275 ZZH STATISTIC RCP TIME EA 10 MIN

## 2020-09-29 PROCEDURE — 25000132 ZZH RX MED GY IP 250 OP 250 PS 637: Mod: GY | Performed by: PHYSICIAN ASSISTANT

## 2020-09-29 PROCEDURE — 0B110F4 BYPASS TRACHEA TO CUTANEOUS WITH TRACHEOSTOMY DEVICE, OPEN APPROACH: ICD-10-PCS | Performed by: THORACIC SURGERY (CARDIOTHORACIC VASCULAR SURGERY)

## 2020-09-29 PROCEDURE — 40000239 ZZH STATISTIC VAT ROUNDS

## 2020-09-29 PROCEDURE — 25000132 ZZH RX MED GY IP 250 OP 250 PS 637: Mod: GY | Performed by: INTERNAL MEDICINE

## 2020-09-29 PROCEDURE — 25800030 ZZH RX IP 258 OP 636: Performed by: NURSE PRACTITIONER

## 2020-09-29 PROCEDURE — 40000008 ZZH STATISTIC AIRWAY CARE

## 2020-09-29 PROCEDURE — 20000003 ZZH R&B ICU

## 2020-09-29 PROCEDURE — 25800030 ZZH RX IP 258 OP 636: Performed by: NURSE ANESTHETIST, CERTIFIED REGISTERED

## 2020-09-29 PROCEDURE — 25000132 ZZH RX MED GY IP 250 OP 250 PS 637: Mod: GY | Performed by: NURSE PRACTITIONER

## 2020-09-29 PROCEDURE — 27210429 ZZH NUTRITION PRODUCT INTERMEDIATE LITER

## 2020-09-29 PROCEDURE — 37000009 ZZH ANESTHESIA TECHNICAL FEE, EACH ADDTL 15 MIN: Performed by: THORACIC SURGERY (CARDIOTHORACIC VASCULAR SURGERY)

## 2020-09-29 PROCEDURE — 25000132 ZZH RX MED GY IP 250 OP 250 PS 637: Mod: GY | Performed by: HOSPITALIST

## 2020-09-29 PROCEDURE — 95714 VEEG EA 12-26 HR UNMNTR: CPT

## 2020-09-29 PROCEDURE — 0DH63UZ INSERTION OF FEEDING DEVICE INTO STOMACH, PERCUTANEOUS APPROACH: ICD-10-PCS | Performed by: SURGERY

## 2020-09-29 PROCEDURE — 25800030 ZZH RX IP 258 OP 636: Performed by: PHYSICIAN ASSISTANT

## 2020-09-29 PROCEDURE — G0500 MOD SEDAT ENDO SERVICE >5YRS: HCPCS | Performed by: SURGERY

## 2020-09-29 PROCEDURE — 25000125 ZZHC RX 250: Performed by: NURSE ANESTHETIST, CERTIFIED REGISTERED

## 2020-09-29 PROCEDURE — 37000008 ZZH ANESTHESIA TECHNICAL FEE, 1ST 30 MIN: Performed by: THORACIC SURGERY (CARDIOTHORACIC VASCULAR SURGERY)

## 2020-09-29 PROCEDURE — 99207 ZZC APP CREDIT; MD BILLING SHARED VISIT: CPT | Performed by: PHYSICIAN ASSISTANT

## 2020-09-29 PROCEDURE — 99253 IP/OBS CNSLTJ NEW/EST LOW 45: CPT | Performed by: SURGERY

## 2020-09-29 PROCEDURE — 25800025 ZZH RX 258: Performed by: ANESTHESIOLOGY

## 2020-09-29 PROCEDURE — 36000050 ZZH SURGERY LEVEL 2 1ST 30 MIN: Performed by: THORACIC SURGERY (CARDIOTHORACIC VASCULAR SURGERY)

## 2020-09-29 PROCEDURE — 25000566 ZZH SEVOFLURANE, EA 15 MIN: Performed by: THORACIC SURGERY (CARDIOTHORACIC VASCULAR SURGERY)

## 2020-09-29 PROCEDURE — 25800030 ZZH RX IP 258 OP 636: Performed by: INTERNAL MEDICINE

## 2020-09-29 RX ORDER — SODIUM CHLORIDE, SODIUM LACTATE, POTASSIUM CHLORIDE, CALCIUM CHLORIDE 600; 310; 30; 20 MG/100ML; MG/100ML; MG/100ML; MG/100ML
INJECTION, SOLUTION INTRAVENOUS CONTINUOUS PRN
Status: DISCONTINUED | OUTPATIENT
Start: 2020-09-29 | End: 2020-09-29

## 2020-09-29 RX ORDER — FENTANYL CITRATE 50 UG/ML
50 INJECTION, SOLUTION INTRAMUSCULAR; INTRAVENOUS ONCE
Status: COMPLETED | OUTPATIENT
Start: 2020-09-29 | End: 2020-09-29

## 2020-09-29 RX ORDER — METOPROLOL TARTRATE 50 MG
100 TABLET ORAL 2 TIMES DAILY
Status: DISCONTINUED | OUTPATIENT
Start: 2020-09-29 | End: 2020-10-05 | Stop reason: HOSPADM

## 2020-09-29 RX ORDER — FENTANYL CITRATE 50 UG/ML
INJECTION, SOLUTION INTRAMUSCULAR; INTRAVENOUS PRN
Status: DISCONTINUED | OUTPATIENT
Start: 2020-09-29 | End: 2020-09-29

## 2020-09-29 RX ORDER — MAGNESIUM HYDROXIDE 1200 MG/15ML
LIQUID ORAL PRN
Status: DISCONTINUED | OUTPATIENT
Start: 2020-09-29 | End: 2020-09-29 | Stop reason: HOSPADM

## 2020-09-29 RX ADMIN — ROCURONIUM BROMIDE 50 MG: 10 INJECTION INTRAVENOUS at 15:47

## 2020-09-29 RX ADMIN — CHLORHEXIDINE GLUCONATE 15 ML: 1.2 RINSE ORAL at 08:28

## 2020-09-29 RX ADMIN — AMANTADINE HYDROCHLORIDE 100 MG: 100 CAPSULE ORAL at 12:21

## 2020-09-29 RX ADMIN — ASPIRIN 81 MG 81 MG: 81 TABLET ORAL at 18:33

## 2020-09-29 RX ADMIN — AMLODIPINE BESYLATE 10 MG: 10 TABLET ORAL at 09:04

## 2020-09-29 RX ADMIN — FENTANYL CITRATE 50 MCG: 50 INJECTION, SOLUTION INTRAMUSCULAR; INTRAVENOUS at 13:40

## 2020-09-29 RX ADMIN — LEVETIRACETAM 1500 MG: 500 INJECTION, SOLUTION INTRAVENOUS at 21:39

## 2020-09-29 RX ADMIN — HYDRALAZINE HYDROCHLORIDE 10 MG: 20 INJECTION INTRAMUSCULAR; INTRAVENOUS at 16:49

## 2020-09-29 RX ADMIN — SODIUM CHLORIDE, POTASSIUM CHLORIDE, SODIUM LACTATE AND CALCIUM CHLORIDE: 600; 310; 30; 20 INJECTION, SOLUTION INTRAVENOUS at 15:36

## 2020-09-29 RX ADMIN — METOPROLOL TARTRATE 100 MG: 50 TABLET, FILM COATED ORAL at 09:18

## 2020-09-29 RX ADMIN — ATORVASTATIN CALCIUM 40 MG: 40 TABLET, FILM COATED ORAL at 20:08

## 2020-09-29 RX ADMIN — CHLORHEXIDINE GLUCONATE 15 ML: 1.2 RINSE ORAL at 20:08

## 2020-09-29 RX ADMIN — DEXTROSE MONOHYDRATE 1000 ML: 100 INJECTION, SOLUTION INTRAVENOUS at 02:13

## 2020-09-29 RX ADMIN — PANTOPRAZOLE SODIUM 40 MG: 40 TABLET, DELAYED RELEASE ORAL at 09:02

## 2020-09-29 RX ADMIN — FENTANYL CITRATE 50 MCG: 50 INJECTION, SOLUTION INTRAMUSCULAR; INTRAVENOUS at 15:50

## 2020-09-29 RX ADMIN — VALPROATE SODIUM 1000 MG: 100 INJECTION, SOLUTION INTRAVENOUS at 09:04

## 2020-09-29 RX ADMIN — SODIUM CHLORIDE: 9 INJECTION, SOLUTION INTRAVENOUS at 09:25

## 2020-09-29 RX ADMIN — LEVETIRACETAM 1500 MG: 500 INJECTION, SOLUTION INTRAVENOUS at 09:04

## 2020-09-29 RX ADMIN — METOPROLOL TARTRATE 100 MG: 50 TABLET, FILM COATED ORAL at 20:08

## 2020-09-29 RX ADMIN — AMANTADINE HYDROCHLORIDE 100 MG: 100 CAPSULE ORAL at 05:13

## 2020-09-29 RX ADMIN — VALPROATE SODIUM 1000 MG: 100 INJECTION, SOLUTION INTRAVENOUS at 22:11

## 2020-09-29 RX ADMIN — FENTANYL CITRATE 50 MCG: 50 INJECTION, SOLUTION INTRAMUSCULAR; INTRAVENOUS at 15:58

## 2020-09-29 ASSESSMENT — ACTIVITIES OF DAILY LIVING (ADL)
ADLS_ACUITY_SCORE: 22

## 2020-09-29 ASSESSMENT — MIFFLIN-ST. JEOR: SCORE: 1072.25

## 2020-09-29 NOTE — OP NOTE
General Surgery Operative Note    PREOPERATIVE DIAGNOSIS:  Malnutrition and need feeding tube placement    POSTOPERATIVE DIAGNOSIS:   Malnutrition and need feeding tube placement    PROCEDURE:  1-Esophagogastroscopy        2- Percutaneous endoscopic gastrostomy tube placement    SURGEON:  Brandyn Casas M.D.    ASSISTANT:  Fredis Parker PA-C    ANESTHESIA:  50 mg fetanyl    BLOOD LOSS: None    OPERATIVE INDICATIONS: Mrs. Leonard has been in critical condition in the intensive care unit and needs long-term feeding tube placement due to malnutrition.  The risks of the procedure including, but not limited to, bleeding, infection, injury to the bowel or spleen, aspiration, MI, PE,and need for additional procedures if anycomplications occurred.The power of  understood and signed consent.    PROCEDURE  Consent was obtained. A surgical time out was performed.  The standard EGD scope was used to evaluate the esophagus and stomach.  All areas were meticulously evaluated and did not show any significant abnormalities.The scope was then brought into the stomach. I palpated the anterior abdominal wall and easily saw an indentation. The area was then transilluminated and the light was easily seen. Once the area was selected it was prepped and draped in the usual fashion. The area was injected with 1% lidocaine with epinephrine. A small incision was then made with a scalpel. Using the 14-gauge catheter it was slowly brought through the anterior abdominal wall while aspirating. No air bubbles were seen until the needle was visualized into the stomach. The guidewire was then inserted through the catheter. It was grasped with a snare and brought out through the mouth under direct visualization. The gastrostomy tube was then placed around the wire and was slowly brought through the mouth and anterior abdominal wall under direct visualization. This was done without difficulty. The gastrostomy was then spun around with no  tension. There was no bleeding whatsoever. It was anchored at 4 CM.  The stomach was then evaluated meticulously which showed no injuries or bleeding from gastrostomy tube placement. The scope was slowly brought out evaluating the esophagus which also showed no injuries. The gastrostomy was then anchored in the usual fashion. There was no external bleeding. All sponge, instrument, and needle counts were correct at the conclusion of the case. The patient tolerated the procedure well. The Physician Assistant was medically necessary for their expertise in retraction/exposure, prepping the abdominal wall, and actual placement of the gastrostomy tube.      Brandyn Casas M.D.  [facility]  [phone]    Please route or send letter to:  Primary Care Provider (PCP) and Referring Provider

## 2020-09-29 NOTE — ANESTHESIA PREPROCEDURE EVALUATION
Anesthesia Pre-Procedure Evaluation    Patient: Sherrell Leonard   MRN: [id number] : 1945          Preoperative Diagnosis: Respiratory failure (H) [J96.90]    Procedure(s):  TRACHEOSTOMY  PEG TUBE PLACEMENT    History reviewed. No pertinent past medical history.  Past Surgical History:   Procedure Laterality Date     IR CAROTID CEREBRAL ANGIOGRAM LEFT  2020       Anesthesia Evaluation     .             ROS/MED HX    ENT/Pulmonary: Comment: Current vent settings 12/500/5 FiO2 30%    (+), . Other pulmonary disease Prolonged resp failure with Vent support.    Neurologic:     (+)CVA date: 20 with deficits- not following commands,     Cardiovascular: Comment: Cerebral vascular stenosis -> ischemia L MCA and L PCA regions - neg cardiovascular ROS   (+) -Peripheral Vascular Disease-- Other, --. : . . . :. . Previous cardiac testing Echodate:20results:Interpretation Summary     The visual ejection fraction is estimated at 60-65%.  The left ventricle is normal in structure, function and size.  No regional wall motion abnormalities noted.  The right ventricle is normal in structure, function and size.  There is no pericardial effusion.  A contrast injection (Bubble Study) was performed that was negative for flow  across the interatrial septum.date: results: date: results: date: results:         (-) CAD   METS/Exercise Tolerance:     Hematologic:         Musculoskeletal:         GI/Hepatic:  - neg GI/hepatic ROS       Renal/Genitourinary:  - ROS Renal section negative       Endo:  - neg endo ROS       Psychiatric:  - neg psychiatric ROS       Infectious Disease:  - neg infectious disease ROS       Malignancy:         Other:                          Physical Exam  Normal systems: cardiovascular and dental    Airway   Comment: ETT in place    Dental     Cardiovascular       Pulmonary (+) decreased breath sounds               Lab Results   Component Value Date    WBC 10.3 2020    HGB 10.8 (L) 2020     "HCT 35.3 09/26/2020     09/28/2020    CRP <2.9 09/07/2020    SED 6 09/07/2020     (H) 09/28/2020    POTASSIUM 4.3 09/28/2020    CHLORIDE 110 (H) 09/28/2020    CO2 33 (H) 09/28/2020    BUN 20 09/28/2020    CR 0.47 (L) 09/28/2020     (H) 09/28/2020    MARII 8.7 09/28/2020    PHOS 3.1 09/28/2020    MAG 2.1 09/28/2020    ALBUMIN 2.0 (L) 09/26/2020    PROTTOTAL 5.5 (L) 09/26/2020    ALT 29 09/26/2020    AST 21 09/26/2020    ALKPHOS 66 09/26/2020    BILITOTAL 0.2 09/26/2020    NELI 24 09/24/2020    PTT 27 09/07/2020    INR 1.05 09/07/2020    TSH 2.04 09/05/2020       Preop Vitals  BP Readings from Last 3 Encounters:   09/29/20 135/77    Pulse Readings from Last 3 Encounters:   09/29/20 (!) 49      Resp Readings from Last 3 Encounters:   09/29/20 11    SpO2 Readings from Last 3 Encounters:   09/29/20 93%      Temp Readings from Last 1 Encounters:   09/29/20 36.8  C (98.3  F) (Axillary)    Ht Readings from Last 1 Encounters:   09/18/20 1.575 m (5' 2\")      Wt Readings from Last 1 Encounters:   09/29/20 62.4 kg (137 lb 9.1 oz)    Estimated body mass index is 25.16 kg/m  as calculated from the following:    Height as of this encounter: 1.575 m (5' 2\").    Weight as of this encounter: 62.4 kg (137 lb 9.1 oz).       Anesthesia Plan      History & Physical Review  History and physical reviewed and following examination; no interval change.    ASA Status:  4 .    NPO Status:  > 8 hours    Plan for General (ETT in situ) with Intravenous induction. Maintenance will be Balanced.             Postoperative Care  Postoperative pain management:  IV analgesics.      Consents  Anesthetic plan, risks, benefits and alternatives discussed with:  Healthcare Power of ..                 Tien Davila, DO, DO  "

## 2020-09-29 NOTE — PROGRESS NOTES
[facility] ICU RESPIRATORY NOTE        Date of Admission: 9/5/2020    Date of Intubation (most recent):9/11/2020, trach- 9/29/20    Reason for Mechanical Ventilation:Airway protection    Number of Days on Mechanical Ventilation:19    Met Criteria for Spontaneous Breathing Trial:No    Reason for No Spontaneous Breathing Trial:Per MD    Significant Events Today:Patient went to OR for tracheostomy.    ABG Results:   Recent Labs   Lab 09/27/20  0427   O2PER 40       Current Vent Settings: Ventilation Mode: CMV/AC  (Continuous Mandatory Ventilation/ Assist Control)  FiO2 (%): 30 %  Rate Set (breaths/minute): 12 breaths/min  Tidal Volume Set (mL): 500 mL  PEEP (cm H2O): 5 cmH2O  Pressure Support (cm H2O): 10 cmH2O  Oxygen Concentration (%): 30 %  Resp: 8      Skin Assessment:Trach site intact.    Plan:Will cont full vent support overnight and will assess for a daily PS trial in the morning.    Kady Bender, RT

## 2020-09-29 NOTE — ANESTHESIA POSTPROCEDURE EVALUATION
Patient: Sherrell Leonard    Procedure(s):  TRACHEOSTOMY  PEG TUBE PLACEMENT    Diagnosis:Respiratory failure (H) [J96.90]  Diagnosis Additional Information: No value filed.    Anesthesia Type:  General    Note:  Anesthesia Post Evaluation    Patient location during evaluation: ICU  Patient participation: Unable to participate in evaluation secondary to underlying medical condition  Level of consciousness: obtunded/minimal responses  Pain management: adequate  Airway patency: patent  Cardiovascular status: acceptable  Respiratory status: acceptable  Hydration status: acceptable  PONV: none     Anesthetic complications: None    Comments: No anesthetic complications noted.         Last vitals:  Vitals:    09/29/20 1530 09/29/20 1630 09/29/20 1645   BP: (!) 169/84 (!) 191/87 (!) 198/86   Pulse: 53  53   Resp: 11 12 8   Temp:      SpO2: 93%  95%         Electronically Signed By: Tien Davila DO, DO  September 29, 2020  5:20 PM

## 2020-09-29 NOTE — PROGRESS NOTES
Critical Care Progress Note      09/29/2020    Name: Sherrell Leonard MRN#: [id number]   Age: 74 year old YOB: 1945     [facility] Day# 24                 Problem List:   Strokes  Seizures  Encephalopathy  Ventilator support  Hypertension          Summary/Hospital Course:   74-year-old female admitted to the ICU for acute ventilatory failure and airway protection after left MCA subcortical stroke and left PCA stroke with multiple stenotic vessels.  Patient remains intubated.  Patient remains off sedation however her neurologic exam is still comatose.  Patient also continues with low-grade fevers at this time.  She does not have a leukocytosis.    9/21- Still not responding in spite of all sedation being discontinued for 96 hours    9/22- Still not responsive on my exam.By report from neurology, perhaps some improvement in EEG.    9/23- Opened eyes, but no following commands. Increasing WBC, low grade temp and Staph aureus in sputum. Ceftriaxone started    9/25- No changes on exam but EEG without seizure   9/27 eyes open but not following commands     9/28: Won't open eyes, minimal response to painful stimuli. New EEG ordered. Family meeting held for one hour with decision to pursue tracheostomy and PEG.    9/29: Still with hypertension issues. Trach/PEG later today    Assessment and plan :     Sherrell Leonard Is a 74 year old female admitted on 9/5/2020 for management of acute respiratory failure, CVA and seizure.       Neurology/Psychiatry:   1.  Seizure are under control with keppra and valproate but neuro exam is not better. Doesn't open eyes today  Plan  Continue off continuous sedatives but continue AE medications.   EEG    CV:  Hypertension persists, NSR   P: continue amlodipine 10 and increased metoprolol to 100  BID.     Pulmonary/Ventilator Management:   1. Airway intubated for airway protection  Day 17  ventilator   Can do PS trials easily but not awake enough for extubation.    3. Increased sputum  production with fever. Staph aureus in sputum. Completed ceftriaxone course    Plan    Continue full ventilator support   Trach/PEG later today.        GI and Nutrition :   -Continue tube feeds as tolerated.      Renal/Fluids/Electrolytes:   1.  Creatinine within normal limits  Improving hypernatremia    P: continue tube feeds  free water         Infectious Disease:   1.  Staph aureus tracheitis vs. pneumonia    Plan  -Ceftriaxone complete    Endocrine:   Glucoses ok.    Plan  - ICU insulin protocol, goal sugar <180      Hematology/Oncology:   1. Normal WBC now   2. Anemia, no signs, symptoms of active blood loss  3.  Plan  -Continue to monitor        IV/Access:   1. Venous access -PICC line  3.  Plan  - central access required and necessary      ICU Prophylaxis:   1. DVT: Mechanical  2. VAP: HOB 30 degrees, chlorhexidine rinse  3. Stress Ulcer: PPI   6. Feeding - continue tube feeds    Key goals for next 24 hours:   Trach/PEG today           Interim History:     Patient still with altered mental status.          Key Medications:       amantadine  100 mg Oral 2 times per day     amLODIPine  10 mg Oral or Feeding Tube Daily     aspirin  81 mg Per Feeding Tube Daily     atorvastatin  40 mg Oral or NG Tube Daily at 8 pm     chlorhexidine  15 mL Mouth/Throat Q12H     clopidogrel  75 mg Oral or Feeding Tube Daily     heparin ANTICOAGULANT  5,000 Units Subcutaneous Q12H     levETIRAcetam  1,500 mg Intravenous Q12H     metoprolol tartrate  100 mg Per Feeding Tube BID     pantoprazole  40 mg Per Feeding Tube Daily     sodium chloride (PF)  10 mL Intracatheter Q8H     valproate (DEPACON) intermittent infusion  1,000 mg Intravenous Q12H       dextrose 1,000 mL (09/29/20 0213)     - MEDICATION INSTRUCTIONS -       sodium chloride 10 mL/hr at 09/29/20 0925               Physical Examination:   Temp:  [98.2  F (36.8  C)-98.8  F (37.1  C)] 98.8  F (37.1  C)  Pulse:  [51-67] 51  Resp:  [9-61] 12  BP: (129-191)/(68-98) 179/81  FiO2  (%):  [30 %] 30 %  SpO2:  [93 %-99 %] 95 %        1700  urinary output   Wt Readings from Last 4 Encounters:   09/29/20 62.4 kg (137 lb 9.1 oz)     BP - Mean:  [] 120  Ventilation Mode: CMV/AC  (Continuous Mandatory Ventilation/ Assist Control)  FiO2 (%): 30 %  Rate Set (breaths/minute): 12 breaths/min  Tidal Volume Set (mL): 500 mL  PEEP (cm H2O): 5 cmH2O  Pressure Support (cm H2O): 10 cmH2O  Oxygen Concentration (%): 30 %  Resp: 12    GEN: no acute distress;  No real meaningful movements  HEENT:   ETT ok   Respiratory:  Non labored, does ok on PS 10. Clear     CV/COR: RRR S1S2 no gallop,  No rub, no murmur  ABD: soft nontender,   EXT:  Edema   warm  SKIN: no rash  LINES: clean, dry intact         Data:   All data and imaging reviewed     ROUTINE ICU LABS (Last four results)  CMP  Recent Labs   Lab 09/28/20  0950 09/28/20  0422 09/27/20  0427 09/26/20  2150 09/26/20  0658  09/24/20  0655  09/23/20  0415   NA  --  145* 147*  --  149*  --   --   --  148*   POTASSIUM 4.3 3.2* 3.4 4.0 3.3*   < > 3.4   < > 3.3*   CHLORIDE  --  110* 112*  --  113*  --   --   --  112*   CO2  --  33* 32  --  33*  --   --   --  34*   ANIONGAP  --  2* 3  --  3  --   --   --  2*   GLC  --  118* 101*  --  119*  --   --   --  130*   BUN  --  20 21  --  21  --   --   --  24   CR  --  0.47* 0.54  --  0.50*  --   --   --  0.52   GFRESTIMATED  --  >90 >90  --  >90  --   --   --  >90   GFRESTBLACK  --  >90 >90  --  >90  --   --   --  >90   MARII  --  8.7 8.6  --  8.9  --   --   --  8.8   MAG  --  2.1  --   --   --   --   --   --   --    PHOS  --  3.1  --   --   --   --   --   --   --    PROTTOTAL  --   --   --   --  5.5*  --  5.4*  --   --    ALBUMIN  --   --   --   --  2.0*  --  1.8*  --   --    BILITOTAL  --   --   --   --  0.2  --  0.2  --   --    ALKPHOS  --   --   --   --  66  --  62  --   --    AST  --   --   --   --  21  --  29  --   --    ALT  --   --   --   --  29  --  35  --   --     < > = values in this interval not displayed.      CBC  Recent Labs   Lab 09/28/20  0422 09/26/20  0658 09/25/20  0445 09/23/20  0415   WBC  --  10.3  --  12.6*   RBC  --  3.47*  --  3.28*   HGB  --  10.8*  --  10.3*   HCT  --  35.3  --  32.9*   MCV  --  102*  --  100   MCH  --  31.1  --  31.4   MCHC  --  30.6*  --  31.3*   RDW  --  13.8  --  13.7    [phone]     INRNo lab results found in last 7 days.  Arterial Blood Gas  Recent Labs   Lab 09/27/20  0427   O2PER 40         Billing: This patient is critically ill: yes. Encephalopathy coma, respiratory failure, Total critical care time today 35 min.    Carlos Petty MD  [facility] Intensivist Service

## 2020-09-29 NOTE — ANESTHESIA CARE TRANSFER NOTE
Patient: Sherrell Leonard    Procedure(s):  TRACHEOSTOMY  PEG TUBE PLACEMENT    Diagnosis: Respiratory failure (H) [J96.90]  Diagnosis Additional Information: No value filed.    Anesthesia Type:   General     Note:    Patient transferred to:ICU  Comments: Transported to ICU with o2 via ambu bag, placed on vent, settings same as prior, BBS auscultated, VSSICU Handoff: Call for PAUSE to initiate/utilize ICU HANDOFF, Identified Patient, Identified Responsible Provider, Reviewed the Pertinent Medical History, Discussed Surgical Course, Reviewed Intra-OP Anesthesia Management and Issues during Anesthesia, Set Expectations for Post Procedure Period and Allowed Opportunity for Questions and Acknowledgement of Understanding      Vitals: (Last set prior to Anesthesia Care Transfer)    CRNA VITALS  9/29/2020 1541 - 9/29/2020 1620      9/29/2020             Resp Rate (observed):  10    Resp Rate (set):  10                Electronically Signed By: LILLY Mena CRNA  September 29, 2020  4:20 PM

## 2020-09-29 NOTE — CONSULTS
Surgical consultation    I was asked to see Sherrell by Dr. Galicia in regards to possible PEG tube placement.  The patient unfortunately suffered a subcortical and left PCA stroke with multiple stenotic vessels.  She has been in the ICU respiratory failure.  She remains off sedation but her neurological exam is still comatose.  She is planning to have a tracheostomy and possible PEG tube.  She remains on aspirin and Plavix due to her strokes.    Abdomen-soft.  No obvious surgical scars.    A/P  Patient is a suitable candidate for PEG tube placement.  I would recommend performing this with conjunction with the tracheostomy or at bedside pending scheduling.  I discussed the procedure and recovery at length with her  who is the power of .  I described the risks which could include bleeding, injury to the bowel, malposition of tube, and need for additional procedures if any of these occur.  He agreed and will sign consent.    Brandyn Casas M.D.  [facility]  [phone]

## 2020-09-29 NOTE — PROGRESS NOTES
[facility] ICU RESPIRATORY NOTE        Date of Admission: 9/5/2020    Date of Intubation (most recent): 9/11/2020    Reason for Mechanical Ventilation:AW protection    Number of Days on Mechanical Ventilation: 19    Met Criteria for Spontaneous Breathing Trial: No    Reason for No Spontaneous Breathing Trial: per MD    Significant Events Today: None  ABG Results:   Recent Labs   Lab 09/27/20  0427   O2PER 40       Current Vent Settings: Ventilation Mode: CMV/AC  (Continuous Mandatory Ventilation/ Assist Control)  FiO2 (%): 30 %  Rate Set (breaths/minute): 12 breaths/min  Tidal Volume Set (mL): 500 mL  PEEP (cm H2O): 5 cmH2O  Pressure Support (cm H2O): 10 cmH2O  Oxygen Concentration (%): 30 %  Resp: 14        Plan: Will continue on full vent. Support.    Maurice Alvarez, RT

## 2020-09-29 NOTE — CONSULTS
BRIEF NUTRITION NOTE:    Received Nutrition Consult to restart TF after PEG placement today.  A full Nutrition Reassessment was completed 9/28.  See note for details.    NEW FINDINGS:  Pt having trach and PEG placements today.  PA-C with surgery has indicated TF should be restarted tomorrow am.    INTERVENTIONS:  Enteral Nutrition - Entered order in Epic as follows --> On 9/30 at 0800, will restart TF Isosource 1.5 at 20 mL/hr;  After 8 hrs increase to goal 40 mL/hr = 1440 kcal (27 kcal/kg), 65 g protein (1.2 g/kg), 169 g CHO, 14 g fiber, 730 mL H2O.    Jocy Edgar, FELICITY, LD, CNSC

## 2020-09-29 NOTE — PLAN OF CARE
Neuro: PERRLA. Does not follow commands. Flexion to pain in all extremities. Off sedation. Continuous EEG.    Pain: Fentanyl given during bedside PEG tube placement, otherwise no evidence of pain.    CV: SB/SR with BBB, rare PACs and PVCs. BP within goal range, hydralazine given x1.    Pulm: Trach done today, site with dried drainage, intact. Mechanically assisted.     GI/: PEG tube placement at bedside, site noted to be bleeding. Pressure bandage applied per MD. PEG clamped, restart TF tomorrow morning. Purewick in place, good urine output.    Skin: Excoriation, bruises and blisters.    Lines/Drips: Right PICC line. D10 @ 40 mL/hr    Additional: Plavix and subQ heparin held for trach and PEG placement today, plan to resume tomorrow.

## 2020-09-29 NOTE — OP NOTE
DATE OF PROCEDURE: September 29, 2020      SURGEON:  Anson Bui MD   FIRST ASSIST: Beba Salazar PA-C      PREOPERATIVE DIAGNOSIS:  Respiratory failure.       POSTOPERATIVE DIAGNOSIS:  Respiratory failure.       PROCEDURE:  Tracheostomy with Shiley #6 cuffed nonfenestrated tube.       ANESTHESIA:  General.       INDICATIONS:  Patient has respiratory failure and will require prolonged mechanical ventilation.       DESCRIPTION OF PROCEDURE:  The patient was brought to the operating room on the ICU bed.  Under general anesthesia, the patient's neck was extended.  Neck and upper chest were prepared and draped in the usual fashion using ChloraPrep.  A transverse incision was made approximately 2 cm above the sternal notch.  Dissection was carried down to the midline of the strap muscle.  The inferior aspect of the isthmus of the thyroid was divided.  Hemostasis was excellent.  The second ring of the trachea was clearly identified, and some sutures of 2-0 Prolene were placed around the second ring laterally on each side.  A longitudinal tracheotomy was made and dilated.  The endotracheal tube was pulled just above the tracheotomy, and a Shiley #6 cuffed nonfenestrated tube was advanced without any difficulty.  The cuff was inflated.  Ventilation was excellent.  Hemostasis was again verified and was excellent.  Incision was closed with interrupted 3-0 nylon suture on the skin along the tracheostomy.  A dressing was applied, and the tracheostomy was secured around the patient's neck.       The patient was returned to ICU in satisfactory condition.           ANSON BUI MD

## 2020-09-29 NOTE — PLAN OF CARE
Pt NPO since midnight.  D10 started at 40 mL/hr, BG now 90s.  Pt withdraws to pain and opens eyes with vigorous stimulation. Good urine output. Will continue to monitor.

## 2020-09-29 NOTE — BRIEF OP NOTE
[facility]    Brief Operative Note    Pre-operative diagnosis: Cerebrovascular accident (CVA), unspecified mechanism (H) [I63.9]  Post-operative diagnosis Same, Failure to thrive    Procedure: Procedure(s):  ESOPHAGOGASTRODUODENOSCOPY, WITH PEG TUBE INSERTION     Surgeon: Surgeon(s) and Role:     * Brandyn Casas MD - Primary   Fredis Parker PA-C - Assisting  Anesthesia: Conscious Sedation   Estimated blood loss: Less than 10 ml  Drains: None  Specimens: * No specimens in log *  Findings:   None.  Inserted in a skin fold.  Pulled tight tube at 5cm; sewed closer to 5.6/6cm at skin.  Collar stitch and anchor stitches to hub were placed.  Complications: None.  Implants: * No implants in log *

## 2020-09-29 NOTE — PROGRESS NOTES
"  [facility]    Stroke Progress Note    Interval Events  No acute events overnight, Trach and PEG placed today.     Impression & plan  Scattered intracranial atherosclerosis with severe focal left M1 stenosis, now s/p balloon angioplasty 9/11   Left MCA syndrome  New R MCA infarcts  - SBP goal 140-180 mmHg  - DAPT with ASA 81 mg daily + Plavix 75 mg daily --> OK to hold Plavix for 24 hours post procedure  - Continue Lipitor 40 mg daily  - Consideration for possible stent at site of L M1 angioplasty given stenosis on MRA. Will focus on seizure management at this time, but potential intervention pending improvement from seizure perspective     Non-convulsive status epilepticus (NCSE), resolved  Ongoing severe encephalopathy  - vEEG ordered, will follow   - Continue Keppra 1500 mg q12 hours  - Continue VPA 1000mg q12 hours  - Recent Depakote level: 60   - Keppra level: 24  - Ammonia level ordered, results pending      We will continue to follow     Octavia Cowan PA-C   Neurology  To page me or covering stroke neurology team member, click here: AMCOM   Choose \"On Call\" tab at top, then search dropdown box for \"Neurology Adult\", select location, press Enter, then look for stroke/neuro ICU/telestroke.  ______________________________________________________    Medications   Home Meds  Prior to Admission medications    Medication Sig Start Date End Date Taking? Authorizing Provider   Multiple Vitamins-Minerals (SYSTANE ICAPS AREDS2) CAPS Take 1 capsule by mouth daily   Yes Unknown, Entered By History   multivitamin, therapeutic (THERA-VIT) TABS tablet Take 1 tablet by mouth daily   Yes Unknown, Entered By History       Scheduled Meds    amantadine  100 mg Oral 2 times per day     amLODIPine  10 mg Oral or Feeding Tube Daily     aspirin  81 mg Per Feeding Tube Daily     atorvastatin  40 mg Oral or NG Tube Daily at 8 pm     chlorhexidine  15 mL Mouth/Throat Q12H     clopidogrel  75 mg Oral or Feeding Tube " Daily     heparin ANTICOAGULANT  5,000 Units Subcutaneous Q12H     levETIRAcetam  1,500 mg Intravenous Q12H     metoprolol tartrate  100 mg Per Feeding Tube BID     pantoprazole  40 mg Per Feeding Tube Daily     sodium chloride (PF)  10 mL Intracatheter Q8H     valproate (DEPACON) intermittent infusion  1,000 mg Intravenous Q12H       Infusion Meds    dextrose 40 mL/hr at 09/29/20 1645     - MEDICATION INSTRUCTIONS -       sodium chloride 10 mL/hr at 09/29/20 1645       PRN Meds  acetaminophen **OR** acetaminophen, bisacodyl, artificial tears ophthalmic solution, dextrose, glucose **OR** dextrose **OR** glucagon, fentaNYL, hydrALAZINE, labetalol, lidocaine 4%, lidocaine (buffered or not buffered), - MEDICATION INSTRUCTIONS -, melatonin, miconazole, naloxone, ondansetron **OR** ondansetron, polyethylene glycol, potassium chloride, potassium chloride with lidocaine, potassium chloride, potassium chloride, potassium chloride, prochlorperazine **OR** prochlorperazine **OR** prochlorperazine, senna-docusate **OR** senna-docusate, sodium chloride (PF)       PHYSICAL EXAMINATION  Temp:  [98.3  F (36.8  C)-98.8  F (37.1  C)] 98.3  F (36.8  C)  Pulse:  [49-64] 53  Resp:  [8-61] 8  BP: (129-198)/(68-98) 198/86  FiO2 (%):  [30 %] 30 %  SpO2:  [92 %-97 %] 95 %     Neurologic  Mental Status: Intubated, does not open eyes to command or noxious stimuli, did open eyes randomly during exam but not tracking, does not following commands  Cranial Nerves: PERRL, does not protrude tongue, spontaneously yawning   Motor:  normal muscle tone and bulk, withdraws to noxious stimuli in RLE, does not withdraw in any other extremity   Reflexes: deferred   Sensory: see motor exam   Coordination:  unable to assess  Station/Gait:  deferred       Imaging  I personally reviewed all imaging; relevant findings per HPI.     Lab Results Data   CBC  Recent Labs   Lab 09/28/20  0422 09/26/20  0658 09/25/20  0445 09/23/20  0415   WBC  --  10.3  --  12.6*    RBC  --  3.47*  --  3.28*   HGB  --  10.8*  --  10.3*   HCT  --  35.3  --  32.9*    281 259 283     Basic Metabolic Panel    Recent Labs   Lab 09/28/20  0950 09/28/20  0422 09/27/20  0427  09/26/20  0658   NA  --  145* 147*  --  149*   POTASSIUM 4.3 3.2* 3.4   < > 3.3*   CHLORIDE  --  110* 112*  --  113*   CO2  --  33* 32  --  33*   BUN  --  20 21  --  21   CR  --  0.47* 0.54  --  0.50*   GLC  --  118* 101*  --  119*   MARII  --  8.7 8.6  --  8.9    < > = values in this interval not displayed.     Liver Panel  Recent Labs   Lab 09/26/20  0658 09/24/20  0655   PROTTOTAL 5.5* 5.4*   ALBUMIN 2.0* 1.8*   BILITOTAL 0.2 0.2   ALKPHOS 66 62   AST 21 29   ALT 29 35     INR    Recent Labs   Lab Test 09/07/20  0758   INR 1.05      Lipid Profile    Recent Labs   Lab Test 09/05/20  1256   CHOL 186   HDL 87   LDL 81   TRIG 89     A1C    Recent Labs   Lab Test 09/05/20  1256   A1C 5.5     Troponin I  No results for input(s): TROPI in the last 168 hours.

## 2020-09-29 NOTE — OR NURSING
ENDO  Patient seen in OR for EGD for Peg placement.  Consent obtained by Dr Casas.  Sedation and monitoring by ICU nurse.  No specimens .

## 2020-09-30 ENCOUNTER — HOSPITAL ENCOUNTER (OUTPATIENT)
Dept: NEUROLOGY | Facility: CLINIC | Age: 75
DRG: 003 | End: 2020-09-30
Attending: PHYSICIAN ASSISTANT
Payer: MEDICARE

## 2020-09-30 ENCOUNTER — HISTORY (OUTPATIENT)
Dept: SURGERY | Facility: CLINIC | Age: 75
End: 2020-09-30
Payer: MEDICARE

## 2020-09-30 LAB
AMMONIA PLAS-SCNC: 38 UMOL/L (ref 10–50)
GLUCOSE BLDC GLUCOMTR-MCNC: 102 MG/DL (ref 70–99)
GLUCOSE BLDC GLUCOMTR-MCNC: 103 MG/DL (ref 70–99)
GLUCOSE BLDC GLUCOMTR-MCNC: 108 MG/DL (ref 70–99)
GLUCOSE BLDC GLUCOMTR-MCNC: 116 MG/DL (ref 70–99)
GLUCOSE BLDC GLUCOMTR-MCNC: 99 MG/DL (ref 70–99)

## 2020-09-30 PROCEDURE — G0463 HOSPITAL OUTPT CLINIC VISIT: HCPCS

## 2020-09-30 PROCEDURE — 25000125 ZZHC RX 250: Performed by: PHYSICIAN ASSISTANT

## 2020-09-30 PROCEDURE — 82140 ASSAY OF AMMONIA: CPT | Performed by: PHYSICIAN ASSISTANT

## 2020-09-30 PROCEDURE — 25000132 ZZH RX MED GY IP 250 OP 250 PS 637: Mod: GY | Performed by: PHYSICIAN ASSISTANT

## 2020-09-30 PROCEDURE — 99291 CRITICAL CARE FIRST HOUR: CPT | Performed by: INTERNAL MEDICINE

## 2020-09-30 PROCEDURE — 95813 EEG EXTND MNTR 61-119 MIN: CPT

## 2020-09-30 PROCEDURE — 25000128 H RX IP 250 OP 636: Performed by: PHYSICIAN ASSISTANT

## 2020-09-30 PROCEDURE — 99207 ZZC APP CREDIT; MD BILLING SHARED VISIT: CPT | Performed by: PHYSICIAN ASSISTANT

## 2020-09-30 PROCEDURE — 00000146 ZZHCL STATISTIC GLUCOSE BY METER IP

## 2020-09-30 PROCEDURE — 25800030 ZZH RX IP 258 OP 636: Performed by: PHYSICIAN ASSISTANT

## 2020-09-30 PROCEDURE — 40000008 ZZH STATISTIC AIRWAY CARE

## 2020-09-30 PROCEDURE — 20000003 ZZH R&B ICU

## 2020-09-30 PROCEDURE — 99232 SBSQ HOSP IP/OBS MODERATE 35: CPT | Performed by: PSYCHIATRY & NEUROLOGY

## 2020-09-30 PROCEDURE — 25800025 ZZH RX 258: Performed by: PHYSICIAN ASSISTANT

## 2020-09-30 PROCEDURE — 94003 VENT MGMT INPAT SUBQ DAY: CPT

## 2020-09-30 PROCEDURE — 40000239 ZZH STATISTIC VAT ROUNDS

## 2020-09-30 PROCEDURE — 40000275 ZZH STATISTIC RCP TIME EA 10 MIN

## 2020-09-30 RX ORDER — LEVETIRACETAM 100 MG/ML
1500 SOLUTION ORAL EVERY 12 HOURS
Status: COMPLETED | OUTPATIENT
Start: 2020-09-30 | End: 2020-10-01

## 2020-09-30 RX ADMIN — CLOPIDOGREL BISULFATE 75 MG: 75 TABLET, FILM COATED ORAL at 08:18

## 2020-09-30 RX ADMIN — AMANTADINE HYDROCHLORIDE 100 MG: 100 CAPSULE ORAL at 06:16

## 2020-09-30 RX ADMIN — LEVETIRACETAM 1500 MG: 100 SOLUTION ORAL at 21:05

## 2020-09-30 RX ADMIN — HEPARIN SODIUM 5000 UNITS: 5000 INJECTION, SOLUTION INTRAVENOUS; SUBCUTANEOUS at 08:18

## 2020-09-30 RX ADMIN — VALPROATE SODIUM 1000 MG: 100 INJECTION, SOLUTION INTRAVENOUS at 08:18

## 2020-09-30 RX ADMIN — ASPIRIN 81 MG 81 MG: 81 TABLET ORAL at 08:19

## 2020-09-30 RX ADMIN — METOPROLOL TARTRATE 100 MG: 50 TABLET, FILM COATED ORAL at 08:18

## 2020-09-30 RX ADMIN — CHLORHEXIDINE GLUCONATE 15 ML: 1.2 RINSE ORAL at 21:06

## 2020-09-30 RX ADMIN — AMANTADINE HYDROCHLORIDE 100 MG: 100 CAPSULE ORAL at 11:56

## 2020-09-30 RX ADMIN — HYDRALAZINE HYDROCHLORIDE 10 MG: 20 INJECTION INTRAMUSCULAR; INTRAVENOUS at 07:06

## 2020-09-30 RX ADMIN — HEPARIN SODIUM 5000 UNITS: 5000 INJECTION, SOLUTION INTRAVENOUS; SUBCUTANEOUS at 21:05

## 2020-09-30 RX ADMIN — VALPROATE SODIUM 750 MG: 100 INJECTION, SOLUTION INTRAVENOUS at 21:05

## 2020-09-30 RX ADMIN — LEVETIRACETAM 1500 MG: 500 INJECTION, SOLUTION INTRAVENOUS at 08:16

## 2020-09-30 RX ADMIN — CHLORHEXIDINE GLUCONATE 15 ML: 1.2 RINSE ORAL at 08:12

## 2020-09-30 RX ADMIN — ATORVASTATIN CALCIUM 40 MG: 40 TABLET, FILM COATED ORAL at 21:05

## 2020-09-30 RX ADMIN — PANTOPRAZOLE SODIUM 40 MG: 40 TABLET, DELAYED RELEASE ORAL at 08:19

## 2020-09-30 RX ADMIN — DEXTROSE MONOHYDRATE 1000 ML: 100 INJECTION, SOLUTION INTRAVENOUS at 06:16

## 2020-09-30 RX ADMIN — AMLODIPINE BESYLATE 10 MG: 10 TABLET ORAL at 08:18

## 2020-09-30 ASSESSMENT — ACTIVITIES OF DAILY LIVING (ADL)
ADLS_ACUITY_SCORE: 26
ADLS_ACUITY_SCORE: 26
ADLS_ACUITY_SCORE: 22
ADLS_ACUITY_SCORE: 26

## 2020-09-30 ASSESSMENT — MIFFLIN-ST. JEOR: SCORE: 1081.25

## 2020-09-30 NOTE — PROGRESS NOTES
Critical Care Progress Note      09/30/2020    Name: Sherrell Leonard MRN#: [id number]   Age: 74 year old YOB: 1945     [facility] Day# 25                 Problem List:   Strokes  Seizures  Encephalopathy  Ventilator support  Hypertension          Summary/Hospital Course:   74-year-old female admitted to the ICU for acute ventilatory failure and airway protection after left MCA subcortical stroke and left PCA stroke with multiple stenotic vessels.  Patient remains intubated.  Patient remains off sedation however her neurologic exam is still comatose.  Patient also continues with low-grade fevers at this time.  She does not have a leukocytosis.    9/21- Still not responding in spite of all sedation being discontinued for 96 hours    9/22- Still not responsive on my exam.By report from neurology, perhaps some improvement in EEG.    9/23- Opened eyes, but no following commands. Increasing WBC, low grade temp and Staph aureus in sputum. Ceftriaxone started    9/25- No changes on exam but EEG without seizure   9/27 eyes open but not following commands     9/28: Won't open eyes, minimal response to painful stimuli. New EEG ordered. Family meeting held for one hour with decision to pursue tracheostomy and PEG.    9/29: Still with hypertension issues. Trach/PEG later today    9/30: No overt improvement, but EEG improved. Trach and PEG in place    Assessment and plan :     Sherrell Leonard Is a 74 year old female admitted on 9/5/2020 for management of acute respiratory failure, CVA and seizure.       Neurology/Psychiatry:   1.  Seizure are under control with keppra and valproate but neuro exam is not better. Doesn't open eyes today  Plan  Continue off continuous sedatives but continue AE medications.   EEG    CV:  Hypertension persists, NSR   P: continue amlodipine 10 and increased metoprolol to 100  BID on 9/29.     Pulmonary/Ventilator Management:   1. Airway intubated for airway protection  Day 17  ventilator   Can do PS  trials easily but not awake enough for extubation.    3. Increased sputum production with fever. Staph aureus in sputum. Completed ceftriaxone course    Plan    Continue full ventilator support   Trach/PEG complete.        GI and Nutrition :   -Continue tube feeds as tolerated.      Renal/Fluids/Electrolytes:   1.  Creatinine within normal limits  Improving hypernatremia    P: continue tube feeds  free water         Infectious Disease:   1.  Staph aureus tracheitis vs. pneumonia    Plan  -Ceftriaxone complete    Endocrine:   Glucoses ok.    Plan  - ICU insulin protocol, goal sugar <180      Hematology/Oncology:   1. Normal WBC now   2. Anemia, no signs, symptoms of active blood loss  3.  Plan  -Continue to monitor        IV/Access:   1. Venous access -PICC line  3.  Plan  - central access required and necessary      ICU Prophylaxis:   1. DVT: Mechanical  2. VAP: HOB 30 degrees, chlorhexidine rinse  3. Stress Ulcer: PPI   6. Feeding - continue tube feeds    Key goals for next 24 hours:   Spontaneous breathing trial in AM  Continuous EEG           Interim History:     Patient still with altered mental status.          Key Medications:       amantadine  100 mg Oral 2 times per day     amLODIPine  10 mg Oral or Feeding Tube Daily     aspirin  81 mg Per Feeding Tube Daily     atorvastatin  40 mg Oral or NG Tube Daily at 8 pm     chlorhexidine  15 mL Mouth/Throat Q12H     clopidogrel  75 mg Oral or Feeding Tube Daily     heparin ANTICOAGULANT  5,000 Units Subcutaneous Q12H     levETIRAcetam  1,500 mg Oral or PEG tube Q12H     metoprolol tartrate  100 mg Per Feeding Tube BID     pantoprazole  40 mg Per Feeding Tube Daily     sodium chloride (PF)  10 mL Intracatheter Q8H     valproate (DEPACON) intermittent infusion  750 mg Intravenous Q12H       dextrose 40 mL/hr at 09/30/20 1200     - MEDICATION INSTRUCTIONS -       sodium chloride 10 mL/hr at 09/30/20 1200               Physical Examination:   Temp:  [97.8  F (36.6   C)-99.6  F (37.6  C)] 98.9  F (37.2  C)  Pulse:  [52-71] 58  Resp:  [10-20] 20  BP: (122-189)/() 137/61  FiO2 (%):  [30 %] 30 %  SpO2:  [89 %-97 %] 94 %        1700  urinary output   Wt Readings from Last 4 Encounters:   09/30/20 63.3 kg (139 lb 8.8 oz)     BP - Mean:  [] 88  Ventilation Mode: CMV/AC  (Continuous Mandatory Ventilation/ Assist Control)  FiO2 (%): 30 %  Rate Set (breaths/minute): 12 breaths/min  Tidal Volume Set (mL): 500 mL  PEEP (cm H2O): 5 cmH2O  Oxygen Concentration (%): 30 %  Resp: 20    GEN: no acute distress;  No real meaningful movements  HEENT:   ETT ok   Respiratory:  Non labored, does ok on PS 10. Clear     CV/COR: RRR S1S2 no gallop,  No rub, no murmur  ABD: soft nontender,   EXT:  Edema   warm  SKIN: no rash  LINES: clean, dry intact         Data:   All data and imaging reviewed     ROUTINE ICU LABS (Last four results)  CMP  Recent Labs   Lab 09/28/20  0950 09/28/20  0422 09/27/20  0427 09/26/20  2150 09/26/20  0658  09/24/20  0655   NA  --  145* 147*  --  149*  --   --    POTASSIUM 4.3 3.2* 3.4 4.0 3.3*   < > 3.4   CHLORIDE  --  110* 112*  --  113*  --   --    CO2  --  33* 32  --  33*  --   --    ANIONGAP  --  2* 3  --  3  --   --    GLC  --  118* 101*  --  119*  --   --    BUN  --  20 21  --  21  --   --    CR  --  0.47* 0.54  --  0.50*  --   --    GFRESTIMATED  --  >90 >90  --  >90  --   --    GFRESTBLACK  --  >90 >90  --  >90  --   --    MARII  --  8.7 8.6  --  8.9  --   --    MAG  --  2.1  --   --   --   --   --    PHOS  --  3.1  --   --   --   --   --    PROTTOTAL  --   --   --   --  5.5*  --  5.4*   ALBUMIN  --   --   --   --  2.0*  --  1.8*   BILITOTAL  --   --   --   --  0.2  --  0.2   ALKPHOS  --   --   --   --  66  --  62   AST  --   --   --   --  21  --  29   ALT  --   --   --   --  29  --  35    < > = values in this interval not displayed.     CBC  Recent Labs   Lab 09/28/20  0422 09/26/20  0658 09/25/20  0445   WBC  --  10.3  --    RBC  --  3.47*  --    HGB  --   10.8*  --    HCT  --  35.3  --    MCV  --  102*  --    MCH  --  31.1  --    MCHC  --  30.6*  --    RDW  --  13.8  --     281 259     INRNo lab results found in last 7 days.  Arterial Blood Gas  Recent Labs   Lab 09/27/20  0427   O2PER 40         Billing: This patient is critically ill: yes. Encephalopathy coma, respiratory failure, Total critical care time today 35 min.    Carlos Petty MD  [facility] Intensivist Service

## 2020-09-30 NOTE — PLAN OF CARE
Pt remains NPO, D10 at 40 mL/hr, BG now 100s.  Pt withdraws to pain and opens eyes with vigorous stimulation. Good urine output. No changes to dried drainage to trach or PEG dressings.  Will continue to monitor.   Alert/Awake

## 2020-09-30 NOTE — PROGRESS NOTES
"SPIRITUAL HEALTH SERVICES  SPIRITUAL ASSESSMENT Progress Note  [facility] ICU     REFERRAL SOURCE: Follow Up    I shared a visit with Donell, patient Amisha's spouse today, known to me from previous visits from this stay. Donell spent some time processing the last few weeks and what it's been like for Amisha to be in the ICU. He shares he is feeling hopeful that she is more comfortable with the trach and will be transferring to an LTACH. He shared about her slow improvement but that they are trying to \"take things one day at a time\" and look forward to her improvement. I inquired into their hopes for Amisha's transfer to an LTACH. Donell shared that the family is aware this is a \"new normal\" and that his hope is for her to \"get home\" and that they feel hopeful this will happen.     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 He shares about the many things he appreciates about her and who she is in their family. He was tearful as he processed this, sharing \"we've had a wonderful life together and I want it to keep going.\" I offered emotional and grief support to Donell through reflective listening, validation of emotions/experiences and words of comfort and support. Donell and his family are well supported by their parish , [employer].     PLAN: [facility] will continue to remain available for family support.     Liz Beavers  Associate    Phone: [phone]  Pager: [phone]    "

## 2020-09-30 NOTE — PROVIDER NOTIFICATION
MD Notification    Notified Person: MD    Notified Person Name: Augusto    Notification Date/Time: 09/29/20 @ 5:22 PM    Notification Interaction: paged MD    Purpose of Notification: PEG tube site bleeding/oozing, OK to give aspirin?    Orders Received: Apply pressure bandage and OK to give aspirin.    Comments:

## 2020-09-30 NOTE — PROGRESS NOTES
Neuro crit care paged this AM due to patient's systolic BP being under 140 and bradycardia (HR 48 to 54) . No new provider orders at that time, patient's BP returned to defined limits in 45 minutes. New wound care consult placed due to increased perineal redness / excoriation / vulvar lesions.

## 2020-09-30 NOTE — PROGRESS NOTES
[facility] ICU RESPIRATORY NOTE           Date of Admission: 9/5/2020     Date of Intubation (most recent):9/11/2020, trach- 9/29/20     Reason for Mechanical Ventilation:Airway protection     Number of Days on Mechanical Ventilation: 20     Met Criteria for Spontaneous Breathing Trial:No     Reason for No Spontaneous Breathing Trial:Per MD     Significant Events Today: None overnight     ABG Results:   Recent Labs   Lab 09/27/20  0427   O2PER 40     Ventilation Mode: CMV/AC  (Continuous Mandatory Ventilation/ Assist Control)  FiO2 (%): 30 %  Rate Set (breaths/minute): 12 breaths/min  Tidal Volume Set (mL): 500 mL  PEEP (cm H2O): 5 cmH2O  Oxygen Concentration (%): 30 %  Resp: 12    Gabino Torres, RT

## 2020-09-30 NOTE — PROGRESS NOTES
"General Surgery Note    Stable S/P PEG tube placement  POD1    -Slow bloody ooze from PEG site is slowing.  Continue to change dressings PRN saturation.  I think this will continue to slow and stop  -Start TF anytime.  -WOCN following for sacral area.  Some concern for vaginal area that RN will ask them to look at today too.  -General Surgery will sign off.  Please contact us if there are any concerns after starting TF. [phone]      NAD, trach, vent  BP (!) 158/72   Pulse 63   Temp 98.3  F (36.8  C) (Axillary)   Resp 17   Ht 1.575 m (5' 2\")   Wt 63.3 kg (139 lb 8.8 oz)   SpO2 96%   BMI 25.52 kg/m      Intake/Output Summary (Last 24 hours) at 9/30/2020 0832  Last data filed at 9/30/2020 0813  Gross per 24 hour   Intake 1196.67 ml   Output 2350 ml   Net -1153.33 ml     Abd: soft, no hematoma felt, ND  PEG site: secured with stitches.  No active bleeding though the dressing has some light bloody drainage.  This is not coming from sutures, but PEG site itself.  Cannot elicit drainage from manipulating drain.      "

## 2020-09-30 NOTE — PROGRESS NOTES
"  [facility]    Stroke Progress Note    Interval Events  Trach and Peg placed yesterday. Patient was given PRN Hydralazine overnight. This morning patients SBP dropped below goal with noted sinus pablo (48-54), but returned to goal range within the hour.     Impression & plan  Scattered intracranial atherosclerosis with severe focal left M1 stenosis, now s/p balloon angioplasty 9/11   Left MCA syndrome  New R MCA infarcts  - SBP goal 140-170 mmHg   - DAPT with ASA 81 mg daily + Plavix 75 mg daily   - Continue Lipitor 40 mg daily  - Consideration for possible stent at site of L M1 angioplasty given stenosis on MRA. Will focus on seizure management at this time, but potential intervention pending improvement from seizure perspective     Non-convulsive status epilepticus (NCSE), resolved  Ongoing severe encephalopathy  - vEEG discontinued  - Continue Keppra 1500 mg q12 hours, changed from IV to oral/PEG route   - Decreased dose of VPA to 750 mg q12 hours   - Recent Depakote level: 60   - Keppra level: 24  - Ammonia level: 38     We will continue to follow     Octavia Cowan PA-C   Neurology  To page me or covering stroke neurology team member, click here: AMCOM   Choose \"On Call\" tab at top, then search dropdown box for \"Neurology Adult\", select location, press Enter, then look for stroke/neuro ICU/telestroke.  ______________________________________________________    Medications   Home Meds  Prior to Admission medications    Medication Sig Start Date End Date Taking? Authorizing Provider   Multiple Vitamins-Minerals (SYSTANE ICAPS AREDS2) CAPS Take 1 capsule by mouth daily   Yes Unknown, Entered By History   multivitamin, therapeutic (THERA-VIT) TABS tablet Take 1 tablet by mouth daily   Yes Unknown, Entered By History       Scheduled Meds    amantadine  100 mg Oral 2 times per day     amLODIPine  10 mg Oral or Feeding Tube Daily     aspirin  81 mg Per Feeding Tube Daily     atorvastatin  40 mg Oral or " NG Tube Daily at 8 pm     chlorhexidine  15 mL Mouth/Throat Q12H     clopidogrel  75 mg Oral or Feeding Tube Daily     heparin ANTICOAGULANT  5,000 Units Subcutaneous Q12H     levETIRAcetam  1,500 mg Intravenous Q12H     metoprolol tartrate  100 mg Per Feeding Tube BID     pantoprazole  40 mg Per Feeding Tube Daily     sodium chloride (PF)  10 mL Intracatheter Q8H     valproate (DEPACON) intermittent infusion  750 mg Intravenous Q12H       Infusion Meds    dextrose 40 mL/hr at 09/30/20 0800     - MEDICATION INSTRUCTIONS -       sodium chloride 10 mL/hr at 09/30/20 0800       PRN Meds  acetaminophen **OR** acetaminophen, bisacodyl, artificial tears ophthalmic solution, dextrose, glucose **OR** dextrose **OR** glucagon, fentaNYL, hydrALAZINE, labetalol, lidocaine 4%, lidocaine (buffered or not buffered), - MEDICATION INSTRUCTIONS -, melatonin, miconazole, naloxone, ondansetron **OR** ondansetron, polyethylene glycol, potassium chloride, potassium chloride with lidocaine, potassium chloride, potassium chloride, potassium chloride, prochlorperazine **OR** prochlorperazine **OR** prochlorperazine, senna-docusate **OR** senna-docusate, sodium chloride (PF)       PHYSICAL EXAMINATION  Temp:  [98.1  F (36.7  C)-99.6  F (37.6  C)] 98.7  F (37.1  C)  Pulse:  [49-71] 55  Resp:  [8-19] 13  BP: (122-198)/() 180/75  FiO2 (%):  [30 %] 30 %  SpO2:  [89 %-98 %] 97 %     Neurologic  Mental Status: opens eyes to sternal rub but not tracking, does not following commands  Cranial Nerves: PERRL, does not protrude tongue, spontaneous movement of mouth  Motor:  normal muscle tone and bulk, withdraws to noxious stimuli in lower extremities, does not withdraw in upper extremities.   Reflexes: deferred   Sensory: see motor exam   Coordination:  unable to assess  Station/Gait:  deferred     Imaging  I personally reviewed all imaging; relevant findings per HPI.     Lab Results Data   CBC  Recent Labs   Lab 09/28/20  0422 09/26/20  7116  09/25/20  0445   WBC  --  10.3  --    RBC  --  3.47*  --    HGB  --  10.8*  --    HCT  --  35.3  --     281 259     Basic Metabolic Panel    Recent Labs   Lab 09/28/20  0950 09/28/20  0422 09/27/20  0427  09/26/20  0658   NA  --  145* 147*  --  149*   POTASSIUM 4.3 3.2* 3.4   < > 3.3*   CHLORIDE  --  110* 112*  --  113*   CO2  --  33* 32  --  33*   BUN  --  20 21  --  21   CR  --  0.47* 0.54  --  0.50*   GLC  --  118* 101*  --  119*   MARII  --  8.7 8.6  --  8.9    < > = values in this interval not displayed.     Liver Panel  Recent Labs   Lab 09/26/20  0658 09/24/20  0655   PROTTOTAL 5.5* 5.4*   ALBUMIN 2.0* 1.8*   BILITOTAL 0.2 0.2   ALKPHOS 66 62   AST 21 29   ALT 29 35     INR    Recent Labs   Lab Test 09/07/20  0758   INR 1.05      Lipid Profile    Recent Labs   Lab Test 09/05/20  1256   CHOL 186   HDL 87   LDL 81   TRIG 89     A1C    Recent Labs   Lab Test 09/05/20  1256   A1C 5.5     Troponin I  No results for input(s): TROPI in the last 168 hours.

## 2020-09-30 NOTE — PROGRESS NOTES
[facility] Nurse Inpatient Pressure Injury Assessment   Reason for consultation: Evaluate and treat panus, groin, labia, butt, bilateral LE      ASSESSMENT      Panus, groin, gluteal cleft intact without minimal erythema      The bilateral walls of the fleshy buttock/ gluteal cleft area a bit raw, lightly denuded on the left side, this is likely due to moisture and may be resolving bacterial skin infection.  The IV antibiotics will continue to help area resolve along with good PIP measures      Bilateral labia with lumpy, protuberant, tight looking growths, not abscess looking. No drainage or erythema, does not appear to be a pressing issue right now.      Posterior and anterior left thigh to knee areas with large violet colored to lightly fading greenish bruising, likely from turning and just general cares to her delicate skin, no PI here        *did not reassess the tip of the tongue or the bilateral axilla today     TREATMENT PLAN  Tongue wound: TID and PRN  Ensure frequent and complete oral cares to promote moist mucous membranes  Disengage tongue from the tube with use of tongue depressor and reposition tongue frequently     Perineal/ vulvar/ groin areas- TID and prn  -clean with Jayda Cleanse and Protect, wipe dry- dust with baby powder or use Interdry cloth in folds    PRESSURE INJURY PREVENTION (PIP) MEASURES  1. REPOSITION   BED POSITIONING - SIDE TO SIDE ONLY WHEN IN BED, EVERY 2 HOURS- this will help not only the skin but the respiratory status   - MAKE SURE TO USE AT LEAST 5-6 PILLOWS IN THE BED AT ALL TIMES TO ACHIEVE GOOD POSITIONING  - If pt is NOT turning then you need to notify/ discuss with the charge nurse, nurse manager and provider AND document this conversation    - if not turning due to pain then discuss with provider in addition to repositioning, providing hot/ cold packs, evaluate for low air loss mattress   CHAIR POSITIONING  -  must fully off load every 1-2 hours  -  If at risk must use chair  cushion (#[id number] or #[id number] bariatric)- pillows to flatten out, not a pressure relieving device  HEELS - MUST BE KEPT ELEVATED AT ALL TIMES  - AT LEAST 2 PILLOWS UNDER EACH CALF, ASSURING HEELS ARE FLOATING  2.   HYGIENE  - PT IS NOT CLEAN UNTIL YOU SEE THE BASE OF THE SKIN FOLD AND WIPES COME AWAY CLEAN- then dust with baby powder  - BRIEFS OFF OR LOOSE IN BED      Orders Written  WOC Nurse follow-up plan:weekly  Nursing to notify the Provider(s) and re-consult the WOC Nurse if wound(s) deteriorates or new skin concern.    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  Patient remains intubated.  Patient remains off sedation however her neurologic exam is still comatose.  Patient also continues with low-grade fevers at this time.  She does not have a leukocytosis.       Objective Data  Containment of urine/stool: Incontinence Protocol and External catheter    Current Diet/ Nutrition:  None      Output:   I/O last 3 completed shifts:  In: 1641.67 [I.V.:1291.67; NG/GT:350]  Out: 3275 [Urine:3275]    Risk Assessment:   Sensory Perception: 2-->very limited  Moisture: 3-->occasionally moist  Activity: 1-->bedfast  Mobility: 1-->completely immobile  Nutrition: 2-->probably inadequate  Friction and Shear: 1-->problem  Nikita Score: 10      Labs:   Recent Labs   Lab 09/26/20  0658   ALBUMIN 2.0*   HGB 10.8*   WBC 10.3       Physical Exam    Sept 30, 2020  labia    Right labia with large protuberant growth, no erythema, bilateral labia growths all total 7-8? No drainage or erythema    Sept 30,2020 buttocks    Coccyx and sacral area intac the walls of the gluteal cleft peeling with the left side patchy, raw looking denudement, lightly bleeding with cares.     Sept 30, 2020    Right LE light purple, blanchable bruising         Interventions  Current  support surface: Standard  Low air loss mattress  Current off-loading measures: Pillows and Wedge positioning system  Repositioning aid: Pillows  Visual inspection of wound(s) completed   Tube Securement: ETAD  Wound Care: was done per plan of care.  Supplies: at bedside  Educated provided: importance of repositioning, plan of care, Moisture management and Off-loading pressure  Education provided to: nurse  Discussed importance of:repositioning every 2 hours, off-loading pressure to wound and moisture management  Discussed plan of care with Nurse    Ariel Rocha RN CWOCN

## 2020-09-30 NOTE — PROGRESS NOTES
THORACIC SURGERY    Tracheostomy OK  No bleeding    PAULO TALLEY MD [facility] ONCOLOGY THORACIC SURGERY  CELL:  [phone]  OFFICE: [phone]

## 2020-09-30 NOTE — CONSULTS
"Care Transition Initial Assessment - RN        Met with:  Family, Spouse, Donell.  DATA   Active Problems:    Acute encephalopathy       Cognitive Status: non-responsive.        Contact information and PCP information verified: Yes  Lives With: child(fatuma), adult, spouse   Living Arrangements: house(2 story)         Insurance concerns: No Insurance issues identified  ASSESSMENT  Patient currently receives the following services:  Patient did not receive any at home        Identified issues/concerns regarding health management: Patient had a  stroke involving severe focal left M1 with new R MCA infarcts. She required a Tracheostomy and vent dependent. Patient will require LTACH placement.   Writer met with spouse, Donell in patient's room. Donell had many questions about LTACH and what they do. Writer gave Donell brochures for [facility]  And went over visiting policy. Donell is excited as daughter might be able to \"Finally see her mother\". Writer asked if he had ever face timed with daughter in patient's room and Donell stated. \"No, I didn't want her to see her like this.\" Donell asked when this discharge would take place, writer told Donell it is up to Dr. Galicia and the medical team to say she is ready to go. Donell was very thankful. He also wanted to know when their Long Term Care insurance would be needed. Writer told Donell that it would dependent on how well she did in therapies at [facility] but placement into LTC would be done by [facility] if that was recommended. Donell seem to understand that was a possibility.  PLAN  Financial costs for the patient include unsure .  Patient given options and choices for discharge yes .  Patient/family is agreeable to the plan?  Yes:   Patient anticipates discharging to LTACH .        Patient anticipates needs for home equipment: Does not know  Plan/Disposition: LTACH    Appointments: See [facility]      Care  (CTS) will continue to follow as needed.            "

## 2020-10-01 LAB
GLUCOSE BLDC GLUCOMTR-MCNC: 104 MG/DL (ref 70–99)
GLUCOSE BLDC GLUCOMTR-MCNC: 109 MG/DL (ref 70–99)
GLUCOSE BLDC GLUCOMTR-MCNC: 110 MG/DL (ref 70–99)
GLUCOSE BLDC GLUCOMTR-MCNC: 126 MG/DL (ref 70–99)

## 2020-10-01 PROCEDURE — 96372 THER/PROPH/DIAG INJ SC/IM: CPT | Performed by: PHYSICIAN ASSISTANT

## 2020-10-01 PROCEDURE — 999N000040 HC STATISTIC CONSULT NO CHARGE VASC ACCESS

## 2020-10-01 PROCEDURE — 250N000009 HC RX 250: Performed by: PHYSICIAN ASSISTANT

## 2020-10-01 PROCEDURE — 999N001017 HC STATISTIC GLUCOSE BY METER IP

## 2020-10-01 PROCEDURE — 999N000157 HC STATISTIC RCP TIME EA 10 MIN

## 2020-10-01 PROCEDURE — 99207 PR APP CREDIT; MD BILLING SHARED VISIT: CPT | Performed by: PHYSICIAN ASSISTANT

## 2020-10-01 PROCEDURE — 250N000013 HC RX MED GY IP 250 OP 250 PS 637: Performed by: PHYSICIAN ASSISTANT

## 2020-10-01 PROCEDURE — 200N000001 HC R&B ICU

## 2020-10-01 PROCEDURE — 94003 VENT MGMT INPAT SUBQ DAY: CPT

## 2020-10-01 PROCEDURE — 272N000078 HC NUTRITION PRODUCT INTERMEDIATE LITER

## 2020-10-01 PROCEDURE — 258N000003 HC RX IP 258 OP 636: Performed by: PHYSICIAN ASSISTANT

## 2020-10-01 PROCEDURE — 999N000009 HC STATISTIC AIRWAY CARE

## 2020-10-01 PROCEDURE — 250N000013 HC RX MED GY IP 250 OP 250 PS 637: Performed by: STUDENT IN AN ORGANIZED HEALTH CARE EDUCATION/TRAINING PROGRAM

## 2020-10-01 PROCEDURE — 99233 SBSQ HOSP IP/OBS HIGH 50: CPT | Performed by: PSYCHIATRY & NEUROLOGY

## 2020-10-01 PROCEDURE — 250N000009 HC RX 250: Performed by: ANESTHESIOLOGY

## 2020-10-01 PROCEDURE — 250N000011 HC RX IP 250 OP 636: Performed by: PHYSICIAN ASSISTANT

## 2020-10-01 RX ORDER — ACETAMINOPHEN 160 MG
TABLET,DISINTEGRATING ORAL 2 TIMES DAILY
Status: DISCONTINUED | OUTPATIENT
Start: 2020-10-01 | End: 2020-10-05 | Stop reason: HOSPADM

## 2020-10-01 RX ORDER — LEVETIRACETAM 100 MG/ML
1500 SOLUTION ORAL EVERY 12 HOURS
Status: DISCONTINUED | OUTPATIENT
Start: 2020-10-01 | End: 2020-10-05 | Stop reason: HOSPADM

## 2020-10-01 RX ADMIN — VALPROATE SODIUM 750 MG: 100 INJECTION, SOLUTION INTRAVENOUS at 09:17

## 2020-10-01 RX ADMIN — POLYETHYLENE GLYCOL 3350 17 G: 17 POWDER, FOR SOLUTION ORAL at 17:18

## 2020-10-01 RX ADMIN — HEPARIN SODIUM 5000 UNITS: 5000 INJECTION, SOLUTION INTRAVENOUS; SUBCUTANEOUS at 09:16

## 2020-10-01 RX ADMIN — CHLORHEXIDINE GLUCONATE 15 ML: 1.2 RINSE ORAL at 20:39

## 2020-10-01 RX ADMIN — ATORVASTATIN CALCIUM 40 MG: 40 TABLET, FILM COATED ORAL at 20:39

## 2020-10-01 RX ADMIN — AMANTADINE HYDROCHLORIDE 100 MG: 100 CAPSULE ORAL at 07:19

## 2020-10-01 RX ADMIN — HEPARIN SODIUM 5000 UNITS: 5000 INJECTION, SOLUTION INTRAVENOUS; SUBCUTANEOUS at 20:39

## 2020-10-01 RX ADMIN — CLOPIDOGREL BISULFATE 75 MG: 75 TABLET, FILM COATED ORAL at 09:16

## 2020-10-01 RX ADMIN — ASPIRIN 81 MG 81 MG: 81 TABLET ORAL at 09:16

## 2020-10-01 RX ADMIN — HYDROGEN PEROXIDE: 30 LIQUID TOPICAL at 21:30

## 2020-10-01 RX ADMIN — LEVETIRACETAM 1500 MG: 100 SOLUTION ORAL at 21:30

## 2020-10-01 RX ADMIN — CHLORHEXIDINE GLUCONATE 15 ML: 1.2 RINSE ORAL at 09:17

## 2020-10-01 RX ADMIN — PANTOPRAZOLE SODIUM 40 MG: 40 TABLET, DELAYED RELEASE ORAL at 09:18

## 2020-10-01 RX ADMIN — AMLODIPINE BESYLATE 10 MG: 10 TABLET ORAL at 09:16

## 2020-10-01 RX ADMIN — VALPROATE SODIUM 750 MG: 100 INJECTION, SOLUTION INTRAVENOUS at 21:30

## 2020-10-01 RX ADMIN — ACETAMINOPHEN 650 MG: 325 TABLET, FILM COATED ORAL at 17:18

## 2020-10-01 RX ADMIN — AMANTADINE HYDROCHLORIDE 100 MG: 100 CAPSULE ORAL at 13:22

## 2020-10-01 RX ADMIN — METOPROLOL TARTRATE 100 MG: 50 TABLET, FILM COATED ORAL at 20:39

## 2020-10-01 RX ADMIN — LEVETIRACETAM 1500 MG: 100 SOLUTION ORAL at 09:17

## 2020-10-01 ASSESSMENT — ACTIVITIES OF DAILY LIVING (ADL)
ADLS_ACUITY_SCORE: 26

## 2020-10-01 ASSESSMENT — MIFFLIN-ST. JEOR: SCORE: 1082.25

## 2020-10-01 NOTE — PROGRESS NOTES
"[facility]    Stroke Progress Note    Interval Events  Overnight patients BP reported to drop below target goal range. Metoprolol held, patient returned within range.     Impression & plan  Scattered intracranial atherosclerosis with severe focal left M1 stenosis, now s/p balloon angioplasty 9/11   Left MCA syndrome  New R MCA infarcts  - SBP goal 140-170 mmHg   - DAPT with ASA 81 mg daily + Plavix 75 mg daily   - Continue Lipitor 40 mg daily  - Consideration for possible stent at site of L M1 angioplasty given stenosis on MRA. Will focus on seizure management at this time, but potential intervention pending improvement from seizure perspective     Non-convulsive status epilepticus (NCSE), resolved  Ongoing severe encephalopathy  - Continue Keppra 1500 mg q12 hours  - Continue VPA to 750 mg q12 hours        We will continue to follow     Octavia Cowan PA-C   Neurology  To page me or covering stroke neurology team member, click here: AMCOM   Choose \"On Call\" tab at top, then search dropdown box for \"Neurology Adult\", select location, press Enter, then look for stroke/neuro ICU/telestroke.  ______________________________________________________    Medications   Home Meds  Prior to Admission medications    Medication Sig Start Date End Date Taking? Authorizing Provider   Multiple Vitamins-Minerals (SYSTANE ICAPS AREDS2) CAPS Take 1 capsule by mouth daily   Yes Unknown, Entered By History   multivitamin, therapeutic (THERA-VIT) TABS tablet Take 1 tablet by mouth daily   Yes Unknown, Entered By History       Scheduled Meds    amantadine  100 mg Oral 2 times per day     amLODIPine  10 mg Oral or Feeding Tube Daily     aspirin  81 mg Per Feeding Tube Daily     atorvastatin  40 mg Oral or NG Tube Daily at 8 pm     chlorhexidine  15 mL Mouth/Throat Q12H     clopidogrel  75 mg Oral or Feeding Tube Daily     heparin ANTICOAGULANT  5,000 Units Subcutaneous Q12H     hydrogen peroxide   Topical BID     " metoprolol tartrate  100 mg Per Feeding Tube BID     pantoprazole  40 mg Per Feeding Tube Daily     sodium chloride (PF)  10 mL Intracatheter Q8H     valproate (DEPACON) intermittent infusion  750 mg Intravenous Q12H       Infusion Meds    dextrose Stopped (09/30/20 1911)     - MEDICATION INSTRUCTIONS -       sodium chloride 10 mL/hr at 09/30/20 1200       PRN Meds  acetaminophen **OR** acetaminophen, bisacodyl, artificial tears ophthalmic solution, dextrose, glucose **OR** dextrose **OR** glucagon, fentaNYL, hydrALAZINE, labetalol, lidocaine 4%, lidocaine (buffered or not buffered), - MEDICATION INSTRUCTIONS -, melatonin, miconazole, naloxone, ondansetron **OR** ondansetron, polyethylene glycol, potassium chloride, potassium chloride with lidocaine, potassium chloride, potassium chloride, potassium chloride, prochlorperazine **OR** prochlorperazine **OR** prochlorperazine, senna-docusate **OR** senna-docusate, sodium chloride (PF)       PHYSICAL EXAMINATION  Temp:  [98.7  F (37.1  C)-99.1  F (37.3  C)] 98.7  F (37.1  C)  Pulse:  [55-69] 69  Resp:  [11-20] 12  BP: (106-176)/(53-85) 138/63  FiO2 (%):  [30 %] 30 %  SpO2:  [92 %-100 %] 97 %        Neurologic  Mental Status: did not open eyes during exam, not following commands  Cranial Nerves: PERRL, does not protrude tongue, spontaneous movement of mouth.  Motor: Normal muscle tone and bulk, withdraws to noxious stimuli in lower extremities, does not withdraw in upper extremities.   Reflexes: deferred   Sensory: see motor exam   Coordination:  unable to assess  Station/Gait:  deferred        Imaging  I personally reviewed all imaging; relevant findings per HPI.     Lab Results Data   CBC  Recent Labs   Lab 09/28/20 0422 09/26/20  0658 09/25/20  0445   WBC  --  10.3  --    RBC  --  3.47*  --    HGB  --  10.8*  --    HCT  --  35.3  --     281 259     Basic Metabolic Panel    Recent Labs   Lab 09/28/20  0950 09/28/20  0422 09/27/20  0427 09/26/20  0658  09/26/20  0658   NA  --  145* 147*  --  149*   POTASSIUM 4.3 3.2* 3.4   < > 3.3*   CHLORIDE  --  110* 112*  --  113*   CO2  --  33* 32  --  33*   BUN  --  20 21  --  21   CR  --  0.47* 0.54  --  0.50*   GLC  --  118* 101*  --  119*   MARII  --  8.7 8.6  --  8.9    < > = values in this interval not displayed.     Liver Panel  Recent Labs   Lab 09/26/20  0658   PROTTOTAL 5.5*   ALBUMIN 2.0*   BILITOTAL 0.2   ALKPHOS 66   AST 21   ALT 29     INR    Recent Labs   Lab Test 09/07/20  0758   INR 1.05      Lipid Profile    Recent Labs   Lab Test 09/05/20  1256   CHOL 186   HDL 87   LDL 81   TRIG 89     A1C    Recent Labs   Lab Test 09/05/20  1256   A1C 5.5     Troponin I  No results for input(s): TROPI in the last 168 hours.

## 2020-10-01 NOTE — PROGRESS NOTES
[facility] ICU RESPIRATORY NOTE           Date of Admission: 9/5/2020     Date of Intubation (most recent):9/11/2020, trach- 9/29/20     Reason for Mechanical Ventilation:Airway protection     Number of Days on Mechanical Ventilation: 21     Met Criteria for Spontaneous Breathing Trial:No     Reason for No Spontaneous Breathing Trial:Per MD     Significant Events Today: None overnight      ABG Results:   Recent Labs   Lab 09/27/20  0427   O2PER 40     Ventilation Mode: CMV/AC  (Continuous Mandatory Ventilation/ Assist Control)  FiO2 (%): 30 %  Rate Set (breaths/minute): 12 breaths/min  Tidal Volume Set (mL): 500 mL  PEEP (cm H2O): 5 cmH2O  Oxygen Concentration (%): 30 %  Resp: 12    Gabino Torres, RT

## 2020-10-01 NOTE — PLAN OF CARE
Problem: Adult Inpatient Plan of Care  Goal: Plan of Care Review  Outcome: No Change  Flowsheets (Taken 9/30/2020 1950)  Plan of Care Reviewed With:   patient   spouse  Progress: no change  Goal: Patient-Specific Goal (Individualization)  Outcome: No Change  Goal: Absence of Hospital-Acquired Illness or Injury  Outcome: No Change  Goal: Optimal Comfort and Wellbeing  Outcome: No Change  Goal: Readiness for Transition of Care  Outcome: No Change  Goal: Rounds/Family Conference  Outcome: No Change

## 2020-10-01 NOTE — PROGRESS NOTES
CLINICAL NUTRITION SERVICES - REASSESSMENT NOTE      Malnutrition:  (9/14)  % Weight Loss:  Unable to obtain without a nutrition history   % Intake:  </= 50% for >/= 5 days (severe malnutrition)  Subcutaneous Fat Loss:  None observed  Muscle Loss:  None observed  Fluid Retention:  None noted     Malnutrition Diagnosis: Unable to determine due to lack of a nutrition history (patient intubated and not able to provide)        EVALUATION OF PROGRESS TOWARD GOALS   Diet:  NPO    Nutrition Support:  TF was restarted after trach/PEG procedures on 9/30 at 20 mL/hr and after 12 hrs increased to 40 mL/hr as below:    Nutrition Support Enteral:  Type of Feeding Tube:  PEG  Enteral Frequency:  Continuous  Enteral Regimen:  Isosource 1.5 at 40 mL/hr  Total Enteral Provisions:  1440 kcal (27 kcal/kg), 65 g protein (1.2 g/kg), 169 g CHO, 14 g fiber, 730 mL H2O  Free Water Flush:  100 mL every 4 hrs  TOTAL FLUID (TF + flushes) = 1330 mL/day      Intake/Tolerance:    Last recorded stool 9/28 x1.  Labs and BGs have been acceptable.  Wt:  63.4 kg (down 2.8 kg since admit).    ASSESSED NUTRITION NEEDS:  Dosing Weight 54 kg (adjusted for overweight)   Estimated Energy Needs: 9924-8482 kcals (25-30 Kcal/Kg)  Justification: maintenance and overweight  Estimated Protein Needs: 65-81 grams protein (1.2-1.5 g pro/Kg)  Justification: preservation of lean body mass      NEW FINDINGS:   9/29:  Procedures = Tracheostomy with Shiley #6 cuffed nonfenestrated tube + Percutaneous endoscopic gastrostomy tube placement    9/30:  WOCN - Panus, groin, gluteal cleft intact without minimal erythema   - The bilateral walls of the fleshy buttock/ gluteal cleft area a bit raw, lightly denuded on left side, this is likely due to moisture & may be resolving bacterial skin infection.   - Bilateral labia with lumpy, protuberant, tight looking growths, not abscess looking. No drainage or erythema, does not appear to be a pressing issue right now.   - Posterior &  anterior left thigh to knee areas with large violet colored to lightly fading greenish bruising, likely from turning and just general cares to her delicate skin, no PI here       *did not reassess the tip of the tongue or the bilateral axilla today      Previous Goals (9/28):   EN to meet % est needs  Evaluation: Met    Previous Nutrition Diagnosis (9/28):   No nutrition diagnosis identified at this time   Evaluation: No change      CURRENT NUTRITION DIAGNOSIS  No nutrition diagnosis identified at this time       INTERVENTIONS  Recommendations / Nutrition Prescription  Continue TF as above    Implementation  Collaboration and Referral of Nutrition care - Pt was discussed during ICU interdisciplinary rounds this morning    Goals  TF Isosource 1.5 at 40 mL/hr will continue to meet % estimated needs.      MONITORING AND EVALUATION:  Progress towards goals will be monitored and evaluated per protocol and Practice Guidelines  - Check need for bowel program.    Jocy Edgar, RD, LD, CNSC

## 2020-10-01 NOTE — PLAN OF CARE
Pt's blood pressure below desired range -180 last night, metoprolol held, now within range.  No further changes overnight.  Will continue to monitor.

## 2020-10-02 LAB
MAGNESIUM SERPL-MCNC: 1.9 MG/DL (ref 1.6–2.3)
POTASSIUM SERPL-SCNC: 2.9 MMOL/L (ref 3.4–5.3)
POTASSIUM SERPL-SCNC: 3.4 MMOL/L (ref 3.4–5.3)
POTASSIUM SERPL-SCNC: 4 MMOL/L (ref 3.4–5.3)

## 2020-10-02 PROCEDURE — 250N000013 HC RX MED GY IP 250 OP 250 PS 637: Performed by: PHYSICIAN ASSISTANT

## 2020-10-02 PROCEDURE — 250N000013 HC RX MED GY IP 250 OP 250 PS 637: Performed by: STUDENT IN AN ORGANIZED HEALTH CARE EDUCATION/TRAINING PROGRAM

## 2020-10-02 PROCEDURE — 83735 ASSAY OF MAGNESIUM: CPT | Performed by: INTERNAL MEDICINE

## 2020-10-02 PROCEDURE — 999N000009 HC STATISTIC AIRWAY CARE

## 2020-10-02 PROCEDURE — 99207 PR APP CREDIT; MD BILLING SHARED VISIT: CPT | Performed by: PHYSICIAN ASSISTANT

## 2020-10-02 PROCEDURE — 99232 SBSQ HOSP IP/OBS MODERATE 35: CPT | Performed by: PSYCHIATRY & NEUROLOGY

## 2020-10-02 PROCEDURE — 999N000157 HC STATISTIC RCP TIME EA 10 MIN

## 2020-10-02 PROCEDURE — 250N000011 HC RX IP 250 OP 636: Performed by: PHYSICIAN ASSISTANT

## 2020-10-02 PROCEDURE — 94003 VENT MGMT INPAT SUBQ DAY: CPT

## 2020-10-02 PROCEDURE — 84132 ASSAY OF SERUM POTASSIUM: CPT | Performed by: PHYSICIAN ASSISTANT

## 2020-10-02 PROCEDURE — 200N000001 HC R&B ICU

## 2020-10-02 PROCEDURE — 999N000190 HC STATISTIC VAT ROUNDS

## 2020-10-02 PROCEDURE — 84132 ASSAY OF SERUM POTASSIUM: CPT | Performed by: INTERNAL MEDICINE

## 2020-10-02 PROCEDURE — 96372 THER/PROPH/DIAG INJ SC/IM: CPT | Performed by: PHYSICIAN ASSISTANT

## 2020-10-02 PROCEDURE — 272N000078 HC NUTRITION PRODUCT INTERMEDIATE LITER

## 2020-10-02 PROCEDURE — 258N000003 HC RX IP 258 OP 636: Performed by: PHYSICIAN ASSISTANT

## 2020-10-02 PROCEDURE — 250N000009 HC RX 250: Performed by: PHYSICIAN ASSISTANT

## 2020-10-02 RX ADMIN — MICONAZOLE NITRATE: 20 POWDER TOPICAL at 12:06

## 2020-10-02 RX ADMIN — VALPROATE SODIUM 500 MG: 100 INJECTION, SOLUTION INTRAVENOUS at 20:47

## 2020-10-02 RX ADMIN — LEVETIRACETAM 1500 MG: 100 SOLUTION ORAL at 08:14

## 2020-10-02 RX ADMIN — POTASSIUM CHLORIDE 40 MEQ: 1.5 POWDER, FOR SOLUTION ORAL at 03:13

## 2020-10-02 RX ADMIN — HYDROGEN PEROXIDE: 30 LIQUID TOPICAL at 20:55

## 2020-10-02 RX ADMIN — METOPROLOL TARTRATE 100 MG: 50 TABLET, FILM COATED ORAL at 08:13

## 2020-10-02 RX ADMIN — CLOPIDOGREL BISULFATE 75 MG: 75 TABLET, FILM COATED ORAL at 08:13

## 2020-10-02 RX ADMIN — VALPROATE SODIUM 750 MG: 100 INJECTION, SOLUTION INTRAVENOUS at 08:13

## 2020-10-02 RX ADMIN — DOCUSATE SODIUM AND SENNOSIDES 2 TABLET: 8.6; 5 TABLET, FILM COATED ORAL at 21:42

## 2020-10-02 RX ADMIN — HEPARIN SODIUM 5000 UNITS: 5000 INJECTION, SOLUTION INTRAVENOUS; SUBCUTANEOUS at 20:47

## 2020-10-02 RX ADMIN — HYDROGEN PEROXIDE: 30 LIQUID TOPICAL at 11:55

## 2020-10-02 RX ADMIN — AMLODIPINE BESYLATE 10 MG: 10 TABLET ORAL at 08:12

## 2020-10-02 RX ADMIN — HEPARIN SODIUM 5000 UNITS: 5000 INJECTION, SOLUTION INTRAVENOUS; SUBCUTANEOUS at 08:14

## 2020-10-02 RX ADMIN — POTASSIUM CHLORIDE 40 MEQ: 1.5 POWDER, FOR SOLUTION ORAL at 01:21

## 2020-10-02 RX ADMIN — AMANTADINE HYDROCHLORIDE 100 MG: 100 CAPSULE ORAL at 06:44

## 2020-10-02 RX ADMIN — AMANTADINE HYDROCHLORIDE 100 MG: 100 CAPSULE ORAL at 12:15

## 2020-10-02 RX ADMIN — ASPIRIN 81 MG 81 MG: 81 TABLET ORAL at 08:13

## 2020-10-02 RX ADMIN — PANTOPRAZOLE SODIUM 40 MG: 40 TABLET, DELAYED RELEASE ORAL at 08:12

## 2020-10-02 RX ADMIN — ATORVASTATIN CALCIUM 40 MG: 40 TABLET, FILM COATED ORAL at 20:47

## 2020-10-02 RX ADMIN — CHLORHEXIDINE GLUCONATE 15 ML: 1.2 RINSE ORAL at 20:54

## 2020-10-02 RX ADMIN — LEVETIRACETAM 1500 MG: 100 SOLUTION ORAL at 20:47

## 2020-10-02 RX ADMIN — METOPROLOL TARTRATE 100 MG: 50 TABLET, FILM COATED ORAL at 20:47

## 2020-10-02 RX ADMIN — CHLORHEXIDINE GLUCONATE 15 ML: 1.2 RINSE ORAL at 08:05

## 2020-10-02 ASSESSMENT — ACTIVITIES OF DAILY LIVING (ADL)
ADLS_ACUITY_SCORE: 22
ADLS_ACUITY_SCORE: 26
ADLS_ACUITY_SCORE: 22
ADLS_ACUITY_SCORE: 26
ADLS_ACUITY_SCORE: 26
ADLS_ACUITY_SCORE: 22

## 2020-10-02 ASSESSMENT — MIFFLIN-ST. JEOR: SCORE: 1090.25

## 2020-10-02 NOTE — PROGRESS NOTES
Critical Care Progress Note      10/1/2020    Name: Sherrell Leonard MRN#: [id number]   Age: 74 year old YOB: 1945     [facility] [place] Day# 26                 Problem List:   Strokes  Seizures  Encephalopathy  Ventilator support  Hypertension          Summary/Hospital Course:   74-year-old female admitted to the ICU for acute ventilatory failure and airway protection after left MCA subcortical stroke and left PCA stroke with multiple stenotic vessels.  Patient remains intubated.  Patient remains off sedation however her neurologic exam is still comatose.  Patient also continues with low-grade fevers at this time.  She does not have a leukocytosis.    9/21- Still not responding in spite of all sedation being discontinued for 96 hours    9/22- Still not responsive on my exam.By report from neurology, perhaps some improvement in EEG.    9/23- Opened eyes, but no following commands. Increasing WBC, low grade temp and Staph aureus in sputum. Ceftriaxone started    9/25- No changes on exam but EEG without seizure   9/27 eyes open but not following commands     9/28: Won't open eyes, minimal response to painful stimuli. New EEG ordered. Family meeting held for one hour with decision to pursue tracheostomy and PEG.    9/29: Still with hypertension issues. Trach/PEG later today    9/30: No overt improvement, but EEG improved. Trach and PEG in place    10/1: Again no overt improvement, but EEG continues to improve. Anti-seizure meds per neuro-CC    Assessment and plan :     Sherrell Leonard Is a 74 year old female admitted on 9/5/2020 for management of acute respiratory failure, CVA and seizure.       Neurology/Psychiatry:   1.  Seizure are under control with keppra and valproate but neuro exam is not better. Doesn't open eyes today  Plan  Continue off continuous sedatives but continue AE medications.   EEG    CV:  Hypertension persists, NSR   P: continue amlodipine 10 and increased metoprolol to 100  BID on 9/29.      Pulmonary/Ventilator Management:   1. Airway intubated for airway protection  Day 17  ventilator   Can do PS trials easily but not awake enough for extubation.    3. Increased sputum production with fever. Staph aureus in sputum. Completed ceftriaxone course    Plan    Continue full ventilator support   Trach/PEG complete.        GI and Nutrition :   -Continue tube feeds as tolerated.      Renal/Fluids/Electrolytes:   1.  Creatinine within normal limits  Improving hypernatremia    P: continue tube feeds  free water         Infectious Disease:   1.  Staph aureus tracheitis vs. pneumonia    Plan  -Ceftriaxone complete    Endocrine:   Glucoses ok.    Plan  - ICU insulin protocol, goal sugar <180      Hematology/Oncology:   1. Normal WBC now   2. Anemia, no signs, symptoms of active blood loss  3.  Plan  -Continue to monitor        IV/Access:   1. Venous access -PICC line  3.  Plan  - central access required and necessary      ICU Prophylaxis:   1. DVT: Mechanical  2. VAP: HOB 30 degrees, chlorhexidine rinse  3. Stress Ulcer: PPI   6. Feeding - continue tube feeds    Key goals for next 24 hours:   Spontaneous breathing trial in AM  Continuous EEG stopped           Interim History:     Patient still with altered mental status.          Key Medications:       amantadine  100 mg Oral 2 times per day     amLODIPine  10 mg Oral or Feeding Tube Daily     aspirin  81 mg Per Feeding Tube Daily     atorvastatin  40 mg Oral or NG Tube Daily at 8 pm     chlorhexidine  15 mL Mouth/Throat Q12H     clopidogrel  75 mg Oral or Feeding Tube Daily     heparin ANTICOAGULANT  5,000 Units Subcutaneous Q12H     hydrogen peroxide   Topical BID     levETIRAcetam  1,500 mg Oral or PEG tube Q12H     metoprolol tartrate  100 mg Per Feeding Tube BID     pantoprazole  40 mg Per Feeding Tube Daily     sodium chloride (PF)  10 mL Intracatheter Q8H     valproate (DEPACON) intermittent infusion  750 mg Intravenous Q12H       dextrose Stopped  (09/30/20 1911)     - MEDICATION INSTRUCTIONS -       sodium chloride Stopped (10/02/20 0000)               Physical Examination:   Temp:  [99  F (37.2  C)-100.3  F (37.9  C)] 99.2  F (37.3  C)  Pulse:  [53-73] 69  Resp:  [11-19] 12  BP: (119-182)/(58-94) 173/78  FiO2 (%):  [25 %-30 %] 25 %  SpO2:  [92 %-99 %] 97 %        1700  urinary output   Wt Readings from Last 4 Encounters:   10/02/20 64.2 kg (141 lb 8.6 oz)     BP - Mean:  [] 110  Ventilation Mode: CMV/AC  (Continuous Mandatory Ventilation/ Assist Control)  FiO2 (%): 25 %  Rate Set (breaths/minute): 12 breaths/min  Tidal Volume Set (mL): 500 mL  PEEP (cm H2O): 5 cmH2O  Oxygen Concentration (%): 25 %  Resp: 12    GEN: no acute distress;  No real meaningful movements  HEENT:   ETT ok   Respiratory:  Non labored, does ok on PS 10. Clear     CV/COR: RRR S1S2 no gallop,  No rub, no murmur  ABD: soft nontender,   EXT:  Edema   warm  SKIN: no rash  LINES: clean, dry intact         Data:   All data and imaging reviewed     ROUTINE ICU LABS (Last four results)  CMP  Recent Labs   Lab 10/02/20  0700 10/02/20  0056 09/28/20  0950 09/28/20  0422 09/27/20  0427 09/26/20  0658 09/26/20  0658   NA  --   --   --  145* 147*  --  149*   POTASSIUM 4.0 2.9* 4.3 3.2* 3.4   < > 3.3*   CHLORIDE  --   --   --  110* 112*  --  113*   CO2  --   --   --  33* 32  --  33*   ANIONGAP  --   --   --  2* 3  --  3   GLC  --   --   --  118* 101*  --  119*   BUN  --   --   --  20 21  --  21   CR  --   --   --  0.47* 0.54  --  0.50*   GFRESTIMATED  --   --   --  >90 >90  --  >90   GFRESTBLACK  --   --   --  >90 >90  --  >90   MARII  --   --   --  8.7 8.6  --  8.9   MAG  --  1.9  --  2.1  --   --   --    PHOS  --   --   --  3.1  --   --   --    PROTTOTAL  --   --   --   --   --   --  5.5*   ALBUMIN  --   --   --   --   --   --  2.0*   BILITOTAL  --   --   --   --   --   --  0.2   ALKPHOS  --   --   --   --   --   --  66   AST  --   --   --   --   --   --  21   ALT  --   --   --   --   --   --   29    < > = values in this interval not displayed.     CBC  Recent Labs   Lab 09/28/20  0422 09/26/20  0658   WBC  --  10.3   RBC  --  3.47*   HGB  --  10.8*   HCT  --  35.3   MCV  --  102*   MCH  --  31.1   MCHC  --  30.6*   RDW  --  13.8    281     INRNo lab results found in last 7 days.  Arterial Blood Gas  Recent Labs   Lab 09/27/20  0427   O2PER 40         Billing: This patient is critically ill: yes. Encephalopathy coma, respiratory failure, Total critical care time today 32 min.    Carlos Petty MD  [facility] Intensivist Service

## 2020-10-02 NOTE — PROGRESS NOTES
"[facility]    Stroke Progress Note    Interval Events  No acute events overnight.     Impression & plan  Scattered intracranial atherosclerosis with severe focal left M1 stenosis, now s/p balloon angioplasty 9/11   Left MCA syndrome  New R MCA infarcts  - SBP goal 140-170 mmHg   - Continue DAPT with ASA 81 mg daily + Plavix 75 mg daily   - Continue Lipitor 40 mg daily  - Consideration for possible stent at site of L M1 angioplasty given stenosis on MRA. Will focus on seizure management at this time, but potential intervention pending improvement from seizure perspective     Non-convulsive status epilepticus (NCSE), resolved  Ongoing severe encephalopathy  - Continue Keppra 1500 mg q12 hours  - Decrease VPA to 500 mg q12 hours     We will continue to follow     Octavia Cowan PA-C   Neurology  To page me or covering stroke neurology team member, click here: AMCOM   Choose \"On Call\" tab at top, then search dropdown box for \"Neurology Adult\", select location, press Enter, then look for stroke/neuro ICU/telestroke.  ______________________________________________________    Medications   Home Meds  Prior to Admission medications    Medication Sig Start Date End Date Taking? Authorizing Provider   Multiple Vitamins-Minerals (SYSTANE ICAPS AREDS2) CAPS Take 1 capsule by mouth daily   Yes Unknown, Entered By History   multivitamin, therapeutic (THERA-VIT) TABS tablet Take 1 tablet by mouth daily   Yes Unknown, Entered By History       Scheduled Meds    amantadine  100 mg Oral 2 times per day     amLODIPine  10 mg Oral or Feeding Tube Daily     aspirin  81 mg Per Feeding Tube Daily     atorvastatin  40 mg Oral or NG Tube Daily at 8 pm     chlorhexidine  15 mL Mouth/Throat Q12H     clopidogrel  75 mg Oral or Feeding Tube Daily     heparin ANTICOAGULANT  5,000 Units Subcutaneous Q12H     hydrogen peroxide   Topical BID     levETIRAcetam  1,500 mg Oral or PEG tube Q12H     metoprolol tartrate  100 mg " Per Feeding Tube BID     pantoprazole  40 mg Per Feeding Tube Daily     sodium chloride (PF)  10 mL Intracatheter Q8H     valproate (DEPACON) intermittent infusion  750 mg Intravenous Q12H       Infusion Meds    dextrose Stopped (09/30/20 1911)     - MEDICATION INSTRUCTIONS -       sodium chloride Stopped (10/02/20 0000)       PRN Meds  acetaminophen **OR** acetaminophen, bisacodyl, artificial tears ophthalmic solution, dextrose, glucose **OR** dextrose **OR** glucagon, fentaNYL, hydrALAZINE, labetalol, lidocaine 4%, lidocaine (buffered or not buffered), - MEDICATION INSTRUCTIONS -, melatonin, miconazole, naloxone, ondansetron **OR** ondansetron, polyethylene glycol, potassium chloride, potassium chloride with lidocaine, potassium chloride, potassium chloride, potassium chloride, prochlorperazine **OR** prochlorperazine **OR** prochlorperazine, senna-docusate **OR** senna-docusate, sodium chloride (PF)       PHYSICAL EXAMINATION  Temp:  [98.4  F (36.9  C)-100.3  F (37.9  C)] 98.4  F (36.9  C)  Pulse:  [53-73] 56  Resp:  [11-19] 12  BP: (119-182)/(58-94) 140/66  FiO2 (%):  [25 %-30 %] 25 %  SpO2:  [95 %-99 %] 96 %     Neurologic  Mental Status: opens eyes after multiple stimuli but not tracking, does not following commands  Cranial Nerves: PERRL, does not protrude tongue, spontaneous movement of mouth, grimace to noxious stimuli  Motor:  normal muscle tone and bulk, withdraws to noxious stimuli in lower extremities, does not withdraw in upper extremities.   Reflexes: deferred   Sensory: see motor exam   Coordination:  unable to assess  Station/Gait:  deferred       Imaging  I personally reviewed all imaging; relevant findings per HPI.     Lab Results Data   CBC  Recent Labs   Lab 09/28/20 0422 09/26/20  0658   WBC  --  10.3   RBC  --  3.47*   HGB  --  10.8*   HCT  --  35.3    281     Basic Metabolic Panel    Recent Labs   Lab 10/02/20  0700 10/02/20  0056 09/28/20  0950 09/28/20  0422 09/27/20  0427  09/26/20 0658 09/26/20 0658   NA  --   --   --  145* 147*  --  149*   POTASSIUM 4.0 2.9* 4.3 3.2* 3.4   < > 3.3*   CHLORIDE  --   --   --  110* 112*  --  113*   CO2  --   --   --  33* 32  --  33*   BUN  --   --   --  20 21  --  21   CR  --   --   --  0.47* 0.54  --  0.50*   GLC  --   --   --  118* 101*  --  119*   MARII  --   --   --  8.7 8.6  --  8.9    < > = values in this interval not displayed.     Liver Panel  Recent Labs   Lab 09/26/20 0658   PROTTOTAL 5.5*   ALBUMIN 2.0*   BILITOTAL 0.2   ALKPHOS 66   AST 21   ALT 29     INR    Recent Labs   Lab Test 09/07/20  0758   INR 1.05      Lipid Profile    Recent Labs   Lab Test 09/05/20  1256   CHOL 186   HDL 87   LDL 81   TRIG 89     A1C    Recent Labs   Lab Test 09/05/20  1256   A1C 5.5     Troponin I  No results for input(s): TROPI in the last 168 hours.

## 2020-10-02 NOTE — PLAN OF CARE
Shift Summary [id number]:     No acute changes overnight. Neuro status unchanged. Pt having PACs and PVCs, K+ found to be 2.9, replacement initiated, awaiting redraw. Thick oral secretions noted, trach cares performed at 0000.     Alpa Livingston RN

## 2020-10-02 NOTE — PROGRESS NOTES
Spontaneous Breathing Trial    Criteria met for SBT (Y/N):Yes    Settings:    PS:10  PEEP:5  FiO2 25%     Measured Parameters:    RR:17  Tidal volume:364 ml's    Patient tolerance:Pt tolerated the PS trial well. End of designated trial per MD.      Duration of SBT:75 minutes  Plan:Will cont full vent support overnight and will assess for a daily PS trial tomorrow morning.  10/2/2020  Kady Bender, RT

## 2020-10-02 NOTE — PLAN OF CARE
Metoprolol held this AM to keep BP within goal;  Family updated over the phone and at bedside.  Caregiver stress addressed; Family has difficulties with accepting the patient's current situation.     Problem: Adult Inpatient Plan of Care  Goal: Plan of Care Review  Outcome: No Change  Flowsheets (Taken 10/1/2020 1933)  Plan of Care Reviewed With:   spouse   daughter  Progress: no change  Goal: Patient-Specific Goal (Individualization)  Outcome: No Change  Goal: Absence of Hospital-Acquired Illness or Injury  Outcome: No Change  Intervention: Identify and Manage Fall Risk  Recent Flowsheet Documentation  Taken 10/1/2020 1600 by Freddie Iglesias, RN  Safety Promotion/Fall Prevention: fall prevention program maintained  Taken 10/1/2020 1200 by Freddie Iglesias RN  Safety Promotion/Fall Prevention: fall prevention program maintained  Taken 10/1/2020 0800 by Freddie Iglesias RN  Safety Promotion/Fall Prevention: fall prevention program maintained  Intervention: Prevent VTE (venous thromboembolism)  Recent Flowsheet Documentation  Taken 10/1/2020 1600 by Freddie Iglesias, RN  VTE Prevention/Management: pneumatic compression device  Taken 10/1/2020 1200 by Freddie Iglesias RN  VTE Prevention/Management: pneumatic compression device  Taken 10/1/2020 0800 by Freddie Iglesias, RN  VTE Prevention/Management: pneumatic compression device  Goal: Optimal Comfort and Wellbeing  Outcome: No Change  Intervention: Provide Person-Centered Care  Recent Flowsheet Documentation  Taken 10/1/2020 1600 by Freddie Iglesias, RN  Trust Relationship/Rapport:   care explained   reassurance provided   thoughts/feelings acknowledged  Taken 10/1/2020 1200 by Freddie Iglesias RN  Trust Relationship/Rapport:   care explained   reassurance provided   thoughts/feelings acknowledged  Taken 10/1/2020 0800 by Freddie Iglesias, RN  Trust Relationship/Rapport:   care explained   reassurance provided   thoughts/feelings acknowledged  Goal: Readiness for  Transition of Care  Outcome: No Change  Goal: Rounds/Family Conference  Outcome: No Change

## 2020-10-02 NOTE — PROGRESS NOTES
Critical Care Progress Note      October 2, 2020      Name: Sherrell Leonard MRN#: [id number]   Age: 74 year old YOB: 1945     [facility]                 Problem List:   Strokes  Seizures  Encephalopathy  Ventilator support  Hypertension          Summary/Hospital Course:   74-year-old female admitted to the ICU for acute ventilatory failure and airway protection after left MCA subcortical stroke and left PCA stroke with multiple stenotic vessels.  Patient remains intubated.  Patient remains off sedation however her neurologic exam is still comatose.  Patient also continues with low-grade fevers at this time.  She does not have a leukocytosis.    9/21- Still not responding in spite of all sedation being discontinued for 96 hours    9/22- Still not responsive on my exam.By report from neurology, perhaps some improvement in EEG.    9/23- Opened eyes, but no following commands. Increasing WBC, low grade temp and Staph aureus in sputum. Ceftriaxone started    9/25- No changes on exam but EEG without seizure   9/27 eyes open but not following commands     9/28: Won't open eyes, minimal response to painful stimuli. New EEG ordered. Family meeting held for one hour with decision to pursue tracheostomy and PEG.    9/29: Still with hypertension issues. Trach/PEG later today    9/30: No overt improvement, but EEG improved. Trach and PEG in place    10/1: Again no overt improvement, but EEG continues to improve. Anti-seizure meds per neuro-CC    10/2: Patient stable, no significant improvement.     Assessment and plan :     Sherrell Leonard Is a 74 year old female admitted on 9/5/2020 for management of acute respiratory failure, CVA and seizure.       Neurology/Psychiatry:   1.  Seizure are under control with keppra and valproate but neuro exam is not better. Doesn't open eyes today  Plan  Continue off continuous sedatives but continue AE medications.   EEG    CV:  Hypertension persists, NSR   P: continue amlodipine 10  and increased metoprolol to 100  BID on 9/29.     Pulmonary/Ventilator Management:   1. Airway intubated for airway protection  Now with tracheostomy   3. Increased sputum production with fever. Staph aureus in sputum. Completed ceftriaxone course    Plan    Continue full ventilator support   Trach/PEG complete.        GI and Nutrition :   -Continue tube feeds as tolerated.      Renal/Fluids/Electrolytes:   1.  Creatinine within normal limits  Improving hypernatremia    P: continue tube feeds  free water         Infectious Disease:   1.  Staph aureus tracheitis vs. pneumonia    Plan  -Ceftriaxone complete    Endocrine:   Glucoses ok.    Plan  - ICU insulin protocol, goal sugar <180      Hematology/Oncology:   1. Normal WBC now   2. Anemia, no signs, symptoms of active blood loss  3.  Plan  -Continue to monitor        IV/Access:   1. Venous access -PICC line  3.  Plan  - central access required and necessary      ICU Prophylaxis:   1. DVT: Mechanical  2. VAP: HOB 30 degrees, chlorhexidine rinse  3. Stress Ulcer: PPI   6. Feeding - continue tube feeds    Key goals for next 24 hours:   Spontaneous breathing trial in AM  Seizure meds per neuro ICU           Interim History:     Patient still with altered mental status.          Key Medications:       amantadine  100 mg Oral 2 times per day     amLODIPine  10 mg Oral or Feeding Tube Daily     aspirin  81 mg Per Feeding Tube Daily     atorvastatin  40 mg Oral or NG Tube Daily at 8 pm     chlorhexidine  15 mL Mouth/Throat Q12H     clopidogrel  75 mg Oral or Feeding Tube Daily     heparin ANTICOAGULANT  5,000 Units Subcutaneous Q12H     hydrogen peroxide   Topical BID     levETIRAcetam  1,500 mg Oral or PEG tube Q12H     metoprolol tartrate  100 mg Per Feeding Tube BID     pantoprazole  40 mg Per Feeding Tube Daily     sodium chloride (PF)  10 mL Intracatheter Q8H     valproate (DEPACON) intermittent infusion  750 mg Intravenous Q12H       dextrose Stopped (09/30/20 1911)      - MEDICATION INSTRUCTIONS -       sodium chloride Stopped (10/02/20 0000)               Physical Examination:   Temp:  [98.4  F (36.9  C)-100.3  F (37.9  C)] 98.4  F (36.9  C)  Pulse:  [53-71] 62  Resp:  [11-19] 17  BP: (119-182)/(58-94) 153/70  FiO2 (%):  [25 %-30 %] 25 %  SpO2:  [95 %-99 %] 96 %        1700  urinary output   Wt Readings from Last 4 Encounters:   10/02/20 64.2 kg (141 lb 8.6 oz)     BP - Mean:  [] 123  Ventilation Mode: PS  (Pressure Support)  FiO2 (%): 25 %  Rate Set (breaths/minute): 12 breaths/min  Tidal Volume Set (mL): 500 mL  PEEP (cm H2O): 5 cmH2O  Pressure Support (cm H2O): 10 cmH2O  Oxygen Concentration (%): 25 %  Resp: 17    GEN: no acute distress;  No real meaningful movements  HEENT:   ETT ok   Respiratory:  Non labored, does ok on PS 10. Clear     CV/COR: RRR S1S2 no gallop,  No rub, no murmur  ABD: soft nontender,   EXT:  Edema   warm  SKIN: no rash  LINES: clean, dry intact         Data:   All data and imaging reviewed     ROUTINE ICU LABS (Last four results)  CMP  Recent Labs   Lab 10/02/20  0700 10/02/20  0056 09/28/20  0950 09/28/20  0422 09/27/20  0427 09/26/20  0658 09/26/20  0658   NA  --   --   --  145* 147*  --  149*   POTASSIUM 4.0 2.9* 4.3 3.2* 3.4   < > 3.3*   CHLORIDE  --   --   --  110* 112*  --  113*   CO2  --   --   --  33* 32  --  33*   ANIONGAP  --   --   --  2* 3  --  3   GLC  --   --   --  118* 101*  --  119*   BUN  --   --   --  20 21  --  21   CR  --   --   --  0.47* 0.54  --  0.50*   GFRESTIMATED  --   --   --  >90 >90  --  >90   GFRESTBLACK  --   --   --  >90 >90  --  >90   MARII  --   --   --  8.7 8.6  --  8.9   MAG  --  1.9  --  2.1  --   --   --    PHOS  --   --   --  3.1  --   --   --    PROTTOTAL  --   --   --   --   --   --  5.5*   ALBUMIN  --   --   --   --   --   --  2.0*   BILITOTAL  --   --   --   --   --   --  0.2   ALKPHOS  --   --   --   --   --   --  66   AST  --   --   --   --   --   --  21   ALT  --   --   --   --   --   --  29    < > =  values in this interval not displayed.     CBC  Recent Labs   Lab 09/28/20  0422 09/26/20  0658   WBC  --  10.3   RBC  --  3.47*   HGB  --  10.8*   HCT  --  35.3   MCV  --  102*   MCH  --  31.1   MCHC  --  30.6*   RDW  --  13.8    281     INRNo lab results found in last 7 days.  Arterial Blood Gas  Recent Labs   Lab 09/27/20  0427   O2PER 40         Billing: This patient is critically ill: yes. Encephalopathy coma, respiratory failure, Total critical care time today 35 min.    Carlos Petty MD  [facility] Intensivist Service

## 2020-10-03 ENCOUNTER — APPOINTMENT (OUTPATIENT)
Dept: MRI IMAGING | Facility: CLINIC | Age: 75
DRG: 003 | End: 2020-10-03
Attending: PSYCHIATRY & NEUROLOGY
Payer: MEDICARE

## 2020-10-03 LAB
ALBUMIN SERPL-MCNC: 1.8 G/DL (ref 3.4–5)
ALP SERPL-CCNC: 59 U/L (ref 40–150)
ALT SERPL W P-5'-P-CCNC: 19 U/L (ref 0–50)
ANION GAP SERPL CALCULATED.3IONS-SCNC: 3 MMOL/L (ref 3–14)
AST SERPL W P-5'-P-CCNC: 13 U/L (ref 0–45)
BASOPHILS # BLD AUTO: 0.1 10E9/L (ref 0–0.2)
BASOPHILS NFR BLD AUTO: 0.6 %
BILIRUB SERPL-MCNC: 0.5 MG/DL (ref 0.2–1.3)
BUN SERPL-MCNC: 16 MG/DL (ref 7–30)
CALCIUM SERPL-MCNC: 8.4 MG/DL (ref 8.5–10.1)
CHLORIDE SERPL-SCNC: 107 MMOL/L (ref 94–109)
CO2 SERPL-SCNC: 32 MMOL/L (ref 20–32)
CREAT SERPL-MCNC: 0.46 MG/DL (ref 0.52–1.04)
DIFFERENTIAL METHOD BLD: ABNORMAL
EOSINOPHIL # BLD AUTO: 0.7 10E9/L (ref 0–0.7)
EOSINOPHIL NFR BLD AUTO: 8.8 %
ERYTHROCYTE [DISTWIDTH] IN BLOOD BY AUTOMATED COUNT: 14.3 % (ref 10–15)
GFR SERPL CREATININE-BSD FRML MDRD: >90 ML/MIN/{1.73_M2}
GLUCOSE SERPL-MCNC: 122 MG/DL (ref 70–99)
HCT VFR BLD AUTO: 32 % (ref 35–47)
HGB BLD-MCNC: 10 G/DL (ref 11.7–15.7)
IMM GRANULOCYTES # BLD: 0.1 10E9/L (ref 0–0.4)
IMM GRANULOCYTES NFR BLD: 1.3 %
LYMPHOCYTES # BLD AUTO: 1.5 10E9/L (ref 0.8–5.3)
LYMPHOCYTES NFR BLD AUTO: 18.7 %
MCH RBC QN AUTO: 31.3 PG (ref 26.5–33)
MCHC RBC AUTO-ENTMCNC: 31.3 G/DL (ref 31.5–36.5)
MCV RBC AUTO: 100 FL (ref 78–100)
MONOCYTES # BLD AUTO: 1.7 10E9/L (ref 0–1.3)
MONOCYTES NFR BLD AUTO: 22 %
NEUTROPHILS # BLD AUTO: 3.9 10E9/L (ref 1.6–8.3)
NEUTROPHILS NFR BLD AUTO: 48.6 %
NRBC # BLD AUTO: 0 10*3/UL
NRBC BLD AUTO-RTO: 0 /100
PLATELET # BLD AUTO: 186 10E9/L (ref 150–450)
PLATELET # BLD EST: ABNORMAL 10*3/UL
POTASSIUM SERPL-SCNC: 3.2 MMOL/L (ref 3.4–5.3)
POTASSIUM SERPL-SCNC: 3.8 MMOL/L (ref 3.4–5.3)
PROT SERPL-MCNC: 5.2 G/DL (ref 6.8–8.8)
RBC # BLD AUTO: 3.19 10E12/L (ref 3.8–5.2)
RBC MORPH BLD: NORMAL
SODIUM SERPL-SCNC: 142 MMOL/L (ref 133–144)
WBC # BLD AUTO: 7.9 10E9/L (ref 4–11)

## 2020-10-03 PROCEDURE — 250N000011 HC RX IP 250 OP 636: Performed by: PHYSICIAN ASSISTANT

## 2020-10-03 PROCEDURE — A9585 GADOBUTROL INJECTION: HCPCS | Performed by: HOSPITALIST

## 2020-10-03 PROCEDURE — 85025 COMPLETE CBC W/AUTO DIFF WBC: CPT | Performed by: INTERNAL MEDICINE

## 2020-10-03 PROCEDURE — 99232 SBSQ HOSP IP/OBS MODERATE 35: CPT | Performed by: PSYCHIATRY & NEUROLOGY

## 2020-10-03 PROCEDURE — 999N000157 HC STATISTIC RCP TIME EA 10 MIN

## 2020-10-03 PROCEDURE — 250N000013 HC RX MED GY IP 250 OP 250 PS 637: Performed by: PHYSICIAN ASSISTANT

## 2020-10-03 PROCEDURE — 99207 PR APP CREDIT; MD BILLING SHARED VISIT: CPT | Performed by: PHYSICIAN ASSISTANT

## 2020-10-03 PROCEDURE — 94003 VENT MGMT INPAT SUBQ DAY: CPT

## 2020-10-03 PROCEDURE — 250N000013 HC RX MED GY IP 250 OP 250 PS 637: Performed by: STUDENT IN AN ORGANIZED HEALTH CARE EDUCATION/TRAINING PROGRAM

## 2020-10-03 PROCEDURE — 250N000009 HC RX 250: Performed by: PHYSICIAN ASSISTANT

## 2020-10-03 PROCEDURE — 96372 THER/PROPH/DIAG INJ SC/IM: CPT | Performed by: PHYSICIAN ASSISTANT

## 2020-10-03 PROCEDURE — 255N000002 HC RX 255 OP 636: Performed by: HOSPITALIST

## 2020-10-03 PROCEDURE — 258N000003 HC RX IP 258 OP 636: Performed by: PHYSICIAN ASSISTANT

## 2020-10-03 PROCEDURE — 84132 ASSAY OF SERUM POTASSIUM: CPT | Performed by: INTERNAL MEDICINE

## 2020-10-03 PROCEDURE — 80053 COMPREHEN METABOLIC PANEL: CPT | Performed by: INTERNAL MEDICINE

## 2020-10-03 PROCEDURE — 999N000190 HC STATISTIC VAT ROUNDS

## 2020-10-03 PROCEDURE — 70553 MRI BRAIN STEM W/O & W/DYE: CPT

## 2020-10-03 PROCEDURE — 200N000001 HC R&B ICU

## 2020-10-03 PROCEDURE — 999N000009 HC STATISTIC AIRWAY CARE

## 2020-10-03 RX ORDER — GADOBUTROL 604.72 MG/ML
6 INJECTION INTRAVENOUS ONCE
Status: COMPLETED | OUTPATIENT
Start: 2020-10-03 | End: 2020-10-03

## 2020-10-03 RX ORDER — LISINOPRIL 5 MG/1
5 TABLET ORAL DAILY
Status: DISCONTINUED | OUTPATIENT
Start: 2020-10-03 | End: 2020-10-05 | Stop reason: HOSPADM

## 2020-10-03 RX ADMIN — CLOPIDOGREL BISULFATE 75 MG: 75 TABLET, FILM COATED ORAL at 08:10

## 2020-10-03 RX ADMIN — HEPARIN SODIUM 5000 UNITS: 5000 INJECTION, SOLUTION INTRAVENOUS; SUBCUTANEOUS at 20:42

## 2020-10-03 RX ADMIN — POTASSIUM CHLORIDE 20 MEQ: 1.5 POWDER, FOR SOLUTION ORAL at 08:09

## 2020-10-03 RX ADMIN — HYDROGEN PEROXIDE: 30 LIQUID TOPICAL at 10:13

## 2020-10-03 RX ADMIN — AMLODIPINE BESYLATE 10 MG: 10 TABLET ORAL at 08:10

## 2020-10-03 RX ADMIN — GADOBUTROL 6 ML: 604.72 INJECTION INTRAVENOUS at 15:40

## 2020-10-03 RX ADMIN — CHLORHEXIDINE GLUCONATE 15 ML: 1.2 RINSE ORAL at 20:44

## 2020-10-03 RX ADMIN — CHLORHEXIDINE GLUCONATE 15 ML: 1.2 RINSE ORAL at 07:45

## 2020-10-03 RX ADMIN — LISINOPRIL 5 MG: 5 TABLET ORAL at 14:19

## 2020-10-03 RX ADMIN — PANTOPRAZOLE SODIUM 40 MG: 40 TABLET, DELAYED RELEASE ORAL at 08:09

## 2020-10-03 RX ADMIN — VALPROIC ACID 500 MG: 250 SOLUTION ORAL at 20:43

## 2020-10-03 RX ADMIN — ATORVASTATIN CALCIUM 40 MG: 40 TABLET, FILM COATED ORAL at 20:43

## 2020-10-03 RX ADMIN — POTASSIUM CHLORIDE 40 MEQ: 1.5 POWDER, FOR SOLUTION ORAL at 05:26

## 2020-10-03 RX ADMIN — MICONAZOLE NITRATE: 20 POWDER TOPICAL at 21:36

## 2020-10-03 RX ADMIN — MICONAZOLE NITRATE: 20 POWDER TOPICAL at 10:11

## 2020-10-03 RX ADMIN — HYDROGEN PEROXIDE: 30 LIQUID TOPICAL at 20:45

## 2020-10-03 RX ADMIN — AMANTADINE HYDROCHLORIDE 100 MG: 100 CAPSULE ORAL at 12:07

## 2020-10-03 RX ADMIN — METOPROLOL TARTRATE 100 MG: 50 TABLET, FILM COATED ORAL at 08:10

## 2020-10-03 RX ADMIN — VALPROATE SODIUM 500 MG: 100 INJECTION, SOLUTION INTRAVENOUS at 08:10

## 2020-10-03 RX ADMIN — LEVETIRACETAM 1500 MG: 100 SOLUTION ORAL at 20:43

## 2020-10-03 RX ADMIN — ASPIRIN 81 MG 81 MG: 81 TABLET ORAL at 08:10

## 2020-10-03 RX ADMIN — HYDRALAZINE HYDROCHLORIDE 10 MG: 20 INJECTION INTRAMUSCULAR; INTRAVENOUS at 16:45

## 2020-10-03 RX ADMIN — LEVETIRACETAM 1500 MG: 100 SOLUTION ORAL at 08:10

## 2020-10-03 RX ADMIN — AMANTADINE HYDROCHLORIDE 100 MG: 100 CAPSULE ORAL at 05:26

## 2020-10-03 RX ADMIN — METOPROLOL TARTRATE 100 MG: 50 TABLET, FILM COATED ORAL at 20:43

## 2020-10-03 RX ADMIN — POLYETHYLENE GLYCOL 3350 17 G: 17 POWDER, FOR SOLUTION ORAL at 08:09

## 2020-10-03 RX ADMIN — HEPARIN SODIUM 5000 UNITS: 5000 INJECTION, SOLUTION INTRAVENOUS; SUBCUTANEOUS at 08:09

## 2020-10-03 ASSESSMENT — ACTIVITIES OF DAILY LIVING (ADL)
ADLS_ACUITY_SCORE: 22

## 2020-10-03 ASSESSMENT — MIFFLIN-ST. JEOR: SCORE: 1078.25

## 2020-10-03 NOTE — PROGRESS NOTES
[facility] ICU RESPIRATORY NOTE        Date of Admission: 9/5/2020    Date of Intubation (most recent):  9/11/20    Reason for Mechanical Ventilation: AW protection 2 stroke    Number of Days on Mechanical Ventilation:  22    Significant Events Today:  None    ABG Results:   Recent Labs   Lab 09/27/20  0427   O2PER 40       Current Vent Settings: Ventilation Mode: CMV/AC  (Continuous Mandatory Ventilation/ Assist Control)  FiO2 (%): 25 %  Rate Set (breaths/minute): 12 breaths/min  Tidal Volume Set (mL): 500 mL  PEEP (cm H2O): 5 cmH2O  Pressure Support (cm H2O): 10 cmH2O  Oxygen Concentration (%): 25 %  Resp: 18      Plan:  Pt to remain on full vent support overnight    Judah Cruz, RT

## 2020-10-03 NOTE — PROGRESS NOTES
Critical Care Progress Note      October 3, 2020      Name: Sherrell Leonard MRN#: [id number]   Age: 74 year old YOB: 1945                      Problem List:   Strokes  Seizures  Encephalopathy  Ventilator support  Hypertension          Summary/Hospital Course:   74-year-old female admitted to the ICU for acute ventilatory failure and airway protection after left MCA subcortical stroke and left PCA stroke with multiple stenotic vessels.  Patient remains intubated.  Patient remains off sedation however her neurologic exam is still comatose.  Patient also continues with low-grade fevers at this time.  She does not have a leukocytosis.    9/21- Still not responding in spite of all sedation being discontinued for 96 hours    9/22- Still not responsive on my exam.By report from neurology, perhaps some improvement in EEG.    9/23- Opened eyes, but no following commands. Increasing WBC, low grade temp and Staph aureus in sputum. Ceftriaxone started    9/25- No changes on exam but EEG without seizure   9/27 eyes open but not following commands     9/28: Won't open eyes, minimal response to painful stimuli. New EEG ordered. Family meeting held for one hour with decision to pursue tracheostomy and PEG.    9/29: Still with hypertension issues. Trach/PEG later today    9/30: No overt improvement, but EEG improved. Trach and PEG in place    10/1: Again no overt improvement, but EEG continues to improve. Anti-seizure meds per neuro-CC    10/2: Patient stable, no significant improvement.     10/3: Patient about the same. Neuro CC will order MRI today    Assessment and plan :     Sherrell Leonard Is a 74 year old female admitted on 9/5/2020 for management of acute respiratory failure, CVA and seizure.       Neurology/Psychiatry:   1.  Seizure are under control with keppra and valproate but neuro exam is not better. Doesn't open eyes today  Plan  Continue off continuous sedatives but continue AE medications.   EEG    CV:   Hypertension persists, NSR   P: continue amlodipine 10 and increased metoprolol to 100  BID on 9/29.   MRI today    Pulmonary/Ventilator Management:   1. Airway intubated for airway protection  Now with tracheostomy   3. Increased sputum production with fever. Staph aureus in sputum. Completed ceftriaxone course    Plan    Continue full ventilator support   Trach/PEG complete.        GI and Nutrition :   -Continue tube feeds as tolerated.      Renal/Fluids/Electrolytes:   1.  Creatinine within normal limits  Improving hypernatremia    P: continue tube feeds  free water         Infectious Disease:   1.  Staph aureus tracheitis vs. pneumonia    Plan  -Ceftriaxone complete    Endocrine:   Glucoses ok.    Plan  - ICU insulin protocol, goal sugar <180      Hematology/Oncology:   1. Normal WBC now   2. Anemia, no signs, symptoms of active blood loss  3.  Plan  -Continue to monitor        IV/Access:   1. Venous access -PICC line  3.  Plan  - central access required and necessary      ICU Prophylaxis:   1. DVT: Mechanical  2. VAP: HOB 30 degrees, chlorhexidine rinse  3. Stress Ulcer: PPI   6. Feeding - continue tube feeds    Key goals for next 24 hours:   Spontaneous breathing trial in AM  Seizure meds per neuro ICU  MRI today           Interim History:     Patient still with altered mental status.          Key Medications:       amantadine  100 mg Oral 2 times per day     amLODIPine  10 mg Oral or Feeding Tube Daily     aspirin  81 mg Per Feeding Tube Daily     atorvastatin  40 mg Oral or NG Tube Daily at 8 pm     chlorhexidine  15 mL Mouth/Throat Q12H     clopidogrel  75 mg Oral or Feeding Tube Daily     heparin ANTICOAGULANT  5,000 Units Subcutaneous Q12H     hydrogen peroxide   Topical BID     levETIRAcetam  1,500 mg Oral or PEG tube Q12H     metoprolol tartrate  100 mg Per Feeding Tube BID     pantoprazole  40 mg Per Feeding Tube Daily     sodium chloride (PF)  10 mL Intracatheter Q8H     valproate (DEPACON)  intermittent infusion  500 mg Intravenous Q12H       dextrose Stopped (09/30/20 1911)     - MEDICATION INSTRUCTIONS -       sodium chloride Stopped (10/02/20 0000)               Physical Examination:   Temp:  [98.3  F (36.8  C)-100.6  F (38.1  C)] 98.6  F (37  C)  Pulse:  [50-65] 54  Resp:  [7-24] 13  BP: (139-181)/(57-95) 151/64  FiO2 (%):  [25 %] 25 %  SpO2:  [90 %-98 %] 91 %        1700  urinary output   Wt Readings from Last 4 Encounters:   10/03/20 63 kg (138 lb 14.2 oz)     BP - Mean:  [] 96  Ventilation Mode: CMV/AC  (Continuous Mandatory Ventilation/ Assist Control)  FiO2 (%): 25 %  Rate Set (breaths/minute): 12 breaths/min  Tidal Volume Set (mL): 500 mL  PEEP (cm H2O): 5 cmH2O  Pressure Support (cm H2O): 10 cmH2O  Oxygen Concentration (%): 25 %  Resp: 13    GEN: no acute distress;  No real meaningful movements  HEENT:   ETT ok   Respiratory:  Non labored, does ok on PS 10. Clear     CV/COR: RRR S1S2 no gallop,  No rub, no murmur  ABD: soft nontender,   EXT:  Edema   warm  SKIN: no rash  LINES: clean, dry intact         Data:   All data and imaging reviewed     ROUTINE ICU LABS (Last four results)  CMP  Recent Labs   Lab 10/03/20  1007 10/03/20  0442 10/02/20  1650 10/02/20  0700 10/02/20  0056 09/28/20  0422 09/28/20  0422 09/27/20  0427   NA  --  142  --   --   --   --  145* 147*   POTASSIUM 3.8 3.2* 3.4 4.0 2.9*   < > 3.2* 3.4   CHLORIDE  --  107  --   --   --   --  110* 112*   CO2  --  32  --   --   --   --  33* 32   ANIONGAP  --  3  --   --   --   --  2* 3   GLC  --  122*  --   --   --   --  118* 101*   BUN  --  16  --   --   --   --  20 21   CR  --  0.46*  --   --   --   --  0.47* 0.54   GFRESTIMATED  --  >90  --   --   --   --  >90 >90   GFRESTBLACK  --  >90  --   --   --   --  >90 >90   MARII  --  8.4*  --   --   --   --  8.7 8.6   MAG  --   --   --   --  1.9  --  2.1  --    PHOS  --   --   --   --   --   --  3.1  --    PROTTOTAL  --  5.2*  --   --   --   --   --   --    ALBUMIN  --  1.8*  --    --   --   --   --   --    BILITOTAL  --  0.5  --   --   --   --   --   --    ALKPHOS  --  59  --   --   --   --   --   --    AST  --  13  --   --   --   --   --   --    ALT  --  19  --   --   --   --   --   --     < > = values in this interval not displayed.     CBC  Recent Labs   Lab 10/03/20  0442 09/28/20  0422   WBC 7.9  --    RBC 3.19*  --    HGB 10.0*  --    HCT 32.0*  --      --    MCH 31.3  --    MCHC 31.3*  --    RDW 14.3  --     263     INRNo lab results found in last 7 days.  Arterial Blood Gas  Recent Labs   Lab 09/27/20 0427   O2PER 40         Billing: This patient is critically ill: yes. Encephalopathy coma, respiratory failure, Total critical care time today 35 min.    Carlos Petty MD  [facility] Intensivist Service

## 2020-10-03 NOTE — PROGRESS NOTES
"  [facility]    Stroke Progress Note    Interval Events  No acute events overnight     Impression & plan  Scattered intracranial atherosclerosis with severe focal left M1 stenosis, now s/p balloon angioplasty 9/11   Left MCA syndrome  New R MCA infarcts  - Repeat MRI brain ordered, will follow results   - SBP goal 140-170 mmHg   - Lisinopril 5 mg daily added for BP management   - Continue DAPT with ASA 81 mg daily + Plavix 75 mg daily   - Continue Lipitor 40 mg daily  - Consideration for possible stent at site of L M1 angioplasty given stenosis on MRA. Will focus on seizure management at this time, but potential intervention pending improvement from seizure perspective     Non-convulsive status epilepticus (NCSE), resolved  Ongoing severe encephalopathy  - Continue Keppra 1500 mg q12 hours  - Decrease VPA to 500 mg q12 hours route changed to oral/PEG     We will continue to follow     Octavia Cowan PA-C   Neurology  To page me or covering stroke neurology team member, click here: AMCOM   Choose \"On Call\" tab at top, then search dropdown box for \"Neurology Adult\", select location, press Enter, then look for stroke/neuro ICU/telestroke.  ______________________________________________________    Medications   Home Meds  Prior to Admission medications    Medication Sig Start Date End Date Taking? Authorizing Provider   Multiple Vitamins-Minerals (SYSTANE ICAPS AREDS2) CAPS Take 1 capsule by mouth daily   Yes Unknown, Entered By History   multivitamin, therapeutic (THERA-VIT) TABS tablet Take 1 tablet by mouth daily   Yes Unknown, Entered By History       Scheduled Meds    amantadine  100 mg Oral 2 times per day     amLODIPine  10 mg Oral or Feeding Tube Daily     aspirin  81 mg Per Feeding Tube Daily     atorvastatin  40 mg Oral or NG Tube Daily at 8 pm     chlorhexidine  15 mL Mouth/Throat Q12H     clopidogrel  75 mg Oral or Feeding Tube Daily     heparin ANTICOAGULANT  5,000 Units Subcutaneous " Q12H     hydrogen peroxide   Topical BID     levETIRAcetam  1,500 mg Oral or PEG tube Q12H     metoprolol tartrate  100 mg Per Feeding Tube BID     pantoprazole  40 mg Per Feeding Tube Daily     sodium chloride (PF)  10 mL Intracatheter Q8H     valproate (DEPACON) intermittent infusion  500 mg Intravenous Q12H       Infusion Meds    dextrose Stopped (09/30/20 1911)     - MEDICATION INSTRUCTIONS -       sodium chloride Stopped (10/02/20 0000)       PRN Meds  acetaminophen **OR** acetaminophen, bisacodyl, artificial tears ophthalmic solution, dextrose, glucose **OR** dextrose **OR** glucagon, fentaNYL, hydrALAZINE, labetalol, lidocaine 4%, lidocaine (buffered or not buffered), - MEDICATION INSTRUCTIONS -, melatonin, miconazole, naloxone, ondansetron **OR** ondansetron, polyethylene glycol, potassium chloride, potassium chloride with lidocaine, potassium chloride, potassium chloride, potassium chloride, prochlorperazine **OR** prochlorperazine **OR** prochlorperazine, senna-docusate **OR** senna-docusate, sodium chloride (PF)       PHYSICAL EXAMINATION  Temp:  [98  F (36.7  C)-100.6  F (38.1  C)] 98  F (36.7  C)  Pulse:  [50-65] 59  Resp:  [7-24] 15  BP: (139-182)/(57-95) 182/77  FiO2 (%):  [25 %] 25 %  SpO2:  [90 %-98 %] 97 %        Neurologic  Mental Status: did not open eyes today during exam, does not following commands  Cranial Nerves: PERRL, does not protrude tongue, spontaneous movement of mouth  Motor: normal muscle tone and bulk, withdraws to noxious stimuli in lower extremities, does not withdraw in upper extremities.   Reflexes: deferred   Sensory: see motor exam   Coordination:  unable to assess  Station/Gait:  deferred     Imaging  I personally reviewed all imaging; relevant findings per HPI.     Lab Results Data   CBC  Recent Labs   Lab 10/03/20  0442 09/28/20  0422   WBC 7.9  --    RBC 3.19*  --    HGB 10.0*  --    HCT 32.0*  --     263     Basic Metabolic Panel    Recent Labs   Lab 10/03/20  1007  10/03/20  0442 10/02/20  1650 09/28/20  0422 09/28/20  0422 09/27/20 0427   NA  --  142  --   --  145* 147*   POTASSIUM 3.8 3.2* 3.4   < > 3.2* 3.4   CHLORIDE  --  107  --   --  110* 112*   CO2  --  32  --   --  33* 32   BUN  --  16  --   --  20 21   CR  --  0.46*  --   --  0.47* 0.54   GLC  --  122*  --   --  118* 101*   MARII  --  8.4*  --   --  8.7 8.6    < > = values in this interval not displayed.     Liver Panel  Recent Labs   Lab 10/03/20  0442   PROTTOTAL 5.2*   ALBUMIN 1.8*   BILITOTAL 0.5   ALKPHOS 59   AST 13   ALT 19     INR    Recent Labs   Lab Test 09/07/20  0758   INR 1.05      Lipid Profile    Recent Labs   Lab Test 09/05/20  1256   CHOL 186   HDL 87   LDL 81   TRIG 89     A1C    Recent Labs   Lab Test 09/05/20  1256   A1C 5.5     Troponin I  No results for input(s): TROPI in the last 168 hours.

## 2020-10-03 NOTE — PLAN OF CARE
Neuros remain unchanged. Lisinopril started today and hydralazine given x1 for HTN w/ improvement. Repeat MRI. BM x2. Pt's  updated on POC at bedside, supportive.  Jeanette Patino RN

## 2020-10-03 NOTE — PLAN OF CARE
Shift Summary [id number]:    No acute changes overnight. Neuro status remains unchanged, not as frequently opening eyes. Sinus pablo when sleeping. Senna given PRN per bowel regiment plan.     Alpa Livingston RN

## 2020-10-03 NOTE — PLAN OF CARE
Neuros remain unchanged. No BM, active BS. TF remain at goal. Adequate urine out per purewick. Pt appears to rest comfortably. Trach site cares done. Pt's  updated on POC at bedside, and daughter via phone, supportive.  Jeanette Patino RN

## 2020-10-04 PROCEDURE — 99232 SBSQ HOSP IP/OBS MODERATE 35: CPT | Performed by: PSYCHIATRY & NEUROLOGY

## 2020-10-04 PROCEDURE — 272N000078 HC NUTRITION PRODUCT INTERMEDIATE LITER

## 2020-10-04 PROCEDURE — 250N000011 HC RX IP 250 OP 636: Performed by: PHYSICIAN ASSISTANT

## 2020-10-04 PROCEDURE — 250N000013 HC RX MED GY IP 250 OP 250 PS 637: Performed by: PHYSICIAN ASSISTANT

## 2020-10-04 PROCEDURE — 250N000013 HC RX MED GY IP 250 OP 250 PS 637: Performed by: STUDENT IN AN ORGANIZED HEALTH CARE EDUCATION/TRAINING PROGRAM

## 2020-10-04 PROCEDURE — 99207 PR APP CREDIT; MD BILLING SHARED VISIT: CPT | Performed by: PHYSICIAN ASSISTANT

## 2020-10-04 PROCEDURE — 200N000001 HC R&B ICU

## 2020-10-04 PROCEDURE — 999N000157 HC STATISTIC RCP TIME EA 10 MIN

## 2020-10-04 PROCEDURE — 999N000190 HC STATISTIC VAT ROUNDS

## 2020-10-04 PROCEDURE — 94003 VENT MGMT INPAT SUBQ DAY: CPT

## 2020-10-04 PROCEDURE — 96372 THER/PROPH/DIAG INJ SC/IM: CPT | Performed by: PHYSICIAN ASSISTANT

## 2020-10-04 PROCEDURE — 999N000009 HC STATISTIC AIRWAY CARE

## 2020-10-04 RX ADMIN — HYDROGEN PEROXIDE: 30 LIQUID TOPICAL at 08:39

## 2020-10-04 RX ADMIN — ASPIRIN 81 MG 81 MG: 81 TABLET ORAL at 08:38

## 2020-10-04 RX ADMIN — LEVETIRACETAM 1500 MG: 100 SOLUTION ORAL at 08:38

## 2020-10-04 RX ADMIN — LEVETIRACETAM 1500 MG: 100 SOLUTION ORAL at 20:10

## 2020-10-04 RX ADMIN — AMLODIPINE BESYLATE 10 MG: 10 TABLET ORAL at 08:38

## 2020-10-04 RX ADMIN — AMANTADINE HYDROCHLORIDE 100 MG: 100 CAPSULE ORAL at 12:08

## 2020-10-04 RX ADMIN — CHLORHEXIDINE GLUCONATE 15 ML: 1.2 RINSE ORAL at 08:29

## 2020-10-04 RX ADMIN — METOPROLOL TARTRATE 100 MG: 50 TABLET, FILM COATED ORAL at 08:38

## 2020-10-04 RX ADMIN — ATORVASTATIN CALCIUM 40 MG: 40 TABLET, FILM COATED ORAL at 20:10

## 2020-10-04 RX ADMIN — METOPROLOL TARTRATE 100 MG: 50 TABLET, FILM COATED ORAL at 20:10

## 2020-10-04 RX ADMIN — PANTOPRAZOLE SODIUM 40 MG: 40 TABLET, DELAYED RELEASE ORAL at 08:38

## 2020-10-04 RX ADMIN — VALPROIC ACID 500 MG: 250 SOLUTION ORAL at 20:10

## 2020-10-04 RX ADMIN — MICONAZOLE NITRATE: 20 POWDER TOPICAL at 08:18

## 2020-10-04 RX ADMIN — HEPARIN SODIUM 5000 UNITS: 5000 INJECTION, SOLUTION INTRAVENOUS; SUBCUTANEOUS at 08:39

## 2020-10-04 RX ADMIN — LISINOPRIL 5 MG: 5 TABLET ORAL at 08:39

## 2020-10-04 RX ADMIN — AMANTADINE HYDROCHLORIDE 100 MG: 100 CAPSULE ORAL at 05:38

## 2020-10-04 RX ADMIN — HEPARIN SODIUM 5000 UNITS: 5000 INJECTION, SOLUTION INTRAVENOUS; SUBCUTANEOUS at 20:09

## 2020-10-04 RX ADMIN — CHLORHEXIDINE GLUCONATE 15 ML: 1.2 RINSE ORAL at 20:11

## 2020-10-04 RX ADMIN — CLOPIDOGREL BISULFATE 75 MG: 75 TABLET, FILM COATED ORAL at 08:38

## 2020-10-04 RX ADMIN — VALPROIC ACID 500 MG: 250 SOLUTION ORAL at 08:39

## 2020-10-04 RX ADMIN — HYDROGEN PEROXIDE: 30 LIQUID TOPICAL at 20:11

## 2020-10-04 ASSESSMENT — ACTIVITIES OF DAILY LIVING (ADL)
ADLS_ACUITY_SCORE: 22

## 2020-10-04 ASSESSMENT — MIFFLIN-ST. JEOR: SCORE: 1061.25

## 2020-10-04 NOTE — PROGRESS NOTES
"[facility]    Stroke Progress Note    Interval Events  No acute changes overnight.     Impression & plan  Scattered intracranial atherosclerosis with severe focal left M1 stenosis, now s/p balloon angioplasty 9/11   Left MCA syndrome  New R MCA infarcts  - No acute changes seen on repeat MRI   - SBP goal 140-170 mmHg   - Lisinopril 5 mg daily added for BP management   - Continue DAPT with ASA 81 mg daily + Plavix 75 mg daily (for 90 days total from initial dose) THEN discontinue Plavix and continue ASA 81 mg daily indefinitely   - Continue Lipitor 40 mg daily     Non-convulsive status epilepticus (NCSE), resolved  Ongoing severe encephalopathy  - Continue Keppra 1500 mg q12 hours  - VPA to 500 mg q12 hours     Thank you for this consult. No further stroke evaluation is recommended, so we will sign off. Please contact us with any additional questions.    Octavia Cowan PA-C   Neurology  To page me or covering stroke neurology team member, click here: AMCOM   Choose \"On Call\" tab at top, then search dropdown box for \"Neurology Adult\", select location, press Enter, then look for stroke/neuro ICU/telestroke.  ______________________________________________________    Medications   Home Meds  Prior to Admission medications    Medication Sig Start Date End Date Taking? Authorizing Provider   Multiple Vitamins-Minerals (SYSTANE ICAPS AREDS2) CAPS Take 1 capsule by mouth daily   Yes Unknown, Entered By History   multivitamin, therapeutic (THERA-VIT) TABS tablet Take 1 tablet by mouth daily   Yes Unknown, Entered By History       Scheduled Meds    amantadine  100 mg Oral 2 times per day     amLODIPine  10 mg Oral or Feeding Tube Daily     aspirin  81 mg Per Feeding Tube Daily     atorvastatin  40 mg Oral or NG Tube Daily at 8 pm     chlorhexidine  15 mL Mouth/Throat Q12H     clopidogrel  75 mg Oral or Feeding Tube Daily     heparin ANTICOAGULANT  5,000 Units Subcutaneous Q12H     hydrogen peroxide   " Topical BID     levETIRAcetam  1,500 mg Oral or PEG tube Q12H     lisinopril  5 mg Oral Daily     metoprolol tartrate  100 mg Per Feeding Tube BID     pantoprazole  40 mg Per Feeding Tube Daily     sodium chloride (PF)  10 mL Intracatheter Q8H     valproic acid  500 mg Oral or PEG tube Q12H       Infusion Meds    dextrose Stopped (09/30/20 1911)     - MEDICATION INSTRUCTIONS -       sodium chloride Stopped (10/02/20 0000)       PRN Meds  acetaminophen **OR** acetaminophen, bisacodyl, artificial tears ophthalmic solution, dextrose, glucose **OR** dextrose **OR** glucagon, fentaNYL, hydrALAZINE, labetalol, lidocaine 4%, lidocaine (buffered or not buffered), - MEDICATION INSTRUCTIONS -, melatonin, miconazole, naloxone, ondansetron **OR** ondansetron, polyethylene glycol, potassium chloride, potassium chloride with lidocaine, potassium chloride, potassium chloride, potassium chloride, prochlorperazine **OR** prochlorperazine **OR** prochlorperazine, senna-docusate **OR** senna-docusate, sodium chloride (PF)       PHYSICAL EXAMINATION  Temp:  [98  F (36.7  C)-99.5  F (37.5  C)] 99.5  F (37.5  C)  Pulse:  [52-65] 60  Resp:  [11-26] 17  BP: (142-182)/(59-83) 171/80  FiO2 (%):  [25 %] 25 %  SpO2:  [93 %-99 %] 94 %     Mental Status: Did not open eyes today during exam, grimace to pain, does not following commands  Cranial Nerves: PERRL, does not protrude tongue, spontaneous movement of mouth.   Motor: normal muscle tone and bulk, withdraws with noxious stimuli in RUE, flexion in bilateral lower extremities to noxious stimuli. No spontaneous movement in either upper or lower extremities   Reflexes: deferred   Sensory: see motor exam   Coordination:  unable to assess  Station/Gait:  deferred        Imaging  I personally reviewed all imaging; relevant findings per HPI.     Lab Results Data   CBC  Recent Labs   Lab 10/03/20  0442 09/28/20  0422   WBC 7.9  --    RBC 3.19*  --    HGB 10.0*  --    HCT 32.0*  --     263      Basic Metabolic Panel    Recent Labs   Lab 10/03/20  1007 10/03/20  0442 10/02/20  1650 09/28/20  0422 09/28/20 0422   NA  --  142  --   --  145*   POTASSIUM 3.8 3.2* 3.4   < > 3.2*   CHLORIDE  --  107  --   --  110*   CO2  --  32  --   --  33*   BUN  --  16  --   --  20   CR  --  0.46*  --   --  0.47*   GLC  --  122*  --   --  118*   MARII  --  8.4*  --   --  8.7    < > = values in this interval not displayed.     Liver Panel  Recent Labs   Lab 10/03/20  0442   PROTTOTAL 5.2*   ALBUMIN 1.8*   BILITOTAL 0.5   ALKPHOS 59   AST 13   ALT 19     INR    Recent Labs   Lab Test 09/07/20  0758   INR 1.05      Lipid Profile    Recent Labs   Lab Test 09/05/20  1256   CHOL 186   HDL 87   LDL 81   TRIG 89     A1C    Recent Labs   Lab Test 09/05/20  1256   A1C 5.5     Troponin I  No results for input(s): TROPI in the last 168 hours.

## 2020-10-04 NOTE — PROGRESS NOTES
Critical Care Progress Note      October 3, 2020      Name: Sherrell Leonard MRN#: [id number]   Age: 74 year old YOB: 1945                      Problem List:   Strokes  Seizures  Encephalopathy  Ventilator support  Hypertension          Summary/Hospital Course:   74-year-old female admitted to the ICU for acute ventilatory failure and airway protection after left MCA subcortical stroke and left PCA stroke with multiple stenotic vessels.  Patient remains intubated.  Patient remains off sedation however her neurologic exam is still comatose.  Patient also continues with low-grade fevers at this time.  She does not have a leukocytosis.    9/21- Still not responding in spite of all sedation being discontinued for 96 hours    9/22- Still not responsive on my exam.By report from neurology, perhaps some improvement in EEG.    9/23- Opened eyes, but no following commands. Increasing WBC, low grade temp and Staph aureus in sputum. Ceftriaxone started    9/25- No changes on exam but EEG without seizure   9/27 eyes open but not following commands     9/28: Won't open eyes, minimal response to painful stimuli. New EEG ordered. Family meeting held for one hour with decision to pursue tracheostomy and PEG.    9/29: Still with hypertension issues. Trach/PEG later today    9/30: No overt improvement, but EEG improved. Trach and PEG in place    10/1: Again no overt improvement, but EEG continues to improve. Anti-seizure meds per neuro-CC    10/2: Patient stable, no significant improvement.     10/3: Patient about the same. Neuro CC will order MRI today    10/4: No change    Assessment and plan :     Sherrell Leonard Is a 74 year old female admitted on 9/5/2020 for management of acute respiratory failure, CVA and seizure.       Neurology/Psychiatry:   1.  Seizure are under control with keppra and valproate but neuro exam is not better. Doesn't open eyes today  Plan  Continue off continuous sedatives but continue AE medications.    EEG    CV:  Hypertension persists, NSR   P: continue amlodipine 10 and increased metoprolol to 100  BID on 9/29.   MRI done, no significant change    Pulmonary/Ventilator Management:   1. Airway intubated for airway protection  Now with tracheostomy   3. Completed ceftriaxone course for Staph aureus in sputum    Plan    Continue full ventilator support   Trach/PEG complete.        GI and Nutrition :   -Continue tube feeds as tolerated.      Renal/Fluids/Electrolytes:   1.  Creatinine within normal limits  Improving hypernatremia    P: continue tube feeds  free water         Infectious Disease:   1.  Staph aureus tracheitis vs. pneumonia    Plan  -Ceftriaxone complete    Endocrine:   Glucoses ok.    Plan  - ICU insulin protocol, goal sugar <180      Hematology/Oncology:   1. Normal WBC now   2. Anemia, no signs, symptoms of active blood loss  3.  Plan  -Continue to monitor        IV/Access:   1. Venous access -PICC line    Plan  - central access required and necessary      ICU Prophylaxis:   1. DVT: Mechanical  2. VAP: HOB 30 degrees, chlorhexidine rinse  3. Stress Ulcer: PPI   6. Feeding - continue tube feeds    Key goals for next 24 hours:   Spontaneous breathing trial in AM  Seizure meds per neuro ICU  Care coordination for LTACH transfer when neuro CC has completed seizure medication titration.           Interim History:     Patient still with altered mental status.          Key Medications:       amantadine  100 mg Oral 2 times per day     amLODIPine  10 mg Oral or Feeding Tube Daily     aspirin  81 mg Per Feeding Tube Daily     atorvastatin  40 mg Oral or NG Tube Daily at 8 pm     chlorhexidine  15 mL Mouth/Throat Q12H     clopidogrel  75 mg Oral or Feeding Tube Daily     heparin ANTICOAGULANT  5,000 Units Subcutaneous Q12H     hydrogen peroxide   Topical BID     levETIRAcetam  1,500 mg Oral or PEG tube Q12H     lisinopril  5 mg Oral Daily     metoprolol tartrate  100 mg Per Feeding Tube BID     pantoprazole   40 mg Per Feeding Tube Daily     sodium chloride (PF)  10 mL Intracatheter Q8H     valproic acid  500 mg Oral or PEG tube Q12H       dextrose Stopped (09/30/20 1911)     - MEDICATION INSTRUCTIONS -       sodium chloride Stopped (10/02/20 0000)               Physical Examination:   Temp:  [98.6  F (37  C)-99.5  F (37.5  C)] 99.1  F (37.3  C)  Pulse:  [52-65] 56  Resp:  [11-26] 20  BP: (119-183)/(57-97) 136/60  FiO2 (%):  [25 %] 25 %  SpO2:  [93 %-99 %] 94 %        1700  urinary output   Wt Readings from Last 4 Encounters:   10/04/20 61.3 kg (135 lb 2.3 oz)     BP - Mean:  [] 90  Ventilation Mode: CMV/AC  (Continuous Mandatory Ventilation/ Assist Control)  FiO2 (%): 25 %  Rate Set (breaths/minute): 12 breaths/min  Tidal Volume Set (mL): 500 mL  PEEP (cm H2O): 5 cmH2O  Oxygen Concentration (%): 25 %  Resp: 20    GEN: no acute distress;  No real meaningful movements  HEENT:   ETT ok   Respiratory:  Non labored, does ok on PS 10. Clear     CV/COR: RRR S1S2 no gallop,  No rub, no murmur  ABD: soft nontender,   EXT:  Edema   warm  SKIN: no rash  LINES: clean, dry intact         Data:   All data and imaging reviewed     ROUTINE ICU LABS (Last four results)  CMP  Recent Labs   Lab 10/03/20  1007 10/03/20  0442 10/02/20  1650 10/02/20  0700 10/02/20  0056 09/28/20  0422 09/28/20  0422   NA  --  142  --   --   --   --  145*   POTASSIUM 3.8 3.2* 3.4 4.0 2.9*   < > 3.2*   CHLORIDE  --  107  --   --   --   --  110*   CO2  --  32  --   --   --   --  33*   ANIONGAP  --  3  --   --   --   --  2*   GLC  --  122*  --   --   --   --  118*   BUN  --  16  --   --   --   --  20   CR  --  0.46*  --   --   --   --  0.47*   GFRESTIMATED  --  >90  --   --   --   --  >90   GFRESTBLACK  --  >90  --   --   --   --  >90   MARII  --  8.4*  --   --   --   --  8.7   MAG  --   --   --   --  1.9  --  2.1   PHOS  --   --   --   --   --   --  3.1   PROTTOTAL  --  5.2*  --   --   --   --   --    ALBUMIN  --  1.8*  --   --   --   --   --    BILITOTAL   --  0.5  --   --   --   --   --    ALKPHOS  --  59  --   --   --   --   --    AST  --  13  --   --   --   --   --    ALT  --  19  --   --   --   --   --     < > = values in this interval not displayed.     CBC  Recent Labs   Lab 10/03/20  0442 09/28/20  0422   WBC 7.9  --    RBC 3.19*  --    HGB 10.0*  --    HCT 32.0*  --      --    MCH 31.3  --    MCHC 31.3*  --    RDW 14.3  --     263     INRNo lab results found in last 7 days.  Arterial Blood Gas  No lab results found in last 7 days.      Billing: This patient is critically ill: yes. Encephalopathy coma, respiratory failure, Total critical care time today 35 min.    Carlos Petty MD  [facility] Intensivist Service

## 2020-10-04 NOTE — PLAN OF CARE
No neuro changes. Failed PS trial today; tachypneic and low TV. Pt's  updated on POC at bedside, supportive, children updated via facetime. Plan- possible discharge to LTAC tomorrow.  Jeanette Patino RN

## 2020-10-04 NOTE — PLAN OF CARE
Shift Summary [id number]:    No acute changes overnight. Neuro status remains unchanged.    Alpa Livingston RN

## 2020-10-04 NOTE — PROGRESS NOTES
[facility] ICU RESPIRATORY NOTE           Date of Admission: 9/5/2020     Date of Intubation (most recent):  9/11/20     Reason for Mechanical Ventilation: AW protection 2 stroke     Number of Days on Mechanical Ventilation:  23     Significant Events Today:  None overnight     ABG Results:       Recent Labs   Lab 09/27/20  0427   O2PER 40       Current Vent Settings: Ventilation Mode: CMV/AC  (Continuous Mandatory Ventilation/ Assist Control)  FiO2 (%): 25 %  Rate Set (breaths/minute): 12 breaths/min  Tidal Volume Set (mL): 500 mL  PEEP (cm H2O): 5 cmH2O  Pressure Support (cm H2O): 10 cmH2O  Oxygen Concentration (%): 25 %  Resp: 18        Plan:  Pt to remain on full vent support.  Assess SBT per MD order    Steve Rodriguez, RT

## 2020-10-05 VITALS
HEIGHT: 62 IN | WEIGHT: 136.47 LBS | RESPIRATION RATE: 23 BRPM | DIASTOLIC BLOOD PRESSURE: 75 MMHG | OXYGEN SATURATION: 96 % | TEMPERATURE: 98.9 F | BODY MASS INDEX: 25.11 KG/M2 | SYSTOLIC BLOOD PRESSURE: 161 MMHG | HEART RATE: 58 BPM

## 2020-10-05 LAB
GLUCOSE BLDC GLUCOMTR-MCNC: 114 MG/DL (ref 70–99)
MAGNESIUM SERPL-MCNC: 2 MG/DL (ref 1.6–2.3)
PHOSPHATE SERPL-MCNC: 3.6 MG/DL (ref 2.5–4.5)
POTASSIUM SERPL-SCNC: 3.6 MMOL/L (ref 3.4–5.3)

## 2020-10-05 PROCEDURE — 250N000013 HC RX MED GY IP 250 OP 250 PS 637: Performed by: PHYSICIAN ASSISTANT

## 2020-10-05 PROCEDURE — 84100 ASSAY OF PHOSPHORUS: CPT | Performed by: PHYSICIAN ASSISTANT

## 2020-10-05 PROCEDURE — 999N000157 HC STATISTIC RCP TIME EA 10 MIN

## 2020-10-05 PROCEDURE — 250N000011 HC RX IP 250 OP 636: Performed by: PHYSICIAN ASSISTANT

## 2020-10-05 PROCEDURE — 250N000013 HC RX MED GY IP 250 OP 250 PS 637: Performed by: STUDENT IN AN ORGANIZED HEALTH CARE EDUCATION/TRAINING PROGRAM

## 2020-10-05 PROCEDURE — 999N000009 HC STATISTIC AIRWAY CARE

## 2020-10-05 PROCEDURE — 96372 THER/PROPH/DIAG INJ SC/IM: CPT | Performed by: PHYSICIAN ASSISTANT

## 2020-10-05 PROCEDURE — 999N000190 HC STATISTIC VAT ROUNDS

## 2020-10-05 PROCEDURE — 84132 ASSAY OF SERUM POTASSIUM: CPT | Performed by: PHYSICIAN ASSISTANT

## 2020-10-05 PROCEDURE — 999N001017 HC STATISTIC GLUCOSE BY METER IP

## 2020-10-05 PROCEDURE — 94003 VENT MGMT INPAT SUBQ DAY: CPT

## 2020-10-05 PROCEDURE — 99291 CRITICAL CARE FIRST HOUR: CPT | Performed by: INTERNAL MEDICINE

## 2020-10-05 PROCEDURE — 83735 ASSAY OF MAGNESIUM: CPT | Performed by: PHYSICIAN ASSISTANT

## 2020-10-05 RX ORDER — AMLODIPINE BESYLATE 10 MG/1
10 TABLET ORAL DAILY
DISCHARGE
Start: 2020-10-06

## 2020-10-05 RX ORDER — CHLORHEXIDINE GLUCONATE ORAL RINSE 1.2 MG/ML
15 SOLUTION DENTAL EVERY 12 HOURS
DISCHARGE
Start: 2020-10-05

## 2020-10-05 RX ORDER — CARBOXYMETHYLCELLULOSE SODIUM 5 MG/ML
2 SOLUTION/ DROPS OPHTHALMIC
DISCHARGE
Start: 2020-10-05

## 2020-10-05 RX ORDER — AMANTADINE HYDROCHLORIDE 100 MG/1
100 CAPSULE, GELATIN COATED ORAL 2 TIMES DAILY
DISCHARGE
Start: 2020-10-05

## 2020-10-05 RX ORDER — ASPIRIN 81 MG/1
81 TABLET, CHEWABLE ORAL DAILY
DISCHARGE
Start: 2020-10-06

## 2020-10-05 RX ORDER — LEVETIRACETAM 100 MG/ML
1500 SOLUTION ORAL EVERY 12 HOURS
DISCHARGE
Start: 2020-10-05

## 2020-10-05 RX ORDER — BISACODYL 10 MG
10 SUPPOSITORY, RECTAL RECTAL DAILY PRN
DISCHARGE
Start: 2020-10-05

## 2020-10-05 RX ORDER — ACETAMINOPHEN 160 MG
TABLET,DISINTEGRATING ORAL 2 TIMES DAILY
DISCHARGE
Start: 2020-10-05

## 2020-10-05 RX ORDER — CLOPIDOGREL BISULFATE 75 MG/1
75 TABLET ORAL DAILY
DISCHARGE
Start: 2020-09-10 | End: 2020-12-09

## 2020-10-05 RX ORDER — ACETAMINOPHEN 325 MG/1
650 TABLET ORAL EVERY 4 HOURS PRN
DISCHARGE
Start: 2020-10-05

## 2020-10-05 RX ORDER — LISINOPRIL 5 MG/1
5 TABLET ORAL DAILY
DISCHARGE
Start: 2020-10-06

## 2020-10-05 RX ORDER — METOPROLOL TARTRATE 100 MG
100 TABLET ORAL 2 TIMES DAILY
DISCHARGE
Start: 2020-10-05

## 2020-10-05 RX ORDER — HEPARIN SODIUM 5000 [USP'U]/.5ML
5000 INJECTION, SOLUTION INTRAVENOUS; SUBCUTANEOUS EVERY 12 HOURS
DISCHARGE
Start: 2020-10-05

## 2020-10-05 RX ORDER — ATORVASTATIN CALCIUM 40 MG/1
40 TABLET, FILM COATED ORAL DAILY
DISCHARGE
Start: 2020-10-05

## 2020-10-05 RX ADMIN — HEPARIN SODIUM 5000 UNITS: 5000 INJECTION, SOLUTION INTRAVENOUS; SUBCUTANEOUS at 08:29

## 2020-10-05 RX ADMIN — HYDROGEN PEROXIDE: 30 LIQUID TOPICAL at 08:36

## 2020-10-05 RX ADMIN — METOPROLOL TARTRATE 100 MG: 50 TABLET, FILM COATED ORAL at 08:32

## 2020-10-05 RX ADMIN — CLOPIDOGREL BISULFATE 75 MG: 75 TABLET, FILM COATED ORAL at 08:33

## 2020-10-05 RX ADMIN — LEVETIRACETAM 1500 MG: 100 SOLUTION ORAL at 08:40

## 2020-10-05 RX ADMIN — ASPIRIN 81 MG 81 MG: 81 TABLET ORAL at 08:33

## 2020-10-05 RX ADMIN — AMANTADINE HYDROCHLORIDE 100 MG: 100 CAPSULE ORAL at 05:36

## 2020-10-05 RX ADMIN — VALPROIC ACID 500 MG: 250 SOLUTION ORAL at 08:34

## 2020-10-05 RX ADMIN — AMANTADINE HYDROCHLORIDE 100 MG: 100 CAPSULE ORAL at 12:00

## 2020-10-05 RX ADMIN — HYDRALAZINE HYDROCHLORIDE 10 MG: 20 INJECTION INTRAMUSCULAR; INTRAVENOUS at 08:05

## 2020-10-05 RX ADMIN — CHLORHEXIDINE GLUCONATE 15 ML: 1.2 RINSE ORAL at 08:28

## 2020-10-05 RX ADMIN — DOCUSATE SODIUM 50 MG AND SENNOSIDES 8.6 MG 1 TABLET: 8.6; 5 TABLET, FILM COATED ORAL at 12:00

## 2020-10-05 RX ADMIN — LISINOPRIL 5 MG: 5 TABLET ORAL at 08:33

## 2020-10-05 RX ADMIN — AMLODIPINE BESYLATE 10 MG: 10 TABLET ORAL at 08:29

## 2020-10-05 RX ADMIN — PANTOPRAZOLE SODIUM 40 MG: 40 TABLET, DELAYED RELEASE ORAL at 08:29

## 2020-10-05 ASSESSMENT — ACTIVITIES OF DAILY LIVING (ADL)
ADLS_ACUITY_SCORE: 22

## 2020-10-05 ASSESSMENT — MIFFLIN-ST. JEOR: SCORE: 1067.25

## 2020-10-05 NOTE — PROGRESS NOTES
CLINICAL NUTRITION SERVICES - REASSESSMENT NOTE      Malnutrition:  (9/14)  % Weight Loss:  Unable to obtain without a nutrition history   % Intake:  </= 50% for >/= 5 days (severe malnutrition)  Subcutaneous Fat Loss:  None observed  Muscle Loss:  None observed  Fluid Retention:  None noted     Malnutrition Diagnosis: Unable to determine due to lack of a nutrition history (patient intubated and not able to provide)       EVALUATION OF PROGRESS TOWARD GOALS   Diet:  NPO on vent     Nutrition Support:  Patient continues on goal TF regimen as follows ~    Nutrition Support Enteral:  Type of Feeding Tube: NG  Enteral Frequency:  Continuous  Enteral Regimen: Isosource 1.5 at 40 mL/hr   Total Enteral Provisions: 1440 kcal (27 kcal/kg), 65 g protein (1.2 g/kg), 169 g CHO, 14 g fiber, 730 H2O  Free Water Flush: 100 mL every 4 hours     Intake/Tolerance:    K normal      Last BM 10/3 (x3)  I/O 1560/1350, wt 61.9 kg (down 4.3 kg from dosing wt) now 123% IBW      ASSESSED NUTRITION NEEDS:  Dosing Weight 53 kg (adjusted for overweight)   Estimated Energy Needs: 3704-9458 kcals (25-30 Kcal/Kg)  Justification: maintenance and overweight  Estimated Protein Needs: 65-80 grams protein (1.2-1.5 g pro/Kg)  Justification: preservation of lean body mass      NEW FINDINGS:   Plan LTACH once bed available     Previous Goals (10/1):   TF Isosource 1.5 at 40 mL/hr will continue to meet % estimated needs  Evaluation: Met    Previous Nutrition Diagnosis (10/1):   No nutrition diagnosis identified at this time  Evaluation: No change      CURRENT NUTRITION DIAGNOSIS  No nutrition diagnosis identified at this time     INTERVENTIONS  Recommendations / Nutrition Prescription  Continue Isosource 1.5 at 40 mL/hr as above     Implementation  Collaboration and Referral of Nutrition care:  Patient discussed today during interdisciplinary bedside rounds     Goals  Isosource 1.5 at 40 mL/hr will continue to meet % needs     MONITORING  AND EVALUATION:  Progress towards goals will be monitored and evaluated per protocol and Practice Guidelines    Callie Johnston RD, LD, CNSC   Clinical Dietitian - [facility]

## 2020-10-05 NOTE — PROGRESS NOTES
Care Management Discharge Note    Discharge Planning:  Expected Discharge Date: 10/5/2020  Concerns to be Addressed:         Anticipated Discharge Disposition:  LTACH  Anticipated Discharge Services:  LTACH-MD, RN, RT, PT, OT,SLP  Anticipated Discharge DME:  JESSICA    Patient/family educated on Medicare website which has current facility and service quality ratings:    Referrals Placed by CM/SW:  yes  Education Provided on the Discharge Plan:  yes  Patient/Family in Agreement with the Plan:  yes  Disposition Comments:  To [facility] transport ride at 2:00p,. Family updated.Admitting MD is Dr. Park ([facility]) [phone]           Sonia Chung, RN

## 2020-10-05 NOTE — PLAN OF CARE
Shift Summary [id number]:    No acute change overnight. Neuro status remains unchanged.    Alpa Livingston RN

## 2020-10-05 NOTE — PROGRESS NOTES
[facility] ICU RESPIRATORY NOTE           Date of Admission: 9/5/2020     Date of Intubation (most recent):  9/11/20     Reason for Mechanical Ventilation: AW protection 2 stroke     Number of Days on Mechanical Ventilation:  24     Significant Events Today:  None overnight     ABG Results:         Recent Labs   Lab 09/27/20  0427   O2PER 40       Current Vent Settings: Ventilation Mode: CMV/AC  (Continuous Mandatory Ventilation/ Assist Control)  FiO2 (%): 25 %  Rate Set (breaths/minute): 12 breaths/min  Tidal Volume Set (mL): 500 mL  PEEP (cm H2O): 5 cmH2O  Pressure Support (cm H2O): 10 cmH2O  Oxygen Concentration (%): 25 %  Resp: 18        Plan:  Pt to remain on full vent support.  Assess SBT per MD order    Steve Rodriguez, RT

## 2020-10-05 NOTE — PLAN OF CARE
Patient transferred to [facility] for continuation of care /  rehab. Report given to staff RN Dottie. Previous discussion with family about upcoming transfer. All questions answered and concerns addressed at this time.

## 2020-10-05 NOTE — PROGRESS NOTES
Critical Care Progress Note  10/05/2020    Name: Sherrell Leonard MRN#: [id number]   Age: 74 year old YOB: 1945     [facility] Day# 30           Problem List:   Active Problems:    Acute encephalopathy           Summary/Hospital Course:   Ms. 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 Hospital course was complicated by status epilepticus, requiring multiple anti-epileptics. Sedation is being slowly weaned, however no following commands has been noted (opening eyes). EEG has remained improved. She underwent trach and PEG placement on 9/30.     Assessment and plan :     Sherrell Leonard is a 74 year old female admitted on 9/5/2020 for left MCA subcortical stroke and left PCA stroke with stenotic vessels, with course complicated by status epilepticus, now with ongoing impaired mental status despite improved EEG, s/p trach and PEG.   I have personally reviewed the daily labs, imaging studies, cultures and discussed the case with referring physician and consulting physicians.   My assessment and plan by system for this patient is as follows:    Neurology/Psychiatry:   1. Left MCA subcortical stroke, Left PCA stroke  2. Status epilepticus, resolved  3. Altered mental status  Plan  -Appreciate neuroICU assistance  -Keppra 1500 mg BID  -Valproate 500 mg BID  -amantadine 100 mg BID  -asa 81 mg daily indefinitely   -atorvastatin 40 mg daily   -clopidogrel 75 mg daily planned for 90 days from 9/10/20.   -SBP goal of 140-170  -s/p Trach/PEG.  -Will need LTACH, she is stable for transfer.     Cardiovascular:   1. Hypertension  Plan  -amlodipine 10 mg daily   -lisinopril 5 mg daily   -metoprolol 100 mg BID    Pulmonary/Ventilator Management:   1. Intubated for airway protection, s/p Trach on 9/30  2. Staph aureus in sputum, s/p course of ceftriaxone  Plan  - PST as able  - wean vent as able  - will need  LTACH    GI/Nutrition :   1. Enteral feedings  2. Stress ulcer prophylaxis   Plan  -Tube feeds  -pantoprazole for GI ppx    Renal/Fluids/Electrolytes:   1. Hypernatremia  Plan  - free water with tube feeds  - monitor function and electrolytes as needed with replacement per ICU protocols.  - generally avoid nephrotoxic agents such as NSAID, IV contrast unless specifically required  - adjust medications as needed for renal clearance  - follow I/O's as appropriate.    Infectious Disease:   1. Staph aureus tracheitis vs pneumonia  Plan  - s/p course of ceftriaxone.     Endocrine:   1. Stress induced hyperglycemia  Plan  - ICU insulin protocol, goal sugar <180    Hematology/Oncology:   1. Anemia, no signs, symptoms of active blood loss  Plan  - Monitor, transfuse for Hgb <7    MSK/Rheum:  1. No acute issues  Plan  - monitor    IV/Access:   1. Venous access - PICC  2. Arterial access - not indicated  Plan  - central access required and necessary    ICU Prophylaxis:   1. DVT: mechanical  2. VAP: HOB 30 degrees, chlorhexidine rinse  3. Stress Ulcer: PPI  4. Restraints: Nonviolent soft two point restraints required and necessary for patient safety and continued cares and good effect as patient continues to pull at necessary lines, tubes despite education and distraction. Will readdress daily.   5. Wound care - per unit routine   6. Feeding - tube feeds  7. Family Update: not yet completed today.   8. Disposition - LTACH, stable for transfer when bed available.     Key goals for next 24 hours:   1.  Continue daily SBTs  2.  Transfer to LTACH when able        Interim History/Overnight Events:   No acute events overnight. Not responding to commands this AM. NeuroICU signed off.          Key Medications:       amantadine  100 mg Oral 2 times per day     amLODIPine  10 mg Oral or Feeding Tube Daily     aspirin  81 mg Per Feeding Tube Daily     atorvastatin  40 mg Oral or NG Tube Daily at 8 pm     chlorhexidine  15 mL Mouth/Throat  Q12H     clopidogrel  75 mg Oral or Feeding Tube Daily     heparin ANTICOAGULANT  5,000 Units Subcutaneous Q12H     hydrogen peroxide   Topical BID     levETIRAcetam  1,500 mg Oral or PEG tube Q12H     lisinopril  5 mg Oral Daily     metoprolol tartrate  100 mg Per Feeding Tube BID     pantoprazole  40 mg Per Feeding Tube Daily     sodium chloride (PF)  10 mL Intracatheter Q8H     valproic acid  500 mg Oral or PEG tube Q12H       dextrose Stopped (09/30/20 1911)     - MEDICATION INSTRUCTIONS -       sodium chloride Stopped (10/02/20 0000)               Physical Examination:   Temp:  [99  F (37.2  C)-100.7  F (38.2  C)] 99  F (37.2  C)  Pulse:  [52-68] 58  Resp:  [11-20] 11  BP: (119-184)/(57-97) 145/66  FiO2 (%):  [25 %] 25 %  SpO2:  [92 %-97 %] 93 %    Intake/Output Summary (Last 24 hours) at 10/5/2020 0751  Last data filed at 10/5/2020 0600  Gross per 24 hour   Intake 1560 ml   Output 1350 ml   Net 210 ml     Wt Readings from Last 4 Encounters:   10/05/20 61.9 kg (136 lb 7.4 oz)     BP - Mean:  [] 95  Ventilation Mode: CMV/AC  (Continuous Mandatory Ventilation/ Assist Control)  FiO2 (%): 25 %  Rate Set (breaths/minute): 12 breaths/min  Tidal Volume Set (mL): 500 mL  PEEP (cm H2O): 5 cmH2O  Oxygen Concentration (%): 25 %  Resp: 11    No lab results found in last 7 days.    GEN: no acute distress, lying in bed  HEENT: head ncat, sclera anicteric, trachea midline. Some left gaze preference noted.   PULM: unlabored synchronous with vent, clear anteriorly    CV/COR: RRR, normal S1 and S2. No gallop, rub, nor murmur  ABD: soft, non-tender, non-distended. Hypoactive bowel sounds, no mass  MSK/EXT:  1+ LE edema. WWP.  NEURO: PERRL, left gaze preference. Withdraws to pain in RUE, flexion of bilateral LE to noxious stimuli. No spontaneous movements noted.   SKIN: no obvious rash  LINES: clean, dry intact         Data:   All data and imaging reviewed.     ROUTINE ICU LABS (Last four results)  CMP  Recent Labs   Lab  10/05/20  0542 10/03/20  1007 10/03/20  0442 10/02/20  1650 10/02/20  0056 10/02/20  0056   NA  --   --  142  --   --   --    POTASSIUM 3.6 3.8 3.2* 3.4   < > 2.9*   CHLORIDE  --   --  107  --   --   --    CO2  --   --  32  --   --   --    ANIONGAP  --   --  3  --   --   --    GLC  --   --  122*  --   --   --    BUN  --   --  16  --   --   --    CR  --   --  0.46*  --   --   --    GFRESTIMATED  --   --  >90  --   --   --    GFRESTBLACK  --   --  >90  --   --   --    MARII  --   --  8.4*  --   --   --    MAG 2.0  --   --   --   --  1.9   PHOS 3.6  --   --   --   --   --    PROTTOTAL  --   --  5.2*  --   --   --    ALBUMIN  --   --  1.8*  --   --   --    BILITOTAL  --   --  0.5  --   --   --    ALKPHOS  --   --  59  --   --   --    AST  --   --  13  --   --   --    ALT  --   --  19  --   --   --     < > = values in this interval not displayed.     CBC  Recent Labs   Lab 10/03/20  0442   WBC 7.9   RBC 3.19*   HGB 10.0*   HCT 32.0*      MCH 31.3   MCHC 31.3*   RDW 14.3        INRNo lab results found in last 7 days.  Arterial Blood GasNo lab results found in last 7 days.    All cultures:  No results for input(s): CULT in the last 168 hours.  No results found for this or any previous visit (from the past 24 hour(s)).              Billing: This patient is critically ill: Yes. Total critical care time today 40 min.    Carlos Rolle MD    Department of Medicine, Division of Pulmonary, Allergy, Critical Care, and Sleep Medicine  Pager: [phone]

## 2020-10-05 NOTE — DISCHARGE SUMMARY
"Discharge Summary    Sherrell Leonard MRN# [id number]   YOB: 1945 Age: 74 year old     Date of Admission:  9/5/2020  Date of Discharge:  10/5/2020  Admitting Physician:  Braden Noriega MD  Discharge Physician:  Carlos Rolle MD  Discharging Service:  Critical Care     Home clinic: none  Primary Provider: No Ref-Primary, Physician          Admission Diagnoses:   Troponin level elevated [R77.8]  Hypertensive urgency [I16.0]  Urinary tract infection without hematuria, site unspecified [N39.0]  Altered mental status, unspecified altered mental status type [R41.82]          Discharge Diagnosis:   Patient Active Problem List   Diagnosis     Acute encephalopathy  Left hemispheric strokes due to Left MCA M1 segment stenosis                 Discharge Disposition:   Transferred to LTACH           Condition on Discharge:   Discharge condition: Stable   Discharge vitals: Blood pressure (!) 148/72, pulse 56, temperature 98.9  F (37.2  C), temperature source Axillary, resp. rate 13, height 1.575 m (5' 2\"), weight 61.9 kg (136 lb 7.4 oz), SpO2 95 %.     Code status on discharge: Full Code           Procedures / Labs / Imaging:   Invasive procedures:   9/13/20  Cerebral angiogram with balloon angioplasty of left MCA M1 segment by neurosurgery.   9/29/20 PEG tube placement by general surgery  9/29/20 Tracheostomy by Thoracic surgery     Imaging performed: Selection of significant imaging summarized  9/5/20 CT head: Patchy and confluent white matter hypoattenuation likely  representing advanced chronic small vessel ischemic change. This is  most focal involving the left basal ganglia/centrum semiovale. Acute  ischemia cannot be completely excluded. MRI could be performed if  Indicated.  9/5/20 CTA head/neck: 1. Multiple high-grade intracranial stenoses as detailed including high-grade stenosis of the left middle cerebral artery M1 segment. CTA Neck: No evidence of flow limiting stenosis at the carotid  bifurcations. "   9/5/20 MR Brain: 1. Acute infarcts involving the left basal ganglia, centrum semiovale, and corona radiata/frontal lobe. 2. Acute infarcts involving the left occipital lobe. 3. No evidence of hemorrhage or significant mass effect. 4. Volume loss, chronic small vessel ischemic change, and multiple old lacunar infarcts.  10/3/20 MR Brain: Stable scan pattern of evolving subacute infarcts. There is no hemorrhagic transformation. No convincing evidence for new  infarcts.          Medications Prior to Admission:     Medications Prior to Admission   Medication Sig Dispense Refill Last Dose     Multiple Vitamins-Minerals (SYSTANE ICAPS AREDS2) CAPS Take 1 capsule by mouth daily   9/5/2020 at Unknown time     multivitamin, therapeutic (THERA-VIT) TABS tablet Take 1 tablet by mouth daily   9/5/2020 at Unknown time             Discharge Medications:     Current Discharge Medication List      START taking these medications    Details   acetaminophen (TYLENOL) 325 MG tablet Take 2 tablets (650 mg) by mouth every 4 hours as needed for mild pain  Qty:      Associated Diagnoses: Altered mental status, unspecified altered mental status type; Urinary tract infection without hematuria, site unspecified; Hypertensive urgency; Troponin level elevated; Acute arterial ischemic stroke, multifocal, anterior circulation, left (H); Acute encephalopathy      amantadine (SYMMETREL) 100 MG capsule Take 1 capsule (100 mg) by mouth 2 times daily    Associated Diagnoses: Altered mental status, unspecified altered mental status type; Urinary tract infection without hematuria, site unspecified; Hypertensive urgency; Troponin level elevated; Acute arterial ischemic stroke, multifocal, anterior circulation, left (H); Acute encephalopathy      amLODIPine (NORVASC) 10 MG tablet 1 tablet (10 mg) by Oral or Feeding Tube route daily  Qty:      Associated Diagnoses: Altered mental status, unspecified altered mental status type; Urinary tract infection  without hematuria, site unspecified; Hypertensive urgency; Troponin level elevated; Acute arterial ischemic stroke, multifocal, anterior circulation, left (H); Acute encephalopathy      aspirin (ASA) 81 MG chewable tablet 1 tablet (81 mg) by Per Feeding Tube route daily  Qty:      Associated Diagnoses: Altered mental status, unspecified altered mental status type; Urinary tract infection without hematuria, site unspecified; Hypertensive urgency; Troponin level elevated; Acute arterial ischemic stroke, multifocal, anterior circulation, left (H); Acute encephalopathy      atorvastatin (LIPITOR) 40 MG tablet 1 tablet (40 mg) by Oral or NG Tube route daily    Associated Diagnoses: Altered mental status, unspecified altered mental status type; Urinary tract infection without hematuria, site unspecified; Hypertensive urgency; Troponin level elevated; Acute arterial ischemic stroke, multifocal, anterior circulation, left (H); Acute encephalopathy      bisacodyl (DULCOLAX) 10 MG suppository Place 1 suppository (10 mg) rectally daily as needed for constipation  Qty:      Associated Diagnoses: Altered mental status, unspecified altered mental status type; Urinary tract infection without hematuria, site unspecified; Hypertensive urgency; Troponin level elevated; Acute arterial ischemic stroke, multifocal, anterior circulation, left (H); Acute encephalopathy      carboxymethylcellulose PF (REFRESH PLUS) 0.5 % ophthalmic solution Apply 2 drops to eye every hour as needed for dry eyes  Qty:      Associated Diagnoses: Altered mental status, unspecified altered mental status type; Urinary tract infection without hematuria, site unspecified; Hypertensive urgency; Troponin level elevated; Acute arterial ischemic stroke, multifocal, anterior circulation, left (H); Acute encephalopathy      chlorhexidine (PERIDEX) 0.12 % solution Take 15 mLs by mouth every 12 hours  Qty:      Associated Diagnoses: Altered mental status, unspecified  altered mental status type; Urinary tract infection without hematuria, site unspecified; Hypertensive urgency; Troponin level elevated; Acute arterial ischemic stroke, multifocal, anterior circulation, left (H); Acute encephalopathy      clopidogrel (PLAVIX) 75 MG tablet 1 tablet (75 mg) by Oral or Feeding Tube route daily    Associated Diagnoses: Altered mental status, unspecified altered mental status type; Urinary tract infection without hematuria, site unspecified; Hypertensive urgency; Troponin level elevated; Acute arterial ischemic stroke, multifocal, anterior circulation, left (H); Acute encephalopathy      Heparin Sodium, Porcine, (HEPARIN ANTICOAGULANT) 5000 UNIT/0.5ML injection Inject 0.5 mLs (5,000 Units) Subcutaneous every 12 hours  Qty:      Associated Diagnoses: Altered mental status, unspecified altered mental status type; Urinary tract infection without hematuria, site unspecified; Hypertensive urgency; Troponin level elevated; Acute arterial ischemic stroke, multifocal, anterior circulation, left (H); Acute encephalopathy      hydrogen peroxide 3 % solution Apply topically 2 times daily To stoma site  Qty:      Associated Diagnoses: Altered mental status, unspecified altered mental status type; Urinary tract infection without hematuria, site unspecified; Hypertensive urgency; Troponin level elevated; Acute arterial ischemic stroke, multifocal, anterior circulation, left (H); Acute encephalopathy      levETIRAcetam (KEPPRA) 100 MG/ML solution 15 mLs (1,500 mg) by Oral or PEG tube route every 12 hours  Qty:      Associated Diagnoses: Altered mental status, unspecified altered mental status type; Urinary tract infection without hematuria, site unspecified; Hypertensive urgency; Troponin level elevated; Acute arterial ischemic stroke, multifocal, anterior circulation, left (H); Acute encephalopathy      lisinopril (ZESTRIL) 5 MG tablet Take 1 tablet (5 mg) by mouth daily  Qty:      Associated  Diagnoses: Altered mental status, unspecified altered mental status type; Urinary tract infection without hematuria, site unspecified; Hypertensive urgency; Troponin level elevated; Acute arterial ischemic stroke, multifocal, anterior circulation, left (H); Acute encephalopathy      metoprolol tartrate (LOPRESSOR) 100 MG tablet 1 tablet (100 mg) by Per Feeding Tube route 2 times daily  Qty:      Associated Diagnoses: Altered mental status, unspecified altered mental status type; Urinary tract infection without hematuria, site unspecified; Hypertensive urgency; Troponin level elevated; Acute arterial ischemic stroke, multifocal, anterior circulation, left (H); Acute encephalopathy      pantoprazole (PROTONIX) 2 mg/mL SUSP suspension 20 mLs (40 mg) by Per Feeding Tube route daily  Qty:      Associated Diagnoses: Altered mental status, unspecified altered mental status type; Urinary tract infection without hematuria, site unspecified; Hypertensive urgency; Troponin level elevated; Acute arterial ischemic stroke, multifocal, anterior circulation, left (H); Acute encephalopathy      valproic acid (DEPAKENE) 250 MG/5ML syrup 10 mLs (500 mg) by Oral or PEG tube route every 12 hours  Qty:      Associated Diagnoses: Altered mental status, unspecified altered mental status type; Urinary tract infection without hematuria, site unspecified; Hypertensive urgency; Troponin level elevated; Acute arterial ischemic stroke, multifocal, anterior circulation, left (H); Acute encephalopathy         STOP taking these medications       Multiple Vitamins-Minerals (SYSTANE ICAPS AREDS2) CAPS Comments:   Reason for Stopping:         multivitamin, therapeutic (THERA-VIT) TABS tablet Comments:   Reason for Stopping:                     Consultations:   Consultation during this admission received from neurology, surgery, palliative, neurosurgery, critical care             Brief History of Illness and hospital course:   Ms. Leonard is a 74 year old  woman with a history of limited medical contact, who presented on 9/5 after left MCA subcortical stroke and left PCA stroke with stenotic vessels. Hospital course was complicated by status epilepticus, requiring multiple anti-epileptics. Sedation was slowly weaned, however no following commands has been noted (opening eyes only). EEG showed resolution of epilepticus. Sedative meds were stopped, and she has been placed on keppra and valproate only. She is also on aspirin 81 mg daily indefinitely, clopidogrel 75 mg daily for 90 days from 9/10/20, and atovatatin 40 mg daily. She underwent trach and PEG placement on 9/30. Her blood pressure was elevated, and has been controlled with amlodipine 10 mg dialy, lisinopril 5 mg daily, and metoprolol 100 mg BID. She has been mantained on tube feeds via PEG. She has had some hypernatremia, which is controlled with free water flushes. During her admission, she did have staph aureus (MSSA) grow on sputum culture, and completed a course of ceftriaxone for tracheitis vs pneumonia.        Significant Results:   As above.              Pending Results:   None           Discharge Instructions and Follow-Up:   Discharge diet: Tube feeds   Discharge activity: Activity as tolerated   [facility]up[facility]    Outpatient therapy: Therapies per [facility]   Home Care agency: None    Supplies and equipment: None   Lines and drains: Shiley 36 cuffed tracheostomy, PEG   Wound care: None   Other instructions: None

## 2020-10-07 LAB
FUNGUS SPEC CULT: NORMAL
SPECIMEN SOURCE: NORMAL

## 2023-11-21 NOTE — H&P
[facility]    History and Physical - Hospitalist Service       Date of Admission:  9/5/2020    Assessment & Plan   Sherrell Leonard is a 74 year old female admitted on 9/5/2020.  No known past medical history secondary to no routine medical care.  Her  brings her in secondary to confusion over the past 2 days.    Acute encephalopathy suspect secondary to CVA  Rule out PRES/hypertensive emergency  Presents with confusion as above, seems to have dense expressive aphasia and unable to follow commands.  Slight right lower facial droop on exam but no appreciable focal weakness.  Admission EKG shows left bundle branch block, unclear if acute but appears to be in no distress, initial troponin slightly elevated at 0.051.  Admission UA mildly abnormal.  No significant electrolyte abnormalities.  Admission head CT with possible acute ischemia involving the left basal ganglia/centrum semiovale.  Admission CTA shows multiple intracranial stenoses including high-grade stenosis of the left middle cerebral artery M1 segment.  Neurology was contacted by ED provider who recommended MRI to assess for stroke versus PRES, but they did not feel pres was present on CT imaging.  -Telemetry  -Serial troponin  -lipid panel and a1c and LFT's  -TTE  -Neurochecks every 4 hours  -Start aspirin and statin  -PT/OT/SLP evaluations  -Neurology consult  -permissive HTN for now; if signs of PRES will initiate more aggressive BP control    EKG with LBBB and mild trop elevation  - cardiac work-up and monitoring as above    Possible UTI  - ceftriaxone pending urine culture     Diet: NPO  DVT Prophylaxis: Pneumatic Compression Devices  Sinclair Catheter: not present  Code Status: Full code per          Disposition Plan   Expected discharge: 2 - 3 days, recommended to prior living arrangement once work-up complete, confusion improved.  Entered: Braden Noriega MD 09/05/2020, 4:49 PM     The patient's care was discussed with the  Patient and Patient's Family.    Braden Noriega MD  [facility]    ______________________________________________________________________    Chief Complaint   Confusion    History is obtained from the patient's  who is at bedside, discussion with emergency room provider.  The patient is unable to provide any history secondary to confusion.    History of Present Illness   Sherrell Leonard is a 74 year old female who presents with confusion.  Her  reports he noticed some mild confusion on Thursday.  He also reports some slight slurring of the speech.  He noted no focal weakness or facial droop, however, his granddaughter reported to him that she felt a facial droop was present.  They had dinner with some friends a couple evenings ago who contacted him later on expressing some concern for her confusion.  He notes that she has had trouble walking but this is not necessarily an acute issue.    The patient is unable to provide any reliable history.  She is unable to reliably answer even yes/no questions.  She is unable to state her name.  She seems to have dense expressive aphasia and has significant difficulty following even simple commands    Review of Systems    The 10 point Review of Systems is negative other than noted in the HPI or here.     Past Medical History    No known medical history secondary to lack of medical care.    Past Surgical History    reports no prior surgeries.    Social History   Has been reports approximately 30-pack-year history of tobacco abuse, having quit 12 years ago.  He and his wife drink 1 alcoholic beverage per day.    Family History      reports no known significant family history of heart disease or stroke.    Prior to Admission Medications   Prior to Admission Medications   Prescriptions Last Dose Informant Patient Reported? Taking?   Multiple Vitamins-Minerals (SYSTANE ICAPS AREDS2) CAPS 9/5/2020 at Unknown time Spouse/Significant Other Yes Yes    Sig: Take 1 capsule by mouth daily   multivitamin, therapeutic (THERA-VIT) TABS tablet 9/5/2020 at Unknown time Spouse/Significant Other Yes Yes   Sig: Take 1 tablet by mouth daily      Facility-Administered Medications: None     Allergies   No Known Allergies    Physical Exam   Vital Signs: Temp: 98  F (36.7  C) Temp src: Oral BP: (!) 169/105 Pulse: 82   Resp: 17 SpO2: 93 % O2 Device: None (Room air)    Weight: 140 lbs 0 oz    General Appearance: Thin elderly female no acute distress  Eyes:  pupils equal, round reactive to light, sclera anicteric  HEENT: Mucous membranes moist, no neck adenopathy  Respiratory: Lungs clear to auscultation bilaterally, no wheeze or crackles, no tachypnea  Cardiovascular: Regular rhythm, normal S1/S2, no murmurs, rubs or gallops  GI: Abdomen soft, nontender, nondistended, normal bowel sounds  Lymph/Hematologic: No peripheral edema extremities warm well perfused  Musculoskeletal: Extremities warm and well-perfused  Neurologic: Appears to have slight right lower facial droop, cranial nerves otherwise appear intact though exam is limited by difficulty following commands, seems to have 5 of 5 strength in all extremities though again limited exam, unable to assess coordination or light touch, seems to have a significant expressive aphasia  Psychiatric: Unable to assess    Data   Data reviewed today: I reviewed all medications, new labs and imaging results over the last 24 hours. I personally reviewed the EKG tracing showing Left bundle branch block.    Recent Labs   Lab 09/05/20  1256   WBC 9.5   HGB 15.8*   MCV 97         POTASSIUM 3.3*   CHLORIDE 109   CO2 28   BUN 20   CR 0.82   ANIONGAP 7   MARII 9.7   *   TROPI 0.051*     Recent Results (from the past 24 hour(s))   Head CT w/o contrast    Narrative    CT SCAN OF THE HEAD WITHOUT CONTRAST September 5, 2020 2:48 PM     HISTORY: Confusion, unsteady.    TECHNIQUE: Axial images of the head and coronal reformations  without  IV contrast material. Radiation dose for this scan was reduced using  automated exposure control, adjustment of the mA and/or kV according  to patient size, or iterative reconstruction technique.    COMPARISON: None.    FINDINGS: Moderate volume loss is present. Patchy and confluent white  matter hypoattenuation likely represents advanced chronic small vessel  ischemic change. Hypoattenuation is most focal involving the left  basal ganglia/centrum semiovale. No evidence of hemorrhage, mass, mass  effect or hydrocephalus. The visualized calvarium, tympanic cavities,  mastoid cavities, and paranasal sinuses are unremarkable.      Impression    IMPRESSION: Patchy and confluent white matter hypoattenuation likely  representing advanced chronic small vessel ischemic change. This is  most focal involving the left basal ganglia/centrum semiovale. Acute  ischemia cannot be completely excluded. MRI could be performed if  indicated.    CHANELL HERNANDEZ MD   CTA Head Neck with Contrast    Narrative    CT ANGIOGRAM OF THE HEAD AND NECK WITH CONTRAST September 5, 2020 2:49  PM     HISTORY: Ataxia, stroke suspected.     TECHNIQUE: CT angiography with an injection of 90 mL [id number] IV  with scans through the head and neck. Images were transferred to a  separate 3-D workstation where multiplanar reformations and 3-D images  were created. Estimates of carotid stenoses are made relative to the  distal internal carotid artery diameters except as noted. Radiation  dose for this scan was reduced using automated exposure control,  adjustment of the mA and/or kV according to patient size, or iterative  reconstruction technique.    COMPARISON: None.     CT ANGIOGRAM HEAD FINDINGS: The bilateral vertebral arteries, basilar  artery, and posterior cerebral arteries are patent. Right vertebral  artery predominantly terminates in posterior inferior cerebellar  artery with diminutive V4 segment. The internal carotid arteries,  anterior  cerebral arteries, and middle cerebral arteries are patent.  Moderate to severe atherosclerotic plaquing is present. Severe  stenosis is present involving the left middle cerebral artery, the  distal M1 segment (series 9 image 406). Multifocal segmental stenoses  are present involving the anterior cerebral arteries, left worse than  right. Fetal origin of the right posterior cerebral artery. Multiple  moderate to severe stenoses are present involving the posterior  cerebral arteries.     CT ANGIOGRAM NECK FINDINGS: A 3 vessel aortic arch is present with  heavy atherosclerotic plaquing. The bilateral common carotid, anterior  carotid, external carotid, and vertebral arteries are patent. No  evidence of flow limiting stenosis at the carotid bifurcations. Mild  plaque is present at the left vertebral artery origin.     Extensive pulmonary emphysema is present. Nodular thickening at the  lung apices presumably represents chronic scarring/atelectasis.  Moderate to severe cervical spine degenerative change is present.  Scattered dental disease is present with multiple erosions and  periapical abscess noted.       Impression    IMPRESSION:   CTA Head: Multiple intracranial stenoses including high-grade stenosis  of the left middle cerebral artery M1 segment.   CTA Neck: No evidence of flow limiting stenosis at the carotid  bifurcations.    XR Chest 2 Views    Narrative    CHEST TWO VIEW   9/5/2020 3:23 PM     HISTORY: Hypertension, elevated troponin.    COMPARISON: None.      Impression    IMPRESSION: Two views of the chest. Mild interstitial lung changes are  noted bilaterally. No focal infiltrate or lung consolidation is  appreciated. Lungs are hypoaerated. Heart size appears within normal  limits. No pneumothorax or pleural effusions.    NASIM GARNETT MD      no gross abnormalities/neck

## (undated) DEVICE — TUBING SUCTION MEDI-VAC SOFT 3/16"X20' N520A

## (undated) DEVICE — ESU PENCIL W/HOLSTER E2350H

## (undated) DEVICE — ESU GROUND PAD UNIVERSAL W/O CORD

## (undated) DEVICE — PACK MINOR SBA15MIFSE

## (undated) DEVICE — SOL NACL 0.9% IRRIG 1000ML BOTTLE 2F7124

## (undated) DEVICE — DRSG DRAIN 4X4" 7086

## (undated) DEVICE — GLOVE GAMMEX DERMAPRENE ULTRA SZ 8 LF 8516

## (undated) DEVICE — SYR 10ML SLIP TIP W/O NDL

## (undated) DEVICE — GOWN IMPERVIOUS ZONED LG

## (undated) DEVICE — GLOVE PROTEXIS W/NEU-THERA 7.5  2D73TE75

## (undated) DEVICE — SOL WATER IRRIG 1000ML BOTTLE 2F7114

## (undated) DEVICE — LINEN GOWN OVERSIZE 5408

## (undated) DEVICE — GLOVE PROTEXIS W/NEU-THERA 6.5  2D73TE65

## (undated) DEVICE — SU ETHILON 3-0 FS-1 18" 669H

## (undated) DEVICE — Device

## (undated) DEVICE — LINEN TOWEL PACK X5 5464

## (undated) DEVICE — ESU ELEC BLADE 2.75" COATED/INSULATED E1455

## (undated) DEVICE — TUBE SMOKE EVAC 7/8"X10 (STERILE)

## (undated) DEVICE — SU PROLENE 2-0 RB-1DA 36" 8559H

## (undated) DEVICE — TUBE GASTROSTOMY MIC PULL PEG KIT 20FR 7160-20

## (undated) DEVICE — JELLY LUBRICATING SURGILUBE 4OZ TUBE

## (undated) DEVICE — PREP CHLORAPREP W/ORANGE TINT 10.5ML 930715

## (undated) DEVICE — DRAPE MINOR PROCEDURE LAP 29496

## (undated) RX ORDER — PROPOFOL 10 MG/ML
INJECTION, EMULSION INTRAVENOUS
Status: DISPENSED
Start: 2020-09-29

## (undated) RX ORDER — LIDOCAINE HYDROCHLORIDE 10 MG/ML
INJECTION, SOLUTION EPIDURAL; INFILTRATION; INTRACAUDAL; PERINEURAL
Status: DISPENSED
Start: 2020-09-06

## (undated) RX ORDER — FENTANYL CITRATE 50 UG/ML
INJECTION, SOLUTION INTRAMUSCULAR; INTRAVENOUS
Status: DISPENSED
Start: 2020-09-29

## (undated) RX ORDER — HEPARIN SODIUM 1000 [USP'U]/ML
INJECTION, SOLUTION INTRAVENOUS; SUBCUTANEOUS
Status: DISPENSED
Start: 2020-09-11

## (undated) RX ORDER — LIDOCAINE HYDROCHLORIDE 10 MG/ML
INJECTION, SOLUTION INFILTRATION; PERINEURAL
Status: DISPENSED
Start: 2020-09-11

## (undated) RX ORDER — HEPARIN SODIUM 200 [USP'U]/100ML
INJECTION, SOLUTION INTRAVENOUS
Status: DISPENSED
Start: 2020-09-11

## (undated) RX ORDER — FENTANYL CITRATE 50 UG/ML
INJECTION, SOLUTION INTRAMUSCULAR; INTRAVENOUS
Status: DISPENSED
Start: 2020-09-11